# Patient Record
Sex: FEMALE | Race: WHITE | Employment: OTHER | URBAN - METROPOLITAN AREA
[De-identification: names, ages, dates, MRNs, and addresses within clinical notes are randomized per-mention and may not be internally consistent; named-entity substitution may affect disease eponyms.]

---

## 2017-06-20 ENCOUNTER — OFFICE VISIT (OUTPATIENT)
Dept: ORTHOPEDIC SURGERY | Age: 72
End: 2017-06-20

## 2017-06-20 VITALS
SYSTOLIC BLOOD PRESSURE: 129 MMHG | DIASTOLIC BLOOD PRESSURE: 63 MMHG | HEART RATE: 68 BPM | HEIGHT: 67 IN | BODY MASS INDEX: 18.83 KG/M2 | WEIGHT: 120 LBS

## 2017-06-20 DIAGNOSIS — M47.812 CERVICAL SPONDYLOSIS WITHOUT MYELOPATHY: ICD-10-CM

## 2017-06-20 DIAGNOSIS — M50.30 DEGENERATION OF CERVICAL INTERVERTEBRAL DISC: ICD-10-CM

## 2017-06-20 DIAGNOSIS — M50.20 DISPLACEMENT OF CERVICAL INTERVERTEBRAL DISC WITHOUT MYELOPATHY: Primary | ICD-10-CM

## 2017-06-20 DIAGNOSIS — M54.12 RADICULOPATHY, CERVICAL: ICD-10-CM

## 2017-06-20 RX ORDER — LEFLUNOMIDE 10 MG/1
TABLET ORAL
Refills: 0 | COMMUNITY
Start: 2017-05-31 | End: 2018-05-07

## 2017-06-20 RX ORDER — GABAPENTIN 100 MG/1
100 CAPSULE ORAL
Qty: 90 CAP | Refills: 5 | Status: SHIPPED | OUTPATIENT
Start: 2017-06-20 | End: 2018-04-30 | Stop reason: SDUPTHER

## 2017-06-20 NOTE — MR AVS SNAPSHOT
Visit Information Date & Time Provider Department Dept. Phone Encounter #  
 6/20/2017 10:50 AM Tom Arshad MD 4 [facility], [address] and [facility] [place] [phone] [id number] Follow-up Instructions Return in about 6 months (around 12/20/2017). Upcoming Health Maintenance Date Due Hepatitis C Screening 1945 DTaP/Tdap/Td series (1 - Tdap) 10/25/1967 BREAST CANCER SCRN MAMMOGRAM 10/25/1996 FOBT Q 1 YEAR AGE 50-75 10/25/1996 ZOSTER VACCINE AGE 60> 10/25/2006 GLAUCOMA SCREENING Q2Y 10/25/2011 OSTEOPOROSIS SCREENING (DEXA) 10/25/2011 Pneumococcal 65+ Low/Medium Risk (1 of 2 - PCV13) 10/25/2011 MEDICARE YEARLY EXAM 10/25/2011 INFLUENZA AGE 9 TO ADULT 8/1/2017 Allergies as of 6/20/2017  Review Complete On: 6/20/2017 By: Tom Arshad MD  
  
 Severity Noted Reaction Type Reactions Hydrocodone High 11/30/2016    Anaphylaxis Nsaids (Non-steroidal Anti-inflammatory Drug) Medium 11/30/2016    Other (comments) None due to kidneys Azithromycin  11/30/2016    Itching Other Medication    Other (comments) GI intolerance to nonsteroidal antiinflammatory medications Vicodin [Hydrocodone-acetaminophen]    Other (comments) Current Immunizations  Never Reviewed No immunizations on file. Not reviewed this visit You Were Diagnosed With   
  
 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 
ICD-9-CM: 723.4 Vitals BP Pulse Height(growth percentile) Weight(growth percentile) BMI OB Status 129/63 68 5' 7\" (1.702 m) 120 lb (54.4 kg) 18.79 kg/m2 Hysterectomy Smoking Status Never Smoker Vitals History BMI and BSA Data Body Mass Index Body Surface Area 18.79 kg/m 2 1.6 m 2 Preferred Pharmacy Pharmacy Name Phone [facility], [address] [phone] Your Updated Medication List  
  
   
This list is accurate as of: 6/20/17 11:43 AM.  Always use your most recent med list.  
  
  
  
  
 acetaminophen 500 mg tablet Commonly known as:  TYLENOL  
500 mg.  
  
 aspirin 81 mg chewable tablet Take 81 mg by mouth daily. Biotin (Bulk) 100 % Powd  
Use.  
  
 cholecalciferol 400 unit Tab tablet Commonly known as:  VITAMIN D3 Take 800 Units by mouth. CIMZIA SC  
by SubCUTAneous route. clotrimazole 10 mg bradly Commonly known as:  MYCELEX  
prn FISH -1,000 mg capsule Generic drug:  fish oil- omega-3 fatty acids Take  by Mouth. folic acid 1 mg tablet Commonly known as:  FOLVITE  
  
 gabapentin 100 mg capsule Commonly known as:  NEURONTIN Take 1 Cap by mouth three (3) times daily (with meals). Indications: NEUROPATHIC PAIN  
  
 leflunomide 10 mg tablet Commonly known as:  ARAVA  
  
 methotrexate 2.5 mg tablet Commonly known as:  RHEUMATREX  
take 6 tablets by mouth every week  
  
 metoprolol tartrate 50 mg tablet Commonly known as:  LOPRESSOR  
1 po bid  
  
 multivitamin tablet Commonly known as:  ONE A DAY Take 1 Tab by Mouth Once a Day. omeprazole 40 mg capsule Commonly known as:  PRILOSEC  
i po q d  
  
 predniSONE 5 mg tablet Commonly known as:  DELTASONE  
2.5 mg.  
  
 sertraline 25 mg tablet Commonly known as:  ZOLOFT Take  by mouth daily. Prescriptions Sent to Pharmacy Refills  
 gabapentin (NEURONTIN) 100 mg capsule 5 Sig: Take 1 Cap by mouth three (3) times daily (with meals). Indications: NEUROPATHIC PAIN  Class: Normal  
 Pharmacy: [facility] [address], [address]  #: [phone] Route: Oral  
  
Follow-up Instructions Return in about 6 months (around 12/20/2017). Introducing [facility]! Anthonymag Adams introduces True Pivot patient portal. Now you can access parts of your medical record, email your doctor's office, and request medication refills online. 1. In your internet browser, go to [email or web address] 2. Click on the First Time User? Click Here link in the Sign In box. You will see the New Member Sign Up page. 3. Enter your True Pivot Access Code exactly as it appears below. You will not need to use this code after youve completed the sign-up process. If you do not sign up before the expiration date, you must request a new code. · True Pivot Access Code: [email or web address] Expires: 9/18/2017 10:53 AM 
 
4. Enter the last four digits of your Social Security Number (xxxx) and Date of Birth (mm/dd/yyyy) as indicated and click Submit. You will be taken to the next sign-up page. 5. Create a True Pivot ID. This will be your True Pivot login ID and cannot be changed, so think of one that is secure and easy to remember. 6. Create a True Pivot password. You can change your password at any time. 7. Enter your Password Reset Question and Answer. This can be used at a later time if you forget your password. 8. Enter your e-mail address. You will receive e-mail notification when new information is available in [address] 9. Click Sign Up. You can now view and download portions of your medical record. 10. Click the Download Summary menu link to download a portable copy of your medical information. If you have questions, please visit the Frequently Asked Questions section of the True Pivot website. Remember, True Pivot is NOT to be used for urgent needs. For medical emergencies, dial 911. Now available from your iPhone and Android! Please provide this summary of care documentation to your next provider. Your primary care clinician is listed as Mily Parham. If you have any questions after today's visit, please call [phone].

## 2017-06-20 NOTE — PROGRESS NOTES
[facility]  [address], [place]  Phone: [phone]  Fax: [phone]        PROGRESS NOTE      HISTORY OF PRESENT ILLNESS:  The patient is a 80 y.o. female and was seen today for follow up of neck pain extending to left elbow with paraesthesia of the left hand. Pt reports of RUE pain with weakness of the hands with  but denies progression. Previously, patient stated her neck pain symptoms extended into the left upper extremity with LUE symptoms localized from the wrist distally involving all of the fingers. She had some lesser proximal symptoms. She reported trouble raising LUE some days. Pt sees Dr. Anne Ding, a neurosurgeon at [facility], and had MRI there. She also reports widespread pain which she attributes to RA, noting she is in the process of switching to a new biologic. Pt is on Methotrexate for her rheumatoid arthritis. She is followed by Dr. Justa Otoole for RA. Pt has hx of squamous skin carcinoma and reports her oncologist did not appreciate the biologic. Pt continues with Neurontin 100 mg TID. She continues with her HEP 3-4/week. She denies dropping objects or LOB. Cervical spine MRI dated 4/19/14 per report revealed a disc osteophyte complex with superimposed left paracentral disc protrusion at C5-C6 and flattening of the left cervical cord with severe narrowing of the left neuroforamen. There were degenerative changes at the remaining levels. At her last clinical appointment, she declined increased Neurontin dosage at that time. Patient wished to continue her current treatment. She was provided with refills of Neurontin 100 mg TID. I recommended she increase the frequency of HEP to daily. The patient returns today with pain location and distribution remain unchanged. She rates pain 1/10, an improvement since her last visit (2/10). She is compliant with Neurontin 100 mg TID. Pt admits completing HEP 5x/week.  reviewed. Past Medical History:   Diagnosis Date    Bowen's disease     Coronary artery disease     Fibromuscular dysplasia (HCC)     GERD (gastroesophageal reflux disease)     Hypertension     Rheumatoid arthritis ([facility] [address]     Skin cancer     Thyroid disease         Social History     Social History    Marital status:      Spouse name: N/A    Number of children: N/A    Years of education: N/A     Occupational History    Not on file. Social History Main Topics    Smoking status: Never Smoker    Smokeless tobacco: Never Used    Alcohol use Yes    Drug use: No    Sexual activity: Not on file     Other Topics Concern    Not on file     Social History Narrative       Current Outpatient Prescriptions   Medication Sig Dispense Refill    gabapentin (NEURONTIN) 100 mg capsule Take 1 Cap by mouth three (3) times daily (with meals). Indications: NEUROPATHIC PAIN 90 Cap 5    fish oil- omega-3 fatty acids (FISH OIL) 300-1,000 mg capsule Take  by Mouth.  multivitamin (ONE A DAY) tablet Take 1 Tab by Mouth Once a Day.  acetaminophen (TYLENOL) 500 mg tablet 500 mg.  cholecalciferol (VITAMIN D3) 400 unit tab tablet Take 800 Units by mouth.  methotrexate (RHEUMATREX) 2.5 mg tablet take 6 tablets by mouth every week  0    aspirin 81 mg chewable tablet Take 81 mg by mouth daily.  sertraline (ZOLOFT) 25 mg tablet Take  by mouth daily.  folic acid (FOLVITE) 1 mg tablet   0    metoprolol (LOPRESSOR) 50 mg tablet 1 po bid  0    omeprazole (PRILOSEC) 40 mg capsule i po q d      predniSONE (DELTASONE) 5 mg tablet 2.5 mg.  0    leflunomide (ARAVA) 10 mg tablet   0    Biotin, Bulk, 100 % powd Use.  clotrimazole (MYCELEX) 10 mg bradly prn  0    CERTOLIZUMAB PEGOL (CIMZIA SC) by SubCUTAneous route.          Allergies   Allergen Reactions    Hydrocodone Anaphylaxis    Nsaids (Non-Steroidal Anti-Inflammatory Drug) Other (comments)     None due to kidneys    Azithromycin Itching  Other Medication Other (comments)     GI intolerance to nonsteroidal antiinflammatory medications     Vicodin [Hydrocodone-Acetaminophen] Other (comments)          PHYSICAL EXAMINATION    Visit Vitals    /63    Pulse 68    Ht 5' 7\" (1.702 m)    Wt 120 lb (54.4 kg)    BMI 18.79 kg/m2       CONSTITUTIONAL: NAD, A&O x 3  SENSATION: Intact to light touch throughout  NEURO: Genesis's is negative bilaterally. RANGE OF MOTION: The patient has full passive range of motion in all four extremities. Shoulder AB/Flex Elbow Flex Wrist Ext Elbow Ext Wrist Flex Hand Intrin Tone   Right +4/5 +4/5 +4/5 +4/5 +4/5 +4/5 +4/5   Left +4/5 +4/5 +4/5 +4/5 +4/5 +4/5 +4/5                 ASSESSMENT   [place] was seen today for follow-up. 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 Indications: NEUROPATHIC PAIN    Degeneration of cervical intervertebral disc  -     gabapentin (NEURONTIN) 100 mg capsule; Take 1 Cap by mouth three (3) times daily (with meals). Indications: NEUROPATHIC PAIN    Cervical spondylosis without myelopathy  -     gabapentin (NEURONTIN) 100 mg capsule; Take 1 Cap by mouth three (3) times daily (with meals). Indications: NEUROPATHIC PAIN    Radiculopathy, cervical  -     gabapentin (NEURONTIN) 100 mg capsule; Take 1 Cap by mouth three (3) times daily (with meals). Indications: NEUROPATHIC PAIN          IMPRESSION AND PLAN:  Patient wished to continue her current treatment. She was provided with refills of her Neurontin 100 mg TID. I recommend she increase the frequency of HEP to daily. I will see the patient back in 6 month's time or earlier if needed. Written by Alaina Ramsey, as dictated by Junior William MD  I examined the patient, reviewed and agree with the note.

## 2018-03-19 ENCOUNTER — HOSPITAL ENCOUNTER (OUTPATIENT)
Dept: LAB | Age: 73
Discharge: HOME | End: 2018-03-19
Payer: MEDICARE

## 2018-03-19 LAB
ALBUMIN SERPL-MCNC: 3.9 G/DL (ref 3.4–5)
ALBUMIN/GLOB SERPL: 1.3 {RATIO} (ref 0.8–1.7)
ALP SERPL-CCNC: 71 U/L (ref 45–117)
ALT SERPL-CCNC: 26 U/L (ref 13–56)
ANION GAP SERPL CALC-SCNC: 9 MMOL/L (ref 3–18)
AST SERPL-CCNC: 24 U/L (ref 15–37)
BILIRUB SERPL-MCNC: 0.4 MG/DL (ref 0.2–1)
BUN SERPL-MCNC: 11 MG/DL (ref 7–18)
BUN/CREAT SERPL: 14 (ref 12–20)
CALCIUM SERPL-MCNC: 10.2 MG/DL (ref 8.5–10.1)
CHLORIDE SERPL-SCNC: 101 MMOL/L (ref 100–108)
CO2 SERPL-SCNC: 30 MMOL/L (ref 21–32)
CREAT SERPL-MCNC: 0.8 MG/DL (ref 0.6–1.3)
ERYTHROCYTE [DISTWIDTH] IN BLOOD BY AUTOMATED COUNT: 13.3 % (ref 11.6–14.5)
GLOBULIN SER CALC-MCNC: 3.1 G/DL (ref 2–4)
GLUCOSE SERPL-MCNC: 80 MG/DL (ref 74–99)
HCT VFR BLD AUTO: 37.5 % (ref 35–45)
HGB BLD-MCNC: 12.1 G/DL (ref 12–16)
MCH RBC QN AUTO: 29.9 PG (ref 24–34)
MCHC RBC AUTO-ENTMCNC: 32.3 G/DL (ref 31–37)
MCV RBC AUTO: 92.6 FL (ref 74–97)
PLATELET # BLD AUTO: 301 K/UL (ref 135–420)
PMV BLD AUTO: 10.8 FL (ref 9.2–11.8)
POTASSIUM SERPL-SCNC: 3.8 MMOL/L (ref 3.5–5.5)
PROT SERPL-MCNC: 7 G/DL (ref 6.4–8.2)
RBC # BLD AUTO: 4.05 M/UL (ref 4.2–5.3)
SODIUM SERPL-SCNC: 140 MMOL/L (ref 136–145)
WBC # BLD AUTO: 6.1 K/UL (ref 4.6–13.2)

## 2018-03-19 PROCEDURE — 36415 COLL VENOUS BLD VENIPUNCTURE: CPT

## 2018-03-19 PROCEDURE — 85027 COMPLETE CBC AUTOMATED: CPT

## 2018-03-19 PROCEDURE — 80053 COMPREHEN METABOLIC PANEL: CPT

## 2018-04-02 ENCOUNTER — HOSPITAL ENCOUNTER (OUTPATIENT)
Dept: LAB | Age: 73
Discharge: HOME | End: 2018-04-02
Payer: MEDICARE

## 2018-04-02 PROCEDURE — 88331 PATH CONSLTJ SURG 1 BLK 1SPC: CPT

## 2018-04-02 PROCEDURE — 88305 TISSUE EXAM BY PATHOLOGIST: CPT

## 2018-04-30 ENCOUNTER — OFFICE VISIT (OUTPATIENT)
Dept: ORTHOPEDIC SURGERY | Age: 73
End: 2018-04-30

## 2018-04-30 VITALS
HEIGHT: 67 IN | DIASTOLIC BLOOD PRESSURE: 52 MMHG | WEIGHT: 121 LBS | BODY MASS INDEX: 18.99 KG/M2 | SYSTOLIC BLOOD PRESSURE: 130 MMHG | HEART RATE: 64 BPM

## 2018-04-30 DIAGNOSIS — M50.30 DEGENERATION OF CERVICAL INTERVERTEBRAL DISC: ICD-10-CM

## 2018-04-30 DIAGNOSIS — M50.20 DISPLACEMENT OF CERVICAL INTERVERTEBRAL DISC WITHOUT MYELOPATHY: ICD-10-CM

## 2018-04-30 DIAGNOSIS — M50.20 OTHER CERVICAL DISC DISPLACEMENT, UNSPECIFIED CERVICAL REGION: ICD-10-CM

## 2018-04-30 DIAGNOSIS — M54.12 RADICULOPATHY, CERVICAL: ICD-10-CM

## 2018-04-30 DIAGNOSIS — M47.812 CERVICAL SPONDYLOSIS WITHOUT MYELOPATHY: Primary | ICD-10-CM

## 2018-04-30 RX ORDER — GABAPENTIN 100 MG/1
100 CAPSULE ORAL
Qty: 270 CAP | Refills: 0 | Status: SHIPPED | OUTPATIENT
Start: 2018-04-30 | End: 2018-10-18

## 2018-04-30 NOTE — MR AVS SNAPSHOT
[address] [facility] 
 
 
 [address] [facility] 
[phone] Patient: Tessy Andres MRN: [id number] :1945 Visit Information Date & Time Provider Department Dept. Phone Encounter #  
 2018 11:20 AM Trinity Benavides  [facility], [address] and [facility] [place] [phone] [id number] Follow-up Instructions Return in about 4 weeks (around 2018). Follow-up and Disposition History Your Appointments 2018 10:00 AM  
New Patient with Romain Cuello MD  
[facility]) Appt Note: New Patient - [facility] (referred by Dr. Juan R Marquez) [address] [facility], [facility] [address] [address] [place]  
  
   
 [address], [address] Upcoming Health Maintenance Date Due Hepatitis C Screening 1945 DTaP/Tdap/Td series (1 - Tdap) 10/25/1967 BREAST CANCER SCRN MAMMOGRAM 10/25/1996 FOBT Q 1 YEAR AGE 50-75 10/25/1996 ZOSTER VACCINE AGE 60> 2006 GLAUCOMA SCREENING Q2Y 10/25/2011 Bone Densitometry (Dexa) Screening 10/25/2011 Pneumococcal 65+ Low/Medium Risk (1 of 2 - PCV13) 10/25/2011 MEDICARE YEARLY EXAM 3/20/2018 Influenza Age 5 to Adult 2018 Allergies as of 2018  Review Complete On: 2018 By: Trinity Benavides MD  
  
 Severity Noted Reaction Type Reactions Hydrocodone High 2016    Anaphylaxis Nsaids (Non-steroidal Anti-inflammatory Drug) Medium 2016    Other (comments) None due to kidneys Azithromycin  2016    Itching Other Medication    Other (comments) GI intolerance to nonsteroidal antiinflammatory medications Vicodin [Hydrocodone-acetaminophen]    Other (comments) Current Immunizations  Never Reviewed No immunizations on file. Not reviewed this visit You Were Diagnosed With   
  
 Codes Comments Cervical spondylosis without myelopathy    -  Primary ICD-10-CM: G63.369 ICD-9-CM: 721.0 Other cervical disc displacement, unspecified cervical region     ICD-10-CM: M50.20 ICD-9-CM: 722.0 Radiculopathy, cervical     ICD-10-CM: M54.12 
ICD-9-CM: 723.4 Displacement of cervical intervertebral disc without myelopathy     ICD-10-CM: M50.20 ICD-9-CM: 722.0 Degeneration of cervical intervertebral disc     ICD-10-CM: M50.30 ICD-9-CM: 722.4 Vitals BP Pulse Height(growth percentile) Weight(growth percentile) BMI OB Status 130/52 64 5' 7\" (1.702 m) 121 lb (54.9 kg) 18.95 kg/m2 Hysterectomy Smoking Status Never Smoker Vitals History BMI and BSA Data Body Mass Index Body Surface Area 18.95 kg/m 2 1.61 m 2 Preferred Pharmacy Pharmacy Name Phone [facility], [facility] [address] [phone] Your Updated Medication List  
  
   
This list is accurate as of 4/30/18 12:31 PM.  Always use your most recent med list.  
  
  
  
  
 acetaminophen 500 mg tablet Commonly known as:  TYLENOL  
500 mg.  
  
 aspirin 81 mg chewable tablet Take 81 mg by mouth daily. Biotin (Bulk) 100 % Powd  
Use.  
  
 cholecalciferol 400 unit Tab tablet Commonly known as:  VITAMIN D3 Take 800 Units by mouth. CIMZIA SC  
by SubCUTAneous route. clotrimazole 10 mg bradly Commonly known as:  MYCELEX  
prn FISH -1,000 mg capsule Generic drug:  fish oil- omega-3 fatty acids Take  by Mouth. folic acid 1 mg tablet Commonly known as:  FOLVITE  
  
 gabapentin 100 mg capsule Commonly known as:  NEURONTIN Take 1 Cap by mouth three (3) times daily (with meals). Indications: NEUROPATHIC PAIN  
  
 leflunomide 10 mg tablet Commonly known as:  ARAVA  
  
 methotrexate 2.5 mg tablet Commonly known as:  RHEUMATREX  
take 6 tablets by mouth every week metoprolol tartrate 50 mg tablet Commonly known as:  LOPRESSOR  
1 po bid  
  
 multivitamin tablet Commonly known as:  ONE A DAY Take 1 Tab by Mouth Once a Day. omeprazole 40 mg capsule Commonly known as:  PRILOSEC  
i po q d  
  
 predniSONE 5 mg tablet Commonly known as:  DELTASONE  
2.5 mg.  
  
 sertraline 25 mg tablet Commonly known as:  ZOLOFT Take  by mouth daily. Prescriptions Sent to Pharmacy Refills  
 gabapentin (NEURONTIN) 100 mg capsule 0 Sig: Take 1 Cap by mouth three (3) times daily (with meals). Indications: NEUROPATHIC PAIN Class: Normal  
 Pharmacy: [id number] [address] [place] [address],[address], [address] Ph #: [phone] Route: Oral  
  
Follow-up Instructions Return in about 4 weeks (around 5/28/2018). Introducing [facility]! Dear [place]: 
Thank you for requesting a [employer] account. Our records indicate that you already have an active [employer] account. You can access your account anytime at [email or web address] [email or web address] Did you know that you can access your hospital and ER discharge instructions at any time in [employer]? You can also review all of your test results from your hospital stay or ER visit. Additional Information If you have questions, please visit the Frequently Asked Questions section of the 2[employer] website at [email or web address] [email or web address] Remember, [employer] is NOT to be used for urgent needs. For medical emergencies, dial 911. Now available from your iPhone and Android! Please provide this summary of care documentation to your next provider. Your primary care clinician is listed as Shayne Gilman. If you have any questions after today's visit, please call [phone].

## 2018-04-30 NOTE — PROGRESS NOTES
[facility] SPECIAL[facility]  [address], [place]   Phone: [phone]  Fax: [phone]        PROGRESS NOTE      HISTORY OF PRESENT ILLNESS:  The patient is a 71 y.o. female and was seen today for follow up of neck and left shoulder pain extending to LUE to the hand, involving all digits with paresthesia. Pt reports of RUE pain with weakness of the hands with  but denies progression. Previously, patient stated her neck pain symptoms extended into the left upper extremity with LUE symptoms localized from the wrist distally involving all of the fingers. She had some lesser proximal symptoms. She reported trouble raising LUE some days. Pt sees Dr. Surendra Lieja, a neurosurgeon at [facility], and had MRI there. She also reports widespread pain which she attributes to RA, noting she is in the process of switching to a new biologic. Pt is on Methotrexate for her rheumatoid arthritis. She is followed by Dr. Sandor Bay for RA. Pt has hx of squamous skin carcinoma and reports her oncologist did not appreciate the biologic. Pt continues with Neurontin 100 mg TID. She continues with her HEP 3-4/week. She denies dropping objects or LOB. Cervical spine MRI dated 4/19/14 per report revealed a disc osteophyte complex with superimposed left paracentral disc protrusion at C5-C6 and flattening of the left cervical cord with severe narrowing of the left neuroforamen. There were degenerative changes at the remaining levels. At her last clinical appointment, patient wished to continue her current treatment. She was provided with refills of her Neurontin 100 mg TID. I recommended she increase the frequency of HEP to daily. Pt was last seen on 6/20/17 but failed to f/u in 6 month's time. The patient returns today with pain location and distribution remain unchanged. She rates pain 5/10, elevated since her last visit (1/10).  Pt states she had to d/c her Methotrexate x 6 weeks d/t a facial surgery which she believes has caused her increase in neck/left shoulder pain. She reports she will be receiving Methotrexate treatment today and next Tuesday. Pt is compliant with Neurontin 100 mg TID. Pt performs her HEP daily.  reviewed. Body mass index is 18.95 kg/(m^2). PCP: Romain Cuello MD      Past Medical History:   Diagnosis Date    Bowen's disease     Coronary artery disease     Fibromuscular dysplasia ([facility] [address]     GERD (gastroesophageal reflux disease)     Hypertension     Rheumatoid arthritis ([facility] [address]     Skin cancer     Thyroid disease         Social History     Social History    Marital status:      Spouse name: N/A    Number of children: N/A    Years of education: N/A     Occupational History    Not on file. Social History Main Topics    Smoking status: Never Smoker    Smokeless tobacco: Never Used    Alcohol use Yes    Drug use: No    Sexual activity: Not on file     Other Topics Concern    Not on file     Social History Narrative       Current Outpatient Prescriptions   Medication Sig Dispense Refill    gabapentin (NEURONTIN) 100 mg capsule Take 1 Cap by mouth three (3) times daily (with meals). Indications: NEUROPATHIC PAIN 270 Cap 0    fish oil- omega-3 fatty acids (FISH OIL) 300-1,000 mg capsule Take  by Mouth.  multivitamin (ONE A DAY) tablet Take 1 Tab by Mouth Once a Day.  acetaminophen (TYLENOL) 500 mg tablet 500 mg.  cholecalciferol (VITAMIN D3) 400 unit tab tablet Take 800 Units by mouth.  methotrexate (RHEUMATREX) 2.5 mg tablet take 6 tablets by mouth every week  0    aspirin 81 mg chewable tablet Take 81 mg by mouth daily.  sertraline (ZOLOFT) 25 mg tablet Take  by mouth daily.       folic acid (FOLVITE) 1 mg tablet   0    metoprolol (LOPRESSOR) 50 mg tablet 1 po bid  0    omeprazole (PRILOSEC) 40 mg capsule i po q d      predniSONE (DELTASONE) 5 mg tablet 2.5 mg.  0    leflunomide (ARAVA) 10 mg tablet   0    Biotin, Bulk, 100 % powd Use.  clotrimazole (MYCELEX) 10 mg bradly prn  0    CERTOLIZUMAB PEGOL (CIMZIA SC) by SubCUTAneous route. Allergies   Allergen Reactions    Hydrocodone Anaphylaxis    Nsaids (Non-Steroidal Anti-Inflammatory Drug) Other (comments)     None due to kidneys    Azithromycin Itching    Other Medication Other (comments)     GI intolerance to nonsteroidal antiinflammatory medications     Vicodin [Hydrocodone-Acetaminophen] Other (comments)        PHYSICAL EXAMINATION    Visit Vitals    /52    Pulse 64    Ht 5' 7\" (1.702 m)    Wt 54.9 kg (121 lb)    BMI 18.95 kg/m2       CONSTITUTIONAL: NAD, A&O x 3  SENSATION: Intact to light touch throughout  NEURO: Genesis's is negative bilaterally. RANGE OF MOTION: The patient has full passive range of motion in all four extremities. Shoulder AB/Flex Elbow Flex Wrist Ext Elbow Ext Wrist Flex Hand Intrin Tone   Right +4/5 +4/5 +4/5 +4/5 +4/5 +4/5 +4/5   Left +4/5 +4/5 +4/5 +4/5 +4/5 +4/5 +4/5                 ASSESSMENT   Diagnoses and all orders for this visit:    1. Cervical spondylosis without myelopathy  -     gabapentin (NEURONTIN) 100 mg capsule; Take 1 Cap by mouth three (3) times daily (with meals). Indications: NEUROPATHIC PAIN    2. Other cervical disc displacement, unspecified cervical region    3. Radiculopathy, cervical  -     gabapentin (NEURONTIN) 100 mg capsule; Take 1 Cap by mouth three (3) times daily (with meals). Indications: NEUROPATHIC PAIN    4. 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 Indications: NEUROPATHIC PAIN    5. Degeneration of cervical intervertebral disc  -     gabapentin (NEURONTIN) 100 mg capsule; Take 1 Cap by mouth three (3) times daily (with meals).  Indications: NEUROPATHIC PAIN          IMPRESSION AND PLAN:  Patient wished to continue her current treatment at this time as she would like to see how she responds to her Methotrexate treatments. She was provided with refills of her Neurontin 100 mg TID. I encourage patient to perform her HEP daily. I will see the patient back in 1 month's time or earlier if needed. Written by Roselia Wei, as dictated by Leonel Cooney MD  I examined the patient, reviewed and agree with the note.

## 2018-05-02 ENCOUNTER — TELEPHONE (OUTPATIENT)
Dept: INTERNAL MEDICINE CLINIC | Age: 73
End: 2018-05-02

## 2018-05-02 ENCOUNTER — OFFICE VISIT (OUTPATIENT)
Dept: INTERNAL MEDICINE CLINIC | Age: 73
End: 2018-05-02

## 2018-05-02 DIAGNOSIS — D04.9 BOWEN'S DISEASE: ICD-10-CM

## 2018-05-02 DIAGNOSIS — K58.2 IRRITABLE BOWEL SYNDROME WITH BOTH CONSTIPATION AND DIARRHEA: ICD-10-CM

## 2018-05-02 DIAGNOSIS — K21.9 GASTROESOPHAGEAL REFLUX DISEASE, ESOPHAGITIS PRESENCE NOT SPECIFIED: ICD-10-CM

## 2018-05-02 DIAGNOSIS — E21.3 HYPERPARATHYROIDISM (HCC): ICD-10-CM

## 2018-05-02 DIAGNOSIS — Z00.00 MEDICARE ANNUAL WELLNESS VISIT, INITIAL: ICD-10-CM

## 2018-05-02 DIAGNOSIS — M54.2 NECK PAIN ON LEFT SIDE: ICD-10-CM

## 2018-05-02 DIAGNOSIS — Z71.89 ACP (ADVANCE CARE PLANNING): ICD-10-CM

## 2018-05-02 DIAGNOSIS — M05.79 RHEUMATOID ARTHRITIS INVOLVING MULTIPLE SITES WITH POSITIVE RHEUMATOID FACTOR (HCC): ICD-10-CM

## 2018-05-02 DIAGNOSIS — I44.7 LBBB (LEFT BUNDLE BRANCH BLOCK): ICD-10-CM

## 2018-05-02 DIAGNOSIS — I77.3 FIBROMUSCULAR DYSPLASIA (HCC): ICD-10-CM

## 2018-05-02 DIAGNOSIS — I10 ESSENTIAL HYPERTENSION: Primary | ICD-10-CM

## 2018-05-02 DIAGNOSIS — I70.1 RENAL ARTERY STENOSIS DUE TO FIBROMUSCULAR DYSPLASIA (HCC): ICD-10-CM

## 2018-05-02 DIAGNOSIS — I77.3 RENAL ARTERY STENOSIS DUE TO FIBROMUSCULAR DYSPLASIA (HCC): ICD-10-CM

## 2018-05-02 DIAGNOSIS — M54.12 RADICULOPATHY, CERVICAL: ICD-10-CM

## 2018-05-02 NOTE — MR AVS SNAPSHOT
[facility] 
 
 
 [address], [facility] [address] [facility] 
[phone] Patient: Tessy Andres MRN: [id number] :1945 Visit Information Date & Time Provider Department Dept. Phone Encounter #  
 2018 10:00 AM Romain Cuello MD [facility] [id number] Follow-up Instructions Return in about 3 months (around 2018), or if symptoms worsen or fail to improve. Your Appointments 2018 10:25 AM  
Follow Up with Trinity Benavides MD  
[facility] ([facility]) Appt Note: neck 4 wk fu  
  [address]  
  
    
 2018  9:25 AM  
LAB with [facility] NURSE VISIT [facility] ([facility]) Appt Note: labs [address], [address] [place]  
  
   
 [address], [address]  
  
    
 2018  8:30 AM  
Office Visit with Romain Cuello MD  
[facility] Appt Note: ov per sarris [address], [facility] [address] [address] [place]  
  
   
 [address], [address] Upcoming Health Maintenance Date Due Hepatitis C Screening 1945 DTaP/Tdap/Td series (1 - Tdap) 10/25/1967 BREAST CANCER SCRN MAMMOGRAM 10/25/1996 FOBT Q 1 YEAR AGE 50-75 10/25/1996 ZOSTER VACCINE AGE 60> 2006 GLAUCOMA SCREENING Q2Y 10/25/2011 Bone Densitometry (Dexa) Screening 10/25/2011 Pneumococcal 65+ Low/Medium Risk (1 of 2 - PCV13) 10/25/2011 MEDICARE YEARLY EXAM 3/20/2018 Influenza Age 5 to Adult 2018 Allergies as of 2018  Review Complete On: 2018 By: Jaxon Vazquez Severity Noted Reaction Type Reactions Hydrocodone High 2016    Anaphylaxis Nsaids (Non-steroidal Anti-inflammatory Drug) Medium 11/30/2016    Other (comments) None due to kidneys Azithromycin  11/30/2016    Itching Other Medication    Other (comments) GI intolerance to nonsteroidal antiinflammatory medications Vicodin [Hydrocodone-acetaminophen]    Other (comments) Current Immunizations  Never Reviewed No immunizations on file. Not reviewed this visit Vitals BP Pulse Temp Resp Height(growth percentile) Weight(growth percentile) 146/86 (BP 1 Location: Right arm, BP Patient Position: Sitting) 75 98.1 °F (36.7 °C) (Oral) 16 5' 7\" (1.702 m) 120 lb 6.4 oz (54.6 kg) SpO2 BMI OB Status Smoking Status 90% 18.86 kg/m2 Hysterectomy Never Smoker Vitals History BMI and BSA Data Body Mass Index Body Surface Area  
 18.86 kg/m 2 1.61 m 2 Preferred Pharmacy Pharmacy Name Phone [facility], [facility] [address]. [phone] Your Updated Medication List  
  
   
This list is accurate as of 5/2/18 11:17 AM.  Always use your most recent med list.  
  
  
  
  
 acetaminophen 500 mg tablet Commonly known as:  TYLENOL  
500 mg.  
  
 aspirin 81 mg chewable tablet Take 81 mg by mouth daily. Biotin (Bulk) 100 % Powd  
Use.  
  
 cholecalciferol 400 unit Tab tablet Commonly known as:  VITAMIN D3 Take 800 Units by mouth. CIMZIA SC  
by SubCUTAneous route. clotrimazole 10 mg bradly Commonly known as:  MYCELEX  
prn FISH -1,000 mg capsule Generic drug:  fish oil- omega-3 fatty acids Take  by Mouth. folic acid 1 mg tablet Commonly known as:  FOLVITE  
  
 gabapentin 100 mg capsule Commonly known as:  NEURONTIN Take 1 Cap by mouth three (3) times daily (with meals). Indications: NEUROPATHIC PAIN  
  
 leflunomide 10 mg tablet Commonly known as:  ARAVA  
  
 methotrexate 2.5 mg tablet Commonly known as:  Peri Burton take 6 tablets by mouth every week  
  
 metoprolol tartrate 50 mg tablet Commonly known as:  LOPRESSOR  
1 po bid  
  
 multivitamin tablet Commonly known as:  ONE A DAY Take 1 Tab by Mouth Once a Day. omeprazole 40 mg capsule Commonly known as:  PRILOSEC  
i po q d  
  
 predniSONE 5 mg tablet Commonly known as:  DELTASONE  
2.5 mg.  
  
 sertraline 25 mg tablet Commonly known as:  ZOLOFT Take  by mouth daily. Follow-up Instructions Return in about 3 months (around 8/2/2018), or if symptoms worsen or fail to improve. Patient Instructions Medicare Wellness Visit, Female The best way to improve and maintain good health is to have a healthy lifestyle by eating a well-balanced diet, exercising regularly, limiting alcohol and stopping smoking. Regular visits with your physician or non-physician health care provider also support your good health. Preventive screening tests can find health problems before they become diseases or illnesses. Preventive services such as immunizations prevent serious infections. All people over age 72 should have a Pneumovax and a Prevnar-13 shot to prevent potentially life threatening infections with the pneumococcus bacteria, a common cause of pneumonia. These are once in a lifetime unless you and your provider decide differently. All people over 65 should have a yearly influenza vaccine or \"flu\" shot. This does not prevent infection with cold viruses but has been proven to prevent hospitalization and death from influenza. Although Medicare part B \"regular Medicare\" currently only covers tetanus vaccination in the context of an injury, a tetanus vaccine (Tdap or Td) is recommended every 10 years. A shingles vaccine is recommended once in a lifetime after age 61. The Shingles vaccine is also not covered by Medicare part B.  
 
Note, however, that both the Shingles vaccine and Tdap/Td are generally covered by secondary carriers. Please check your coverage and out of pocket expenses. Consider contacting your local health department because it may stock these vaccines for a reasonable charge. We currently have documentation of the following immunization history for you: There is no immunization history on file for this patient. Screening for infection with Hepatitis C is recommended for anyone born between [birth year] through [address]. The table at the bottom of this document indicates the status of this and other preventive services. A bone mass density test (DEXA) to screen for osteoporosis or thinning of the bones should be done at least once after age 72 and may be done up to every 2 years as determined by you and your health care provider. The most recent DEXA we have on file for you is: DEXA Results (most recent): No results found for this or any previous visit. Screening for diabetes mellitus with a blood sugar test (glucose) should be done at least every 3 years until age 79. You and your health care provider may decide whether to continue screening after age 79. The most recent blood glucose we have on file for you is:  
Lab Results Component Value Date/Time Glucose 81 03/19/2018 01:48 PM  
 
 
 
Glaucoma is a disease of the eye due to increased ocular pressure that can lead to blindness. People with risk factors for glaucoma ( race, diabetes, family history) should be screened at least every 2 years by an eye professional.  
 
Cardiovascular screening tests that check for elevated lipids or cholesterol (fatty part of blood) which can lead to heart disease and strokes should be done every 4-6 years through age 79. You and your health care provider may decide whether to continue screening after age 79. The most recent lipid panel we have on file for you is: No results found for: CHOL, CHOLPOCT, CHOLX, CHLST, CHOLV, HDL, HDLPOC, LDL, LDLCPOC, LDLC, DLDLP, VLDLC, VLDL, TGLX, TRIGL, TRIGP, TGLPOCT, CHHD, CHHDX Colorectal cancer screening that evaluates for blood or polyps in your colon for people with average risk should be done yearly as a stool test, every five years as a flexible sigmoidoscope or every 10 years as a colonoscopy up to age 76. You and your health care provider may decide whether to continue screening after age 76. Breast cancer screening with a mammogram is recommended at least once every 2 years  for women age 54-69. You and your health care provider may decide whether to continue screening after age 76. The most recent mammogram we have on file for you is: [facility] Results (most recent): No results found for this or any previous visit. Screening for cervical cancer with a pap smear is recommended for all women with a cervix until age 72. The frequency of this test is based on the details of her prior pap smear testing. You and your health care provider may decide whether to continue screening after age 72. People who have smoked the equivalent of 1 pack per day for 30 years or more may benefit from screening for lung cancer with a yearly low dose CT scan until they have been non smokers for 15 years or competing health conditions render this unlikely to be beneficial. Our records show: N/A Your Medicare Wellness Exam is recommended annually. Here is a list of your current Health Maintenance items with a due date: 
Health Maintenance Topic Date Due  
 Hepatitis C Screening  1945  
 DTaP/Tdap/Td series (1 - Tdap) 10/25/1967  BREAST CANCER SCRN MAMMOGRAM  10/25/1996  
 FOBT Q 1 YEAR AGE 50-75  10/25/1996  ZOSTER VACCINE AGE 60>  08/25/2006  GLAUCOMA SCREENING Q2Y  10/25/2011  Bone Densitometry (Dexa) Screening  10/25/2011  Pneumococcal 65+ Low/Medium Risk (1 of 2 - PCV13) 10/25/2011  MEDICARE YEARLY EXAM  03/20/2018  Influenza Age 5 to Adult  08/01/2018 Will update health maintenance once records received and chart reviewed. Introducing [facility]! Dear [address] [place] [address]: 
Thank you for requesting a Heyo account. Our records indicate that you already have an active Heyo account. You can access your account anytime at [email or web address] Did you know that you can access your hospital and ER discharge instructions at any time in Heyo? You can also review all of your test results from your hospital stay or ER visit. Additional Information If you have questions, please visit the Frequently Asked Questions section of the Heyo website at [email or web address] Remember, Heyo is NOT to be used for urgent needs. For medical emergencies, dial 911. Now available from your iPhone and Android! Please provide this summary of care documentation to your next provider. Your primary care clinician is listed as Deonte Morel. If you have any questions after today's visit, please call [phone].

## 2018-05-02 NOTE — PROGRESS NOTES
This is an Initial Medicare Annual Wellness Exam (AWV) (Performed 12 months after IPPE or effective date of Medicare Part B enrollment, Once in a lifetime)    I have reviewed the patient's medical history in detail and updated the computerized patient record. 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    Rheumatoid arthritis ([facility] [address]     Skin cancer       Past Surgical History:   Procedure Laterality Date    HX ANGIOPLASTY      HX KNEE REPLACEMENT Bilateral     partial     HX OTHER SURGICAL      removal of fibroid benign tumors from breast     HX PARTIAL HYSTERECTOMY      HX TONSILLECTOMY       Current Outpatient Prescriptions   Medication Sig Dispense Refill    pantoprazole (PROTONIX) 40 mg tablet 40 mg.  predniSONE (DELTASONE) 5 mg tablet 5 mg.  gabapentin (NEURONTIN) 100 mg capsule Take 1 Cap by mouth three (3) times daily (with meals). Indications: NEUROPATHIC PAIN 270 Cap 0    fish oil- omega-3 fatty acids (FISH OIL) 300-1,000 mg capsule Take  by Mouth.  multivitamin (ONE A DAY) tablet Take 1 Tab by Mouth Once a Day.  acetaminophen (TYLENOL) 500 mg tablet 500 mg.  cholecalciferol (VITAMIN D3) 400 unit tab tablet Take 800 Units by mouth.  methotrexate (RHEUMATREX) 2.5 mg tablet take 6 tablets by mouth every week  0    aspirin 81 mg chewable tablet Take 81 mg by mouth daily.  sertraline (ZOLOFT) 25 mg tablet Take  by mouth daily.  folic acid (FOLVITE) 1 mg tablet   0    metoprolol (LOPRESSOR) 50 mg tablet 1 po bid  0    Biotin, Bulk, 100 % powd Use.       clotrimazole (MYCELEX) 10 mg bradly prn  0     Allergies   Allergen Reactions    Hydrocodone Anaphylaxis    Nsaids (Non-Steroidal Anti-Inflammatory Drug) Other (comments)     None due to kidneys    Azithromycin Itching    Other Medication Other (comments)     GI intolerance to nonsteroidal antiinflammatory medications     Vicodin [Hydrocodone-Acetaminophen] Other (comments)     Family History   Problem Relation Age of Onset    Arthritis-osteo Mother     Elevated Lipids Mother     Heart Disease Mother      CHF    Lung Disease Mother     Hypertension Sister     Lung Disease Sister     Hypertension Brother     MS Brother      Social History   Substance Use Topics    Smoking status: Never Smoker    Smokeless tobacco: Never Used    Alcohol use Yes     Patient Active Problem List   Diagnosis Code    Neck pain on left side M54.2    Intervertebral disc protrusion [id number]    Displacement of cervical intervertebral disc without myelopathy M50.20    Cervical spondylosis without myelopathy M47.812    Degeneration of cervical intervertebral disc M50.30    Brachial neuritis or radiculitis M54.12    Radiculopathy, cervical M54.12    Skin cancer C44.90    Rheumatoid arthritis ([facility] [address] M06.9    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 (HCC) I70.1    LBBB (left bundle branch block) I44.7    Hypertension I10    Hyperparathyroidism ([facility] [address] E21.3    Hiatal hernia K44.9    GERD (gastroesophageal reflux disease) K21.9    Fibromuscular dysplasia (HCC) I77.3    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    Irritable bowel syndrome with both constipation and diarrhea K58.2       Depression Risk Factor Screening:     PHQ over the last two weeks 5/2/2018   PHQ Not Done -   Little interest or pleasure in doing things Not at all   Feeling down, depressed or hopeless Not at all   Total Score PHQ 2 0     Alcohol Risk Factor Screening: You do not drink alcohol or very rarely. Functional Ability and Level of Safety:     Hearing Loss  Hearing is good.     Activities of Daily Living  The home contains: no safety equipment. Patient does total self care    Fall Risk  Fall Risk Assessment, last 12 mths 5/2/2018   Able to walk? Yes   Fall in past 12 months? Yes   Fall with injury? Yes   Number of falls in past 12 months 1   Fall Risk Score 2       Abuse Screen  Patient is not abused    Cognitive Screening   Evaluation of Cognitive Function:  Has your family/caregiver stated any concerns about your memory: no  Normal    Patient Care Team   Patient Care Team:  Kofi Staples MD as PCP - General (Internal Medicine)  Leesa Steve MD as Physician (Physical Medicine and Rehab)  Denice Welsh DO (Rheumatology)  Jane Sharpe MD (Gastroenterology)  Rebel Colon MD (Ophthalmology)  Jewell Bright MD (Cardiology)  Bob Busby MD (Endocrinology)    Assessment/Plan   Education and counseling provided:  Are appropriate based on today's review and evaluation  End-of-Life planning (with patient's consent)  Pneumococcal Vaccine  Screening Mammography  Screening Pap and pelvic (covered once every 2 years)  Colorectal cancer screening tests  Cardiovascular screening blood test  Bone mass measurement (DEXA)  Screening for glaucoma  Diabetes screening test    Diagnoses and all orders for this visit:    1. Essential hypertension  -     CBC WITH AUTOMATED DIFF; Future  -     LIPID PANEL; Future  -     METABOLIC PANEL, COMPREHENSIVE; Future  -     URINALYSIS W/MICROSCOPIC; Future  -     TSH 3RD GENERATION; Future    2. Fibromuscular dysplasia ([address]  -     LIPID PANEL; Future    3. LBBB (left bundle branch block)    4. 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 Rheumatoid arthritis involving multiple sites with positive rheumatoid factor (HCC)  -     CBC WITH AUTOMATED DIFF; Future  -     VITAMIN D, 25 HYDROXY; Future    6. Hyperparathyroidism ([address]  -     VITAMIN D, 25 HYDROXY; Future    7. Radiculopathy, cervical    8.  Gastroesophageal reflux disease, esophagitis presence not specified    9. Irritable bowel syndrome with both constipation and diarrhea    10. Bowen's disease    11. Neck pain on left side    12. Medicare annual wellness visit, initial    15.  ACP (advance care planning)         Health Maintenance Due   Topic Date Due    DTaP/Tdap/Td series (1 - Tdap) 10/25/1967    Pneumococcal 65+ Low/Medium Risk (1 of 2 - PCV13) 10/25/2011

## 2018-05-02 NOTE — PATIENT INSTRUCTIONS
Medicare Wellness Visit, Female    The best way to improve and maintain good health is to have a healthy lifestyle by eating a well-balanced diet, exercising regularly, limiting alcohol and stopping smoking. Regular visits with your physician or non-physician health care provider also support your good health. Preventive screening tests can find health problems before they become diseases or illnesses. Preventive services such as immunizations prevent serious infections. All people over age 72 should have a Pneumovax and a Prevnar-13 shot to prevent potentially life threatening infections with the pneumococcus bacteria, a common cause of pneumonia. These are once in a lifetime unless you and your provider decide differently. All people over 65 should have a yearly influenza vaccine or \"flu\" shot. This does not prevent infection with cold viruses but has been proven to prevent hospitalization and death from influenza. Although Medicare part B \"regular Medicare\" currently only covers tetanus vaccination in the context of an injury, a tetanus vaccine (Tdap or Td) is recommended every 10 years. A shingles vaccine is recommended once in a lifetime after age 61. The Shingles vaccine is also not covered by Medicare part B. Note, however, that both the Shingles vaccine and Tdap/Td are generally covered by secondary carriers. Please check your coverage and out of pocket expenses. Consider contacting your local health department because it may stock these vaccines for a reasonable charge. We currently have documentation of the following immunization history for you:  Immunization History   Administered Date(s) Administered    Pneumococcal Conjugate (PCV-13) 12/14/2015       Screening for infection with Hepatitis C is recommended for anyone born between [birth year] through [address]. The table at the bottom of this document indicates the status of this and other preventive services.     A bone mass density test (DEXA) to screen for osteoporosis or thinning of the bones should be done at least once after age 72 and may be done up to every 2 years as determined by you and your health care provider. The most recent DEXA we have on file for you is:  DEXA Results (most recent):  No results found for this or any previous visit. Screening for diabetes mellitus with a blood sugar test (glucose) should be done at least every 3 years until age 79. You and your health care provider may decide whether to continue screening after age 79. The most recent blood glucose we have on file for you is:   Lab Results   Component Value Date/Time    Glucose 81 03/19/2018 01:48 PM         Glaucoma is a disease of the eye due to increased ocular pressure that can lead to blindness. People with risk factors for glaucoma ( race, diabetes, family history) should be screened at least every 2 years by an eye professional.     Cardiovascular screening tests that check for elevated lipids or cholesterol (fatty part of blood) which can lead to heart disease and strokes should be done every 4-6 years through age 79. You and your health care provider may decide whether to continue screening after age 79. The most recent lipid panel we have on file for you is:   No results found for: CHOL, CHOLPOCT, CHOLX, CHLST, CHOLV, HDL, HDLPOC, LDL, LDLCPOC, LDLC, DLDLP, VLDLC, VLDL, TGLX, TRIGL, TRIGP, TGLPOCT, CHHD, CHHDX    Colorectal cancer screening that evaluates for blood or polyps in your colon for people with average risk should be done yearly as a stool test, every five years as a flexible sigmoidoscope or every 10 years as a colonoscopy up to age 76. You and your health care provider may decide whether to continue screening after age 76. Breast cancer screening with a mammogram is recommended at least once every 2 years  for women age 54-69. You and your health care provider may decide whether to continue screening after age 76.  The most recent mammogram we have on file for you is:   [facility] Results (most recent):  No results found for this or any previous visit. Screening for cervical cancer with a pap smear is recommended for all women with a cervix until age 72. The frequency of this test is based on the details of her prior pap smear testing. You and your health care provider may decide whether to continue screening after age 72. People who have smoked the equivalent of 1 pack per day for 30 years or more may benefit from screening for lung cancer with a yearly low dose CT scan until they have been non smokers for 15 years or competing health conditions render this unlikely to be beneficial. Our records show: N/A    Your Medicare Wellness Exam is recommended annually. Here is a list of your current Health Maintenance items with a due date:  Health Maintenance   Topic Date Due    Hepatitis C Screening  1945    DTaP/Tdap/Td series (1 - Tdap) 10/25/1967    ZOSTER VACCINE AGE 60>  08/25/2006    Bone Densitometry (Dexa) Screening  10/25/2011    Pneumococcal 65+ Low/Medium Risk (1 of 2 - PCV13) 10/25/2011    Influenza Age 9 to Adult  08/01/2018    MEDICARE YEARLY EXAM  05/03/2019    GLAUCOMA SCREENING Q2Y  07/10/2019    BREAST CANCER SCRN MAMMOGRAM  09/06/2019    COLONOSCOPY  07/14/2021     Will update health maintenance once records received and chart reviewed.

## 2018-05-02 NOTE — ACP (ADVANCE CARE PLANNING)
Advance Care Planning (ACP) Provider Note - Comprehensive     Date of ACP Conversation: 05/02/18  Persons included in Conversation:  patient  Length of ACP Conversation in minutes:  16 minutes    Authorized Decision Maker (if patient is incapable of making informed decisions):  and daughter  This person is:  Healthcare Agent/Medical Power of  under Advance Directive          General ACP for ALL Patients with Decision Making Capacity:   Importance of advance care planning, including choosing a healthcare agent to communicate patient's healthcare decisions if patient lost the ability to make decisions, such as after a sudden illness or accident  Understanding of the healthcare agent role was assessed and information provided  Exploration of values, goals, and preferences if recovery is not expected, even with continued medical treatment in the event of: Imminent death  Severe, permanent brain injury  \"In these circumstances, what matters most to you? \"  Care focused more on comfort or quality of life. Review of Existing Advance Directive:  Patient has not completed an advance directive previously. She states that she would designate her  and daughter as her healthcare agents. She expresses that she does not wish life prolonging procedures for end of life care. For Serious or Chronic Illness:  Understanding of medical condition      Interventions Provided:  Recommended completion of Advance Directive form after review of ACP materials and conversation with prospective healthcare agent   Recommended communicating the plan and making copies for the healthcare agent, personal physician, and others as appropriate (e.g., health system)  Recommended review of completed ACP document annually or upon change in health status   Recommended to complete advance directive and return completed form to office to be copied and scanned into chart. Paperwork provided and reviewed.

## 2018-05-02 NOTE — TELEPHONE ENCOUNTER
Please request records from:    Dr. Triston Katz (prior PCP)-release of information signed. Need particularly immunization history. Dr. Maureen Aviles for mammogram and pap smear results. She sees a nurse practitioner. Dr. Shagufta Cotter for recent eye exam.    Thanks.

## 2018-05-02 NOTE — PROGRESS NOTES
Chief Complaint   Patient presents with   [address] [facility] patient present to establish care with a new PCP, Dr. Ria Jorge. Health Maintenance Due   Topic Date Due    Hepatitis C Screening  1945    DTaP/Tdap/Td series (1 - Tdap) 10/25/1967    BREAST CANCER SCRN MAMMOGRAM  10/25/1996    FOBT Q 1 YEAR AGE 50-75  10/25/1996    ZOSTER VACCINE AGE 60>  08/25/2006    GLAUCOMA SCREENING Q2Y  10/25/2011    Bone Densitometry (Dexa) Screening  10/25/2011    Pneumococcal 65+ Low/Medium Risk (1 of 2 - PCV13) 10/25/2011    MEDICARE YEARLY EXAM  03/20/2018     Patient states her last mammogram was 6 months ago at [facility]. Eye exam was 3 months ago with [facility]. 1. Have you been to the ER, urgent care clinic or hospitalized within the last 12 months? NO.     2. Have you seen or consulted any other health care providers outside of the [address] [place] [address] within the last 12 months (Include any smears or colon screening)? YES, Dr. Satnam Pozo, rheumatologist, last seen yesterday. Dr. Asa Tapia, cardiologist, last seen months ago. Dr. Vanessa Perez, Gastroenterologist, last seen at office 2 weeks ago. Dr. Usha Dickinson, neuro surgeon, at [facility] in [place], last seen 3 months ago. Dr. Shari Mcknight, dermatologist surgeon, last seen 3 weeks ago. Do you have an Advanced Directive? NO    Would you like information on Advanced Directives?  YES

## 2018-05-07 VITALS
HEART RATE: 75 BPM | SYSTOLIC BLOOD PRESSURE: 132 MMHG | DIASTOLIC BLOOD PRESSURE: 78 MMHG | OXYGEN SATURATION: 90 % | BODY MASS INDEX: 18.9 KG/M2 | RESPIRATION RATE: 16 BRPM | WEIGHT: 120.4 LBS | HEIGHT: 67 IN | TEMPERATURE: 98.1 F

## 2018-05-07 PROBLEM — K58.2 IRRITABLE BOWEL SYNDROME WITH BOTH CONSTIPATION AND DIARRHEA: Status: ACTIVE | Noted: 2018-05-07

## 2018-05-07 RX ORDER — PANTOPRAZOLE SODIUM 40 MG/1
40 TABLET, DELAYED RELEASE ORAL DAILY
COMMUNITY
Start: 2017-11-09 | End: 2018-05-29 | Stop reason: ALTCHOICE

## 2018-05-07 RX ORDER — PREDNISONE 5 MG/1
2.5 TABLET ORAL DAILY
COMMUNITY
Start: 2017-09-30 | End: 2021-10-10 | Stop reason: ALTCHOICE

## 2018-05-07 NOTE — PROGRESS NOTES
HPI:   [place] is a 71y.o. year old female who presents today to establish care. Prior PCP Dr. Ava Liang. She was referred by Dr. Milvia Ramirez. She has a history of hypertension, rheumatoid arthritis, cervical degenerative arthritis/disc disease, fibromuscular dysplasia, renal artery stenosis, GERD, hyperparathyroidism, Bowen's disease, and palpitations. She reports that she is doing well and is currently without specific complaints. She has a history of hypertension and palpitations, treated currently with metoprolol. She has seen Dr. Simon Aleman in 2012 for evaluation of palpitations. Event monitor was placed, and showed a known LBBB and PVC's, but reportedly no other arrhythmias (report unavailable). In 9/2012, she also underwent a pharmacologic nuclear stress test which was a normal low risk study with EF 55%. She also had an echocardiogram, which showed low normal LV function (EF 50%), mild grade 1 diastolic dysfunction, trace MR, TR, and AI. She also has a history of fibromuscular dysplasia, and in 5/2009, underwent aortogram and renal arteriogram with balloon angioplasty. She has a known right carotid bruit, and her last carotid duplex (5/2104) showed 50-69% bilateral internal carotid stenoses secondary to fibromuscular dysplasia. There was no change from prior study in 2011. She remains quite active, and denies any chest pain, shortness of breath at rest or with exertion, palpitations, lightheadedness, or edema. She has a history of rheumatoid arthritis, diagnosed when she was 37years old, treated currently with subcutaneous methotrexate and prednisone. She is being followed closely by Dr. Milvia Ramirez. She is not currently being treated with a biologic agent due to concern regarding recent multiple squamous cell carcinomas of the skin, for which she is being followed by Dr. Tracy Yan.  She reports that she was well controlled on Humira for several years, but had to discontinue treatment when it was no longer on her insurance formulary. She also has a history of osteopenia, with bone density studies performed by Dr. Paul Pisano. She has mild primary hyperparathyroidism with normal calcium and Vitamin D levels. She also has mild hypercalciuria. She is being followed by Dr. Johnnie Solo. She has a history of cervical degenerative joint and disc disease and chronic neck pain. She was being followed by Dr. Mathew Arthur, who recommended surgery. She was seen by Dr. Rafael Hogan at [facility] for a second opinion, and cervical MRI (9/2015) showed multilevel degenerative disc and facet disease; worst at C5-C6. Left central/subarticular disc protrusion with superimposed annular fissure noted at C5-C6 causing mass effect upon the left hemicord but no signal changes. There is also severe left neuroforaminal narrowing at this level. Recommendation was for her to proceed with physical therapy, which resulted in some improvement. She states that she is also being treated with gabapentin. She states that she was recently referred to Dr. Rimma Muse for evaluation, but has not yet had an appointment with him. She has a history of GERD. She was recently evaluated by Dr. Teja Huynh and her treatment was changed from omeprazole to Protonix. She underwent upper endoscopy by Dr. Maria Guadalupe Cruz in 11/2012 which showed a 2 cm hiatal hernia and pathology from a distal esophageal biopsy showing chronic inflammation. She had a screening colonoscopy in 7/14/2016 by Dr. Maria Guadalupe Cruz, which reportedly showed a polyp (report unavailable). Recommendation was for follow-up in 5 years. She denies any abdominal pain, nausea, vomiting, melena, hematochezia, or change in bowel movements.      Past Medical History:   Diagnosis Date    Bowen's disease     Fibromuscular dysplasia (HCC)     GERD (gastroesophageal reflux disease)     Hiatal hernia     Hyperparathyroidism ([address]     Hypertension     LBBB (left bundle branch block)     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 [facility])     s/p balloon angioplasty 5/2009 [facility]    Rheumatoid arthritis ([facility]     Skin cancer      Past Surgical History:   Procedure Laterality Date    HX ANGIOPLASTY      HX KNEE REPLACEMENT Bilateral     partial     HX OTHER SURGICAL      removal of fibroid benign tumors from breast     HX PARTIAL HYSTERECTOMY      HX TONSILLECTOMY       Current Outpatient Prescriptions   Medication Sig    pantoprazole (PROTONIX) 40 mg tablet 40 mg.  predniSONE (DELTASONE) 5 mg tablet 5 mg.  gabapentin (NEURONTIN) 100 mg capsule Take 1 Cap by mouth three (3) times daily (with meals). Indications: NEUROPATHIC PAIN    fish oil- omega-3 fatty acids (FISH OIL) 300-1,000 mg capsule Take  by Mouth.  multivitamin (ONE A DAY) tablet Take 1 Tab by Mouth Once a Day.  acetaminophen (TYLENOL) 500 mg tablet 500 mg.  cholecalciferol (VITAMIN D3) 400 unit tab tablet Take 800 Units by mouth.  methotrexate (RHEUMATREX) 2.5 mg tablet take 6 tablets by mouth every week    aspirin 81 mg chewable tablet Take 81 mg by mouth daily.  sertraline (ZOLOFT) 25 mg tablet Take  by mouth daily.  folic acid (FOLVITE) 1 mg tablet     metoprolol (LOPRESSOR) 50 mg tablet 1 po bid    Biotin, Bulk, 100 % powd Use.  clotrimazole (MYCELEX) 10 mg bradly prn     No current facility-administered medications for this visit. Allergies and Intolerances: Allergies   Allergen Reactions    Hydrocodone Anaphylaxis    Nsaids (Non-Steroidal Anti-Inflammatory Drug) Other (comments)     None due to kidneys    Azithromycin Itching    Other Medication Other (comments)     GI intolerance to nonsteroidal antiinflammatory medications     Vicodin [Hydrocodone-Acetaminophen] Other (comments)     Family History: Her father passed away from a ruptured AAA. Her mother has osteoarthritis and COPD, and a maternal aunt had RA. Her brother recently passed away from multiple sclerosis.  She has no family history of colon or breast cancer. Family History   Problem Relation Age of Onset    Arthritis-osteo Mother     Elevated Lipids Mother     Heart Disease Mother      CHF    Lung Disease Mother     Hypertension Sister     Lung Disease Sister     Hypertension Brother     MS Brother      Social History:   She  reports that she has never smoked. She has never used smokeless tobacco. She is  with two children and one stepchild. 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 She drinks wine only occasionally. History   Alcohol Use    Yes     Immunization History:  Immunization History   Administered Date(s) Administered    Pneumococcal Conjugate (PCV-13) 12/14/2015       Review of Systems:   As above included in HPI. Otherwise 11 point review of systems negative including constitutional, skin, HENT, eyes, respiratory, cardiovascular, gastrointestinal, genitourinary, musculoskeletal, endocrine, hematologic, allergy, and neurologic. Physical:   Vitals:   BP: 132/78  HR: 75  WT: 120 lb 6.4 oz (54.6 kg)  BMI:  18.86 kg/m2    Exam:   Patient appears in no apparent distress. Affect is appropriate. HEENT --Anicteric sclerae, tympanic membranes normal,  ear canals normal.  PERRL, EOMI, conjunctiva and lids normal.   Sinuses were nontender, turbinates normal, hearing normal.  Oropharynx without  erythema, normal tongue, oral mucosa and tonsils. No cervical lymphadenopathy. No thyromegaly, JVD, or bruits. Carotid pulses 2+ with normal upstroke. Lungs --Clear to auscultation. No wheezing or rales. Heart --Regular rate and rhythm, no murmurs, rubs, gallops, or clicks. Chest wall --Nontender to palpation. PMI normal.  Abdomen -- Soft and nontender, no hepatosplenomegaly or masses. Extremities -- Without cyanosis, clubbing, edema. 2+ pulses equally and bilaterally.   Normal looking digits, ROM intact  Neuro -- CN 2-12 intact, strength 5/5 with intact soft touch in all extremities  Derm  no obvious abnormalities noted, no rash      Review of Data:  Labs:  No visits with results within 1 Month(s) from this visit. Latest known visit with results is:    Hospital Outpatient Visit on 03/19/2018   Component Date Value Ref Range Status    WBC 03/19/2018 6.1  4.6 - 13.2 K/uL Final    RBC 03/19/2018 4.05* 4.20 - 5.30 M/uL Final    HGB 03/19/2018 12.1  12.0 - 16.0 g/dL Final    HCT 03/19/2018 37.5  35.0 - 45.0 % Final    MCV 03/19/2018 92.6  74.0 - 97.0 FL Final    MCH 03/19/2018 29.9  24.0 - 34.0 PG Final    MCHC 03/19/2018 32.3  31.0 - 37.0 g/dL Final    RDW 03/19/2018 13.3  11.6 - 14.5 % Final    PLATELET 71/38/0701 552  135 - 420 K/uL Final    MPV 03/19/2018 10.8  9.2 - 11.8 FL Final    Sodium 03/19/2018 140  136 - 145 mmol/L Final    Potassium 03/19/2018 3.8  3.5 - 5.5 mmol/L Final    Chloride 03/19/2018 101  100 - 108 mmol/L Final    CO2 03/19/2018 30  21 - 32 mmol/L Final    Anion gap 03/19/2018 9  3.0 - 18 mmol/L Final    Glucose 03/19/2018 80  74 - 99 mg/dL Final    BUN 03/19/2018 11  7.0 - 18 MG/DL Final    Creatinine 03/19/2018 0.80  0.6 - 1.3 MG/DL Final    BUN/Creatinine ratio 03/19/2018 14  12 - 20   Final    GFR est AA 03/19/2018 >60  >60 ml/min/1.73m2 Final    GFR est non-AA 03/19/2018 >60  >60 ml/min/1.73m2 Final    Calcium 03/19/2018 10.2* 8.5 - 10.1 MG/DL Final    Bilirubin, total 03/19/2018 0.4  0.2 - 1.0 MG/DL Final    ALT (SGPT) 03/19/2018 26  13 - 56 U/L Final    AST (SGOT) 03/19/2018 24  15 - 37 U/L Final    Alk.  phosphatase 03/19/2018 71  45 - 117 U/L Final    Protein, total 03/19/2018 7.0  6.4 - 8.2 g/dL Final    Albumin 03/19/2018 3.9  3.4 - 5.0 g/dL Final    Globulin 03/19/2018 3.1  2.0 - 4.0 g/dL Final    A-G Ratio 03/19/2018 1.3  0.8 - 1.7   Final     Calculated 10 year ASCVD risk score: unknown    Health Maintenance:  Screening:    Mammogram: negative (2/2018) [facility]   PAP smear: well woman exams performed at [facility] (last 2/2018)   Colorectal: colonoscopy (7/2016) benign polyp (report unavailable) Dr. Roint Joyce. Due 2021. Now followed by Dr. Milena Luciano. Depression: on Zoloft   DM (HbA1c/FPG): FPG 80 (3/2018)   Hepatitis C: negative (1/2/2018) Dr. Tao Thomas. Falls: none   DEXA: osteopenia. Performed by Dr. Tao Thomas. Glaucoma: regular eye exams with Dr. Monique Gomes (last 2/2018 )   Smoking: none   Vitamin D: 44.3 (4/2017)   Medicare Wellness: today    Impression:  Patient Active Problem List   Diagnosis Code    Neck pain on left side M54.2    Intervertebral disc protrusion [id number]    Displacement of cervical intervertebral disc without myelopathy M50.20    Cervical spondylosis without myelopathy M47.812    Degeneration of cervical intervertebral disc M50.30    Brachial neuritis or radiculitis M54.12    Radiculopathy, cervical M54.12    Skin cancer C44.90    Rheumatoid arthritis ([facility] [address] M06.9    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 (HCC) I70.1    LBBB (left bundle branch block) I44.7    Hypertension I10    Hyperparathyroidism ([address] E21.3    Hiatal hernia K44.9    GERD (gastroesophageal reflux disease) K21.9    Fibromuscular dysplasia (HCC) I77.3    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    Irritable bowel syndrome with both constipation and diarrhea K58.2       Plan:  1. Hypertension. Blood pressure initially elevated on presentation, but improved on repeat measurement. On metoprolol, which also helps with control of palpitations. Renal function normal with creatinine 0.8/ eGFR >60. Will continue to follow. 2. Fibromuscular dysplasia. Previous angioplasty of renal arteries. Known moderate stenosis of bilateral carotid arteries. Previously followed by Dr. Abdulaziz Forrest of [facility], but given stability over many years, decision made to only pursue further evaluation if becomes symptomatic. Follow. 3. Rheumatoid arthritis. On [place] methotrexate and prednisone with fairly stable symptoms. Considering another biologic but hesitant given Bowen's disease.  Being followed closely by Dr. Feliberto Boggs. 4. Cervical degenerative disease. Experiencing chronic neck pain, treated with gabapentin with some improvement. Notable improvement with exercising as well. Not wishing to pursue surgery. Being followed by Dr. Mellie Bernheim currently, but states also considering referral to Dr. Carolina Stern for evaluation. 5. Osteopenia. Bone density study performed by Dr. Feliberto Boggs. Will request copy. On calcium and Vitamin D supplementation. 6. Primary hyperparathyroidism. Mild increase in PTH with normal calcium and Vitamin D levels. Also, mild hypercalciuria. Being followed by Dr. Rigoberto Zambrano. 7. GERD. Recently changed to Protonix by Dr. Geovanny Andrew. Symptoms of diarrhea alternating with constipation chronically thought to be secondary to irritable bowel syndrome. On probiotic. 8. Bowen's disease. Being followed closely by Dr. Stephanie Parikh for multiple squamous cell carcinomas in situ. 9. Health maintenance. Reviewed record and received Prevnar 13 in  12/2015. Will give pneumovax at next visit. Unclear if received Tdap. Shingrix to be considered once recommendation regarding immunosuppressed patients becomes available. Mammogram and well women exams performed at [facility]. Will request report. Continue regular eye exams with Dr. Melanie Dugan. Will request report. Vitamin D level normal. Continue maintenance dose supplement. Will obtain fasting labs at next visit in 8/2018 as physical due at that time. In addition, an annual Medicare wellness visit was done today. Patient understands recommendations and agrees with plan. Follow-up in 3 months.

## 2018-05-09 ENCOUNTER — TELEPHONE (OUTPATIENT)
Dept: INTERNAL MEDICINE CLINIC | Age: 73
End: 2018-05-09

## 2018-05-15 ENCOUNTER — DOCUMENTATION ONLY (OUTPATIENT)
Dept: INTERNAL MEDICINE CLINIC | Age: 73
End: 2018-05-15

## 2018-05-15 NOTE — PROGRESS NOTES
Reviewed bone density study, performed by Dr. Author Hodges on 10/25/2016. Showed T-scores:  femoral neck left -1.51 and lumbar -1. 96. Calculated FRAX score estimates her 10 year risk of a major osteoporetic fracture at 17 % and hip fracture at 3.4 %. No significant change when compared to prior study in 3/19/2014.

## 2018-05-16 NOTE — TELEPHONE ENCOUNTER
[facility] calling again. Says they never got fax. Their machine down. Can you fax it to her back up machine at [phone]?

## 2018-05-29 ENCOUNTER — OFFICE VISIT (OUTPATIENT)
Dept: ORTHOPEDIC SURGERY | Age: 73
End: 2018-05-29

## 2018-05-29 VITALS
BODY MASS INDEX: 18.83 KG/M2 | TEMPERATURE: 98.4 F | SYSTOLIC BLOOD PRESSURE: 134 MMHG | RESPIRATION RATE: 14 BRPM | OXYGEN SATURATION: 99 % | DIASTOLIC BLOOD PRESSURE: 61 MMHG | HEIGHT: 67 IN | WEIGHT: 120 LBS | HEART RATE: 60 BPM

## 2018-05-29 DIAGNOSIS — M47.812 CERVICAL SPONDYLOSIS WITHOUT MYELOPATHY: Primary | ICD-10-CM

## 2018-05-29 DIAGNOSIS — M05.79 RHEUMATOID ARTHRITIS INVOLVING MULTIPLE SITES WITH POSITIVE RHEUMATOID FACTOR (HCC): ICD-10-CM

## 2018-05-29 DIAGNOSIS — M54.12 RADICULOPATHY, CERVICAL: ICD-10-CM

## 2018-05-29 DIAGNOSIS — M50.30 DEGENERATION OF CERVICAL INTERVERTEBRAL DISC: ICD-10-CM

## 2018-05-29 RX ORDER — OMEPRAZOLE 40 MG/1
40 CAPSULE, DELAYED RELEASE ORAL EVERY OTHER DAY
COMMUNITY

## 2018-05-29 NOTE — PROGRESS NOTES
[facility]  [address], [place]   Phone: [phone]  Fax: [phone]        PROGRESS NOTE      HISTORY OF PRESENT ILLNESS:  The patient is a 71 y.o. female and was seen today for follow up of neck and left shoulder pain extending to LUE to the hand, involving all digits with paresthesia. Pt reports of RUE pain with weakness of the hands with  but denies progression. Previously, patient stated her neck pain symptoms extended into the left upper extremity with LUE symptoms localized from the wrist distally involving all of the fingers. She had some lesser proximal symptoms. She reported trouble raising LUE some days. Pt sees Dr. Dyana Jang, a neurosurgeon at [facility], and had MRI there. She also reports widespread pain which she attributes to RA, noting she is in the process of switching to a new biologic. Pt is on Methotrexate for her rheumatoid arthritis. She is followed by Dr. Lin Hall for RA. Pt has hx of squamous skin carcinoma and reports her oncologist did not appreciate the biologic. Pt continues with Neurontin 100 mg TID. She continues with her HEP 3-4/week. She denies dropping objects or LOB. Cervical spine MRI dated 4/19/14 per report revealed a disc osteophyte complex with superimposed left paracentral disc protrusion at C5-C6 and flattening of the left cervical cord with severe narrowing of the left neuroforamen. There were degenerative changes at the remaining levels. At her last clinical appointment, patient wished to continue her current treatment at that time as she wished to see how she responded to her Methotrexate treatments. She was provided with refills of her Neurontin 100 mg TID. I encouraged patient to perform her HEP daily. The patient returns today with pain location and distribution remain unchanged. She rates pain 1-5/10, an  improvement since her last visit (5/10).  Pt restarted her Methotrexate after her facial surgery which has improved her pain overall. She is noting more good days than bad. Pt is compliant with Neurontin 100 mg TID. She performs her HEP daily.  reviewed. Body mass index is 18.79 kg/(m^2). PCP: Luis Villanueva MD      Past Medical History:   Diagnosis Date    Bowen's disease     [facility]romuscular [facility] [address]     GERD (gastroesophageal reflux disease)     Hiatal hernia     Hyperparathyroidism ([facility]     Hypertension     LBBB (left bundle branch block)     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 [facility])     s/p [facility] 5/2009 [facility]    Rheumatoid arthritis ([facility] [address]     Skin cancer         Social History     Social History    Marital status:      Spouse name: N/A    Number of children: N/A    Years of education: N/A     Occupational History    Not on file. Social History Main Topics    Smoking status: Never Smoker    Smokeless tobacco: Never Used    Alcohol use Yes    Drug use: No    Sexual activity: Not Currently     Other Topics Concern    Not on file     Social History Narrative       Current Outpatient Prescriptions   Medication Sig Dispense Refill    METHOTREXATE SODIUM INJECTION 15 mg every seven (7) days.  omeprazole (PRILOSEC) 40 mg capsule Take 40 mg by mouth daily.  predniSONE (DELTASONE) 5 mg tablet Take 5 mg by mouth daily.  gabapentin (NEURONTIN) 100 mg capsule Take 1 Cap by mouth three (3) times daily (with meals). Indications: NEUROPATHIC PAIN 270 Cap 0    multivitamin (ONE A DAY) tablet Take 1 Tab by Mouth Once a Day.  acetaminophen (TYLENOL) 500 mg tablet Take 500 mg by mouth every six (6) hours as needed.  cholecalciferol (VITAMIN D3) 400 unit tab tablet Take 800 Units by mouth.  aspirin 81 mg chewable tablet Take 81 mg by mouth daily.  sertraline (ZOLOFT) 25 mg tablet Take  by mouth daily.  folic acid (FOLVITE) 1 mg tablet Take 1 mg by mouth daily.   0    metoprolol (LOPRESSOR) 50 mg tablet 1 po bid  0       Allergies   Allergen Reactions    Hydrocodone Anaphylaxis    Nsaids (Non-Steroidal Anti-Inflammatory Drug) Other (comments)     None due to kidneys    Azithromycin Itching    Other Medication Other (comments)     GI intolerance to nonsteroidal antiinflammatory medications     Vicodin [Hydrocodone-Acetaminophen] Other (comments)          PHYSICAL EXAMINATION    Visit Vitals    /61    Pulse 60    Temp 98.4 °F (36.9 °C) (Oral)    Resp 14    Ht 5' 7\" (1.702 m)    Wt 54.4 kg (120 lb)    SpO2 99%    BMI 18.79 kg/m2       CONSTITUTIONAL: NAD, A&O x 3  SENSATION: Intact to light touch throughout  NEURO: Genesis's is negative bilaterally. RANGE OF MOTION: The patient has full passive range of motion in all four extremities. EXTREMITIES: Arthritic changes, bilateral hands      Shoulder AB/Flex Elbow Flex Wrist Ext Elbow Ext Wrist Flex Hand Intrin Tone   Right +4/5 +4/5 +4/5 +4/5 +4/5 +4/5 +4/5   Left +4/5 +4/5 +4/5 +4/5 +4/5 +4/5 +4/5                 ASSESSMENT   Diagnoses and all orders for this visit:    1. Cervical spondylosis without myelopathy    2. Radiculopathy, cervical    3. Degeneration of cervical intervertebral disc    4. Rheumatoid arthritis involving multiple sites with positive rheumatoid factor ([address]          IMPRESSION AND PLAN:  Patient wished to continue her current treatment. She does not need refills of her Neurontin 100 mg TID. I encourage patient to perform her HEP daily. I will see the patient back in 3 month's time or earlier if needed. Written by Keri Humphrey, as dictated by Tavares Felix MD  I examined the patient, reviewed and agree with the note.

## 2018-08-02 ENCOUNTER — HOSPITAL ENCOUNTER (OUTPATIENT)
Dept: LAB | Age: 73
Discharge: HOME | End: 2018-08-02
Payer: MEDICARE

## 2018-08-02 LAB
APPEARANCE UR: CLEAR
BACTERIA URNS QL MICRO: NEGATIVE /HPF
BASOPHILS # BLD: 0 K/UL (ref 0–0.1)
BASOPHILS NFR BLD: 1 % (ref 0–2)
BILIRUB UR QL: NEGATIVE
CAOX CRY URNS QL MICRO: ABNORMAL
CHOLEST SERPL-MCNC: 223 MG/DL
COLOR UR: YELLOW
DIFFERENTIAL METHOD BLD: ABNORMAL
EOSINOPHIL # BLD: 0.1 K/UL (ref 0–0.4)
EOSINOPHIL NFR BLD: 3 % (ref 0–5)
EPITH CASTS URNS QL MICRO: ABNORMAL /LPF (ref 0–5)
ERYTHROCYTE [DISTWIDTH] IN BLOOD BY AUTOMATED COUNT: 14.8 % (ref 11.6–14.5)
GLUCOSE UR STRIP.AUTO-MCNC: NEGATIVE MG/DL
HCT VFR BLD AUTO: 41.3 % (ref 35–45)
HDLC SERPL-MCNC: 81 MG/DL (ref 40–60)
HDLC SERPL: 2.8 {RATIO} (ref 0–5)
HGB BLD-MCNC: 13 G/DL (ref 12–16)
HGB UR QL STRIP: NEGATIVE
KETONES UR QL STRIP.AUTO: NEGATIVE MG/DL
LDLC SERPL CALC-MCNC: 126.2 MG/DL (ref 0–100)
LEUKOCYTE ESTERASE UR QL STRIP.AUTO: ABNORMAL
LIPID PROFILE,FLP: ABNORMAL
LYMPHOCYTES # BLD: 1.6 K/UL (ref 0.9–3.6)
LYMPHOCYTES NFR BLD: 30 % (ref 21–52)
MCH RBC QN AUTO: 29.9 PG (ref 24–34)
MCHC RBC AUTO-ENTMCNC: 31.5 G/DL (ref 31–37)
MCV RBC AUTO: 94.9 FL (ref 74–97)
MONOCYTES # BLD: 0.5 K/UL (ref 0.05–1.2)
MONOCYTES NFR BLD: 10 % (ref 3–10)
NEUTS SEG # BLD: 2.9 K/UL (ref 1.8–8)
NEUTS SEG NFR BLD: 56 % (ref 40–73)
NITRITE UR QL STRIP.AUTO: NEGATIVE
PH UR STRIP: 6 [PH] (ref 5–8)
PLATELET # BLD AUTO: 277 K/UL (ref 135–420)
PMV BLD AUTO: 10.9 FL (ref 9.2–11.8)
PROT UR STRIP-MCNC: NEGATIVE MG/DL
RBC # BLD AUTO: 4.35 M/UL (ref 4.2–5.3)
RBC #/AREA URNS HPF: 0 /HPF (ref 0–5)
SP GR UR REFRACTOMETRY: 1.02 (ref 1–1.03)
TRIGL SERPL-MCNC: 79 MG/DL (ref ?–150)
TSH SERPL DL<=0.05 MIU/L-ACNC: 2.8 UIU/ML (ref 0.36–3.74)
UROBILINOGEN UR QL STRIP.AUTO: 1 EU/DL (ref 0.2–1)
VLDLC SERPL CALC-MCNC: 15.8 MG/DL
WBC # BLD AUTO: 5.2 K/UL (ref 4.6–13.2)
WBC URNS QL MICRO: ABNORMAL /HPF (ref 0–4)

## 2018-08-02 PROCEDURE — 36415 COLL VENOUS BLD VENIPUNCTURE: CPT | Performed by: INTERNAL MEDICINE

## 2018-08-02 PROCEDURE — 82306 VITAMIN D 25 HYDROXY: CPT | Performed by: INTERNAL MEDICINE

## 2018-08-02 PROCEDURE — 80061 LIPID PANEL: CPT | Performed by: INTERNAL MEDICINE

## 2018-08-02 PROCEDURE — 81001 URINALYSIS AUTO W/SCOPE: CPT | Performed by: INTERNAL MEDICINE

## 2018-08-02 PROCEDURE — 84443 ASSAY THYROID STIM HORMONE: CPT | Performed by: INTERNAL MEDICINE

## 2018-08-02 PROCEDURE — 85025 COMPLETE CBC W/AUTO DIFF WBC: CPT | Performed by: INTERNAL MEDICINE

## 2018-08-03 LAB — 25(OH)D3 SERPL-MCNC: 19.9 NG/ML (ref 30–100)

## 2018-08-09 ENCOUNTER — OFFICE VISIT (OUTPATIENT)
Dept: INTERNAL MEDICINE CLINIC | Age: 73
End: 2018-08-09

## 2018-08-09 VITALS
DIASTOLIC BLOOD PRESSURE: 62 MMHG | WEIGHT: 120 LBS | HEART RATE: 64 BPM | OXYGEN SATURATION: 97 % | RESPIRATION RATE: 14 BRPM | BODY MASS INDEX: 18.83 KG/M2 | TEMPERATURE: 97.9 F | SYSTOLIC BLOOD PRESSURE: 132 MMHG | HEIGHT: 67 IN

## 2018-08-09 DIAGNOSIS — K21.9 GASTROESOPHAGEAL REFLUX DISEASE, ESOPHAGITIS PRESENCE NOT SPECIFIED: ICD-10-CM

## 2018-08-09 DIAGNOSIS — K58.2 IRRITABLE BOWEL SYNDROME WITH BOTH CONSTIPATION AND DIARRHEA: ICD-10-CM

## 2018-08-09 DIAGNOSIS — M54.12 RADICULOPATHY, CERVICAL: ICD-10-CM

## 2018-08-09 DIAGNOSIS — E21.3 HYPERPARATHYROIDISM (HCC): ICD-10-CM

## 2018-08-09 DIAGNOSIS — I44.7 LBBB (LEFT BUNDLE BRANCH BLOCK): ICD-10-CM

## 2018-08-09 DIAGNOSIS — I77.3 RENAL ARTERY STENOSIS DUE TO FIBROMUSCULAR DYSPLASIA (HCC): ICD-10-CM

## 2018-08-09 DIAGNOSIS — I70.1 RENAL ARTERY STENOSIS DUE TO FIBROMUSCULAR DYSPLASIA (HCC): ICD-10-CM

## 2018-08-09 DIAGNOSIS — I77.3 FIBROMUSCULAR DYSPLASIA (HCC): ICD-10-CM

## 2018-08-09 DIAGNOSIS — R09.89 BILATERAL CAROTID BRUITS: ICD-10-CM

## 2018-08-09 DIAGNOSIS — D04.9 BOWEN'S DISEASE: ICD-10-CM

## 2018-08-09 DIAGNOSIS — C44.90 SKIN CANCER: ICD-10-CM

## 2018-08-09 DIAGNOSIS — M47.812 CERVICAL SPONDYLOSIS WITHOUT MYELOPATHY: ICD-10-CM

## 2018-08-09 DIAGNOSIS — E78.5 HYPERLIPIDEMIA, UNSPECIFIED HYPERLIPIDEMIA TYPE: ICD-10-CM

## 2018-08-09 DIAGNOSIS — E55.9 VITAMIN D DEFICIENCY: ICD-10-CM

## 2018-08-09 DIAGNOSIS — M85.89 OSTEOPENIA OF MULTIPLE SITES: ICD-10-CM

## 2018-08-09 DIAGNOSIS — E83.52 FHH (FAMILIAL HYPOCALCIURIC HYPERCALCEMIA): ICD-10-CM

## 2018-08-09 DIAGNOSIS — I10 ESSENTIAL HYPERTENSION: Primary | ICD-10-CM

## 2018-08-09 DIAGNOSIS — M05.79 RHEUMATOID ARTHRITIS INVOLVING MULTIPLE SITES WITH POSITIVE RHEUMATOID FACTOR (HCC): ICD-10-CM

## 2018-08-09 RX ORDER — METOPROLOL TARTRATE 50 MG/1
50 TABLET ORAL 2 TIMES DAILY
Qty: 180 TAB | Refills: 2 | Status: SHIPPED | OUTPATIENT
Start: 2018-08-09 | End: 2019-02-28 | Stop reason: SDUPTHER

## 2018-08-09 RX ORDER — SERTRALINE HYDROCHLORIDE 25 MG/1
25 TABLET, FILM COATED ORAL DAILY
Qty: 90 TAB | Refills: 2 | Status: SHIPPED | OUTPATIENT
Start: 2018-08-09 | End: 2019-04-17 | Stop reason: SDUPTHER

## 2018-08-09 NOTE — PATIENT INSTRUCTIONS
Hyperlipidemia: After Your Visit  Your Care Instructions  Hyperlipidemia is too much fat in your blood. The body has several kinds of fat, including cholesterol and triglycerides. Your body needs fat for many things, such as making new cells. But too much fat in your blood increases your chances of having a heart attack or stroke. You may be able to lower your cholesterol and triglycerides with a heart-healthy diet, exercise, and if needed, medicine. Your doctor may want you to try lifestyle changes first to see whether they lower the fat in your blood. You may need to take medicine if lifestyle changes do not lower the fat in your blood enough. Follow-up care is a key part of your treatment and safety. Be sure to make and go to all appointments, and call your doctor if you are having problems. Its also a good idea to know your test results and keep a list of the medicines you take. How can you care for yourself at home? Take your medicines  · Take your medicines exactly as prescribed. Call your doctor if you think you are having a problem with your medicine. · If you take medicine to lower your cholesterol, go to follow-up visits. You will need to have blood tests. · Do not take large doses of niacin, which is a B vitamin, while taking medicine called statins. It may increase the chance of muscle pain and liver problems. · Talk to your doctor about avoiding grapefruit juice if you are taking statins. Grapefruit juice can raise the level of this medicine in your blood. This could increase side effects. Eat more fruits, vegetables, and fiber  · Fruits and vegetables have lots of nutrients that help protect against heart disease, and they have littleif anyfat. Try to eat at least five servings a day. Dark green, deep orange, or yellow fruits and vegetables are healthy choices. · Keep carrots, celery, and other veggies handy for snacks.  Buy fruit that is in season and store it where you can see it so that you will be tempted to eat it. Cook dishes that have a lot of veggies in them, such as stir-fries and soups. · Foods high in fiber may reduce your cholesterol and provide important vitamins and minerals. High-fiber foods include whole-grain cereals and breads, oatmeal, beans, brown rice, citrus fruits, and apples. · Buy whole-grain breads and cereals instead of white bread and pastries. Limit saturated fat  · Read food labels and try to avoid saturated fat and trans fat. They increase your risk of heart disease. · Use olive or canola oil when you cook. Try cholesterol-lowering spreads, such as Benecol or Take Control. · Bake, broil, grill, or steam foods instead of frying them. · Limit the amount of high-fat meats you eat, including hot dogs and sausages. Cut out all visible fat when you prepare meat. · Eat fish, skinless poultry, and soy products such as tofu instead of high-fat meats. Soybeans may be especially good for your heart. Eat at least two servings of fish a week. Certain fish, such as salmon, contain omega-3 fatty acids, which may help reduce your risk of heart attack. · Choose low-fat or fat-free milk and dairy products. Get exercise, limit alcohol, and quit smoking  · Get more exercise. Work with your doctor to set up an exercise program. Even if you can do only a small amount, exercise will help you get stronger, have more energy, and manage your weight and your stress. Walking is an easy way to get exercise. Gradually increase the amount you walk every day. Aim for at least 30 minutes on most days of the week. You also may want to swim, bike, or do other activities. · Limit alcohol to no more than 2 drinks a day for men and 1 drink a day for women. · Do not smoke. If you need help quitting, talk to your doctor about stop-smoking programs and medicines. These can increase your chances of quitting for good. When should you call for help?   Call 911 anytime you think you may need emergency care. For example, call if:  · You have symptoms of a heart attack. These may include:  ¨ Chest pain or pressure, or a strange feeling in the chest.  ¨ Sweating. ¨ Shortness of breath. ¨ Nausea or vomiting. ¨ Pain, pressure, or a strange feeling in the back, neck, jaw, or upper belly or in one or both shoulders or arms. ¨ Lightheadedness or sudden weakness. ¨ A fast or irregular heartbeat. After you call 911, the  may tell you to chew 1 adult-strength or 2 to 4 low-dose aspirin. Wait for an ambulance. Do not try to drive yourself. · You have signs of a stroke. These may include:  ¨ Sudden numbness, paralysis, or weakness in your face, arm, or leg, especially on only one side of your body. ¨ New problems with walking or balance. ¨ Sudden vision changes. ¨ Drooling or slurred speech. ¨ New problems speaking or understanding simple statements, or feeling confused. ¨ A sudden, severe headache that is different from past headaches. · You passed out (lost consciousness). Call your doctor now or seek immediate medical care if:  · You have muscle pain or weakness. Watch closely for changes in your health, and be sure to contact your doctor if:  · You are very tired. · You have an upset stomach, gas, constipation, or belly pain or cramps. Where can you learn more? Go to [employer].be  Enter C406 in the search box to learn more about \"Hyperlipidemia: After Your Visit. \"   © 7444-6067 Healthwise, Incorporated. Care instructions adapted under license by [employer] (which disclaims liability or warranty for this information). This care instruction is for use with your licensed healthcare professional. If you have questions about a medical condition or this instruction, always ask your healthcare professional. [place] [address] any warranty or liability for your use of this information.   Content Version: 6.1.063438; Last Revised: October 13, 2011 Learning About High Cholesterol  What is high cholesterol? Cholesterol is a type of fat in your blood. It is needed for many body functions, such as making new cells. Cholesterol is made by your body. It also comes from food you eat. If you have too much cholesterol, it starts to build up in your arteries. This is called hardening of the arteries, or atherosclerosis. High cholesterol raises your risk of a heart attack and stroke. There are different types of cholesterol. LDL is the \"bad\" cholesterol. High LDL can raise your risk for heart disease, heart attack, and stroke. HDL is the \"good\" cholesterol. High HDL is linked with a lower risk for heart disease, heart attack, and stroke. Your cholesterol levels help your doctor find out your risk for having a heart attack or stroke. How can you prevent high cholesterol? A heart-healthy lifestyle can help you prevent high cholesterol. This lifestyle helps lower your risk for a heart attack and stroke. · Eat heart-healthy foods. ¨ Eat fruits, vegetables, whole grains (like oatmeal), dried beans and peas, nuts and seeds, soy products (like tofu), and fat-free or low-fat dairy products. ¨ Replace butter, margarine, and hydrogenated or partially hydrogenated oils with olive and canola oils. (Canola oil margarine without trans fat is fine.)  ¨ Replace red meat with fish, poultry, and soy protein (like tofu). ¨ Limit processed and packaged foods like chips, crackers, and cookies. · Be active. Exercise can improve your cholesterol level. Get at least 30 minutes of exercise on most days of the week. Walking is a good choice. You also may want to do other activities, such as running, swimming, cycling, or playing tennis or team sports. · Stay at a healthy weight. Lose weight if you need to. · Don't smoke. If you need help quitting, talk to your doctor about stop-smoking programs and medicines. These can increase your chances of quitting for good.   How is high cholesterol treated? The goal of treatment is to reduce your chances of having a heart attack or stroke. The goal is not to lower your cholesterol numbers only. · You may make lifestyle changes, such as eating healthy foods, not smoking, losing weight, and being more active. · You may have to take medicine. Follow-up care is a key part of your treatment and safety. Be sure to make and go to all appointments, and call your doctor if you are having problems. It's also a good idea to know your test results and keep a list of the medicines you take. Where can you learn more? Go to http://kylee-geno.info/. Enter H151 in the search box to learn more about \"Learning About High Cholesterol. \"  Current as of: May 10, 2017  Content Version: 11.7  © 1461-0256 [employer]. Care instructions adapted under license by [employer] (which disclaims liability or warranty for this information). If you have questions about a medical condition or this instruction, always ask your healthcare professional. [address] any warranty or liability for your use of this information.

## 2018-08-09 NOTE — MR AVS SNAPSHOT
[facility] 
 
 
 [address] [facility] 
[phone] Patient: Kenji Seen MRN: [id number] :1945 Visit Information Date & Time Provider Department Dept. Phone Encounter #  
 2018  8:30 AM Hilario Collazo MD [facility] [phone] [id number] Follow-up Instructions Return in about 3 months (around 2018), or if symptoms worsen or fail to improve. Your Appointments 2018  9:15 AM  
Follow Up with Emely [facility] MD  
[facility] and [facility] [place] ([facility]) Appt Note: 3 mo follow up neck and shoulder [address] [place] [facility] [address]  
  
   
 [address] [facility] [address]  
  
    
 2018  9:00 AM  
New Patient with Erick Moralez MD  
[facility] ([address] [place] [address]) Appt Note: Acupuncture appt; -  
 [address] [facility] [address] [facility] [address] [facility]  
  
    
 2018  9:30 AM  
LAB with [facility] [place] NURSE VISIT [facility] ([address] [place] [address]) Appt Note: lab  
 [address], [address] [facility] [address] [place]  
  
   
 [address], [address]  
  
    
 11/15/2018  9:30 AM  
Office Visit with Hilario Collazo MD  
[facility] [address] Appt Note: 3 month follow up  
 [address], [address] [address] [address] [place]  
  
   
 [address], [address] Upcoming Health Maintenance Date Due DTaP/Tdap/Td series (1 - Tdap) 10/25/1967 Pneumococcal 65+ High/Highest Risk (2 of 2 - PPSV23) 2016 Influenza Age 5 to Adult 2018 GLAUCOMA SCREENING Q2Y 7/10/2019 BREAST CANCER SCRN MAMMOGRAM 2019 COLONOSCOPY 2021 Allergies as of 8/9/2018  Review Complete On: 8/9/2018 By: Santos Dick Severity Noted Reaction Type Reactions Hydrocodone High 11/30/2016    Anaphylaxis Nsaids (Non-steroidal Anti-inflammatory Drug) Medium 11/30/2016    Other (comments) None due to kidneys Azithromycin  11/30/2016    Itching Other Medication    Other (comments) GI intolerance to nonsteroidal antiinflammatory medications Vicodin [Hydrocodone-acetaminophen]    Other (comments) Current Immunizations  Never Reviewed Name Date Pneumococcal Conjugate (PCV-13) 12/14/2015 Not reviewed this visit Vitals BP Pulse Temp Resp Height(growth percentile) Weight(growth percentile) 132/62 (BP 1 Location: Right arm, BP Patient Position: Sitting) 64 97.9 °F (36.6 °C) (Oral) 14 5' 7\" (1.702 m) 120 lb (54.4 kg) SpO2 BMI OB Status Smoking Status 97% 18.79 kg/m2 Hysterectomy Never Smoker Vitals History BMI and BSA Data Body Mass Index Body Surface Area 18.79 kg/m 2 1.6 m 2 Preferred Pharmacy Pharmacy Name Phone [facility], [facility]. [address] [phone] Your Updated Medication List  
  
   
This list is accurate as of 8/9/18  9:23 AM.  Always use your most recent med list.  
  
  
  
  
 acetaminophen 500 mg tablet Commonly known as:  TYLENOL Take 500 mg by mouth every six (6) hours as needed. aspirin 81 mg chewable tablet Take 81 mg by mouth daily. cholecalciferol 400 unit Tab tablet Commonly known as:  VITAMIN D3 Take 800 Units by mouth. folic acid 1 mg tablet Commonly known as:  Google Take 1 mg by mouth daily. gabapentin 100 mg capsule Commonly known as:  NEURONTIN Take 1 Cap by mouth three (3) times daily (with meals). Indications: NEUROPATHIC PAIN  
  
 METHOTREXATE SODIUM INJECTION 15 mg every seven (7) days. metoprolol tartrate 50 mg tablet Commonly known as:  LOPRESSOR  
 Take 1 Tab by mouth two (2) times a day. multivitamin tablet Commonly known as:  ONE A DAY Take 1 Tab by Mouth Once a Day. omeprazole 40 mg capsule Commonly known as:  PRILOSEC Take 40 mg by mouth daily. predniSONE 5 mg tablet Commonly known as:  Dena Chill Take 5 mg by mouth daily. sertraline 25 mg tablet Commonly known as:  ZOLOFT Take 1 Tab by mouth daily. Prescriptions Sent to Pharmacy Refills  
 sertraline (ZOLOFT) 25 mg tablet 2 Sig: Take 1 Tab by mouth daily. Class: Normal  
 Pharmacy: [id number] [address] [place] [address],[address], [address] Ph #: [phone] Route: Oral  
 metoprolol tartrate (LOPRESSOR) 50 mg tablet 2 Sig: Take 1 Tab by mouth two (2) times a day. Class: Normal  
 Pharmacy: [id number] [address] [place] [address],[address] Floor, [address] Ph #: [phone] Route: Oral  
  
Follow-up Instructions Return in about 3 months (around 11/9/2018), or if symptoms worsen or fail to improve. Patient Instructions Hyperlipidemia: After Your Visit Your Care Instructions Hyperlipidemia is too much fat in your blood. The body has several kinds of fat, including cholesterol and triglycerides. Your body needs fat for many things, such as making new cells. But too much fat in your blood increases your chances of having a heart attack or stroke. You may be able to lower your cholesterol and triglycerides with a heart-healthy diet, exercise, and if needed, medicine. Your doctor may want you to try lifestyle changes first to see whether they lower the fat in your blood. You may need to take medicine if lifestyle changes do not lower the fat in your blood enough. Follow-up care is a key part of your treatment and safety. Be sure to make and go to all appointments, and call your doctor if you are having problems. Its also a good idea to know your test results and keep a list of the medicines you take. How can you care for yourself at home? Take your medicines · Take your medicines exactly as prescribed. Call your doctor if you think you are having a problem with your medicine. · If you take medicine to lower your cholesterol, go to follow-up visits. You will need to have blood tests. · Do not take large doses of niacin, which is a B vitamin, while taking medicine called statins. It may increase the chance of muscle pain and liver problems. · Talk to your doctor about avoiding grapefruit juice if you are taking statins. Grapefruit juice can raise the level of this medicine in your blood. This could increase side effects. Eat more fruits, vegetables, and fiber · Fruits and vegetables have lots of nutrients that help protect against heart disease, and they have littleif anyfat. Try to eat at least five servings a day. Dark green, deep orange, or yellow fruits and vegetables are healthy choices. · Keep carrots, celery, and other veggies handy for snacks. Buy fruit that is in season and store it where you can see it so that you will be tempted to eat it. Cook dishes that have a lot of veggies in them, such as stir-fries and soups. · Foods high in fiber may reduce your cholesterol and provide important vitamins and minerals. High-fiber foods include whole-grain cereals and breads, oatmeal, beans, brown rice, citrus fruits, and apples. · Buy whole-grain breads and cereals instead of white bread and pastries. Limit saturated fat · Read food labels and try to avoid saturated fat and trans fat. They increase your risk of heart disease. · Use olive or canola oil when you cook. Try cholesterol-lowering spreads, such as Benecol or Take Control. · Bake, broil, grill, or steam foods instead of frying them. · Limit the amount of high-fat meats you eat, including hot dogs and sausages. Cut out all visible fat when you prepare meat.  
· Eat fish, skinless poultry, and soy products such as tofu instead of high-fat meats. Soybeans may be especially good for your heart. Eat at least two servings of fish a week. Certain fish, such as salmon, contain omega-3 fatty acids, which may help reduce your risk of heart attack. · Choose low-fat or fat-free milk and dairy products. Get exercise, limit alcohol, and quit smoking · Get more exercise. Work with your doctor to set up an exercise program. Even if you can do only a small amount, exercise will help you get stronger, have more energy, and manage your weight and your stress. Walking is an easy way to get exercise. Gradually increase the amount you walk every day. Aim for at least 30 minutes on most days of the week. You also may want to swim, bike, or do other activities. · Limit alcohol to no more than 2 drinks a day for men and 1 drink a day for women. · Do not smoke. If you need help quitting, talk to your doctor about stop-smoking programs and medicines. These can increase your chances of quitting for good. When should you call for help? Call 911 anytime you think you may need emergency care. For example, call if: 
· You have symptoms of a heart attack. These may include: ¨ Chest pain or pressure, or a strange feeling in the chest. 
¨ Sweating. ¨ Shortness of breath. ¨ Nausea or vomiting. ¨ Pain, pressure, or a strange feeling in the back, neck, jaw, or upper belly or in one or both shoulders or arms. ¨ Lightheadedness or sudden weakness. ¨ A fast or irregular heartbeat. After you call 911, the  may tell you to chew 1 adult-strength or 2 to 4 low-dose aspirin. Wait for an ambulance. Do not try to drive yourself. · You have signs of a stroke. These may include: 
¨ Sudden numbness, paralysis, or weakness in your face, arm, or leg, especially on only one side of your body. ¨ New problems with walking or balance. ¨ Sudden vision changes. ¨ Drooling or slurred speech. ¨ New problems speaking or understanding simple statements, or feeling confused. ¨ A sudden, severe headache that is different from past headaches. · You passed out (lost consciousness). Call your doctor now or seek immediate medical care if: 
· You have muscle pain or weakness. Watch closely for changes in your health, and be sure to contact your doctor if: 
· You are very tired. · You have an upset stomach, gas, constipation, or belly pain or cramps. Where can you learn more? Go to HowStuffWorks.be Enter C406 in the search box to learn more about \"Hyperlipidemia: After Your Visit. \"  
© 5363-9694 Healthwise, Incorporated. Care instructions adapted under license by Kip Bar[employer] (which disclaims liability or warranty for this information). This care instruction is for use with your licensed healthcare professional. If you have questions about a medical condition or this instruction, always ask your healthcare professional. [address] any warranty or liability for your use of this information. Content Version: 2.4.548272; Last Revised: October 13, 2011 Learning About High Cholesterol What is high cholesterol? Cholesterol is a type of fat in your blood. It is needed for many body functions, such as making new cells. Cholesterol is made by your body. It also comes from food you eat. If you have too much cholesterol, it starts to build up in your arteries. This is called hardening of the arteries, or atherosclerosis. High cholesterol raises your risk of a heart attack and stroke. There are different types of cholesterol. LDL is the \"bad\" cholesterol. High LDL can raise your risk for heart disease, heart attack, and stroke. HDL is the \"good\" cholesterol. High HDL is linked with a lower risk for heart disease, heart attack, and stroke. Your cholesterol levels help your doctor find out your risk for having a heart attack or stroke. How can you prevent high cholesterol? A heart-healthy lifestyle can help you prevent high cholesterol. This lifestyle helps lower your risk for a heart attack and stroke. · Eat heart-healthy foods. ¨ Eat fruits, vegetables, whole grains (like oatmeal), dried beans and peas, nuts and seeds, soy products (like tofu), and fat-free or low-fat dairy products. ¨ Replace butter, margarine, and hydrogenated or partially hydrogenated oils with olive and canola oils. (Canola oil margarine without trans fat is fine.) ¨ Replace red meat with fish, poultry, and soy protein (like tofu). ¨ Limit processed and packaged foods like chips, crackers, and cookies. · Be active. Exercise can improve your cholesterol level. Get at least 30 minutes of exercise on most days of the week. Walking is a good choice. You also may want to do other activities, such as running, swimming, cycling, or playing tennis or team sports. · Stay at a healthy weight. Lose weight if you need to. · Don't smoke. If you need help quitting, talk to your doctor about stop-smoking programs and medicines. These can increase your chances of quitting for good. How is high cholesterol treated? The goal of treatment is to reduce your chances of having a heart attack or stroke. The goal is not to lower your cholesterol numbers only. · You may make lifestyle changes, such as eating healthy foods, not smoking, losing weight, and being more active. · You may have to take medicine. Follow-up care is a key part of your treatment and safety. Be sure to make and go to all appointments, and call your doctor if you are having problems. It's also a good idea to know your test results and keep a list of the medicines you take. Where can you learn more? Go to http://kylee-geno.info/. Enter I231 in the search box to learn more about \"Learning About High Cholesterol. \" Current as of: May 10, 2017 Content Version: 11.7 © 8343-2899 Healthwise, Incorporated. Care instructions adapted under license by [employer] (which disclaims liability or warranty for this information). If you have questions about a medical condition or this instruction, always ask your healthcare professional. [address] any warranty or liability for your use of this information. Introducing [facility]! Dear [place]: 
Thank you for requesting a [employer] account. Our records indicate that you already have an active [employer] account. You can access your account anytime at [email or web address] Did you know that you can access your hospital and ER discharge instructions at any time in [employer]? You can also review all of your test results from your hospital stay or ER visit. Additional Information If you have questions, please visit the Frequently Asked Questions section of the [employer] website at [email or web address] Remember, [employer] is NOT to be used for urgent needs. For medical emergencies, dial 911. Now available from your iPhone and Android! Please provide this summary of care documentation to your next provider. Your primary care clinician is listed as Umair Vazquez. If you have any questions after today's visit, please call [phone].

## 2018-08-09 NOTE — PROGRESS NOTES
Chief Complaint   Patient presents with    Hypertension     3 month follow up with lab results. Health Maintenance Due   Topic Date Due    DTaP/Tdap/Td series (1 - Tdap) 10/25/1966    Pneumococcal 65+ High/Highest Risk (2 of 2 - PPSV23) 02/08/2016    Influenza Age 9 to Adult  08/01/2018     1. Have you been to the ER, urgent care clinic or hospitalized since your last visit? NO.     2. Have you seen or consulted any other health care providers outside of the [address] [place] [address] since your last visit (Include any pap smears or colon screening)? NO      Do you have an Advanced Directive? NO    Would you like information on Advanced Directives? NO, patient states she has the paperwork.

## 2018-08-12 PROBLEM — E55.9 VITAMIN D DEFICIENCY: Status: ACTIVE | Noted: 2018-08-12

## 2018-08-12 PROBLEM — E78.5 HYPERLIPIDEMIA: Status: ACTIVE | Noted: 2018-08-12

## 2018-08-12 PROBLEM — M85.89 OSTEOPENIA OF MULTIPLE SITES: Status: ACTIVE | Noted: 2018-08-12

## 2018-08-12 PROBLEM — E83.52 FHH (FAMILIAL HYPOCALCIURIC HYPERCALCEMIA): Status: ACTIVE | Noted: 2018-08-12

## 2018-08-12 NOTE — PROGRESS NOTES
HPI:   [place] is a 71y.o. 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 She reports that she is doing relatively well. She has been having increasing difficulty with constipation alternating with diarrhea as well as increasing reflux symptoms. She states that she was evaluated by Dr. Elena Bartholomew and it was her opinion that she suffered from irritable bowel syndrome. She has made changes in her diet and increased her consumption of fiber with some improvement. She states that she has resumed taking omeprazole with reasonable control of her reflux symptoms. She states that she continues to have some difficulty with pain related to her rheumatoid arthritis, but remains hesitant about starting additional treatment due to her multiple skin cancers. She is otherwise without specific complaints. She has a history of hypertension and palpitations, treated currently with metoprolol. She has seen Dr. Jennifer Freeman in 2012 for evaluation of palpitations. Event monitor was placed, and showed a known LBBB and PVC's, but reportedly no other arrhythmias (report unavailable). In 9/2012, she also underwent a pharmacologic nuclear stress test which was a normal low risk study with EF 55%. She also had an echocardiogram, which showed low normal LV function (EF 50%), mild grade 1 diastolic dysfunction, trace MR, TR, and AI. She also has a history of fibromuscular dysplasia, and in 5/2009, underwent aortogram and renal arteriogram with balloon angioplasty.  She has a known right carotid bruit, and her last carotid duplex (5/2104) showed mild atherosclerotic intraluminal plaquing with elevated velocities in the bilateral mid internal carotid arteries with a \"string of pearls\" appearance consistent with known fibromuscular dysplasia, causing a 50-69% stenosis of the bilateral internal carotid arteries. There was no change from prior study in 2011. She had been followed by Dr. Jessica Young, but states that she has been released from his care. She remains quite active, and denies any chest pain, shortness of breath at rest or with exertion, palpitations, lightheadedness, or edema. She has a history of rheumatoid arthritis, diagnosed when she was 37years old, treated currently with subcutaneous methotrexate and prednisone. She is being followed closely by Dr. Deonte Gonzalez. She is not currently being treated with a biologic agent due to concern regarding recent multiple squamous cell carcinomas of the skin, for which she is being followed by Dr. Nolan Osler. She reports that she was well controlled on Humira for several years, but had to discontinue treatment when it was no longer on her insurance formulary. She also has a history of osteopenia, with bone density studies performed by Dr. Deonte Gonzalez. Her last bone density study was in 10/2016 showing T-scores:  femoral neck left -1.5  /right -1.6 and lumbar -1.9. She continues to take calcium and Vitamin D supplements. She has no history of pathologic fractures. She was recently diagnosed with familial hypocalciuric hypercalcemia with mildly elevated calcium 10.6, mildly elevated PTH 80, Vitamin D level 36.5, and 24 hour urine calcium of 105 (FeCa 1%) in 7/30/2018, which would be consistent with this diagnosis. She is being followed by Dr. Diogo Shah. She has a history of cervical degenerative joint and disc disease and chronic neck pain. She was being followed by Dr. Enrique Gamez, who recommended surgery.  She was seen by Dr. Natalia Castillo at [address] for a second opinion, and cervical MRI (9/2015) showed multilevel degenerative disc and facet disease, worst at C5-C6; left central/subarticular disc protrusion with superimposed annular fissure noted at C5-C6 causing mass effect upon the left hemicord but no signal changes with severe left neuroforaminal narrowing at this level. Recommendation was for her to proceed with physical therapy, which did result in some improvement. She states that she is currently being treated with gabapentin for pain control. She also states that she was referred to Dr. Randee Mcmillan for evaluation, but has not yet scheduled an appointment with him. She has a history of GERD. She was recently evaluated by Dr. Jenna Villarreal and her treatment was changed from omeprazole to Protonix. However, she states that she restarted omeprazole since she found it to be more effective. She underwent upper endoscopy by Dr. Cruz Poon in 11/2012 which showed a 2 cm hiatal hernia and pathology from a distal esophageal biopsy showing chronic inflammation. She had a screening colonoscopy in 7/14/2016 by Dr. Cruz Poon, which showed an adenomatous polyp (report unavailable). Recommendation was for follow-up in 5 years. She denies any abdominal pain, nausea, vomiting, melena, hematochezia, or change in bowel movements. Past Medical History:   Diagnosis Date    Bowen's disease     Fibromuscular dysplasia (HCC)     GERD (gastroesophageal reflux disease)     Hiatal hernia     [facility]par[facility]roidism ([facility] [address]     Hypertension     LBBB (left bundle branch block)     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 [facility])     s/p balloon angioplasty 5/2009 [facility]    Rheumatoid arthritis ([facility] [address]     Skin cancer      Past Surgical History:   Procedure Laterality Date    HX ANGIOPLASTY      HX KNEE REPLACEMENT Bilateral     partial     HX OTHER SURGICAL      removal of fibroid benign tumors from breast     HX PARTIAL HYSTERECTOMY      HX TONSILLECTOMY       Current Outpatient Prescriptions   Medication Sig    sertraline (ZOLOFT) 25 mg tablet Take 1 Tab by mouth daily.  metoprolol tartrate (LOPRESSOR) 50 mg tablet Take 1 Tab by mouth two (2) times a day.     METHOTREXATE SODIUM INJECTION 15 mg every seven (7) days.  omeprazole (PRILOSEC) 40 mg capsule Take 40 mg by mouth daily.  predniSONE (DELTASONE) 5 mg tablet Take 5 mg by mouth daily.  gabapentin (NEURONTIN) 100 mg capsule Take 1 Cap by mouth three (3) times daily (with meals). Indications: NEUROPATHIC PAIN    multivitamin (ONE A DAY) tablet Take 1 Tab by Mouth Once a Day.  acetaminophen (TYLENOL) 500 mg tablet Take 500 mg by mouth every six (6) hours as needed.  aspirin 81 mg chewable tablet Take 81 mg by mouth daily.  folic acid (FOLVITE) 1 mg tablet Take 1 mg by mouth daily.  cholecalciferol (VITAMIN D3) 400 unit tab tablet Take 800 Units by mouth. No current facility-administered medications for this visit. Allergies and Intolerances: Allergies   Allergen Reactions    Hydrocodone Anaphylaxis    Nsaids (Non-Steroidal Anti-Inflammatory Drug) Other (comments)     None due to kidneys    Azithromycin Itching    Other Medication Other (comments)     GI intolerance to nonsteroidal antiinflammatory medications     Vicodin [Hydrocodone-Acetaminophen] Other (comments)     Family History: Her father passed away from a ruptured AAA. Her mother has osteoarthritis and COPD, and a maternal aunt had RA. Her brother recently passed away from multiple sclerosis. She has no family history of colon or breast cancer. Family History   Problem Relation Age of Onset    Arthritis-osteo Mother     Elevated Lipids Mother     Heart Disease Mother      CHF    Lung Disease Mother     Hypertension Sister     Lung Disease Sister     Hypertension Brother     MS Brother      Social History:   She  reports that she has never smoked. She has never used smokeless tobacco. She is  with two children and one stepchild. 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 She drinks wine only occasionally.    History   Alcohol Use    Yes     Immunization History:  Immunization History   Administered Date(s) Administered   [facility] Pneumococcal Conjugate (PCV-13) 12/14/2015       Review of Systems:   As above included in HPI. Otherwise 11 point review of systems negative including constitutional, skin, HENT, eyes, respiratory, cardiovascular, gastrointestinal, genitourinary, musculoskeletal, endocrine, hematologic, allergy, and neurologic. Physical:   Vitals:   BP: 132/62; repeat 128/68 left arm  HR: 64  WT: 120 lb (54.4 kg)  BMI:  18.79 kg/m2    Exam:   Patient appears in no apparent distress. Affect is appropriate. HEENT: PERRLA, anicteric, oropharynx clear, no JVD, adenopathy or thyromegaly. Bilateral carotid bruits clearly evident. Lungs: clear to auscultation, no wheezes, rhonchi, or rales. Heart: regular rate and rhythm. No murmur, rubs, gallops  Abdomen: soft, nontender, nondistended, normal bowel sounds, no hepatosplenomegaly or masses. Extremities: without edema. Pulses 1-2+ bilaterally. Review of Data:  Labs:  Hospital Outpatient Visit on 08/02/2018   Component Date Value Ref Range Status    WBC 08/02/2018 5.2  4.6 - 13.2 K/uL Final    RBC 08/02/2018 4.35  4.20 - 5.30 M/uL Final    HGB 08/02/2018 13.0  12.0 - 16.0 g/dL Final    HCT 08/02/2018 41.3  35.0 - 45.0 % Final    MCV 08/02/2018 94.9  74.0 - 97.0 FL Final    MCH 08/02/2018 29.9  24.0 - 34.0 PG Final    MCHC 08/02/2018 31.5  31.0 - 37.0 g/dL Final    RDW 08/02/2018 14.8* 11.6 - 14.5 % Final    PLATELET 42/87/6243 946  135 - 420 K/uL Final    MPV 08/02/2018 10.9  9.2 - 11.8 FL Final    NEUTROPHILS 08/02/2018 56  40 - 73 % Final    LYMPHOCYTES 08/02/2018 30  21 - 52 % Final    MONOCYTES 08/02/2018 10  3 - 10 % Final    EOSINOPHILS 08/02/2018 3  0 - 5 % Final    BASOPHILS 08/02/2018 1  0 - 2 % Final    ABS. NEUTROPHILS 08/02/2018 2.9  1.8 - 8.0 K/UL Final    ABS. LYMPHOCYTES 08/02/2018 1.6  0.9 - 3.6 K/UL Final    ABS. MONOCYTES 08/02/2018 0.5  0.05 - 1.2 K/UL Final    ABS. EOSINOPHILS 08/02/2018 0.1  0.0 - 0.4 K/UL Final    ABS.  BASOPHILS 08/02/2018 0.0  0.0 - 0.1 K/UL Final    DF 08/02/2018 AUTOMATED    Final    LIPID PROFILE 08/02/2018        Final    Cholesterol, total 08/02/2018 223* <200 MG/DL Final    Triglyceride 08/02/2018 79  <150 MG/DL Final    HDL Cholesterol 08/02/2018 81* 40 - 60 MG/DL Final    LDL, calculated 08/02/2018 126.2* 0 - 100 MG/DL Final    VLDL, calculated 08/02/2018 15.8  MG/DL Final    CHOL/HDL Ratio 08/02/2018 2.8  0 - 5.0   Final    TSH 08/02/2018 2.80  0.36 - 3.74 uIU/mL Final    Vitamin D 25-Hydroxy 08/02/2018 19.9* 30 - 100 ng/mL Final    Color 08/02/2018 YELLOW    Final    Appearance 08/02/2018 CLEAR    Final    Specific gravity 08/02/2018 1.020  1.005 - 1.030   Final    pH (UA) 08/02/2018 6.0  5.0 - 8.0   Final    Protein 08/02/2018 NEGATIVE   NEG mg/dL Final    Glucose 08/02/2018 NEGATIVE   NEG mg/dL Final    Ketone 08/02/2018 NEGATIVE   NEG mg/dL Final    Bilirubin 08/02/2018 NEGATIVE   NEG   Final    Blood 08/02/2018 NEGATIVE   NEG   Final    Urobilinogen 08/02/2018 1.0  0.2 - 1.0 EU/dL Final    Nitrites 08/02/2018 NEGATIVE   NEG   Final    Leukocyte Esterase 08/02/2018 TRACE* NEG   Final    WBC 08/02/2018 2 to 3  0 - 4 /hpf Final    RBC 08/02/2018 0  0 - 5 /hpf Final    Epithelial cells 08/02/2018 FEW  0 - 5 /lpf Final    Bacteria 08/02/2018 NEGATIVE   NEG /hpf Final    CA Oxalate crystals 08/02/2018 FEW* NEG   Final     Calculated 10 year ASCVD risk score: 12.4%    Health Maintenance:  Screening:    Mammogram: negative (2/2018) [facility]   PAP smear: well woman exams performed at [facility] (last 2/2018)   Colorectal: colonoscopy (7/2016) tubular adenoma. Dr. Adalgisa Campos. Due 2021. Now followed by Dr. Andry Aldridge. Depression: on Zoloft   DM (HbA1c/FPG): FPG 80 (3/2018)   Hepatitis C: negative (1/2/2018) Dr. Ambar Plummer. Falls: none   DEXA: osteopenia. Performed by Dr. Ambar lPummer.    Glaucoma: regular eye exams with Dr. Jerry Lane (last 2/2018 )   Smoking: none   Vitamin D: 19.9 (8/2018)    Medicare Wellness: 5/2/2018    Impression:  Patient Active Problem List   Diagnosis Code    Intervertebral disc protrusion [id number]    Displacement of cervical intervertebral disc without myelopathy M50.20    Cervical spondylosis without myelopathy M47.812    Degeneration of cervical intervertebral disc M50.30    Brachial neuritis or radiculitis M54.12    Radiculopathy, cervical M54.12    Skin cancer C44.90    Rheumatoid arthritis ([facility] [address] M06.9    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 (HCC) I70.1    LBBB (left bundle branch block) I44.7    Hypertension I10    Hyperparathyroidism ([facility] [address] E21.3    Hiatal hernia K44.9    GERD (gastroesophageal reflux disease) K21.9    Fibromuscular dysplasia (HCC) I77.3    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    Irritable bowel syndrome with both constipation and diarrhea K58.2    Bilateral carotid bruits R09.89    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    Hyperlipidemia E78.5    Osteopenia of multiple sites M85.89    Vitamin D deficiency E55.9       Plan:  1. Hypertension. Blood pressure initially elevated on presentation, but improved on repeat measurement. On metoprolol 50 mh bid, which also helps with control of palpitations. Renal function normal with creatinine 0.7/ eGFR >60. Will continue to follow. 2. Fibromuscular dysplasia. Previous angioplasty of renal arteries. Known moderate stenosis of bilateral carotid arteries, although last carotid duplex in 5/2014. Noted prominent bilateral carotid bruits today. Review of report also showed mild atherosclerosis at that time. Given more prominent bruits today and that last duplex ultrasound was in 2014, will obtain repeat carotid duplex to evaluate. Order placed. Previously followed by [facility]. Juan Manuel [facility]idy [facility], but given stability over many years, released from his care with plan to pursue further evaluation only if becomes symptomatic. 3. Hyperlipidemia.  Calculated 10 year ASCVD risk is 12.4 %, which places her in one of the four statin benefit groups as per new AHA/ACC guidelines (primary prevention: 10 year ASCVD risk >7.5%). In addition, mild atherosclerosis in the setting of fibromuscular dysplasia described on prior carotid duplex. Also at increased due to chronic inflammatory state related to rheumatoid arthritis. Given these factors would recommend considering treatment with statin at this time. Reviewed prior lipid panels in Care Everywhere, and last one found was in 9/2012 with Total chol 183/ TG 59/HDL 72/ LDL 99. Discussed benefits and risk of statin therapy at length with patient. Unable to promote lifestyle modifications other than addition of regular exercise as already eating a healthy diet and BMI 18.79. Decision made to wait 3 months to reassess, and if remains elevated, will initiate rosuvastatin. Will recheck lipid panel at next visit. Continue to follow. 4. Rheumatoid arthritis. On [place] methotrexate and prednisone with fairly stable symptoms. Considering another biologic but hesitant given Bowen's disease. Being followed closely by Dr. Kaylee Rossi. 5. Cervical degenerative disease. Experiencing chronic neck pain, treated with gabapentin with some improvement. Notable improvement with exercising as well. Not wishing to pursue surgery. Being followed by Dr. Nayana Rm currently, but also considering referral to Dr. Pavel Turner for evaluation. 6. Osteopenia. Last bone density scan 10/2016 . Using femoral neck T-scores, calculated FRAX score estimates her 10 year risk of a major osteoporetic fracture at 8.7 % and hip fracture at 1.6 %, which are not an indication for biphosphonate treatment (>20% and >3%, respectively). Continue calcium and Vitamin D. Encouraged exercise, particularly weight bearing activities. Performed by Dr. Kaylee Rossi. 7. Hyperparathyroidism.  Mild increase in PTH, mildly elevated calcium, normal Vitamin D level,and 24 hour urine calcium only 105 (FeCa 1%) is consistent with a diagnosis of familial hypocalciuric hypercalcemia. Being followed by Dr. Hermes Davis and told at most recent visit that would not need to follow-up for 2 years given benign condition. Advised to drink plenty of fluids. 8. GERD. Has resumed taking omeprazole. Symptoms of diarrhea alternating with constipation chronically thought to be secondary to irritable bowel syndrome. On probiotic. Reportedly changed diet and increased fiber with some improvement. 8. Bowen's disease. Being followed closely by Dr. Sandra Hansen for multiple squamous cell carcinomas in situ. 9. Health maintenance. Reviewed record and received Prevnar 13 in 12/2015. Will give Pneumovax at next visit. Also unclear if received Tdap. Shingrix to be considered once recommendation regarding immunosuppressed patients becomes available. Mammogram and well women exams performed at [facility]. Continue regular eye exams with Dr. Wendy Martinez. Will request report. Vitamin D level low although measurement at [facility] on 7/20/2018 was normal. Will continue current maintenance dose supplement for now and recheck at next visit. Medicare wellness visit up to date. Total time: 40 minutes spent with the patient in face-to-face consultation of which greater than 50% was spent on counseling, answering questions and/or coordination of care. Complex medical review and management performed. Patient understands recommendations and agrees with plan. Follow-up in 3 months.

## 2018-08-15 ENCOUNTER — HOSPITAL ENCOUNTER (OUTPATIENT)
Dept: VASCULAR SURGERY | Age: 73
Discharge: HOME | End: 2018-08-15
Attending: INTERNAL MEDICINE
Payer: MEDICARE

## 2018-08-15 DIAGNOSIS — I77.3 FIBROMUSCULAR DYSPLASIA (HCC): ICD-10-CM

## 2018-08-15 DIAGNOSIS — R09.89 BILATERAL CAROTID BRUITS: ICD-10-CM

## 2018-08-15 LAB
LEFT CCA DIST DIAS: 20.35 CM/S
LEFT CCA DIST SYS: 93.04 CM/S
LEFT CCA MID DIAS: 25.2 CM/S
LEFT CCA MID SYS: 112.42 CM/S
LEFT CCA PROX DIAS: 28.43 CM/S
LEFT CCA PROX SYS: 126.96 CM/S
LEFT ECA DIAS: 15.55 CM/S
LEFT ECA SYS: 76.57 CM/S
LEFT ICA DIST DIAS: 43.2 CM/S
LEFT ICA DIST SYS: 118.34 CM/S
LEFT ICA MID DIAS: 34.54 CM/S
LEFT ICA MID SYS: 124.43 CM/S
LEFT ICA PROX DIAS: 20.35 CM/S
LEFT ICA PROX SYS: 65.58 CM/S
LEFT ICA/CCA SYS: 1.34
LEFT VERTEBRAL DIAS: 22.7 CM/S
LEFT VERTEBRAL SYS: 82.54 CM/S
RIGHT CCA DIST DIAS: 15.55 CM/S
RIGHT CCA DIST SYS: 88.38 CM/S
RIGHT CCA MID DIAS: 25.39 CM/S
RIGHT CCA MID SYS: 125.78 CM/S
RIGHT CCA PROX DIAS: 23.42 CM/S
RIGHT CCA PROX SYS: 108.06 CM/S
RIGHT ECA DIAS: 11.71 CM/S
RIGHT ECA SYS: 86.97 CM/S
RIGHT ICA DIST DIAS: 28.48 CM/S
RIGHT ICA DIST SYS: 136.13 CM/S
RIGHT ICA MID DIAS: 59.57 CM/S
RIGHT ICA MID SYS: 235.36 CM/S
RIGHT ICA PROX DIAS: 15.55 CM/S
RIGHT ICA PROX SYS: 58.85 CM/S
RIGHT ICA/CCA SYS: 2.66
RIGHT VERTEBRAL DIAS: 17.28 CM/S
RIGHT VERTEBRAL SYS: 81.39 CM/S

## 2018-08-15 PROCEDURE — 93880 EXTRACRANIAL BILAT STUDY: CPT

## 2018-08-30 ENCOUNTER — OFFICE VISIT (OUTPATIENT)
Dept: FAMILY MEDICINE CLINIC | Age: 73
End: 2018-08-30

## 2018-08-30 VITALS
BODY MASS INDEX: 18.36 KG/M2 | DIASTOLIC BLOOD PRESSURE: 68 MMHG | OXYGEN SATURATION: 98 % | SYSTOLIC BLOOD PRESSURE: 126 MMHG | WEIGHT: 117 LBS | HEIGHT: 67 IN | RESPIRATION RATE: 16 BRPM | HEART RATE: 72 BPM | TEMPERATURE: 98.7 F

## 2018-08-30 DIAGNOSIS — M05.79 RHEUMATOID ARTHRITIS INVOLVING MULTIPLE SITES WITH POSITIVE RHEUMATOID FACTOR (HCC): ICD-10-CM

## 2018-08-30 DIAGNOSIS — M79.18 MYOFASCIAL PAIN: Primary | ICD-10-CM

## 2018-08-30 DIAGNOSIS — M54.12 RADICULOPATHY, CERVICAL: ICD-10-CM

## 2018-08-30 NOTE — PROGRESS NOTES
SUBJECTIVE Chief Complaint Patient presents with  New Patient  Arthritis History of Rheumatoid  Other  
  wants to discuss acupuncture Patient presents for neck pain localized to her trapezius. She has had RA for greater than 30 years. She has had chronic neck pain for the past 3-4 years. She has seen a few different spine specialists and was given a mixed picture on surgical versus nonsurgical options. She saw a physiatrist and was sent to PT about 1.5 years ago. She still does the exercises which she derives some benefit from. She was taken off of neurontin by her orthopedist.  She is currently seeing Dr. Evangelina Arambula (ortho). She wonders about the role of acupuncture in her pain. She sees Dr. Trista Richardson for rheumatology. Her prior MRI does show left sided compression and that is the arm where she gets radicular pains. She takes weekly Mtx and is on prednisone. Past Medical History:  
Diagnosis Date  Bowen's disease  Fibromuscular dysplasia ([facility] [address]  GERD (gastroesophageal reflux disease)  Hiatal hernia  Hyperparathyroidism ([facility] [address]  Hypertension  LBBB (left bundle branch block)  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 [facility])   
 s/p balloon angioplasty 5/2009 [facility]  Rheumatoid arthritis ([facility] [address]  Skin cancer Current Outpatient Prescriptions:  
  sertraline (ZOLOFT) 25 mg tablet, Take 1 Tab by mouth daily. , Disp: 90 Tab, Rfl: 2 
  metoprolol tartrate (LOPRESSOR) 50 mg tablet, Take 1 Tab by mouth two (2) times a day., Disp: 180 Tab, Rfl: 2 
  METHOTREXATE SODIUM INJECTION, 15 mg every seven (7) days. , Disp: , Rfl:  
  omeprazole (PRILOSEC) 40 mg capsule, Take 40 mg by mouth daily. , Disp: , Rfl:  
  multivitamin (ONE A DAY) tablet, Take 1 Tab by Mouth Once a Day. , Disp: , Rfl:  
  aspirin 81 mg chewable tablet, Take 81 mg by mouth daily. , Disp: , Rfl:  
  folic acid (FOLVITE) 1 mg tablet, Take 1 mg by mouth daily. , Disp: , Rfl: 0 
   predniSONE (DELTASONE) 5 mg tablet, Take  by mouth daily. 2.5 mg daily, Disp: , Rfl:  
  gabapentin (NEURONTIN) 100 mg capsule, Take 1 Cap by mouth three (3) times daily (with meals). Indications: NEUROPATHIC PAIN, Disp: 270 Cap, Rfl: 0 
  acetaminophen (TYLENOL) 500 mg tablet, Take 500 mg by mouth every six (6) hours as needed. , Disp: , Rfl:  
  cholecalciferol (VITAMIN D3) 400 unit tab tablet, Take 800 Units by mouth., Disp: , Rfl:  
 
Allergies Allergen Reactions  Hydrocodone Anaphylaxis  Nsaids (Non-Steroidal Anti-Inflammatory Drug) Other (comments) None due to kidneys  Azithromycin Itching  Other Medication Other (comments) GI intolerance to nonsteroidal antiinflammatory medications  Vicodin [Hydrocodone-Acetaminophen] Other (comments) Past Surgical History:  
Procedure Laterality Date  HX ANGIOPLASTY  HX KNEE REPLACEMENT Bilateral 2009  
 partial   
 HX OTHER SURGICAL    
 removal of fibroid benign tumors from breast   
 HX PARTIAL HYSTERECTOMY [facility] Social History Social History  Marital status:  Spouse name: N/A  
 Number of children: N/A  
 Years of education: N/A Occupational History  Retired Social History Main Topics  Smoking status: Never Smoker  Smokeless tobacco: Never Used  Alcohol use 0.6 - 1.2 oz/week 1 - 2 Glasses of wine per week  Drug use: No  
 Sexual activity: Not Currently Other Topics Concern  Not on file Social History Narrative Family History Problem Relation Age of Onset AngelinaChenteDaniel Arthritis-osteo Mother  Elevated Lipids Mother  Heart Disease Mother CHF  Lung Disease Mother  Hypertension Sister  Lung Disease Sister  Hypertension Brother  MS Brother OBJECTIVE Blood pressure 126/68, pulse 72, temperature 98.7 °F (37.1 °C), temperature source Oral, resp. rate 16, height 5' 7\" (1.702 m), weight 117 lb (53.1 kg), SpO2 98 %. General:  alert, cooperative, well appearing, in no apparent distress. Musculoskeletal:  The patients gait is normal. 
Neck:  There is normal range of motion with the exception of restricted side bending both ways. There is a negative Spurlings maneuver. There is no bony tenderness. There is tenderness to palpation in the paraspinal regions, mainly the trapezius. Neuro:  Strength is 5/5 in the upper extremity bilaterally. Skin:  The skin is without jaundice. It is intact without pathologic lesions, erythema, vesicles, discharge, or rash. Palpation of the skin is normal without induration, subcutaneous nodules, or tightening. ASSESSMENT / PLAN 
  ICD-10-CM ICD-9-CM 1. Myofascial pain M79.1 729.1 2. Radiculopathy, cervical M54.12 723.4 3. Rheumatoid arthritis involving multiple sites with positive rheumatoid factor (HCC) M05.79 714.0 I have suggested that acupuncture can be used as a supportive modality but will not be curative of RA or any structural issues that she has in her cervical spine. I have advised that it may help mainly with myofascial pain which is likely secondary to RA and cervical arthritis. 25 minutes of face to face time spent with the patient with at least 50% on counseling on above medical issues. All chart history elements were reviewed by me at the time of the visit even though marked at time of note closure. Patient understands our medical plan. Patient has provided input and agrees with goals. Alternatives have been explained and offered. All questions answered. The patient is to call if condition worsens or fails to improve. Follow-up Disposition: 
Return for acupuncture if desired.

## 2018-08-30 NOTE — MR AVS SNAPSHOT
[address] [facility] [address] [facility] 
[phone] Patient: Gideon Box MRN: [id number] :1945 Visit Information Date & Time Provider Department Dept. Phone Encounter #  
 2018  9:00 AM Nadine Ferris, [address] [id number] Follow-up Instructions Return for acupuncture if desired. Your Appointments 2018  9:30 AM  
LAB with [facility] NURSE VISIT [facility] ([address] [facility]) Appt Note: lab  
 [address], [address] [facility] [address]  
  
   
 [address], [address]  
  
    
 11/15/2018  9:30 AM  
Office Visit with Brittany Monae MD  
[facility] [address] [place] [address] Appt Note: 3 month follow up  
 [address], [address] [address] [address]  
  
   
 [address], [address] Upcoming Health Maintenance Date Due DTaP/Tdap/Td series (1 - Tdap) 10/25/1967 Pneumococcal 65+ High/Highest Risk (2 of 2 - PPSV23) 2016 Influenza Age 5 to Adult 2018 GLAUCOMA SCREENING Q2Y 7/10/2019 BREAST CANCER SCRN MAMMOGRAM 2019 COLONOSCOPY 2021 Allergies as of 2018  Review Complete On: 2018 By: Nadine Ferris MD  
  
 Severity Noted Reaction Type Reactions Hydrocodone High 2016    Anaphylaxis Nsaids (Non-steroidal Anti-inflammatory Drug) Medium 2016    Other (comments) None due to kidneys Azithromycin  2016    Itching Other Medication    Other (comments) GI intolerance to nonsteroidal antiinflammatory medications Vicodin [Hydrocodone-acetaminophen]    Other (comments) Current Immunizations  Never Reviewed Name Date Pneumococcal Conjugate (PCV-13) 2015 Not reviewed this visit You Were Diagnosed With   
  
 Codes Comments Rheumatoid arthritis involving multiple sites with positive rheumatoid factor (HCC)    -  Primary ICD-10-CM: M05.79 ICD-9-CM: 714.0 Cervical spondylosis without myelopathy     ICD-10-CM: T93.355 ICD-9-CM: 721.0 Vitals BP Pulse Temp Resp Height(growth percentile) Weight(growth percentile) 126/68 (BP 1 Location: Left arm, BP Patient Position: Sitting) 72 98.7 °F (37.1 °C) (Oral) 16 5' 7\" (1.702 m) 117 lb (53.1 kg) SpO2 BMI OB Status Smoking Status 98% 18.32 kg/m2 Hysterectomy Never Smoker Vitals History BMI and BSA Data Body Mass Index Body Surface Area  
 18.32 kg/m 2 1.58 m 2 Preferred Pharmacy Pharmacy Name Phone [facility] [address] [facility] [address] [phone] Your Updated Medication List  
  
   
This list is accurate as of 8/30/18 10:08 AM.  Always use your most recent med list.  
  
  
  
  
 acetaminophen 500 mg tablet Commonly known as:  TYLENOL Take 500 mg by mouth every six (6) hours as needed. aspirin 81 mg chewable tablet Take 81 mg by mouth daily. cholecalciferol 400 unit Tab tablet Commonly known as:  VITAMIN D3 Take 800 Units by mouth. folic acid 1 mg tablet Commonly known as:  Google Take 1 mg by mouth daily. gabapentin 100 mg capsule Commonly known as:  NEURONTIN Take 1 Cap by mouth three (3) times daily (with meals). Indications: NEUROPATHIC PAIN  
  
 METHOTREXATE SODIUM INJECTION 15 mg every seven (7) days. metoprolol tartrate 50 mg tablet Commonly known as:  LOPRESSOR Take 1 Tab by mouth two (2) times a day. multivitamin tablet Commonly known as:  ONE A DAY Take 1 Tab by Mouth Once a Day. omeprazole 40 mg capsule Commonly known as:  PRILOSEC Take 40 mg by mouth daily. predniSONE 5 mg tablet Commonly known as:  Clayton Fossa Take  by mouth daily. 2.5 mg daily  
  
 sertraline 25 mg tablet Commonly known as:  ZOLOFT  
 Take 1 Tab by mouth daily. Follow-up Instructions Return for acupuncture if desired. Patient Instructions Rheumatoid Arthritis: Care Instructions Your Care Instructions Arthritis is a common health problem in which the joints are inflamed. There are many types of arthritis. In rheumatoid arthritis, the body's own immune system attacks the joints. This causes pain, stiffness, and swelling in the joints, especially in the hands and feet. It can become hard to open jars, write, and do other daily tasks. Sometimes rheumatoid arthritis can also cause bumps to form under the skin. Over time, rheumatoid arthritis can damage and deform joints. Early treatment with medicines may reduce your chances of having a lasting disability. Follow-up care is a key part of your treatment and safety. Be sure to make and go to all appointments, and call your doctor if you are having problems. It's also a good idea to know your test results and keep a list of the medicines you take. How can you care for yourself at home? · If your doctor recommends it, get more exercise. Walking is a good choice. If your knees or ankles hurt, try riding a stationary bike or swimming. · Move each joint gently through its full range of motion once or twice a day. · Rest joints when they are sore or overworked. Short rest breaks may help more than staying in bed. · Reach and stay at a healthy weight. Regular exercise and a healthy diet will help you do this. Extra weight can strain the joints, especially the knees and hips, and make the pain worse. Losing even a few pounds may help. · Get enough calcium and vitamin D to help prevent osteoporosis, which causes thin bones. Talk to your doctor about how much you should take. · Protect your joints from injury. Do not overuse them. Try to limit or avoid activities that cause joint pain or swelling.  Use special kitchen tools and other self-help devices as well as walkers, splints, or canes if needed. · Use heat to ease pain. Take warm showers or baths. Use hot packs or a heating pad set on low. Sleep under a warm electric blanket. · Put ice or a cold pack on the area for 10 to 20 minutes at a time. Put a thin cloth between the ice and your skin. · Take pain medicines exactly as directed. ¨ If the doctor gave you a prescription medicine for pain, take it as prescribed. ¨ If you are not taking a prescription pain medicine, ask your doctor if you can take an over-the-counter medicine. · Take an active role in managing your condition. Set up a treatment plan with your doctor, and learn as much as you can about rheumatoid arthritis. This will help you control pain and stay active. When should you call for help? Call your doctor now or seek immediate medical care if: 
  · You have a fever or a rash along with joint pain.  
  · You have joint pain that is so severe that you cannot use the joint at all.  
  · You have sudden swelling, redness, or pain in one or more joints, and you do not know why.  
  · You have back or neck pain along with weakness in your arms or legs.  
  · You have a loss of bowel or bladder control.  
 Watch closely for changes in your health, and be sure to contact your doctor if: 
  · You have joint pain that lasts for more than 6 weeks.  
  · You have side effects from your arthritis medicines, such as stomach pain, nausea, heartburn, or dark and tarlike stools. Where can you learn more? Go to http://kylee-geno.info/. Enter K205 in the search box to learn more about \"Rheumatoid Arthritis: Care Instructions. \" Current as of: October 10, 2017 Content Version: 11.7 © 1968-6980 [employer]. Care instructions adapted under license by [employer] (which disclaims liability or warranty for this information).  If you have questions about a medical condition or this instruction, always ask your healthcare professional. [address] any warranty or liability for your use of this information. Introducing [facility] & HEALTH SERVICES! Dear [place]: 
Thank you for requesting a Toppr account. Our records indicate that you already have an active Toppr account. You can access your account anytime at [email or web address] Did you know that you can access your hospital and ER discharge instructions at any time in Toppr? You can also review all of your test results from your hospital stay or ER visit. Additional Information If you have questions, please visit the Frequently Asked Questions section of the Toppr website at [email or web address] Remember, Toppr is NOT to be used for urgent needs. For medical emergencies, dial 911. Now available from your iPhone and Android! Please provide this summary of care documentation to your next provider. Your primary care clinician is listed as Troy Zhou. If you have any questions after today's visit, please call [phone].

## 2018-08-30 NOTE — PATIENT INSTRUCTIONS
Rheumatoid Arthritis: Care Instructions Your Care Instructions Arthritis is a common health problem in which the joints are inflamed. There are many types of arthritis. In rheumatoid arthritis, the body's own immune system attacks the joints. This causes pain, stiffness, and swelling in the joints, especially in the hands and feet. It can become hard to open jars, write, and do other daily tasks. Sometimes rheumatoid arthritis can also cause bumps to form under the skin. Over time, rheumatoid arthritis can damage and deform joints. Early treatment with medicines may reduce your chances of having a lasting disability. Follow-up care is a key part of your treatment and safety. Be sure to make and go to all appointments, and call your doctor if you are having problems. It's also a good idea to know your test results and keep a list of the medicines you take. How can you care for yourself at home? · If your doctor recommends it, get more exercise. Walking is a good choice. If your knees or ankles hurt, try riding a stationary bike or swimming. · Move each joint gently through its full range of motion once or twice a day. · Rest joints when they are sore or overworked. Short rest breaks may help more than staying in bed. · Reach and stay at a healthy weight. Regular exercise and a healthy diet will help you do this. Extra weight can strain the joints, especially the knees and hips, and make the pain worse. Losing even a few pounds may help. · Get enough calcium and vitamin D to help prevent osteoporosis, which causes thin bones. Talk to your doctor about how much you should take. · Protect your joints from injury. Do not overuse them. Try to limit or avoid activities that cause joint pain or swelling. Use special kitchen tools and other self-help devices as well as walkers, splints, or canes if needed. · Use heat to ease pain. Take warm showers or baths.  Use hot packs or a heating pad set on low. Sleep under a warm electric blanket. · Put ice or a cold pack on the area for 10 to 20 minutes at a time. Put a thin cloth between the ice and your skin. · Take pain medicines exactly as directed. ¨ If the doctor gave you a prescription medicine for pain, take it as prescribed. ¨ If you are not taking a prescription pain medicine, ask your doctor if you can take an over-the-counter medicine. · Take an active role in managing your condition. Set up a treatment plan with your doctor, and learn as much as you can about rheumatoid arthritis. This will help you control pain and stay active. When should you call for help? Call your doctor now or seek immediate medical care if: 
  · You have a fever or a rash along with joint pain.  
  · You have joint pain that is so severe that you cannot use the joint at all.  
  · You have sudden swelling, redness, or pain in one or more joints, and you do not know why.  
  · You have back or neck pain along with weakness in your arms or legs.  
  · You have a loss of bowel or bladder control.  
 Watch closely for changes in your health, and be sure to contact your doctor if: 
  · You have joint pain that lasts for more than 6 weeks.  
  · You have side effects from your arthritis medicines, such as stomach pain, nausea, heartburn, or dark and tarlike stools. Where can you learn more? Go to http://kylee-geno.info/. Enter K205 in the search box to learn more about \"Rheumatoid Arthritis: Care Instructions. \" Current as of: October 10, 2017 Content Version: 11.7 © 1389-5501 [employer]or. Care instructions adapted under license by [employer] (which disclaims liability or warranty for this information). If you have questions about a medical condition or this instruction, always ask your healthcare professional. [place] [address] any warranty or liability for your use of this information.

## 2018-08-30 NOTE — PROGRESS NOTES
1. Have you been to the ER, urgent care clinic since your last visit? Hospitalized since your last visit? No 
 
2. Have you seen or consulted any other health care providers outside of the [address] [place] [address] since your last visit? Include any pap smears or colon screening.  No

## 2018-09-11 ENCOUNTER — OFFICE VISIT (OUTPATIENT)
Dept: FAMILY MEDICINE CLINIC | Age: 73
End: 2018-09-11

## 2018-09-11 VITALS
WEIGHT: 117 LBS | HEART RATE: 61 BPM | HEIGHT: 67 IN | DIASTOLIC BLOOD PRESSURE: 76 MMHG | BODY MASS INDEX: 18.36 KG/M2 | SYSTOLIC BLOOD PRESSURE: 122 MMHG | RESPIRATION RATE: 16 BRPM | TEMPERATURE: 98.7 F | OXYGEN SATURATION: 98 %

## 2018-09-11 DIAGNOSIS — M79.18 MYOFASCIAL PAIN: Primary | ICD-10-CM

## 2018-09-11 DIAGNOSIS — M50.30 DEGENERATION OF CERVICAL INTERVERTEBRAL DISC: ICD-10-CM

## 2018-09-11 DIAGNOSIS — M47.812 CERVICAL SPONDYLOSIS WITHOUT MYELOPATHY: ICD-10-CM

## 2018-09-11 NOTE — MR AVS SNAPSHOT
[address] [place] [address] 250 200 [facility] 
[phone] Patient: La Nena Guevara MRN: [id number] :1945 Visit Information Date & Time Provider Department Dept. Phone Encounter #  
 2018 10:30 AM Vera Duran, [address] [facility] [address] [id number] Follow-up Instructions Return in about 1 week (around 2018) for acupuncture . Your Appointments 2018  9:30 AM  
LAB with [facility] NURSE VISIT [facility] ([facility]) Appt Note: lab  
 [address], [address] [facility] [address]  
  
   
 [address], [address]  
  
    
 11/15/2018  9:30 AM  
Office Visit with Britton Casillas MD  
[facility] Appt Note: 3 month follow up  
 [address], [address] [place] [address] [address]  
  
   
 [address], [address] Upcoming Health Maintenance Date Due DTaP/Tdap/Td series (1 - Tdap) 10/25/1967 Pneumococcal 65+ High/Highest Risk (2 of 2 - PPSV23) 2016 Influenza Age 5 to Adult 2018 MEDICARE YEARLY EXAM 5/3/2019 GLAUCOMA SCREENING Q2Y 7/10/2019 BREAST CANCER SCRN MAMMOGRAM 2019 COLONOSCOPY 2021 Allergies as of 2018  Review Complete On: 2018 By: Evan Calvillo Severity Noted Reaction Type Reactions Hydrocodone High 2016    Anaphylaxis Nsaids (Non-steroidal Anti-inflammatory Drug) Medium 2016    Other (comments) None due to kidneys Azithromycin  2016    Itching Other Medication    Other (comments) GI intolerance to nonsteroidal antiinflammatory medications Vicodin [Hydrocodone-acetaminophen]    Other (comments) Current Immunizations  Reviewed on 2018 Name Date  Influenza Vaccine 10/29/2013 12:00 AM  
 Pneumococcal Conjugate (PCV-13) 12/14/2015 12:00 AM  
  
 Reviewed by Unknown Boeck on 9/11/2018 at 10:40 AM  
You Were Diagnosed With   
  
 Codes Comments Myofascial pain    -  Primary ICD-10-CM: M79.1 ICD-9-CM: 729.1 Cervical spondylosis without myelopathy     ICD-10-CM: D47.510 ICD-9-CM: 721.0 Degeneration of cervical intervertebral disc     ICD-10-CM: M50.30 ICD-9-CM: 722.4 Vitals BP Pulse Temp Resp Height(growth percentile) Weight(growth percentile) 122/76 (BP 1 Location: Left arm, BP Patient Position: Sitting) 61 98.7 °F (37.1 °C) (Oral) 16 5' 7\" (1.702 m) 117 lb (53.1 kg) SpO2 BMI OB Status Smoking Status 98% 18.32 kg/m2 Hysterectomy Never Smoker Vitals History BMI and BSA Data Body Mass Index Body Surface Area  
 18.32 kg/m 2 1.58 m 2 Preferred Pharmacy Pharmacy Name Phone [facility] [address] [facility] [address] [phone] Your Updated Medication List  
  
   
This list is accurate as of 9/11/18 11:22 AM.  Always use your most recent med list.  
  
  
  
  
 acetaminophen 500 mg tablet Commonly known as:  TYLENOL Take 500 mg by mouth every six (6) hours as needed. aspirin 81 mg chewable tablet Take 81 mg by mouth daily. cholecalciferol 400 unit Tab tablet Commonly known as:  VITAMIN D3 Take 800 Units by mouth. folic acid 1 mg tablet Commonly known as:  Google Take 1 mg by mouth daily. gabapentin 100 mg capsule Commonly known as:  NEURONTIN Take 1 Cap by mouth three (3) times daily (with meals). Indications: NEUROPATHIC PAIN  
  
 METHOTREXATE SODIUM INJECTION 15 mg every seven (7) days. metoprolol tartrate 50 mg tablet Commonly known as:  LOPRESSOR Take 1 Tab by mouth two (2) times a day. multivitamin tablet Commonly known as:  ONE A DAY Take 1 Tab by Mouth Once a Day. omeprazole 40 mg capsule Commonly known as:  PRILOSEC  
 Take 40 mg by mouth daily. predniSONE 5 mg tablet Commonly known as:  Marcelene Im Take  by mouth daily. 2.5 mg every other day  
  
 sertraline 25 mg tablet Commonly known as:  ZOLOFT Take 1 Tab by mouth daily. We Performed the Following ACUPUNCT W/ ELEC STIMUL 15 MIN [id number] CPT(R)] Follow-up Instructions Return in about 1 week (around 9/18/2018) for acupuncture . Introducing [facility] & [facility] SERVICES! Dear [place]: 
Thank you for requesting a [employer] account. Our records indicate that you already have an active [employer] account. You can access your account anytime at [email or web address] Did you know that you can access your hospital and ER discharge instructions at any time in [employer]? You can also review all of your test results from your hospital stay or ER visit. Additional Information If you have questions, please visit the Frequently Asked Questions section of the [employer] website at [email or web address] Remember, [employer] is NOT to be used for urgent needs. For medical emergencies, dial 911. Now available from your iPhone and Android! Please provide this summary of care documentation to your next provider. Your primary care clinician is listed as Karely Alaniz. If you have any questions after today's visit, please call [phone].

## 2018-09-11 NOTE — PROGRESS NOTES
1. Have you been to the ER, urgent care clinic since your last visit? Hospitalized since your last visit? No    2. Have you seen or consulted any other health care providers outside of the [address] [place] [address] since your last visit? Include any pap smears or colon screening.  No

## 2018-09-11 NOTE — PROGRESS NOTES
ICD-10-CM ICD-9-CM    1. Myofascial pain M79.1 729.1 ACUPUNCT W/ ELEC STIMUL 15 MIN   2. Cervical spondylosis without myelopathy M47.812 721.0 ACUPUNCT W/ ELEC STIMUL 15 MIN   3. Degeneration of cervical intervertebral disc M50.30 722.4 ACUPUNCT W/ ELEC STIMUL 15 MIN     Here for first treatment for above diagnoses. No new complaints. [facility]  OFFICE PROCEDURE PROGRESS NOTE    Chart reviewed for the following:   Deborah Dietrich MD, have reviewed the History, Physical and updated the Allergic reactions for [address] performed immediately prior to start of procedure:   Deborah Dietrich MD, have performed the following reviews on [address] [place] [address] prior to the start of the procedure:            * Patient was identified by name and date of birth   * Agreement on procedure being performed was verified  * Procedure site verified and marked as necessary  * Patient was positioned for comfort  * Consent was signed and verified    Patient consents to acupuncture after having been made aware of the following possible complications: infection (rare), bruising and bleeding into the tissues, pain and discomfort, weakness, pneumothorax, tiredness, fainting, nausea, aggravation of existing symptoms for a short time, etc.      Time: 10:49am      Date of procedure: 9/11/2018    Procedure performed by: Layo Gallo MD    Provider assisted by:  MD    Patient assisted by: self     Electroacupuncture Treatment:    Cervical myofascial treatment as follows:    Acupuncture needles placed along the bilateral bladder line using the following points: BL-10, BL-18, BL-22. Also needles placed along bilateral SI-12 or near functionally tender point of the trapezius on each side. Electric stimulation applied at 30Hz connecting BL-10 (negative) to BL-18 (positive) for 20 minutes. On the contralateral side, the current running the opposite direction.   Also bilateral SI-12 functional point (negative) to BL-22 (positive) at [facility] for 20 minutes. Patient is advised to refrain from use of cigarettes, alcohol, or illicit drug use, especially on day of treatment. Patient is advised to avoid strensuous activity and extremes of temperature.       How tolerated by patient: tolerated the procedure well with no complications

## 2018-09-18 ENCOUNTER — OFFICE VISIT (OUTPATIENT)
Dept: FAMILY MEDICINE CLINIC | Age: 73
End: 2018-09-18

## 2018-09-18 VITALS
HEIGHT: 67 IN | DIASTOLIC BLOOD PRESSURE: 74 MMHG | SYSTOLIC BLOOD PRESSURE: 118 MMHG | TEMPERATURE: 98 F | RESPIRATION RATE: 16 BRPM | WEIGHT: 117 LBS | HEART RATE: 59 BPM | BODY MASS INDEX: 18.36 KG/M2 | OXYGEN SATURATION: 99 %

## 2018-09-18 DIAGNOSIS — M47.812 CERVICAL SPONDYLOSIS WITHOUT MYELOPATHY: ICD-10-CM

## 2018-09-18 DIAGNOSIS — M50.30 DEGENERATION OF CERVICAL INTERVERTEBRAL DISC: ICD-10-CM

## 2018-09-18 DIAGNOSIS — M79.18 MYOFASCIAL PAIN: Primary | ICD-10-CM

## 2018-09-18 NOTE — PROGRESS NOTES
1. Have you been to the ER, urgent care clinic since your last visit? Hospitalized since your last visit? No    2. Have you seen or consulted any other health care providers outside of the [address] [place] [address] since your last visit? Include any pap smears or colon screening.  No

## 2018-09-18 NOTE — MR AVS SNAPSHOT
[address] [place] [address] 250 200 [facility] 
[phone] Patient: Joanna Aguero MRN: [id number] :1945 Visit Information Date & Time Provider Department Dept. Phone Encounter #  
 2018 10:15 AM Suresh Riley, [address] [place] [address] Road [id number] Follow-up Instructions Return in about 1 week (around 2018) for acupuncture. Your Appointments 2018  9:30 AM  
LAB with [facility] NURSE VISIT Internists of [address] [place] [address] ([facility]) Appt Note: lab  
 [address], [address] [facility] [address]  
  
   
 [address]  
  
    
 11/15/2018  9:30 AM  
Office Visit with Bradley Garcia MD  
Internists of [facility] Appt Note: 3 month follow up  
 [address] [facility], [address] [place] [address] [address] [place]  
  
   
 [address], [facility] Upcoming Health Maintenance Date Due DTaP/Tdap/Td series (1 - Tdap) 10/25/1967 Pneumococcal 65+ High/Highest Risk (2 of 2 - PPSV23) 2016 Influenza Age 5 to Adult 2018 MEDICARE YEARLY EXAM 5/3/2019 GLAUCOMA SCREENING Q2Y 7/10/2019 BREAST CANCER SCRN MAMMOGRAM 2019 COLONOSCOPY 2021 Allergies as of 2018  Review Complete On: 2018 By: Jose A Beebe Severity Noted Reaction Type Reactions Hydrocodone High 2016    Anaphylaxis Nsaids (Non-steroidal Anti-inflammatory Drug) Medium 2016    Other (comments) None due to kidneys Azithromycin  2016    Itching Other Medication    Other (comments) GI intolerance to nonsteroidal antiinflammatory medications Vicodin [Hydrocodone-acetaminophen]    Other (comments) Current Immunizations  Reviewed on 2018 Name Date  Influenza Vaccine 10/29/2013 12:00 AM  
 Pneumococcal Conjugate (PCV-13) 12/14/2015 12:00 AM  
  
 Reviewed by Nancy Anthony on 9/18/2018 at 10:29 AM  
Vitals BP Pulse Temp Resp Height(growth percentile) Weight(growth percentile) 118/74 (BP 1 Location: Left arm, BP Patient Position: Sitting) (!) 59 98 °F (36.7 °C) (Oral) 16 5' 7\" (1.702 m) 117 lb (53.1 kg) SpO2 BMI OB Status Smoking Status 99% 18.32 kg/m2 Hysterectomy Never Smoker Vitals History BMI and BSA Data Body Mass Index Body Surface Area  
 18.32 kg/m 2 1.58 m 2 Preferred Pharmacy Pharmacy Name Phone [facility], [address] [phone] Your Updated Medication List  
  
   
This list is accurate as of 9/18/18 11:22 AM.  Always use your most recent med list.  
  
  
  
  
 acetaminophen 500 mg tablet Commonly known as:  TYLENOL Take 500 mg by mouth every six (6) hours as needed. aspirin 81 mg chewable tablet Take 81 mg by mouth daily. cholecalciferol 400 unit Tab tablet Commonly known as:  VITAMIN D3 Take 800 Units by mouth. folic acid 1 mg tablet Commonly known as:  Google Take 1 mg by mouth daily. gabapentin 100 mg capsule Commonly known as:  NEURONTIN Take 1 Cap by mouth three (3) times daily (with meals). Indications: NEUROPATHIC PAIN  
  
 METHOTREXATE SODIUM INJECTION 15 mg every seven (7) days. metoprolol tartrate 50 mg tablet Commonly known as:  LOPRESSOR Take 1 Tab by mouth two (2) times a day. multivitamin tablet Commonly known as:  ONE A DAY Take 1 Tab by Mouth Once a Day. omeprazole 40 mg capsule Commonly known as:  PRILOSEC Take 40 mg by mouth daily. predniSONE 5 mg tablet Commonly known as:  Marcelene Im Take  by mouth daily. 2.5 mg every other day  
  
 sertraline 25 mg tablet Commonly known as:  ZOLOFT Take 1 Tab by mouth daily. Follow-up Instructions Return in about 1 week (around 9/25/2018) for acupuncture. Introducing [facility] & HEALTH SERVICES! Dear [facility]: 
Thank you for requesting a TestObject account. Our records indicate that you already have an active TestObject account. You can access your account anytime at [email or web address] Did you know that you can access your hospital and ER discharge instructions at any time in TestObject? You can also review all of your test results from your hospital stay or ER visit. Additional Information If you have questions, please visit the Frequently Asked Questions section of the TestObject website at [email or web address] Remember, TestObject is NOT to be used for urgent needs. For medical emergencies, dial 911. Now available from your iPhone and Android! Please provide this summary of care documentation to your next provider. Your primary care clinician is listed as Carmita Hu. If you have any questions after today's visit, please call [phone].

## 2018-09-25 ENCOUNTER — OFFICE VISIT (OUTPATIENT)
Dept: FAMILY MEDICINE CLINIC | Age: 73
End: 2018-09-25

## 2018-09-25 VITALS — WEIGHT: 117 LBS | HEIGHT: 67 IN | BODY MASS INDEX: 18.36 KG/M2 | RESPIRATION RATE: 16 BRPM

## 2018-09-25 DIAGNOSIS — M50.30 DEGENERATION OF CERVICAL INTERVERTEBRAL DISC: ICD-10-CM

## 2018-09-25 DIAGNOSIS — M79.18 MYOFASCIAL PAIN: Primary | ICD-10-CM

## 2018-09-25 DIAGNOSIS — M47.812 CERVICAL SPONDYLOSIS WITHOUT MYELOPATHY: ICD-10-CM

## 2018-09-25 NOTE — MR AVS SNAPSHOT
[address] [place] [address] [address] 200 [facility] 
[phone] Patient: Laurent Joel MRN: [id number] :1945 Visit Information Date & Time Provider Department Dept. Phone Encounter #  
 2018 10:15 AM Wade Soliman, [address] [facility] Road [id number] Follow-up Instructions Return in about 1 week (around 10/2/2018) for acupuncture. Your Appointments 2018  9:30 AM  
LAB with [facility] NURSE VISIT Internists of [address] [place] [address] ([address] [place] [address]) Appt Note: lab  
 [address], [address] [facility] [address]  
  
   
 [address], [address]  
  
    
 11/15/2018  9:30 AM  
Office Visit with Bryanna Tejeda MD  
Internists of [address] Appt Note: 3 month follow up  
 [address], [address] [place] [address] [address]  
  
   
 [address], [address] Upcoming Health Maintenance Date Due DTaP/Tdap/Td series (1 - Tdap) 10/25/1967 Shingrix Vaccine Age 50> (1 of 2) 10/25/1996 Pneumococcal 65+ High/Highest Risk (2 of 2 - PPSV23) 2016 Influenza Age 5 to Adult 2018 MEDICARE YEARLY EXAM 5/3/2019 GLAUCOMA SCREENING Q2Y 7/10/2019 BREAST CANCER SCRN MAMMOGRAM 2019 COLONOSCOPY 2021 Allergies as of 2018  Review Complete On: 2018 By: Pina He Severity Noted Reaction Type Reactions Hydrocodone High 2016    Anaphylaxis Nsaids (Non-steroidal Anti-inflammatory Drug) Medium 2016    Other (comments) None due to kidneys Azithromycin  2016    Itching Other Medication    Other (comments) GI intolerance to nonsteroidal antiinflammatory medications Vicodin [Hydrocodone-acetaminophen]    Other (comments) Current Immunizations  Reviewed on 2018 Name Date Influenza Vaccine 10/29/2013 12:00 AM  
 Pneumococcal Conjugate (PCV-13) 12/14/2015 12:00 AM  
  
 Reviewed by Veda Solis on 9/25/2018 at 10:15 AM  
You Were Diagnosed With   
  
 Codes Comments Myofascial pain    -  Primary ICD-10-CM: M79.1 ICD-9-CM: 729.1 Cervical spondylosis without myelopathy     ICD-10-CM: Q23.098 ICD-9-CM: 721.0 Degeneration of cervical intervertebral disc     ICD-10-CM: M50.30 ICD-9-CM: 722.4 Vitals Height(growth percentile) Weight(growth percentile) BMI OB Status Smoking Status 5' 7\" (1.702 m) 117 lb (53.1 kg) 18.32 kg/m2 Hysterectomy Never Smoker BMI and BSA Data Body Mass Index Body Surface Area  
 18.32 kg/m 2 1.58 m 2 Preferred Pharmacy Pharmacy Name Phone [facility], [facility] [address] [phone] Your Updated Medication List  
  
   
This list is accurate as of 9/25/18 10:58 AM.  Always use your most recent med list.  
  
  
  
  
 acetaminophen 500 mg tablet Commonly known as:  TYLENOL Take 500 mg by mouth every six (6) hours as needed. aspirin 81 mg chewable tablet Take 81 mg by mouth daily. cholecalciferol 400 unit Tab tablet Commonly known as:  VITAMIN D3 Take 800 Units by mouth. folic acid 1 mg tablet Commonly known as:  Google Take 1 mg by mouth daily. gabapentin 100 mg capsule Commonly known as:  NEURONTIN Take 1 Cap by mouth three (3) times daily (with meals). Indications: NEUROPATHIC PAIN  
  
 METHOTREXATE SODIUM INJECTION 15 mg every seven (7) days. metoprolol tartrate 50 mg tablet Commonly known as:  LOPRESSOR Take 1 Tab by mouth two (2) times a day. multivitamin tablet Commonly known as:  ONE A DAY Take 1 Tab by Mouth Once a Day. omeprazole 40 mg capsule Commonly known as:  PRILOSEC Take 40 mg by mouth daily. predniSONE 5 mg tablet Commonly known as:  [place] Xavier  
 Take  by mouth daily. 2.5 mg every other day  
  
 sertraline 25 mg tablet Commonly known as:  ZOLOFT Take 1 Tab by mouth daily. Follow-up Instructions Return in about 1 week (around 10/2/2018) for acupuncture. Introducing [facility] & [facility] SERVICES! Dear [place]: 
Thank you for requesting a ZIIBRA account. Our records indicate that you already have an active ZIIBRA account. You can access your account anytime at [email or web address] Did you know that you can access your hospital and ER discharge instructions at any time in ZIIBRA? You can also review all of your test results from your hospital stay or ER visit. Additional Information If you have questions, please visit the Frequently Asked Questions section of the ZIIBRA website at [email or web address] Remember, ZIIBRA is NOT to be used for urgent needs. For medical emergencies, dial 911. Now available from your iPhone and Android! Please provide this summary of care documentation to your next provider. Your primary care clinician is listed as Alyse Jenkins. If you have any questions after today's visit, please call [phone].

## 2018-09-25 NOTE — PROGRESS NOTES
ICD-10-CM ICD-9-CM    1. Myofascial pain M79.1 729.1 ACUPUNCT W/ ELEC STIMUL 15 MIN   2. Cervical spondylosis without myelopathy M47.812 721.0 ACUPUNCT W/ ELEC STIMUL 15 MIN   3. Degeneration of cervical intervertebral disc M50.30 722.4 ACUPUNCT W/ ELEC STIMUL 15 MIN     Here for third treatment for above diagnoses. No new complaints other than some soreness that was a bit worse after treatment. Currently feels better from soreness and perhaps better than baseline. [facility] FAMILY PRACTICE  OFFICE PROCEDURE PROGRESS NOTE    Chart reviewed for the following:   Radha Nava MD, have reviewed the History, Physical and updated the Allergic reactions for [address] performed immediately prior to start of procedure:   Radha Nava MD, have performed the following reviews on [address] [place] [address] prior to the start of the procedure:            * Patient was identified by name and date of birth   * Agreement on procedure being performed was verified  * Procedure site verified and marked as necessary  * Patient was positioned for comfort  * Consent was signed and verified    Patient consents to acupuncture after having been made aware of the following possible complications: infection (rare), bruising and bleeding into the tissues, pain and discomfort, weakness, pneumothorax, tiredness, fainting, nausea, aggravation of existing symptoms for a short time, etc.      Time: 10:35am    Date of procedure: 9/25/2018    Procedure performed by: Yandy Acuña MD    Provider assisted by:  MD    Patient assisted by: self     Electroacupuncture Treatment:    Cervical myofascial treatment as follows:    Acupuncture needles placed along the bilateral bladder line using the following points: BL-10, BL-18, BL-22. Also needles placed along bilateral SI-12 or near functionally tender point of the trapezius on each side.   Electric stimulation applied at 30Hz connecting BL-10 (negative) to BL-18 (positive) for 20 minutes. On the contralateral side, the current running the opposite direction. Also bilateral SI-12 functional point (negative) to BL-22 (positive) at [facility] for 20 minutes. Patient is advised to refrain from use of cigarettes, alcohol, or illicit drug use, especially on day of treatment. Patient is advised to avoid strensuous activity and extremes of temperature.       How tolerated by patient: tolerated the procedure well with no complications

## 2018-10-11 ENCOUNTER — OFFICE VISIT (OUTPATIENT)
Dept: FAMILY MEDICINE CLINIC | Age: 73
End: 2018-10-11

## 2018-10-11 VITALS
BODY MASS INDEX: 18.52 KG/M2 | HEIGHT: 67 IN | RESPIRATION RATE: 16 BRPM | SYSTOLIC BLOOD PRESSURE: 120 MMHG | HEART RATE: 68 BPM | TEMPERATURE: 98.4 F | OXYGEN SATURATION: 98 % | DIASTOLIC BLOOD PRESSURE: 74 MMHG | WEIGHT: 118 LBS

## 2018-10-11 DIAGNOSIS — M05.79 RHEUMATOID ARTHRITIS INVOLVING MULTIPLE SITES WITH POSITIVE RHEUMATOID FACTOR (HCC): ICD-10-CM

## 2018-10-11 DIAGNOSIS — M47.812 CERVICAL SPONDYLOSIS WITHOUT MYELOPATHY: Primary | ICD-10-CM

## 2018-10-11 NOTE — PROGRESS NOTES
1. Have you been to the ER, urgent care clinic since your last visit? Hospitalized since your last visit? No    2. Have you seen or consulted any other health care providers outside of the [address] [place] [address] since your last visit? Include any pap smears or colon screening.  No

## 2018-10-11 NOTE — PATIENT INSTRUCTIONS

## 2018-10-11 NOTE — MR AVS SNAPSHOT
[address] [facility] 250 200 [facility] 
[phone] Patient: Shelia Dixon MRN: [id number] :1945 Visit Information Date & Time Provider Department Dept. Phone Encounter #  
 10/11/2018 10:15 AM David Castillo, [address] [facility] [phone] [id number] Follow-up Instructions Return in about 1 week (around 10/18/2018) for acupuncture . Your Appointments 2018  9:30 AM  
LAB with [facility] NURSE VISIT [facility]) Appt Note: lab  
 [address], [address] [facility] [address]  
  
   
 [address], [address]  
  
    
 11/15/2018  9:30 AM  
Office Visit with Jl Chapa MD  
Internists of [facility] Appt Note: 3 month follow up  
 [address], [address] [place] [address] [address]  
  
   
 [address], [address] Upcoming Health Maintenance Date Due DTaP/Tdap/Td series (1 - Tdap) 10/25/1967 Shingrix Vaccine Age 50> (1 of 2) 10/25/1996 Pneumococcal 65+ High/Highest Risk (2 of 2 - PPSV23) 2016 Influenza Age 5 to Adult 2018 MEDICARE YEARLY EXAM 5/3/2019 GLAUCOMA SCREENING Q2Y 7/10/2019 BREAST CANCER SCRN MAMMOGRAM 2019 COLONOSCOPY 2021 Allergies as of 10/11/2018  Review Complete On: 10/11/2018 By: David Castillo MD  
  
 Severity Noted Reaction Type Reactions Hydrocodone High 2016    Anaphylaxis Nsaids (Non-steroidal Anti-inflammatory Drug) Medium 2016    Other (comments) None due to kidneys Azithromycin  2016    Itching Other Medication    Other (comments) GI intolerance to nonsteroidal antiinflammatory medications Vicodin [Hydrocodone-acetaminophen]    Other (comments) Current Immunizations  Reviewed on 10/11/2018 Name Date Influenza Vaccine 10/29/2013 12:00 AM  
 Pneumococcal Conjugate (PCV-13) 12/14/2015 12:00 AM  
  
 Reviewed by Angel Reaves on 10/11/2018 at 10:29 AM  
You Were Diagnosed With   
  
 Codes Comments Cervical spondylosis without myelopathy    -  Primary ICD-10-CM: D24.666 ICD-9-CM: 721.0 Rheumatoid arthritis involving multiple sites with positive rheumatoid factor (HCC)     ICD-10-CM: M05.79 ICD-9-CM: 714.0 Vitals BP Pulse Temp Resp Height(growth percentile) Weight(growth percentile) 120/74 (BP 1 Location: Left arm, BP Patient Position: Sitting) 68 98.4 °F (36.9 °C) (Oral) 16 5' 7\" (1.702 m) 118 lb (53.5 kg) SpO2 BMI OB Status Smoking Status 98% 18.48 kg/m2 Hysterectomy Never Smoker Vitals History BMI and BSA Data Body Mass Index Body Surface Area  
 18.48 kg/m 2 1.59 m 2 Preferred Pharmacy Pharmacy Name Phone [facility], [facility] [address]. [phone] Your Updated Medication List  
  
   
This list is accurate as of 10/11/18 11:24 AM.  Always use your most recent med list.  
  
  
  
  
 acetaminophen 500 mg tablet Commonly known as:  TYLENOL Take 500 mg by mouth every six (6) hours as needed. aspirin 81 mg chewable tablet Take 81 mg by mouth daily. cholecalciferol 400 unit Tab tablet Commonly known as:  VITAMIN D3 Take 800 Units by mouth. folic acid 1 mg tablet Commonly known as:  Google Take 1 mg by mouth daily. gabapentin 100 mg capsule Commonly known as:  NEURONTIN Take 1 Cap by mouth three (3) times daily (with meals). Indications: NEUROPATHIC PAIN  
  
 METHOTREXATE SODIUM INJECTION 15 mg every seven (7) days. metoprolol tartrate 50 mg tablet Commonly known as:  LOPRESSOR Take 1 Tab by mouth two (2) times a day. multivitamin tablet Commonly known as:  ONE A DAY Take 1 Tab by Mouth Once a Day. omeprazole 40 mg capsule Commonly known as:  PRILOSEC Take 40 mg by mouth daily. predniSONE 5 mg tablet Commonly known as:  [place] Eunice Take  by mouth daily. 2.5 mg every other day  
  
 sertraline 25 mg tablet Commonly known as:  ZOLOFT Take 1 Tab by mouth daily. We Performed the Following ACUPUNCT W/O ELEC STIMUL 15 MIN [id number] CPT(R)] Follow-up Instructions Return in about 1 week (around 10/18/2018) for acupuncture . Patient Instructions A Healthy Lifestyle: Care Instructions Your Care Instructions A healthy lifestyle can help you feel good, stay at a healthy weight, and have plenty of energy for both work and play. A healthy lifestyle is something you can share with your whole family. A healthy lifestyle also can lower your risk for serious health problems, such as high blood pressure, heart disease, and diabetes. You can follow a few steps listed below to improve your health and the health of your family. Follow-up care is a key part of your treatment and safety. Be sure to make and go to all appointments, and call your doctor if you are having problems. It's also a good idea to know your test results and keep a list of the medicines you take. How can you care for yourself at home? · Do not eat too much sugar, fat, or fast foods. You can still have dessert and treats now and then. The goal is moderation. · Start small to improve your eating habits. Pay attention to portion sizes, drink less juice and soda pop, and eat more fruits and vegetables. ¨ Eat a healthy amount of food. A 3-ounce serving of meat, for example, is about the size of a deck of cards. Fill the rest of your plate with vegetables and whole grains. ¨ Limit the amount of soda and sports drinks you have every day. Drink more water when you are thirsty. ¨ Eat at least 5 servings of fruits and vegetables every day.  It may seem like a lot, but it is not hard to reach this goal. A serving or helping is 1 piece of fruit, 1 cup of vegetables, or 2 cups of leafy, raw vegetables. Have an apple or some carrot sticks as an afternoon snack instead of a candy bar. Try to have fruits and/or vegetables at every meal. 
· Make exercise part of your daily routine. You may want to start with simple activities, such as walking, bicycling, or slow swimming. Try to be active 30 to 60 minutes every day. You do not need to do all 30 to 60 minutes all at once. For example, you can exercise 3 times a day for 10 or 20 minutes. Moderate exercise is safe for most people, but it is always a good idea to talk to your doctor before starting an exercise program. 
· Keep moving. Jay Seamen the lawn, work in the garden, or LOOKCAST. Take the stairs instead of the elevator at work. · If you smoke, quit. People who smoke have an increased risk for heart attack, stroke, cancer, and other lung illnesses. Quitting is hard, but there are ways to boost your chance of quitting tobacco for good. ¨ Use nicotine gum, patches, or lozenges. ¨ Ask your doctor about stop-smoking programs and medicines. ¨ Keep trying. In addition to reducing your risk of diseases in the future, you will notice some benefits soon after you stop using tobacco. If you have shortness of breath or asthma symptoms, they will likely get better within a few weeks after you quit. · Limit how much alcohol you drink. Moderate amounts of alcohol (up to 2 drinks a day for men, 1 drink a day for women) are okay. But drinking too much can lead to liver problems, high blood pressure, and other health problems. Family health If you have a family, there are many things you can do together to improve your health. · Eat meals together as a family as often as possible. · Eat healthy foods. This includes fruits, vegetables, lean meats and dairy, and whole grains. · Include your family in your fitness plan.  Most people think of activities such as jogging or tennis as the way to fitness, but there are many ways you and your family can be more active. Anything that makes you breathe hard and gets your heart pumping is exercise. Here are some tips: 
¨ Walk to do errands or to take your child to school or the bus. ¨ Go for a family bike ride after dinner instead of watching TV. Where can you learn more? Go to http://kylee-geno.info/. Enter Y834 in the search box to learn more about \"A Healthy Lifestyle: Care Instructions. \" Current as of: December 7, 2017 Content Version: 11.8 © 1169-8946 Respectance. Care instructions adapted under license by TrekCafe (which disclaims liability or warranty for this information). If you have questions about a medical condition or this instruction, always ask your healthcare professional. [address] any warranty or liability for your use of this information. Introducing [facility] & HEALTH SERVICES! Dear [place]: 
Thank you for requesting a Morpho Technologies account. Our records indicate that you already have an active Morpho Technologies account. You can access your account anytime at [email or web address] Did you know that you can access your hospital and ER discharge instructions at any time in Morpho Technologies? You can also review all of your test results from your hospital stay or ER visit. Additional Information If you have questions, please visit the Frequently Asked Questions section of the Morpho Technologies website at [email or web address] Remember, Morpho Technologies is NOT to be used for urgent needs. For medical emergencies, dial 911. Now available from your iPhone and Android! Please provide this summary of care documentation to your next provider. Your primary care clinician is listed as Bernard Covington. If you have any questions after today's visit, please call [phone].

## 2018-10-11 NOTE — PROGRESS NOTES
ICD-10-CM ICD-9-CM    1. Cervical spondylosis without myelopathy M47.812 721.0 ACUPUNCT W/O ELEC STIMUL 15 MIN   2. Rheumatoid arthritis involving multiple sites with positive rheumatoid factor (HCC) M05.79 714.0 ACUPUNCT W/O ELEC STIMUL 15 MIN     Here for a discussion about acupuncture. She has had soreness after her cervical treatments which seems to be longer with each treatment. So we will shift to more traditional meridian treatments along with auricular points. [facility] FAMILY PRACTICE  OFFICE PROCEDURE PROGRESS NOTE    Chart reviewed for the following:   Alejandro Clay MD, have reviewed the History, Physical and updated the Allergic reactions for [address] performed immediately prior to start of procedure:   Alejandro Clay MD, have performed the following reviews on [address] [place] [address] prior to the start of the procedure:            * Patient was identified by name and date of birth   * Agreement on procedure being performed was verified  * Procedure site verified and marked as necessary  * Patient was positioned for comfort  * Consent was signed and verified    Patient consents to acupuncture after having been made aware of the following possible complications: infection (rare), bruising and bleeding into the tissues, pain and discomfort, weakness, pneumothorax, tiredness, fainting, nausea, aggravation of existing symptoms for a short time, etc.      Time: 10:51am    Date of procedure: 10/11/2018    Procedure performed by: Jen Mercer MD    Provider assisted by:  MD    Patient assisted by: self       Acupuncture Treatment as follows:    Ki-Bl meridian treatment using points Ki-3, Ht-3, Bl-60 bilaterally. Kept in dispersion for 25 minutes. Auricular point for cuevas men and neck on homunculus bilaterally. Patient tolerated treatment well. Needles removed with no complications.       Patient is advised to refrain from use of cigarettes, alcohol, or illicit drug use, especially on day of treatment. Patient is advised to avoid strensuous activity and extremes of temperature.

## 2018-10-18 ENCOUNTER — OFFICE VISIT (OUTPATIENT)
Dept: FAMILY MEDICINE CLINIC | Age: 73
End: 2018-10-18

## 2018-10-18 VITALS
RESPIRATION RATE: 16 BRPM | TEMPERATURE: 98.3 F | WEIGHT: 118 LBS | HEIGHT: 67 IN | BODY MASS INDEX: 18.52 KG/M2 | OXYGEN SATURATION: 98 % | DIASTOLIC BLOOD PRESSURE: 72 MMHG | SYSTOLIC BLOOD PRESSURE: 104 MMHG | HEART RATE: 68 BPM

## 2018-10-18 DIAGNOSIS — M05.79 RHEUMATOID ARTHRITIS INVOLVING MULTIPLE SITES WITH POSITIVE RHEUMATOID FACTOR (HCC): ICD-10-CM

## 2018-10-18 DIAGNOSIS — M47.812 CERVICAL SPONDYLOSIS WITHOUT MYELOPATHY: Primary | ICD-10-CM

## 2018-10-18 NOTE — PROGRESS NOTES
1. Have you been to the ER, urgent care clinic since your last visit? Hospitalized since your last visit? No 
 
2. Have you seen or consulted any other health care providers outside of the [address] [place] [address] since your last visit? Include any pap smears or colon screening. No  
 
3. [place] is no longer take gabapentin 100 mg (please remove from medication list)

## 2018-10-18 NOTE — PROGRESS NOTES
ICD-10-CM ICD-9-CM 1. Cervical spondylosis without myelopathy M47.812 721.0 [place] ACUPUNCT W/O ELEC STIMUL 15 MIN 2. Rheumatoid arthritis involving multiple sites with positive rheumatoid factor (HCC) M05.79 714.0 NV ACUPUNCT W/O ELEC STIMUL 15 MIN Here for follow-up of acupuncture. She has had soreness after her cervical treatments which seems to be longer with each treatment so we changed to a meridian treatment with auricular points. She says that the first treatment went well and she feels a positive change but is unsure if she is only being hopeful. [facility] PROCEDURE PROGRESS NOTE Chart reviewed for the following: 
 Zach Alicea MD, have reviewed the History, Physical and updated the Allergic reactions for [address] [place] [address] TIME OUT performed immediately prior to start of procedure: 
 I, Hayley Jackson MD, have performed the following reviews on [address] [place] [address] prior to the start of the procedure: 
         
* Patient was identified by name and date of birth * Agreement on procedure being performed was verified * Procedure site verified and marked as necessary * Patient was positioned for comfort * Consent was signed and verified Patient consents to acupuncture after having been made aware of the following possible complications: infection (rare), bruising and bleeding into the tissues, pain and discomfort, weakness, pneumothorax, tiredness, fainting, nausea, aggravation of existing symptoms for a short time, etc.  
  
Time: 10:20am 
 
Date of procedure: 10/18/2018 Procedure performed by: Hayley Jackson MD 
 
Provider assisted by:  MD 
 
Patient assisted by: self Acupuncture Treatment as follows: 
 
Ki-Bl meridian treatment using points Ki-3, Ht-3, Bl-60 bilaterally. Kept in dispersion for 25 minutes. Auricular point for cuevas men and neck on homunculus bilaterally. Patient tolerated treatment well. Needles removed with no complications. Patient is advised to refrain from use of cigarettes, alcohol, or illicit drug use, especially on day of treatment. Patient is advised to avoid strensuous activity and extremes of temperature.

## 2018-11-01 ENCOUNTER — OFFICE VISIT (OUTPATIENT)
Dept: FAMILY MEDICINE CLINIC | Age: 73
End: 2018-11-01

## 2018-11-01 VITALS — WEIGHT: 118 LBS | BODY MASS INDEX: 18.52 KG/M2 | HEIGHT: 67 IN

## 2018-11-01 DIAGNOSIS — M05.79 RHEUMATOID ARTHRITIS INVOLVING MULTIPLE SITES WITH POSITIVE RHEUMATOID FACTOR (HCC): Primary | ICD-10-CM

## 2018-11-01 DIAGNOSIS — M47.812 CERVICAL SPONDYLOSIS WITHOUT MYELOPATHY: ICD-10-CM

## 2018-11-01 NOTE — PATIENT INSTRUCTIONS
A Healthy Lifestyle: Care Instructions  Your Care Instructions    A healthy lifestyle can help you feel good, stay at a healthy weight, and have plenty of energy for both work and play. A healthy lifestyle is something you can share with your whole family. A healthy lifestyle also can lower your risk for serious health problems, such as high blood pressure, heart disease, and diabetes. You can follow a few steps listed below to improve your health and the health of your family. Follow-up care is a key part of your treatment and safety. Be sure to make and go to all appointments, and call your doctor if you are having problems. It's also a good idea to know your test results and keep a list of the medicines you take. How can you care for yourself at home? · Do not eat too much sugar, fat, or fast foods. You can still have dessert and treats now and then. The goal is moderation. · Start small to improve your eating habits. Pay attention to portion sizes, drink less juice and soda pop, and eat more fruits and vegetables. ? Eat a healthy amount of food. A 3-ounce serving of meat, for example, is about the size of a deck of cards. Fill the rest of your plate with vegetables and whole grains. ? Limit the amount of soda and sports drinks you have every day. Drink more water when you are thirsty. ? Eat at least 5 servings of fruits and vegetables every day. It may seem like a lot, but it is not hard to reach this goal. A serving or helping is 1 piece of fruit, 1 cup of vegetables, or 2 cups of leafy, raw vegetables. Have an apple or some carrot sticks as an afternoon snack instead of a candy bar. Try to have fruits and/or vegetables at every meal.  · Make exercise part of your daily routine. You may want to start with simple activities, such as walking, bicycling, or slow swimming. Try to be active 30 to 60 minutes every day. You do not need to do all 30 to 60 minutes all at once.  For example, you can exercise 3 times a day for 10 or 20 minutes. Moderate exercise is safe for most people, but it is always a good idea to talk to your doctor before starting an exercise program.  · Keep moving. [employer] the lawn, work in the garden, or [employer]. Take the stairs instead of the elevator at work. · If you smoke, quit. People who smoke have an increased risk for heart attack, stroke, cancer, and other lung illnesses. Quitting is hard, but there are ways to boost your chance of quitting tobacco for good. ? Use nicotine gum, patches, or lozenges. ? Ask your doctor about stop-smoking programs and medicines. ? Keep trying. In addition to reducing your risk of diseases in the future, you will notice some benefits soon after you stop using tobacco. If you have shortness of breath or asthma symptoms, they will likely get better within a few weeks after you quit. · Limit how much alcohol you drink. Moderate amounts of alcohol (up to 2 drinks a day for men, 1 drink a day for women) are okay. But drinking too much can lead to liver problems, high blood pressure, and other health problems. Family health  If you have a family, there are many things you can do together to improve your health. · Eat meals together as a family as often as possible. · Eat healthy foods. This includes fruits, vegetables, lean meats and dairy, and whole grains. · Include your family in your fitness plan. Most people think of activities such as jogging or tennis as the way to fitness, but there are many ways you and your family can be more active. Anything that makes you breathe hard and gets your heart pumping is exercise. Here are some tips:  ? Walk to do errands or to take your child to school or the bus.  ? Go for a family bike ride after dinner instead of watching TV. Where can you learn more? Go to http://kylee-geno.info/. Enter F528 in the search box to learn more about \"A Healthy Lifestyle: Care Instructions. \"  Current as of: December 7, 2017  Content Version: 11.8  © 2757-0333 [employer]. Care instructions adapted under license by [employer] (which disclaims liability or warranty for this information). If you have questions about a medical condition or this instruction, always ask your healthcare professional. [address] any warranty or liability for your use of this information.

## 2018-11-01 NOTE — PROGRESS NOTES
ICD-10-CM ICD-9-CM    1. Rheumatoid arthritis involving multiple sites with positive rheumatoid factor (HCC) M05.79 714.0 [place] ACUPUNCT W/O ELEC STIMUL 15 MIN   2. Cervical spondylosis without myelopathy M47.812 721.0 MO ACUPUNCT W/O ELEC STIMUL 15 MIN     Here for follow-up of acupuncture. She has had soreness after her cervical treatments which seems to be longer with each treatment so we changed to a meridian treatment with auricular points. She says her rheumatologist and her thought that her RA nodules in her hands were not as swollen as usual.      [facility]  OFFICE PROCEDURE PROGRESS NOTE    Chart reviewed for the following:   Patience Webster MD, have reviewed the History, Physical and updated the Allergic reactions for [address] performed immediately prior to start of procedure:   Patience Webster MD, have performed the following reviews on [address] [place] [address] prior to the start of the procedure:            * Patient was identified by name and date of birth   * Agreement on procedure being performed was verified  * Procedure site verified and marked as necessary  * Patient was positioned for comfort  * Consent was signed and verified    Patient consents to acupuncture after having been made aware of the following possible complications: infection (rare), bruising and bleeding into the tissues, pain and discomfort, weakness, pneumothorax, tiredness, fainting, nausea, aggravation of existing symptoms for a short time, etc.      Time: 10:40am    Date of procedure: 11/1/2018    Procedure performed by: Efren Mccabe MD    Provider assisted by:  MD    Patient assisted by: self       Acupuncture Treatment as follows:    Ki-Bl meridian treatment using points Ki-3, Ht-3, Bl-60 bilaterally. Kept in dispersion for 25 minutes. Auricular point for cuevas men and neck on homunculus bilaterally. Patient tolerated treatment well. Needles removed with no complications. Patient is advised to refrain from use of cigarettes, alcohol, or illicit drug use, especially on day of treatment. Patient is advised to avoid strensuous activity and extremes of temperature.

## 2018-11-01 NOTE — PROGRESS NOTES
1. Have you been to the ER, urgent care clinic since your last visit? Hospitalized since your last visit? No    2. Have you seen or consulted any other health care providers outside of the [address] [place] [address] since your last visit? Include any pap smears or colon screening.  No

## 2018-11-08 ENCOUNTER — HOSPITAL ENCOUNTER (OUTPATIENT)
Dept: LAB | Age: 73
Discharge: HOME | End: 2018-11-08
Payer: MEDICARE

## 2018-11-08 ENCOUNTER — APPOINTMENT (OUTPATIENT)
Dept: INTERNAL MEDICINE CLINIC | Age: 73
End: 2018-11-08

## 2018-11-08 DIAGNOSIS — E78.5 HYPERLIPIDEMIA, UNSPECIFIED HYPERLIPIDEMIA TYPE: ICD-10-CM

## 2018-11-08 DIAGNOSIS — E55.9 VITAMIN D DEFICIENCY: ICD-10-CM

## 2018-11-08 DIAGNOSIS — I10 ESSENTIAL HYPERTENSION: ICD-10-CM

## 2018-11-08 DIAGNOSIS — R09.89 BILATERAL CAROTID BRUITS: ICD-10-CM

## 2018-11-08 LAB
ALBUMIN SERPL-MCNC: 3.8 G/DL (ref 3.4–5)
ALBUMIN/GLOB SERPL: 1.3 {RATIO} (ref 0.8–1.7)
ALP SERPL-CCNC: 70 U/L (ref 45–117)
ALT SERPL-CCNC: 35 U/L (ref 13–56)
ANION GAP SERPL CALC-SCNC: 6 MMOL/L (ref 3–18)
AST SERPL-CCNC: 27 U/L (ref 15–37)
BILIRUB SERPL-MCNC: 0.5 MG/DL (ref 0.2–1)
BUN SERPL-MCNC: 9 MG/DL (ref 7–18)
BUN/CREAT SERPL: 11 (ref 12–20)
CALCIUM SERPL-MCNC: 9.9 MG/DL (ref 8.5–10.1)
CHLORIDE SERPL-SCNC: 104 MMOL/L (ref 100–108)
CHOLEST SERPL-MCNC: 180 MG/DL
CO2 SERPL-SCNC: 30 MMOL/L (ref 21–32)
CREAT SERPL-MCNC: 0.8 MG/DL (ref 0.6–1.3)
GLOBULIN SER CALC-MCNC: 3 G/DL (ref 2–4)
GLUCOSE SERPL-MCNC: 75 MG/DL (ref 74–99)
HDLC SERPL-MCNC: 77 MG/DL (ref 40–60)
HDLC SERPL: 2.3 {RATIO} (ref 0–5)
LDLC SERPL CALC-MCNC: 89.6 MG/DL (ref 0–100)
LIPID PROFILE,FLP: ABNORMAL
POTASSIUM SERPL-SCNC: 4.1 MMOL/L (ref 3.5–5.5)
PROT SERPL-MCNC: 6.8 G/DL (ref 6.4–8.2)
SODIUM SERPL-SCNC: 140 MMOL/L (ref 136–145)
TRIGL SERPL-MCNC: 67 MG/DL (ref ?–150)
VLDLC SERPL CALC-MCNC: 13.4 MG/DL

## 2018-11-08 PROCEDURE — 36415 COLL VENOUS BLD VENIPUNCTURE: CPT

## 2018-11-08 PROCEDURE — 82306 VITAMIN D 25 HYDROXY: CPT

## 2018-11-08 PROCEDURE — 80053 COMPREHEN METABOLIC PANEL: CPT

## 2018-11-08 PROCEDURE — 80061 LIPID PANEL: CPT

## 2018-11-09 LAB — 25(OH)D3 SERPL-MCNC: 27.1 NG/ML (ref 30–100)

## 2018-11-13 ENCOUNTER — OFFICE VISIT (OUTPATIENT)
Dept: FAMILY MEDICINE CLINIC | Age: 73
End: 2018-11-13

## 2018-11-13 VITALS
HEART RATE: 72 BPM | WEIGHT: 118 LBS | SYSTOLIC BLOOD PRESSURE: 110 MMHG | DIASTOLIC BLOOD PRESSURE: 70 MMHG | BODY MASS INDEX: 18.52 KG/M2 | OXYGEN SATURATION: 99 % | HEIGHT: 67 IN | RESPIRATION RATE: 16 BRPM | TEMPERATURE: 98.1 F

## 2018-11-13 DIAGNOSIS — M47.812 CERVICAL SPONDYLOSIS WITHOUT MYELOPATHY: Primary | ICD-10-CM

## 2018-11-13 DIAGNOSIS — M05.79 RHEUMATOID ARTHRITIS INVOLVING MULTIPLE SITES WITH POSITIVE RHEUMATOID FACTOR (HCC): ICD-10-CM

## 2018-11-13 RX ORDER — LIDOCAINE 50 MG/G
PATCH TOPICAL
Qty: 30 EACH | Refills: 11 | Status: SHIPPED | OUTPATIENT
Start: 2018-11-13 | End: 2019-10-21

## 2018-11-13 NOTE — PATIENT INSTRUCTIONS
Neck Arthritis: Exercises  Your Care Instructions  Here are some examples of typical rehabilitation exercises for your condition. Start each exercise slowly. Ease off the exercise if you start to have pain. Your doctor or physical therapist will tell you when you can start these exercises and which ones will work best for you. How to do the exercises  Neck stretches to the side    1. This stretch works best if you keep your shoulder down as you lean away from it. To help you remember to do this, start by relaxing your shoulders and lightly holding on to your thighs or your chair. 2. Tilt your head toward your shoulder and hold for 15 to 30 seconds. Let the weight of your head stretch your muscles. 3. Repeat 2 to 4 times toward each shoulder. Chin tuck    1. Lie on the floor with a rolled-up towel under your neck. Your head should be touching the floor. 2. Slowly bring your chin toward your chest.  3. Hold for a count of 6, and then relax for up to 10 seconds. 4. Repeat 8 to 12 times. Active cervical rotation    1. Sit in a firm chair, or stand up straight. 2. Keeping your chin level, turn your head to the right, and hold for 15 to 30 seconds. 3. Turn your head to the left and hold for 15 to 30 seconds. 4. Repeat 2 to 4 times to each side. Shoulder blade squeeze    1. While standing, squeeze your shoulder blades together. 2. Do not raise your shoulders up as you are squeezing. 3. Hold for 6 seconds. 4. Repeat 8 to 12 times. Shoulder rolls    1. Sit comfortably with your feet shoulder-width apart. You can also do this exercise standing up. 2. Roll your shoulders up, then back, and then down in a smooth, circular motion. 3. Repeat 2 to 4 times. Follow-up care is a key part of your treatment and safety. Be sure to make and go to all appointments, and call your doctor if you are having problems.  It's also a good idea to know your test results and keep a list of the medicines you take.  Where can you learn more? Go to http://kylee-geno.info/. Enter X068 in the search box to learn more about \"Neck Arthritis: Exercises. \"  Current as of: November 29, 2017  Content Version: 11.8  © 1216-4391 [employer]wise, Incorporated. Care instructions adapted under license by [employer] (which disclaims liability or warranty for this information). If you have questions about a medical condition or this instruction, always ask your healthcare professional. [address] any warranty or liability for your use of this information.

## 2018-11-13 NOTE — PROGRESS NOTES
1. Have you been to the ER, urgent care clinic since your last visit? Hospitalized since your last visit? No    2. Have you seen or consulted any other health care providers outside of the [address] [place] [address] since your last visit? Include any pap smears or colon screening.  No

## 2018-11-14 ENCOUNTER — TELEPHONE (OUTPATIENT)
Dept: FAMILY MEDICINE CLINIC | Age: 73
End: 2018-11-14

## 2018-11-14 NOTE — TELEPHONE ENCOUNTER
Pt states that [facility] said that the office need to contact the insurance about th RX Lidoderm patches. Pt said that the insurance is requesting more information. We have not received anything from the pharmacy about this. Please advise.

## 2018-11-15 ENCOUNTER — OFFICE VISIT (OUTPATIENT)
Dept: INTERNAL MEDICINE CLINIC | Age: 73
End: 2018-11-15

## 2018-11-15 VITALS
TEMPERATURE: 98.2 F | SYSTOLIC BLOOD PRESSURE: 126 MMHG | BODY MASS INDEX: 18.68 KG/M2 | WEIGHT: 119 LBS | HEIGHT: 67 IN | HEART RATE: 63 BPM | DIASTOLIC BLOOD PRESSURE: 60 MMHG | RESPIRATION RATE: 16 BRPM | OXYGEN SATURATION: 100 %

## 2018-11-15 DIAGNOSIS — I65.23 CAROTID STENOSIS, BILATERAL: ICD-10-CM

## 2018-11-15 DIAGNOSIS — I44.7 LBBB (LEFT BUNDLE BRANCH BLOCK): ICD-10-CM

## 2018-11-15 DIAGNOSIS — M50.20 DISPLACEMENT OF CERVICAL INTERVERTEBRAL DISC WITHOUT MYELOPATHY: ICD-10-CM

## 2018-11-15 DIAGNOSIS — K58.2 IRRITABLE BOWEL SYNDROME WITH BOTH CONSTIPATION AND DIARRHEA: ICD-10-CM

## 2018-11-15 DIAGNOSIS — I70.1 RENAL ARTERY STENOSIS DUE TO FIBROMUSCULAR DYSPLASIA (HCC): ICD-10-CM

## 2018-11-15 DIAGNOSIS — M05.79 RHEUMATOID ARTHRITIS INVOLVING MULTIPLE SITES WITH POSITIVE RHEUMATOID FACTOR (HCC): ICD-10-CM

## 2018-11-15 DIAGNOSIS — M54.12 RADICULOPATHY, CERVICAL: ICD-10-CM

## 2018-11-15 DIAGNOSIS — I77.3 RENAL ARTERY STENOSIS DUE TO FIBROMUSCULAR DYSPLASIA (HCC): ICD-10-CM

## 2018-11-15 DIAGNOSIS — M50.30 DEGENERATION OF CERVICAL INTERVERTEBRAL DISC: ICD-10-CM

## 2018-11-15 DIAGNOSIS — E55.9 VITAMIN D DEFICIENCY: ICD-10-CM

## 2018-11-15 DIAGNOSIS — Z23 ENCOUNTER FOR IMMUNIZATION: ICD-10-CM

## 2018-11-15 DIAGNOSIS — I77.3 FIBROMUSCULAR DYSPLASIA (HCC): ICD-10-CM

## 2018-11-15 DIAGNOSIS — E21.3 HYPERPARATHYROIDISM (HCC): ICD-10-CM

## 2018-11-15 DIAGNOSIS — D04.9 BOWEN'S DISEASE: ICD-10-CM

## 2018-11-15 DIAGNOSIS — K21.9 GASTROESOPHAGEAL REFLUX DISEASE, ESOPHAGITIS PRESENCE NOT SPECIFIED: ICD-10-CM

## 2018-11-15 DIAGNOSIS — M47.812 CERVICAL SPONDYLOSIS WITHOUT MYELOPATHY: ICD-10-CM

## 2018-11-15 DIAGNOSIS — E83.52 FHH (FAMILIAL HYPOCALCIURIC HYPERCALCEMIA): ICD-10-CM

## 2018-11-15 DIAGNOSIS — E78.5 HYPERLIPIDEMIA, UNSPECIFIED HYPERLIPIDEMIA TYPE: ICD-10-CM

## 2018-11-15 DIAGNOSIS — M85.89 OSTEOPENIA OF MULTIPLE SITES: ICD-10-CM

## 2018-11-15 DIAGNOSIS — I10 ESSENTIAL HYPERTENSION: Primary | ICD-10-CM

## 2018-11-15 NOTE — PATIENT INSTRUCTIONS

## 2018-11-15 NOTE — PROGRESS NOTES
Chief Complaint Patient presents with  Hypertension 3 month follow up with lab results. Health Maintenance Due Topic Date Due  
 DTaP/Tdap/Td series (1 - Tdap) 10/25/1967  Shingrix Vaccine Age 50> (1 of 2) 10/25/1996  Pneumococcal 65+ High/Highest Risk (2 of 2 - PPSV23) 02/08/2016  Influenza Age 5 to Adult  08/01/2018 Patient states she got her flu vaccine from Dr. Stephanie Bolden office 3 weeks ago. Patient given pneumococcal vaccine, Pneumovax 23, in right deltoid , per verbal order from Dr. Kiah Gonzalez. Instructed patient to sit and wait 10-20 minutes before leaving the premises so that we can watch for any complications or adverse reactions. Patient given vaccine information statement handout before vaccine was given. Patient tolerated well without adverse reactions or complications. 1. Have you been to the ER, urgent care clinic or hospitalized since your last visit? NO.  
 
2. Have you seen or consulted any other health care providers outside of the [address] [place] [address] since your last visit (Include any pap smears or colon screening)? YES, Last seen by Dr. Tk Diaz, rheumatologist, 3 weeks ago. Do you have an Advanced Directive? NO Would you like information on Advanced Directives?  NO

## 2018-11-16 NOTE — TELEPHONE ENCOUNTER
Prior authorization denied. Lidocaine patches are only covered for the diagnoses of postherpetic neuralgia and diabetic neuropathy under patient's plan.

## 2018-11-19 PROBLEM — E83.52: Status: ACTIVE | Noted: 2018-11-19

## 2018-11-19 PROBLEM — I65.23 CAROTID STENOSIS, BILATERAL: Status: ACTIVE | Noted: 2018-11-19

## 2018-11-19 NOTE — PROGRESS NOTES
HPI:  
[place] is a 68y.o. 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 She reports that she is doing relatively well. She states that she has been having increasing difficulty with right sided neck pain and had been referred to Dr. Chin Amaral for acupuncture therapy. She had been receiving it biweekly since 8/2018, but at her last visit on 11/13/2018, it was recommended that she discontinue treatment since she was not deriving any benefit from it. She was prescribed a lidoderm patch, but has not yet received it. She states that she was referred to Dr. Davey Kuhn for further evaluation. She states that she is continuing to have some difficulty with pain related to her rheumatoid arthritis, but remains hesitant about starting additional treatment due to her multiple skin cancers. She has changed her diet since her last visit, eliminating butter and red meat, and she has increased her activity by walking her dogs. She underwent a carotid duplex study (8/15/2018) showing mild (<50%) stenosis of the left internal carotid artery and moderate (50-69%) stenosis of the right internal carotid artery. She is otherwise without specific complaints. She has a history of hypertension and palpitations, treated currently with metoprolol. She has a history of dyslipidemia, not currently being treated with medication. She was evaluated by Dr. Minh Sanchez in 2012 for palpitations. Event monitor was placed, and showed a known LBBB and PVC's, but reportedly no other arrhythmias (report unavailable). In 9/2012, she also underwent a pharmacologic nuclear stress test which was a normal low risk study with EF 55%.  She also had an echocardiogram, which showed low normal LV function (EF 50%), mild grade 1 diastolic dysfunction, trace MR, TR, and AI. She also has a history of fibromuscular dysplasia, and in 5/2009, underwent aortogram and renal arteriogram with balloon angioplasty. She has a known right carotid bruit, and her last carotid duplex (5/2014) showed mild atherosclerotic intraluminal plaquing with elevated velocities in the bilateral mid internal carotid arteries with a \"string of pearls\" appearance consistent with known fibromuscular dysplasia, causing a 50-69% stenosis of the bilateral internal carotid arteries. There was no change from prior study in 2011. She had been followed by Dr. Cheng Berger, but states that she has been released from his care. She remains quite active, and denies any chest pain, shortness of breath at rest or with exertion, palpitations, lightheadedness, or edema. She has a history of rheumatoid arthritis, diagnosed when she was 37years old, treated currently with subcutaneous methotrexate and prednisone. She is being followed closely by Dr. Keny Victoria. She is not currently being treated with a biologic agent due to concern regarding recent multiple squamous cell carcinomas of the skin, for which she is being followed by Dr. Maryan Anthony. She reports that she was well controlled on Humira for several years, but had to discontinue treatment when it was no longer on her insurance formulary. She also has a history of osteopenia, with bone density studies performed by Dr. Keny Victoria. Her last bone density study was in 10/2016 showing T-scores:  femoral neck left -1.5  /right -1.6 and lumbar -1.9. She continues to take calcium and Vitamin D supplements. She has no history of pathologic fractures. She was recently diagnosed with familial hypocalciuric hypercalcemia with mildly elevated calcium 10.6, mildly elevated PTH 80, Vitamin D level 36.5, and 24 hour urine calcium of 105 (FeCa 1%) in 7/30/2018, which would be consistent with this diagnosis. She is being followed by Dr. Taina Bro. She has a history of cervical degenerative joint and disc disease and chronic neck pain. She was being followed by Dr. Geraldine Yao, who recommended surgery. She was seen by Dr. Irene Corbin at [facility] for a second opinion, and cervical MRI (9/2015) showed multilevel degenerative disc and facet disease, worst at C5-C6; left central/subarticular disc protrusion with superimposed annular fissure noted at C5-C6 causing mass effect upon the left hemicord but no signal changes with severe left neuroforaminal narrowing at this level. Recommendation was for her to proceed with physical therapy, which did result in some improvement. She previously had been treated with gabapentin for pain control, but is currently only using Tylenol. She also states that she was referred to Dr. Eileen eCrvantes for evaluation, but has not yet scheduled an appointment with him. She has a history of GERD. In 11/2017, she was having increasing difficulty with constipation alternating with diarrhea as well as increasing reflux symptoms. She states that she was evaluated by Dr. Sara Ramsay and it was her opinion that she suffered from irritable bowel syndrome. She has made changes in her diet and increased her consumption of fiber with some improvement. She states that she has resumed taking omeprazole with reasonable control of her reflux symptoms. She underwent upper endoscopy by Dr. Narendra Pulido in 11/2012 which showed a 2 cm hiatal hernia and pathology from a distal esophageal biopsy showing chronic inflammation. She had a screening colonoscopy in 7/14/2016 by Dr. Narendra Pulido, which showed an adenomatous polyp (report unavailable). Recommendation was for follow-up in 5 years. She denies any abdominal pain, nausea, vomiting, melena, hematochezia, or change in bowel movements. Past Medical History:  
Diagnosis Date  Bowen's disease  Fibrom[facility] dysplasia [facility] [address]  GERD (gastroesophageal reflux disease)  Hiatal hernia  Hyperparathyroidism ([facility] [address]  Hypertension  LBBB (left bundle branch block)  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 [facility])   
 s/p balloon angioplasty 5/2009 [facility]  Rheumatoid arthritis ([facility] [address]  Skin cancer Past Surgical History:  
Procedure Laterality Date  HX ANGIOPLASTY  HX KNEE REPLACEMENT Bilateral 2009  
 partial   
 HX OTHER SURGICAL    
 removal of fibroid benign tumors from breast   
 HX PARTIAL HYSTERECTOMY [address] Current Outpatient Medications Medication Sig  sertraline (ZOLOFT) 25 mg tablet Take 1 Tab by mouth daily.  metoprolol tartrate (LOPRESSOR) 50 mg tablet Take 1 Tab by mouth two (2) times a day.  METHOTREXATE SODIUM INJECTION 15 mg every seven (7) days.  omeprazole (PRILOSEC) 40 mg capsule Take 40 mg by mouth daily.  predniSONE (DELTASONE) 5 mg tablet Take  by mouth daily. 2.5 mg every other day  multivitamin (ONE A DAY) tablet Take 1 Tab by Mouth Once a Day.  acetaminophen (TYLENOL) 500 mg tablet Take 500 mg by mouth every six (6) hours as needed.  cholecalciferol (VITAMIN D3) 400 unit tab tablet Take 800 Units by mouth.  aspirin 81 mg chewable tablet Take 81 mg by mouth daily.  folic acid (FOLVITE) 1 mg tablet Take 1 mg by mouth daily.  lidocaine (LIDODERM) 5 % Apply patch to the affected area for 12 hours a day and remove for 12 hours a day. No current facility-administered medications for this visit. Allergies and Intolerances: Allergies Allergen Reactions  Hydrocodone Anaphylaxis  Nsaids (Non-Steroidal Anti-Inflammatory Drug) Other (comments) None due to kidneys  Azithromycin Itching  Other Medication Other (comments) GI intolerance to nonsteroidal antiinflammatory medications  Vicodin [Hydrocodone-Acetaminophen] Other (comments) Family History: Her father passed away from a ruptured AAA.  Her mother has osteoarthritis and COPD, and a maternal aunt had RA. Her brother recently passed away from multiple sclerosis. She has no family history of colon or breast cancer. Family History Problem Relation Age of Onset [facility] Arthritis-osteo Mother  Elevated Lipids Mother  Heart Disease Mother CHF  Lung Disease Mother  Hypertension Sister  Lung Disease Sister  Hypertension Brother  MS Brother Social History: She  reports that  has never smoked. she has never used smokeless tobacco. She is  with two children and one stepchild. 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 She drinks wine only occasionally. Social History Substance and Sexual Activity Alcohol Use Yes  Alcohol/week: 0.6 - 1.2 oz  Types: 1 - 2 Glasses of wine per week Immunization History: 
Immunization History Administered Date(s) Administered  Influenza Vaccine 10/29/2013  Pneumococcal Conjugate (PCV-13) 12/14/2015  Pneumococcal Polysaccharide (PPSV-23) 11/15/2018 Review of Systems: As above included in HPI. Otherwise 11 point review of systems negative including constitutional, skin, HENT, eyes, respiratory, cardiovascular, gastrointestinal, genitourinary, musculoskeletal, endocrine, hematologic, allergy, and neurologic. Physical:  
Vitals:  
BP: 126/60 HR: 63 
WT: 119 lb (54 kg) BMI:  18.64 kg/m2 Exam:  
Patient appears in no apparent distress. Affect is appropriate. HEENT: PERRLA, anicteric, oropharynx clear, no JVD, adenopathy or thyromegaly. Bilateral carotid bruits clearly evident. Lungs: clear to auscultation, no wheezes, rhonchi, or rales. Heart: regular rate and rhythm. No murmur, rubs, gallops Abdomen: soft, nontender, nondistended, normal bowel sounds, no hepatosplenomegaly or masses. Extremities: without edema. Pulses 1-2+ bilaterally. Review of Data: 
Labs: Hospital Outpatient Visit on 11/08/2018 Component Date Value Ref Range Status  Sodium 11/08/2018 140  136 - 145 mmol/L Final  
 Potassium 11/08/2018 4.1  3.5 - 5.5 mmol/L Final  
 Chloride 11/08/2018 104  100 - 108 mmol/L Final  
 CO2 11/08/2018 30  21 - 32 mmol/L Final  
 Anion gap 11/08/2018 6  3.0 - 18 mmol/L Final  
 Glucose 11/08/2018 75  74 - 99 mg/dL Final  
 BUN 11/08/2018 9  7.0 - 18 MG/DL Final  
 Creatinine 11/08/2018 0.80  0.6 - 1.3 MG/DL Final  
 BUN/Creatinine ratio 11/08/2018 11* 12 - 20   Final  
 GFR est AA 11/08/2018 >60  >60 ml/min/1.73m2 Final  
 GFR est non-AA 11/08/2018 >60  >60 ml/min/1.73m2 Final  
 Calcium 11/08/2018 9.9  8.5 - 10.1 MG/DL Final  
 Bilirubin, total 11/08/2018 0.5  0.2 - 1.0 MG/DL Final  
 ALT (SGPT) 11/08/2018 35  13 - 56 U/L Final  
 AST (SGOT) 11/08/2018 27  15 - 37 U/L Final  
 Alk. phosphatase 11/08/2018 70  45 - 117 U/L Final  
 Protein, total 11/08/2018 6.8  6.4 - 8.2 g/dL Final  
 Albumin 11/08/2018 3.8  3.4 - 5.0 g/dL Final  
 Globulin 11/08/2018 3.0  2.0 - 4.0 g/dL Final  
 A-G Ratio 11/08/2018 1.3  0.8 - 1.7   Final  
 LIPID PROFILE 11/08/2018        Final  
 Cholesterol, total 11/08/2018 180  <200 MG/DL Final  
 Triglyceride 11/08/2018 67  <150 MG/DL Final  
 HDL Cholesterol 11/08/2018 77* 40 - 60 MG/DL Final  
 LDL, calculated 11/08/2018 89.6  0 - 100 MG/DL Final  
 VLDL, calculated 11/08/2018 13.4  MG/DL Final  
 CHOL/HDL Ratio 11/08/2018 2.3  0 - 5.0   Final  
 Vitamin D 25-Hydroxy 11/08/2018 27.1* 30 - 100 ng/mL Final  
 
Calculated 10 year ASCVD risk score: 13.4% Health Maintenance: 
Screening:  
 Mammogram: negative (2/2018) [facility] 
 PAP smear: well woman exams performed at [facility] (last 2/2018) Colorectal: colonoscopy (7/2016) tubular adenoma. Dr. Chhaya Walsh. Due 2021. Now followed by Dr. Krishna Haas. Depression: on Zoloft DM (HbA1c/FPG): FPG 75 (11/2018) Hepatitis C: negative (1/2/2018) Dr. Lucy Rasmussen. Falls: none DEXA: osteopenia. Performed by Dr. Lesly Llanos. Glaucoma: regular eye exams with Dr. David Munoz (last 2/2018 ) Smoking: none Vitamin D: 27.1 (11/2018) Medicare Wellness: 5/2/2018 Impression: 
Patient Active Problem List  
Diagnosis Code  Intervertebral disc protrusion [id number]  Displacement of cervical intervertebral disc without myelopathy M50.20  Cervical spondylosis without myelopathy M47.812  Degeneration of cervical intervertebral disc M50.30  Brachial neuritis or radiculitis M54.12  Radiculopathy, cervical M54.12  
 Skin cancer C44.90  Rheumatoid arthritis ([facility] [address] M06.9  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 (HCC) I70.1  LBBB (left bundle branch block) I44.7  Hypertension I10  
 Hyperparathyroidism ([facility] [address] E21.3  Hiatal hernia K44.9  GERD (gastroesophageal reflux disease) K21.9  Fibromuscular dysplasia (HCC) I77.3  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  Irritable bowel syndrome with both constipation and diarrhea K58.2  Bilateral carotid bruits R09.89  
 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  
 Hyperlipidemia E78.5  Osteopenia of multiple sites M85.89  Vitamin D deficiency E55.9 Plan: 1. Hypertension. Blood pressure well controlled on current regimen of metoprolol 50 mg bid, which also helps with control of palpitations. Renal function normal with creatinine 0.80/ eGFR >60. Will continue to follow. 2. Fibromuscular dysplasia. Previous angioplasty of renal arteries. Known moderate stenosis of bilateral carotid arteries, although last carotid duplex in 5/2014. Noted prominent carotid bruits and repeat carotid duplex obtained in 8/2018 showing moderate (50-69%) stenosis of the right ICA and mild (<50%) of the left ICA. Review of report also showed mild atherosclerosis at that time.  Previously followed by Dr. Cornelio Lugo of [facility], but given stability over many years, released from his care with plan to pursue further evaluation only if becomes symptomatic. Will continue to monitor. 3. Hyperlipidemia. Calculated 10 year ASCVD risk is 13.4 %, which places her in one of the four statin benefit groups as per new AHA/ACC guidelines (primary prevention: 10 year ASCVD risk >7.5%). In addition, mild atherosclerosis in the setting of fibromuscular dysplasia described on prior carotid duplex. Also at increased due to chronic inflammatory state related to rheumatoid arthritis. Given these factors would recommend considering treatment with statin at this time. Reviewed prior lipid panels in Care Everywhere, and last one found was in 9/2012 with total chol 183/ TG 59/HDL 72/ LDL 99. Patient wishing to attempt lifestyle changes prior to considering statin therapy, and she eliminated saturated fats in the form of butter and red meat from her diet. Repeat lipid panel today significantly improved with LDL 89/ HDL 77. Prior levels in 8/2018 with / HDL 81. Discussed that will recheck lipid panel at next visit in 3 months. Given multiple risk factors and known atherosclerosis, would favor initiating rosuvastatin of no further improvements made. Goal LDL <70. Would want to confirm no longer Vitamin D deficient prior to initiating to avoid potentiating muscular symptoms with use. Will readdress at next visit. 4. Rheumatoid arthritis. On [place] methotrexate and prednisone with fairly stable symptoms. Considering another biologic but hesitant given Bowen's disease. Being followed closely by Dr. Gomez Alvarado. 5. Cervical degenerative disease. Experiencing chronic neck pain, treated previously with gabapentin. Some improvement with exercising as well, although continuing to experience pain. Attempted acupuncture therapy by Dr. Dashawn Braun, but after 3 months, no significant benefit seen. Previously followed by Dr. Andrew Benitez, but referred to Dr. Casper Gaona by Dr. Gomez Alvarado.  Has not yet scheduled evaluation. Discussed use of Lidoderm patch. Has not yet received from pharmacy since attempting to obtain PA. However, discussed that usually only approved for post herpetic neuralgia. Discussed over the counter 4% patches as viable option. 6. Osteopenia. Last bone density scan 10/2016 . Using femoral neck T-scores, calculated FRAX score estimates her 10 year risk of a major osteoporetic fracture at 8.7 % and hip fracture at 1.6 %, which are not an indication for biphosphonate treatment (>20% and >3%, respectively). Continue calcium and Vitamin D. Encouraged exercise, particularly weight bearing activities. Performed by Dr. Paty Arrieta. 7. Hyperparathyroidism. Mild increase in PTH, mildly elevated calcium, normal Vitamin D level,and 24 hour urine calcium only 105 (FeCa 1%) is consistent with a diagnosis of familial hypocalciuric hypercalcemia. Being followed by Dr. Kings Price and told at most recent visit that would not need to follow-up for 2 years given benign condition. Advised to drink plenty of fluids. 8. GERD. Has resumed taking omeprazole. Symptoms of diarrhea alternating with constipation chronically thought to be secondary to irritable bowel syndrome. On probiotic. Reportedly changed diet and increased fiber with some improvement. 8. Bowen's disease. Being followed closely by Dr. Dora Gerard for multiple squamous cell carcinomas in situ. 9. Health maintenance. Already received influenza vaccine at Dr. Veronica Oreilly office on 10/29/2018. Reviewed record and received Prevnar 13 in 12/2015. Will give Pneumovax today. Will discuss Tdap at next visit . Shingrix deferred given concerns for triggering arthritis flare. Mammogram and well women exams performed at [facility]. Continue regular eye exams with Dr. Karina Schaffer. Will request report. Vitamin D level remains mildly low and patient reports only taking 400 U tablet.  Instructed to increase to 2000 U daily for now and will recheck next visit. Medicare wellness visit up to date. Total time: 40 minutes spent with the patient in face-to-face consultation of which greater than 50% was spent on counseling, answering questions and/or coordination of care. Complex medical review and management performed. Patient understands recommendations and agrees with plan. Follow-up in 3 months.

## 2018-11-21 NOTE — TELEPHONE ENCOUNTER
[phone] advising patient that prior authorization was denied for Lidoderm patches.

## 2019-01-30 ENCOUNTER — TELEPHONE (OUTPATIENT)
Dept: INTERNAL MEDICINE CLINIC | Age: 74
End: 2019-01-30

## 2019-01-30 RX ORDER — OSELTAMIVIR PHOSPHATE 75 MG/1
75 CAPSULE ORAL DAILY
Qty: 10 CAP | Refills: 0 | Status: SHIPPED | OUTPATIENT
Start: 2019-01-30 | End: 2019-02-09

## 2019-01-30 NOTE — TELEPHONE ENCOUNTER
Dosing for influenza exposure prophylaxis: Tamiflu 75 mg daily for 10 days. Script sent [facility]. Please let her know.

## 2019-01-30 NOTE — TELEPHONE ENCOUNTER
Patient called asking if Dr. Vanessa Atwood can order a Tamaflu medication her daughter was diagnose with Type A Flu this is for precautionary because she takes care of her grand children.

## 2019-02-15 ENCOUNTER — TELEPHONE (OUTPATIENT)
Dept: INTERNAL MEDICINE CLINIC | Age: 74
End: 2019-02-15

## 2019-02-15 DIAGNOSIS — M05.79 RHEUMATOID ARTHRITIS INVOLVING MULTIPLE SITES WITH POSITIVE RHEUMATOID FACTOR (HCC): ICD-10-CM

## 2019-02-15 DIAGNOSIS — E55.9 VITAMIN D DEFICIENCY: ICD-10-CM

## 2019-02-15 DIAGNOSIS — E78.5 HYPERLIPIDEMIA, UNSPECIFIED HYPERLIPIDEMIA TYPE: ICD-10-CM

## 2019-02-15 DIAGNOSIS — I10 ESSENTIAL HYPERTENSION: Primary | ICD-10-CM

## 2019-02-15 NOTE — TELEPHONE ENCOUNTER
Pt Rhuemotoligist  Dr Tao Thomas wants pt to haver a cbc with differential  platlet ran the patient is having labs done 02/21/ here she said Dr Khalida Bryan told her if she needed additional labs to let her know so it can be added to the order Dr Khalida Bryan put in .  Please advise

## 2019-02-21 ENCOUNTER — APPOINTMENT (OUTPATIENT)
Dept: INTERNAL MEDICINE CLINIC | Age: 74
End: 2019-02-21

## 2019-02-21 ENCOUNTER — HOSPITAL ENCOUNTER (OUTPATIENT)
Dept: LAB | Age: 74
Discharge: HOME | End: 2019-02-21
Payer: MEDICARE

## 2019-02-21 DIAGNOSIS — E78.5 HYPERLIPIDEMIA, UNSPECIFIED HYPERLIPIDEMIA TYPE: ICD-10-CM

## 2019-02-21 DIAGNOSIS — M05.79 RHEUMATOID ARTHRITIS INVOLVING MULTIPLE SITES WITH POSITIVE RHEUMATOID FACTOR (HCC): ICD-10-CM

## 2019-02-21 DIAGNOSIS — E55.9 VITAMIN D DEFICIENCY: ICD-10-CM

## 2019-02-21 DIAGNOSIS — I10 ESSENTIAL HYPERTENSION: ICD-10-CM

## 2019-02-21 LAB
ALBUMIN SERPL-MCNC: 4.3 G/DL (ref 3.4–5)
ALBUMIN/GLOB SERPL: 1.5 {RATIO} (ref 0.8–1.7)
ALP SERPL-CCNC: 78 U/L (ref 45–117)
ALT SERPL-CCNC: 45 U/L (ref 13–56)
ANION GAP SERPL CALC-SCNC: 6 MMOL/L (ref 3–18)
AST SERPL-CCNC: 38 U/L (ref 15–37)
BASOPHILS # BLD: 0.1 K/UL (ref 0–0.1)
BASOPHILS NFR BLD: 1 % (ref 0–2)
BILIRUB SERPL-MCNC: 0.4 MG/DL (ref 0.2–1)
BUN SERPL-MCNC: 9 MG/DL (ref 7–18)
BUN/CREAT SERPL: 11 (ref 12–20)
CALCIUM SERPL-MCNC: 10.4 MG/DL (ref 8.5–10.1)
CHLORIDE SERPL-SCNC: 103 MMOL/L (ref 100–108)
CHOLEST SERPL-MCNC: 222 MG/DL
CO2 SERPL-SCNC: 32 MMOL/L (ref 21–32)
CREAT SERPL-MCNC: 0.79 MG/DL (ref 0.6–1.3)
DIFFERENTIAL METHOD BLD: ABNORMAL
EOSINOPHIL # BLD: 0.2 K/UL (ref 0–0.4)
EOSINOPHIL NFR BLD: 3 % (ref 0–5)
ERYTHROCYTE [DISTWIDTH] IN BLOOD BY AUTOMATED COUNT: 14.5 % (ref 11.6–14.5)
GLOBULIN SER CALC-MCNC: 2.9 G/DL (ref 2–4)
GLUCOSE SERPL-MCNC: 77 MG/DL (ref 74–99)
HCT VFR BLD AUTO: 40.9 % (ref 35–45)
HDLC SERPL-MCNC: 97 MG/DL (ref 40–60)
HDLC SERPL: 2.3 {RATIO} (ref 0–5)
HGB BLD-MCNC: 12.6 G/DL (ref 12–16)
LDLC SERPL CALC-MCNC: 110.4 MG/DL (ref 0–100)
LIPID PROFILE,FLP: ABNORMAL
LYMPHOCYTES # BLD: 1.2 K/UL (ref 0.9–3.6)
LYMPHOCYTES NFR BLD: 22 % (ref 21–52)
MCH RBC QN AUTO: 29.8 PG (ref 24–34)
MCHC RBC AUTO-ENTMCNC: 30.8 G/DL (ref 31–37)
MCV RBC AUTO: 96.7 FL (ref 74–97)
MONOCYTES # BLD: 0.6 K/UL (ref 0.05–1.2)
MONOCYTES NFR BLD: 11 % (ref 3–10)
NEUTS SEG # BLD: 3.6 K/UL (ref 1.8–8)
NEUTS SEG NFR BLD: 63 % (ref 40–73)
PLATELET # BLD AUTO: 247 K/UL (ref 135–420)
PMV BLD AUTO: 11.1 FL (ref 9.2–11.8)
POTASSIUM SERPL-SCNC: 4 MMOL/L (ref 3.5–5.5)
PROT SERPL-MCNC: 7.2 G/DL (ref 6.4–8.2)
RBC # BLD AUTO: 4.23 M/UL (ref 4.2–5.3)
SODIUM SERPL-SCNC: 141 MMOL/L (ref 136–145)
TRIGL SERPL-MCNC: 73 MG/DL (ref ?–150)
VLDLC SERPL CALC-MCNC: 14.6 MG/DL
WBC # BLD AUTO: 5.7 K/UL (ref 4.6–13.2)

## 2019-02-21 PROCEDURE — 80061 LIPID PANEL: CPT

## 2019-02-21 PROCEDURE — 82306 VITAMIN D 25 HYDROXY: CPT

## 2019-02-21 PROCEDURE — 80053 COMPREHEN METABOLIC PANEL: CPT

## 2019-02-21 PROCEDURE — 36415 COLL VENOUS BLD VENIPUNCTURE: CPT

## 2019-02-21 PROCEDURE — 85025 COMPLETE CBC W/AUTO DIFF WBC: CPT

## 2019-02-22 LAB — 25(OH)D3 SERPL-MCNC: 29.5 NG/ML (ref 30–100)

## 2019-02-28 ENCOUNTER — OFFICE VISIT (OUTPATIENT)
Dept: INTERNAL MEDICINE CLINIC | Age: 74
End: 2019-02-28

## 2019-02-28 VITALS
SYSTOLIC BLOOD PRESSURE: 124 MMHG | HEART RATE: 72 BPM | DIASTOLIC BLOOD PRESSURE: 80 MMHG | RESPIRATION RATE: 14 BRPM | TEMPERATURE: 98.6 F | BODY MASS INDEX: 18.36 KG/M2 | HEIGHT: 67 IN | WEIGHT: 117 LBS | OXYGEN SATURATION: 98 %

## 2019-02-28 DIAGNOSIS — I77.3 FIBROMUSCULAR DYSPLASIA (HCC): ICD-10-CM

## 2019-02-28 DIAGNOSIS — M47.812 CERVICAL SPONDYLOSIS WITHOUT MYELOPATHY: ICD-10-CM

## 2019-02-28 DIAGNOSIS — M05.79 RHEUMATOID ARTHRITIS INVOLVING MULTIPLE SITES WITH POSITIVE RHEUMATOID FACTOR (HCC): ICD-10-CM

## 2019-02-28 DIAGNOSIS — I10 ESSENTIAL HYPERTENSION: Primary | ICD-10-CM

## 2019-02-28 DIAGNOSIS — I70.1 RENAL ARTERY STENOSIS DUE TO FIBROMUSCULAR DYSPLASIA (HCC): ICD-10-CM

## 2019-02-28 DIAGNOSIS — M54.12 RADICULOPATHY, CERVICAL: ICD-10-CM

## 2019-02-28 DIAGNOSIS — M85.89 OSTEOPENIA OF MULTIPLE SITES: ICD-10-CM

## 2019-02-28 DIAGNOSIS — D04.9 BOWEN'S DISEASE: ICD-10-CM

## 2019-02-28 DIAGNOSIS — E83.52 FHH (FAMILIAL HYPOCALCIURIC HYPERCALCEMIA): ICD-10-CM

## 2019-02-28 DIAGNOSIS — E78.5 HYPERLIPIDEMIA, UNSPECIFIED HYPERLIPIDEMIA TYPE: ICD-10-CM

## 2019-02-28 DIAGNOSIS — I77.3 RENAL ARTERY STENOSIS DUE TO FIBROMUSCULAR DYSPLASIA (HCC): ICD-10-CM

## 2019-02-28 DIAGNOSIS — E55.9 VITAMIN D DEFICIENCY: ICD-10-CM

## 2019-02-28 DIAGNOSIS — M50.30 DEGENERATION OF CERVICAL INTERVERTEBRAL DISC: ICD-10-CM

## 2019-02-28 DIAGNOSIS — I65.23 CAROTID STENOSIS, BILATERAL: ICD-10-CM

## 2019-02-28 DIAGNOSIS — E21.3 HYPERPARATHYROIDISM (HCC): ICD-10-CM

## 2019-02-28 RX ORDER — METOPROLOL TARTRATE 50 MG/1
50 TABLET ORAL 2 TIMES DAILY
Qty: 180 TAB | Refills: 2 | Status: SHIPPED | OUTPATIENT
Start: 2019-02-28

## 2019-02-28 NOTE — PATIENT INSTRUCTIONS

## 2019-02-28 NOTE — ACP (ADVANCE CARE PLANNING)
Do you have an Advanced Directive? NO    Would you like information on Advanced Directives? NO, patient states she has to redo the one she has and already has the paperwork.

## 2019-02-28 NOTE — PROGRESS NOTES
Chief Complaint Patient presents with  Hypertension 3 month follow up with lab results. Health Maintenance Due Topic Date Due  
 DTaP/Tdap/Td series (1 - Tdap) 10/25/1966 1. Have you been to the ER, urgent care clinic or hospitalized since your last visit? NO.  
 
2. Have you seen or consulted any other health care providers outside of the [address] [place] [address] since your last visit (Include any pap smears or colon screening)?  YES, Last seen on Feb. 2019 with Dr. Erum Moralez, rheumatologist.

## 2019-03-05 ENCOUNTER — TELEPHONE (OUTPATIENT)
Dept: INTERNAL MEDICINE CLINIC | Age: 74
End: 2019-03-05

## 2019-03-06 NOTE — PROGRESS NOTES
HPI:   [place] is a 68y.o. 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 She reports that she is doing relatively well. She currently has nasal congestion, producing clear sputum and mild sore throat. She denies any fever, chills, or cough. She reports that her dose of methotrexate was held this week due to her cold. She did take preventive Tamiflu earlier this month for 10 days when her daughter was diagnosed with Influenza A. Fortunately, she did not develop symptoms of the flu. She reports that she began experiencing worsening reflux symptoms in 12/2018 after attempting to wean omeprazole from 40 mg to 20 mg daily. She also was taking an increased dose of prednisone at the time for a flare of her RA. She was evaluated by Dr. Cecilia Burch and attempted a trial of Protonix which she believed may be working better. She plans to continue taking it. She states that at the time of her worsening reflux, she also noted increasing palpitations and underwent evaluation by THOM[facility] Damian for Dr. Anselm Primrose. She states that she was offered an event recorder to evaluate, but she noticed that her symptoms improved once her GERD was under control. She therefore declined further evaluation and continues on metoprolol tartrate 50 mg bid. She also states that she is continuing to consider starting a new biologic therapy in addition to methotrexate in order to achieve better control of her RA. However, she remains hesitant due to her current difficulty with skin cancers. She is otherwise without specific complaints and feeling generally well. She has a history of hypertension and palpitations, treated currently with metoprolol.  She has a history of dyslipidemia, not currently being treated with medication. She was evaluated by Dr. Jose Luis Rosas in 2012 for palpitations. Event monitor was placed, and showed a known LBBB and PVC's, but reportedly no other arrhythmias (report unavailable). In 9/2012, she also underwent a pharmacologic nuclear stress test which was a normal low risk study with EF 55%. She also had an echocardiogram, which showed low normal LV function (EF 50%), mild grade 1 diastolic dysfunction, trace MR, TR, and AI. She also has a history of fibromuscular dysplasia, and in 5/2009, underwent aortogram and renal arteriogram with balloon angioplasty. She has a known right carotid bruit, and her last carotid duplex (5/2014) showed mild atherosclerotic intraluminal plaquing with elevated velocities in the bilateral mid internal carotid arteries with a \"string of pearls\" appearance consistent with known fibromuscular dysplasia, causing a 50-69% stenosis of the bilateral internal carotid arteries. There was no change from prior study in 2011. She had been followed by Dr. Sue No, but states that she has been released from his care. She remains quite active, and denies any chest pain, shortness of breath at rest or with exertion, palpitations, lightheadedness, or edema. She underwent a repeat carotid duplex study (8/15/2018) showing mild (<50%) stenosis of the left internal carotid artery and moderate (50-69%) stenosis of the right internal carotid artery. She has a history of rheumatoid arthritis, diagnosed when she was 37years old, treated currently with subcutaneous methotrexate and prednisone. She is being followed closely by Dr. Alanna Mullins. She is not currently being treated with a biologic agent due to concern regarding recent multiple squamous cell carcinomas of the skin, for which she is being followed by Dr. Kevin Barboza.  She reports that she was well controlled on Humira for several years, but had to discontinue treatment when it was no longer on her insurance formulary. She also has a history of osteopenia, with bone density studies performed by Dr. Jamey Vital. Her last bone density study was in 2/2019 showing T-scores:  femoral neck left -1.7  /right -1.9 and lumbar -2.1. She continues to take calcium and Vitamin D supplements. She has no history of pathologic fractures. She was recently diagnosed with familial hypocalciuric hypercalcemia with mildly elevated calcium 10.6, mildly elevated PTH 80, Vitamin D level 36.5, and 24 hour urine calcium of 105 (FeCa 1%) in 7/30/2018, which would be consistent with this diagnosis. She is being followed by Dr. Calvin Tamayo. She has a history of cervical degenerative joint and disc disease and chronic neck pain. She was being followed by Dr. Svetlana Miramontes, who recommended surgery. She was seen by Dr. Hyman Meckel at [facility] for a second opinion, and cervical MRI (9/2015) showed multilevel degenerative disc and facet disease, worst at C5-C6; left central/subarticular disc protrusion with superimposed annular fissure noted at C5-C6 causing mass effect upon the left hemicord but no signal changes with severe left neuroforaminal narrowing at this level. Recommendation was for her to proceed with physical therapy, which did result in some improvement. She previously had been treated with gabapentin for pain control, but is currently only using Tylenol. She underwent acupuncture therapy with Dr. Yon Solis with only minimal improvement. She also states that she was referred to Dr. Piedad Blizzard for evaluation, but has not yet scheduled an appointment with him. She has a history of GERD. In 11/2017, she was having increasing difficulty with constipation alternating with diarrhea as well as increasing reflux symptoms. She states that she was evaluated by Dr. Mehreen Davison and it was her opinion that she suffered from irritable bowel syndrome. She has made changes in her diet and increased her consumption of fiber with some improvement.  She states that she has resumed taking omeprazole with reasonable control of her reflux symptoms. She underwent upper endoscopy by Dr. Gardenia Rose in 11/2012 which showed a 2 cm hiatal hernia and pathology from a distal esophageal biopsy showing chronic inflammation. She had a screening colonoscopy in 7/14/2016 by Dr. Gardenia Rose, which showed an adenomatous polyp (report unavailable). Recommendation was for follow-up in 5 years. She denies any abdominal pain, nausea, vomiting, melena, hematochezia, or change in bowel movements. Past Medical History:   Diagnosis Date    Bowen's disease     Fibromuscular dysplasia (HCC)     GERD (gastroesophageal reflux disease)     Hiatal hernia     [facility])     Hypertension     LBBB (left bundle branch block)     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 (HCC)     s/p balloon angioplasty 5/2009 [facility]    Rheumatoid arthritis ([facility])     Skin cancer      Past Surgical History:   Procedure Laterality Date    HX ANGIOPLASTY      HX KNEE REPLACEMENT Bilateral 2009    partial     HX OTHER SURGICAL      removal of fibroid benign tumors from breast     HX PARTIAL HYSTERECTOMY      HX TONSILLECTOMY  1964     Current Outpatient Medications   Medication Sig    metoprolol tartrate (LOPRESSOR) 50 mg tablet Take 1 Tab by mouth two (2) times a day.  sertraline (ZOLOFT) 25 mg tablet Take 1 Tab by mouth daily.  METHOTREXATE SODIUM INJECTION 15 mg every seven (7) days.  omeprazole (PRILOSEC) 40 mg capsule Take 40 mg by mouth daily.  predniSONE (DELTASONE) 5 mg tablet Take  by mouth daily. 2.5 mg every other day    multivitamin (ONE A DAY) tablet Take 1 Tab by Mouth Once a Day.  acetaminophen (TYLENOL) 500 mg tablet Take 500 mg by mouth every six (6) hours as needed.  cholecalciferol (VITAMIN D3) 400 unit tab tablet Take 2,000 Units by mouth.  aspirin 81 mg chewable tablet Take 81 mg by mouth daily.     folic acid (FOLVITE) 1 mg tablet Take 1 mg by mouth daily.    lidocaine (LIDODERM) 5 % Apply patch to the affected area for 12 hours a day and remove for 12 hours a day. No current facility-administered medications for this visit. Allergies and Intolerances: Allergies   Allergen Reactions    Hydrocodone Anaphylaxis    Nsaids (Non-Steroidal Anti-Inflammatory Drug) Other (comments)     None due to kidneys    Azithromycin Itching    Other Medication Other (comments)     GI intolerance to nonsteroidal antiinflammatory medications     Vicodin [Hydrocodone-Acetaminophen] Other (comments)     Family History: Her father passed away from a ruptured AAA. Her mother has osteoarthritis and COPD, and a maternal aunt had RA. Her brother recently passed away from multiple sclerosis. She has no family history of colon or breast cancer. Family History   Problem Relation Age of Onset    Arthritis-osteo Mother     Elevated Lipids Mother     Heart Disease Mother         CHF    Lung Disease Mother     Hypertension Sister     Lung Disease Sister     Hypertension Brother     MS Brother      Social History:   She  reports that  has never smoked. she has never used smokeless tobacco. She is  with two children and one stepchild. 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 She drinks wine only occasionally. Social History     Substance and Sexual Activity   Alcohol Use Yes    Alcohol/week: 0.6 - 1.2 oz    Types: 1 - 2 Glasses of wine per week     Immunization History:  Immunization History   Administered Date(s) Administered    Influenza High Dose Vaccine PF 10/29/2018    Influenza Vaccine 10/29/2013    Pneumococcal Conjugate (PCV-13) 12/14/2015    Pneumococcal Polysaccharide (PPSV-23) 11/15/2018       Review of Systems:   As above included in HPI.   Otherwise 11 point review of systems negative including constitutional, skin, HENT, eyes, respiratory, cardiovascular, gastrointestinal, genitourinary, musculoskeletal, endocrine, hematologic, allergy, and neurologic. Physical:   Vitals:   BP: 124/80  HR: 72  WT: 117 lb (53.1 kg)  BMI:  18.32 kg/m2    Exam:   Patient appears in no apparent distress. Affect is appropriate. HEENT: PERRLA, anicteric, oropharynx clear, no JVD, adenopathy or thyromegaly. Bilateral carotid bruits clearly evident. Lungs: clear to auscultation, no wheezes, rhonchi, or rales. Heart: regular rate and rhythm. No murmur, rubs, gallops  Abdomen: soft, nontender, nondistended, normal bowel sounds, no hepatosplenomegaly or masses. Extremities: without edema. Pulses 1-2+ bilaterally. Review of Data:  Labs:  Hospital Outpatient Visit on 02/21/2019   Component Date Value Ref Range Status    WBC 02/21/2019 5.7  4.6 - 13.2 K/uL Final    RBC 02/21/2019 4.23  4.20 - 5.30 M/uL Final    HGB 02/21/2019 12.6  12.0 - 16.0 g/dL Final    HCT 02/21/2019 40.9  35.0 - 45.0 % Final    MCV 02/21/2019 96.7  74.0 - 97.0 FL Final    MCH 02/21/2019 29.8  24.0 - 34.0 PG Final    MCHC 02/21/2019 30.8* 31.0 - 37.0 g/dL Final    RDW 02/21/2019 14.5  11.6 - 14.5 % Final    PLATELET 84/40/6318 064  135 - 420 K/uL Final    MPV 02/21/2019 11.1  9.2 - 11.8 FL Final    NEUTROPHILS 02/21/2019 63  40 - 73 % Final    LYMPHOCYTES 02/21/2019 22  21 - 52 % Final    MONOCYTES 02/21/2019 11* 3 - 10 % Final    EOSINOPHILS 02/21/2019 3  0 - 5 % Final    BASOPHILS 02/21/2019 1  0 - 2 % Final    ABS. NEUTROPHILS 02/21/2019 3.6  1.8 - 8.0 K/UL Final    ABS. LYMPHOCYTES 02/21/2019 1.2  0.9 - 3.6 K/UL Final    ABS. MONOCYTES 02/21/2019 0.6  0.05 - 1.2 K/UL Final    ABS. EOSINOPHILS 02/21/2019 0.2  0.0 - 0.4 K/UL Final    ABS.  BASOPHILS 02/21/2019 0.1  0.0 - 0.1 K/UL Final    DF 02/21/2019 AUTOMATED    Final    LIPID PROFILE 02/21/2019        Final    Cholesterol, total 02/21/2019 222* <200 MG/DL Final    Triglyceride 02/21/2019 73  <150 MG/DL Final    HDL Cholesterol 02/21/2019 97* 40 - 60 MG/DL Final    LDL, calculated 02/21/2019 110.4* 0 - 100 MG/DL Final    VLDL, calculated 02/21/2019 14.6  MG/DL Final    CHOL/HDL Ratio 02/21/2019 2.3  0 - 5.0   Final    Sodium 02/21/2019 141  136 - 145 mmol/L Final    Potassium 02/21/2019 4.0  3.5 - 5.5 mmol/L Final    Chloride 02/21/2019 103  100 - 108 mmol/L Final    CO2 02/21/2019 32  21 - 32 mmol/L Final    Anion gap 02/21/2019 6  3.0 - 18 mmol/L Final    Glucose 02/21/2019 77  74 - 99 mg/dL Final    BUN 02/21/2019 9  7.0 - 18 MG/DL Final    Creatinine 02/21/2019 0.79  0.6 - 1.3 MG/DL Final    BUN/Creatinine ratio 02/21/2019 11* 12 - 20   Final    GFR est AA 02/21/2019 >60  >60 ml/min/1.73m2 Final    GFR est non-AA 02/21/2019 >60  >60 ml/min/1.73m2 Final    Calcium 02/21/2019 10.4* 8.5 - 10.1 MG/DL Final    Bilirubin, total 02/21/2019 0.4  0.2 - 1.0 MG/DL Final    ALT (SGPT) 02/21/2019 45  13 - 56 U/L Final    AST (SGOT) 02/21/2019 38* 15 - 37 U/L Final    Alk. phosphatase 02/21/2019 78  45 - 117 U/L Final    Protein, total 02/21/2019 7.2  6.4 - 8.2 g/dL Final    Albumin 02/21/2019 4.3  3.4 - 5.0 g/dL Final    Globulin 02/21/2019 2.9  2.0 - 4.0 g/dL Final    A-G Ratio 02/21/2019 1.5  0.8 - 1.7   Final    Vitamin D 25-Hydroxy 02/21/2019 29.5* 30 - 100 ng/mL Final     Calculated 10 year ASCVD risk score: 14.1%    Health Maintenance:  Screening:    Mammogram: negative (2/2018) [facility]   [place] smear: [place] [facility] [place] [facility]   Colorectal: colonoscopy (7/2016) tubular adenoma. Dr. Raeann Jordan. Due 2021. Now followed by Dr. Josh Gtz. Depression: on Zoloft   DM (HbA1c/FPG): FPG 77 (2/2019)   Hepatitis C: negative (1/2/2018) Dr. Winter Causey. Falls: none   DEXA: osteopenia (last 2/2019). Performed by Dr. Winter Causey.    Glaucoma: regular eye exams with Dr. Eliza Iqbal (last 2/2018 )   Smoking: none   Vitamin D: 29.5 (2/2019)    Medicare Wellness: 5/2/2018    Impression:  Patient Active Problem List   Diagnosis Code    Intervertebral disc protrusion [id number]    Displacement of cervical intervertebral disc without myelopathy M50.20    Cervical spondylosis without myelopathy M47.812    Degeneration of cervical intervertebral disc M50.30    Brachial neuritis or radiculitis M54.12    Radiculopathy, cervical M54.12    Skin cancer C44.90    Rheumatoid arthritis (HCC) M06.9    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 (HCC) I70.1    LBBB (left bundle branch block) I44.7    Hypertension I10    Hyperparathyroidism ([address] E21.3    Hiatal hernia K44.9    GERD (gastroesophageal reflux disease) K21.9    Fibromuscular dysplasia (HCC) I77.3    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    Irritable bowel syndrome with both constipation and diarrhea K58.2    Bilateral carotid bruits R09.89    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    Hyperlipidemia E78.5    Osteopenia of multiple sites M85.89    Vitamin D deficiency E55.9    Carotid stenosis, bilateral I65.23       Plan:  1. Hypertension. Blood pressure well controlled on current regimen of metoprolol 50 mg bid, which also helps with control of palpitations. Recent increase in palpitations resolved, and pateint declined further evaluation by cardiology. Renal function normal with creatinine 0.79/ eGFR >60. Will continue to follow. 2. Fibromuscular dysplasia. Previous angioplasty of renal arteries. Known moderate stenosis of bilateral carotid arteries, although last carotid duplex in 5/2014. Noted prominent carotid bruits and repeat carotid duplex obtained in 8/2018 showing moderate (50-69%) stenosis of the right ICA and mild (<50%) of the left ICA. Review of report also showed mild atherosclerosis at that time. Previously followed by Dr. Tamara Og of [facility], but given stability over many years, released from his care with plan to pursue further evaluation only if becomes symptomatic. Will continue to monitor. 3. Hyperlipidemia.  Calculated 10 year ASCVD risk is 14.1 %, which places her in one of the four statin benefit groups as per new AHA/ACC guidelines (primary prevention: 10 year ASCVD risk >7.5%). In addition, mild atherosclerosis in the setting of fibromuscular dysplasia described on prior carotid duplex. Also at increased due to chronic inflammatory state related to rheumatoid arthritis. Given these factors would consider treatment with statin at this time. Reviewed prior lipid panels in Care Everywhere, and last one found in 9/2012 with total chol 183/ TG 59/HDL 72/ LDL 99. Patient wished to attempt lifestyle changes prior to considering statin therapy, and she eliminated saturated fats in the form of butter and red meat from her diet. Repeat lipid panel today significantly improved with LDL 89/ HDL 77 at last visit. Slightly increased LDL today at 110 but HDL also elevated at 97. She states that she had been increasing her intake of carbohydrates due to worsening GERD symptoms, and she believed that this was contributing to worsening lipid levels. Discussed that will recheck lipid panel at next visit in 3 months. Given multiple risk factors and known atherosclerosis, would favor initiating rosuvastatin if no further improvements made. Goal LDL <70. Would want to confirm no longer Vitamin D deficient prior to initiating to avoid potentiating muscular symptoms with use. Will readdress at next visit. edson  4. Rheumatoid arthritis. On [place] methotrexate and prednisone with fairly stable symptoms. Considering another biologic but hesitant given Bowen's disease. Being followed closely by Dr. Winter Causey. 5. Cervical degenerative disease. Experiencing chronic neck pain, treated previously with gabapentin. Some improvement with exercising as well, although continuing to experience pain. Attempted acupuncture therapy by Dr. Ulises Draper, but after 3 months, no significant benefit seen. Previously followed by Dr. Dahlia Corea, but referred to Dr. Gemma Mclaughlin by Dr. Winter Causey. Has not yet scheduled evaluation. 6. Osteopenia.  Last bone density scan 2/2019 . Using femoral neck T-scores, calculated FRAX score estimates her 10 year risk of a major osteoporetic fracture at 13 % and hip fracture at 3.1 %, which are an indication for biphosphonate treatment with hip fracture risk >3%. Being performed and followed by Dr. Iram Malave. Continue calcium and Vitamin D. Encouraged exercise, particularly weight bearing activities. 7. Hyperparathyroidism. Mild increase in PTH, mildly elevated calcium, normal Vitamin D level,and 24 hour urine calcium only 105 (FeCa 1%) is consistent with a diagnosis of familial hypocalciuric hypercalcemia. Being followed by Dr. Mary Daley and told at most recent visit that would not need to follow-up for 2 years given benign condition. Advised to drink plenty of fluids. 8. GERD. Finding Protonix more helpful than omeprazole. Symptoms of diarrhea alternating with constipation chronically thought to be secondary to irritable bowel syndrome. On probiotic. Reportedly changed diet and increased fiber with some improvement. 9. Mild elevation of AST. AST at 38 with upper limits of normal 37. Will repeat next visit. 10. Bowen's disease. Being followed closely by Dr. Jenise Francois for multiple squamous cell carcinomas in situ. 11. Health maintenance. Already received influenza vaccine at Dr. Gato Young office. Completed pneumococcal series. Will discuss Tdap at next visit. Shingrix deferred given concerns for triggering arthritis flare. Mammogram and well women exams performed at [facility]. Continue regular eye exams with Dr. Eusebio De Dios. Will request report. Vitamin D level remains mildly low and patient reports only taking 400 U tablet. Instructed to increase to 2000 U daily for now and will recheck next visit. Medicare wellness visit up to date. Patient understands recommendations and agrees with plan. Follow-up in 3 months.

## 2019-04-17 RX ORDER — SERTRALINE HYDROCHLORIDE 25 MG/1
TABLET, FILM COATED ORAL
Qty: 90 TAB | Refills: 2 | Status: SHIPPED | OUTPATIENT
Start: 2019-04-17 | End: 2019-10-30

## 2019-05-29 ENCOUNTER — TELEPHONE (OUTPATIENT)
Dept: INTERNAL MEDICINE CLINIC | Age: 74
End: 2019-05-29

## 2019-05-29 DIAGNOSIS — I10 ESSENTIAL HYPERTENSION: Primary | ICD-10-CM

## 2019-05-29 NOTE — TELEPHONE ENCOUNTER
Patient is coming in tomorrow to have labs drawn. She also see's Dr. Kay Chávez who wants labs done as well. Patient is asking if you can add those labs to our orders so she can get them done here at the same time.     Albumin Serum  Creatinin Serum  CBC w/ diff  AST  ALT

## 2019-05-30 ENCOUNTER — APPOINTMENT (OUTPATIENT)
Dept: INTERNAL MEDICINE CLINIC | Age: 74
End: 2019-05-30

## 2019-05-30 ENCOUNTER — HOSPITAL ENCOUNTER (OUTPATIENT)
Dept: LAB | Age: 74
Discharge: HOME | End: 2019-05-30
Payer: MEDICARE

## 2019-05-30 DIAGNOSIS — M47.812 CERVICAL SPONDYLOSIS WITHOUT MYELOPATHY: ICD-10-CM

## 2019-05-30 DIAGNOSIS — E78.5 HYPERLIPIDEMIA, UNSPECIFIED HYPERLIPIDEMIA TYPE: ICD-10-CM

## 2019-05-30 DIAGNOSIS — M85.89 OSTEOPENIA OF MULTIPLE SITES: ICD-10-CM

## 2019-05-30 DIAGNOSIS — I65.23 CAROTID STENOSIS, BILATERAL: ICD-10-CM

## 2019-05-30 DIAGNOSIS — M50.30 DEGENERATION OF CERVICAL INTERVERTEBRAL DISC: ICD-10-CM

## 2019-05-30 DIAGNOSIS — E83.52 FHH (FAMILIAL HYPOCALCIURIC HYPERCALCEMIA): ICD-10-CM

## 2019-05-30 DIAGNOSIS — I77.3 FIBROMUSCULAR DYSPLASIA (HCC): ICD-10-CM

## 2019-05-30 DIAGNOSIS — D04.9 BOWEN'S DISEASE: ICD-10-CM

## 2019-05-30 DIAGNOSIS — E21.3 HYPERPARATHYROIDISM (HCC): ICD-10-CM

## 2019-05-30 DIAGNOSIS — I10 ESSENTIAL HYPERTENSION: ICD-10-CM

## 2019-05-30 DIAGNOSIS — E55.9 VITAMIN D DEFICIENCY: ICD-10-CM

## 2019-05-30 DIAGNOSIS — I70.1 RENAL ARTERY STENOSIS DUE TO FIBROMUSCULAR DYSPLASIA (HCC): ICD-10-CM

## 2019-05-30 DIAGNOSIS — M54.12 RADICULOPATHY, CERVICAL: ICD-10-CM

## 2019-05-30 DIAGNOSIS — I77.3 RENAL ARTERY STENOSIS DUE TO FIBROMUSCULAR DYSPLASIA (HCC): ICD-10-CM

## 2019-05-30 DIAGNOSIS — M05.79 RHEUMATOID ARTHRITIS INVOLVING MULTIPLE SITES WITH POSITIVE RHEUMATOID FACTOR (HCC): ICD-10-CM

## 2019-05-30 LAB
ALBUMIN SERPL-MCNC: 4 G/DL (ref 3.4–5)
ALBUMIN/GLOB SERPL: 1.4 {RATIO} (ref 0.8–1.7)
ALP SERPL-CCNC: 69 U/L (ref 45–117)
ALT SERPL-CCNC: 31 U/L (ref 13–56)
ANION GAP SERPL CALC-SCNC: 7 MMOL/L (ref 3–18)
AST SERPL-CCNC: 25 U/L (ref 15–37)
BASOPHILS # BLD: 0 K/UL (ref 0–0.1)
BASOPHILS NFR BLD: 1 % (ref 0–2)
BILIRUB SERPL-MCNC: 0.4 MG/DL (ref 0.2–1)
BUN SERPL-MCNC: 12 MG/DL (ref 7–18)
BUN/CREAT SERPL: 15 (ref 12–20)
CALCIUM SERPL-MCNC: 10.1 MG/DL (ref 8.5–10.1)
CHLORIDE SERPL-SCNC: 105 MMOL/L (ref 100–108)
CHOLEST SERPL-MCNC: 190 MG/DL
CO2 SERPL-SCNC: 30 MMOL/L (ref 21–32)
CREAT SERPL-MCNC: 0.78 MG/DL (ref 0.6–1.3)
DIFFERENTIAL METHOD BLD: ABNORMAL
EOSINOPHIL # BLD: 0.1 K/UL (ref 0–0.4)
EOSINOPHIL NFR BLD: 2 % (ref 0–5)
ERYTHROCYTE [DISTWIDTH] IN BLOOD BY AUTOMATED COUNT: 14.5 % (ref 11.6–14.5)
GLOBULIN SER CALC-MCNC: 2.9 G/DL (ref 2–4)
GLUCOSE SERPL-MCNC: 72 MG/DL (ref 74–99)
HCT VFR BLD AUTO: 39.7 % (ref 35–45)
HDLC SERPL-MCNC: 83 MG/DL (ref 40–60)
HDLC SERPL: 2.3 {RATIO} (ref 0–5)
HGB BLD-MCNC: 11.9 G/DL (ref 12–16)
LDLC SERPL CALC-MCNC: 93.6 MG/DL (ref 0–100)
LIPID PROFILE,FLP: ABNORMAL
LYMPHOCYTES # BLD: 1.1 K/UL (ref 0.9–3.6)
LYMPHOCYTES NFR BLD: 21 % (ref 21–52)
MCH RBC QN AUTO: 29.6 PG (ref 24–34)
MCHC RBC AUTO-ENTMCNC: 30 G/DL (ref 31–37)
MCV RBC AUTO: 98.8 FL (ref 74–97)
MONOCYTES # BLD: 0.6 K/UL (ref 0.05–1.2)
MONOCYTES NFR BLD: 12 % (ref 3–10)
NEUTS SEG # BLD: 3.2 K/UL (ref 1.8–8)
NEUTS SEG NFR BLD: 64 % (ref 40–73)
PLATELET # BLD AUTO: 279 K/UL (ref 135–420)
PMV BLD AUTO: 10.8 FL (ref 9.2–11.8)
POTASSIUM SERPL-SCNC: 4.1 MMOL/L (ref 3.5–5.5)
PROT SERPL-MCNC: 6.9 G/DL (ref 6.4–8.2)
RBC # BLD AUTO: 4.02 M/UL (ref 4.2–5.3)
SODIUM SERPL-SCNC: 142 MMOL/L (ref 136–145)
TRIGL SERPL-MCNC: 67 MG/DL (ref ?–150)
VLDLC SERPL CALC-MCNC: 13.4 MG/DL
WBC # BLD AUTO: 5 K/UL (ref 4.6–13.2)

## 2019-05-30 PROCEDURE — 80061 LIPID PANEL: CPT

## 2019-05-30 PROCEDURE — 85025 COMPLETE CBC W/AUTO DIFF WBC: CPT

## 2019-05-30 PROCEDURE — 80053 COMPREHEN METABOLIC PANEL: CPT

## 2019-05-30 PROCEDURE — 36415 COLL VENOUS BLD VENIPUNCTURE: CPT

## 2019-05-30 PROCEDURE — 82306 VITAMIN D 25 HYDROXY: CPT

## 2019-05-31 LAB — 25(OH)D3 SERPL-MCNC: 45.6 NG/ML (ref 30–100)

## 2019-06-06 ENCOUNTER — OFFICE VISIT (OUTPATIENT)
Dept: INTERNAL MEDICINE CLINIC | Age: 74
End: 2019-06-06

## 2019-06-06 ENCOUNTER — TELEPHONE (OUTPATIENT)
Dept: INTERNAL MEDICINE CLINIC | Age: 74
End: 2019-06-06

## 2019-06-06 VITALS
HEART RATE: 72 BPM | BODY MASS INDEX: 17.45 KG/M2 | DIASTOLIC BLOOD PRESSURE: 76 MMHG | HEIGHT: 67 IN | RESPIRATION RATE: 14 BRPM | OXYGEN SATURATION: 98 % | TEMPERATURE: 98.6 F | WEIGHT: 111.2 LBS | SYSTOLIC BLOOD PRESSURE: 126 MMHG

## 2019-06-06 DIAGNOSIS — K58.2 IRRITABLE BOWEL SYNDROME WITH BOTH CONSTIPATION AND DIARRHEA: ICD-10-CM

## 2019-06-06 DIAGNOSIS — I77.3 FIBROMUSCULAR DYSPLASIA (HCC): ICD-10-CM

## 2019-06-06 DIAGNOSIS — I70.1 RENAL ARTERY STENOSIS DUE TO FIBROMUSCULAR DYSPLASIA (HCC): ICD-10-CM

## 2019-06-06 DIAGNOSIS — E55.9 VITAMIN D DEFICIENCY: ICD-10-CM

## 2019-06-06 DIAGNOSIS — M05.79 RHEUMATOID ARTHRITIS INVOLVING MULTIPLE SITES WITH POSITIVE RHEUMATOID FACTOR (HCC): ICD-10-CM

## 2019-06-06 DIAGNOSIS — K21.9 GASTROESOPHAGEAL REFLUX DISEASE, ESOPHAGITIS PRESENCE NOT SPECIFIED: ICD-10-CM

## 2019-06-06 DIAGNOSIS — D04.9 BOWEN'S DISEASE: ICD-10-CM

## 2019-06-06 DIAGNOSIS — Z71.89 ACP (ADVANCE CARE PLANNING): ICD-10-CM

## 2019-06-06 DIAGNOSIS — M85.89 OSTEOPENIA OF MULTIPLE SITES: ICD-10-CM

## 2019-06-06 DIAGNOSIS — I65.23 CAROTID STENOSIS, BILATERAL: ICD-10-CM

## 2019-06-06 DIAGNOSIS — K13.79 MOUTH PAIN: ICD-10-CM

## 2019-06-06 DIAGNOSIS — E21.3 HYPERPARATHYROIDISM (HCC): ICD-10-CM

## 2019-06-06 DIAGNOSIS — Z00.00 MEDICARE ANNUAL WELLNESS VISIT, SUBSEQUENT: ICD-10-CM

## 2019-06-06 DIAGNOSIS — I10 ESSENTIAL HYPERTENSION: Primary | ICD-10-CM

## 2019-06-06 DIAGNOSIS — M54.12 RADICULOPATHY, CERVICAL: ICD-10-CM

## 2019-06-06 DIAGNOSIS — E78.5 HYPERLIPIDEMIA, UNSPECIFIED HYPERLIPIDEMIA TYPE: ICD-10-CM

## 2019-06-06 DIAGNOSIS — M47.812 CERVICAL SPONDYLOSIS WITHOUT MYELOPATHY: ICD-10-CM

## 2019-06-06 DIAGNOSIS — E83.52 FHH (FAMILIAL HYPOCALCIURIC HYPERCALCEMIA): ICD-10-CM

## 2019-06-06 DIAGNOSIS — I77.3 RENAL ARTERY STENOSIS DUE TO FIBROMUSCULAR DYSPLASIA (HCC): ICD-10-CM

## 2019-06-06 RX ORDER — CLOTRIMAZOLE 10 MG/1
10 LOZENGE ORAL; TOPICAL
Qty: 50 TAB | Refills: 0 | Status: SHIPPED | OUTPATIENT
Start: 2019-06-06 | End: 2019-10-21

## 2019-06-06 NOTE — PATIENT INSTRUCTIONS
Discontinue aspirin. Watch weight carefully. Medicare Wellness Visit, Female The best way to improve and maintain good health is to have a healthy lifestyle by eating a well-balanced diet, exercising regularly, limiting alcohol and stopping smoking. Regular visits with your physician or non-physician health care provider also support your good health. Preventive screening tests can find health problems before they become diseases or illnesses. Preventive services such as immunizations prevent serious infections. All people over age 72 should have a Pneumovax and a Prevnar-13 shot to prevent potentially life threatening infections with the pneumococcus bacteria, a common cause of pneumonia. These are once in a lifetime unless you and your provider decide differently. All people over 65 should have a yearly influenza vaccine or \"flu\" shot. This does not prevent infection with cold viruses but has been proven to prevent hospitalization and death from influenza. Although Medicare part B \"regular Medicare\" currently only covers tetanus vaccination in the context of an injury, a tetanus vaccine (Tdap or Td) is recommended every 10 years. A shingles vaccine is recommended once in a lifetime after age 61. The Shingles vaccine is also not covered by Medicare part B. Note, however, that both the Shingles vaccine and Tdap/Td are generally covered by secondary carriers. Please check your coverage and out of pocket expenses. Consider contacting your local health department because it may stock these vaccines for a reasonable charge. We currently have documentation of the following immunization history for you: 
Immunization History Administered Date(s) Administered  Influenza High Dose Vaccine PF 10/29/2018  Influenza Vaccine 10/29/2013  Pneumococcal Conjugate (PCV-13) 12/14/2015  Pneumococcal Polysaccharide (PPSV-23) 11/15/2018 Screening for infection with Hepatitis C is recommended for anyone born between 80 through [address]. The table at the bottom of this document indicates the status of this and other preventive services. A bone mass density test (DEXA) to screen for osteoporosis or thinning of the bones should be done at least once after age 72 and may be done up to every 2 years as determined by you and your health care provider. The most recent DEXA we have on file for you is: DEXA Results (most recent): No results found for this or any previous visit. Screening for diabetes mellitus with a blood sugar test (glucose) should be done at least every 3 years until age 79. You and your health care provider may decide whether to continue screening after age 79. The most recent blood glucose we have on file for you is:  
Lab Results Component Value Date/Time Glucose 72 (L) 05/30/2019 09:34 AM  
 
 
 
Glaucoma is a disease of the eye due to increased ocular pressure that can lead to blindness. People with risk factors for glaucoma ( race, diabetes, family history) should be screened at least every 2 years by an eye professional.  
 
Cardiovascular screening tests that check for elevated lipids or cholesterol (fatty part of blood) which can lead to heart disease and strokes should be done every 4-6 years through age 79. You and your health care provider may decide whether to continue screening after age 79. The most recent lipid panel we have on file for you is:  
Lab Results Component Value Date/Time  Cholesterol, total 190 05/30/2019 09:34 AM  
 HDL Cholesterol 83 (H) 05/30/2019 09:34 AM  
 LDL, calculated 93.6 05/30/2019 09:34 AM  
 VLDL, calculated 13.4 05/30/2019 09:34 AM  
 Triglyceride 67 05/30/2019 09:34 AM  
 CHOL/HDL Ratio 2.3 05/30/2019 09:34 AM  
 
 
Colorectal cancer screening that evaluates for blood or polyps in your colon for people with average risk should be done yearly as a stool test, every five years as a flexible sigmoidoscope or every 10 years as a colonoscopy up to age 76. You and your health care provider may decide whether to continue screening after age 76. Breast cancer screening with a mammogram is recommended at least once every 2 years  for women age 54-69. You and your health care provider may decide whether to continue screening after age 76. The most recent mammogram we have on file for you is: EFRA[facility]N Results (most recent): No results found for this or any previous visit. Screening for cervical cancer with a pap smear is recommended for all women with a cervix until age 72. The frequency of this test is based on the details of her prior pap smear testing. You and your health care provider may decide whether to continue screening after age 72. People who have smoked the equivalent of 1 pack per day for 30 years or more may benefit from screening for lung cancer with a yearly low dose CT scan until they have been non smokers for 15 years or competing health conditions render this unlikely to be beneficial. Our records show: n/a Your Medicare Wellness Exam is recommended annually. Here is a list of your current Health Maintenance items with a due date: 
Health Maintenance Topic Date Due  
 DTaP/Tdap/Td series (1 - Tdap) 10/25/1967  GLAUCOMA SCREENING Q2Y  07/10/2019  BREAST CANCER SCRN MAMMOGRAM  09/06/2019  Shingrix Vaccine Age 50> (1 of 2) 11/02/2020 (Originally 10/25/1996)  Influenza Age 5 to Adult  08/01/2019  MEDICARE YEARLY EXAM  06/06/2020  COLONOSCOPY  07/14/2021  Hepatitis C Screening  Completed  Bone Densitometry (Dexa) Screening  Completed  Pneumococcal 65+ years  Completed Learning About Getting More Protein How does your body use protein? Protein is an essential part of our diets. It is made up of chemicals called amino acids.  Your body needs protein to help build and repair muscle, skin, and other body tissues. Protein also helps fight infection, balance body fluids, and carry oxygen through the body. How much protein do you need? How much protein you need each day depends on your age, sex, and how active you are. It's recommended that most adults eat 5 to 7 ounces of protein foods a day. Sometimes you may need to eat more protein. Your doctor may advise you on the right amount of protein you need. What foods contain protein? Protein is found in a variety of foods. High-protein foods include lean meat, poultry, and fish. A serving of these foods is 2 to 3 ounces. (3 ounces is about the size and thickness of a deck of cards.) Protein isn't just found in meat. If you are looking for other types of protein, the following foods are equal to about 1 ounce of meat: · ¼ cup of cooked beans, peas, or lentils · ¼ cup of tofu (about 2 ounces) · 2 Tbsp of hummus · ½ ounce of nuts or seeds (for example, 12 almonds or 7 walnut halves) · 1 egg · 1 Tbsp of peanut butter or other nut or seed butter Other sources of protein include cheese, milk, and other milk products. You can also buy protein bars, drinks, and powders. Check the nutrition label for the amount of protein in each serving. What are some tips for getting more protein? You can get more protein in your food by adding high-protein ingredients. For example, you can: · Add powdered milk to other foods, such as pudding or soups. · Add powdered protein to fruit smoothies and cooked cereal. 
· Add beans to soup and chili. · Add nuts, seeds, or wheat germ to yogurt. You can also: 
· Spread peanut butter onto a banana. · Mix cottage cheese into noodle dishes or casseroles. · Sprinkle hard-boiled eggs on a salad. · Grate cheese over vegetables and soups. Where can you learn more? Go to http://kylee-geno.info/. Leonel Snyder in the search box to learn more about \"Learning About Getting More Protein. \" 
 Current as of: March 28, 2018 Content Version: 11.9 © 8027-2408 [employer]. Care instructions adapted under license by [employer] (which disclaims liability or warranty for this information). If you have questions about a medical condition or this instruction, always ask your healthcare professional. [address] any warranty or liability for your use of this information.

## 2019-06-06 NOTE — ACP (ADVANCE CARE PLANNING)
Advance Care Planning (ACP) Provider Note - Comprehensive     Date of ACP Conversation: 06/06/19  Persons included in Conversation:  patient  Length of ACP Conversation in minutes:  16 minutes    Authorized Decision Maker (if patient is incapable of making informed decisions):  and daughter  This person is:  Healthcare Agent/Medical Power of  under Advance Directive          General ACP for ALL Patients with Decision Making Capacity:   Importance of advance care planning, including choosing a healthcare agent to communicate patient's healthcare decisions if patient lost the ability to make decisions, such as after a sudden illness or accident  Understanding of the healthcare agent role was assessed and information provided  Exploration of values, goals, and preferences if recovery is not expected, even with continued medical treatment in the event of: Imminent death  Severe, permanent brain injury  \"In these circumstances, what matters most to you? \"  Care focused more on comfort or quality of life. Review of Existing Advance Directive:  Patient has not completed an advance directive previously. She states that she would designate her  and daughter as her healthcare agents. She expresses that she does not wish life prolonging procedures for end of life care. For Serious or Chronic Illness:  Understanding of medical condition      Interventions Provided:  Recommended completion of Advance Directive form after review of ACP materials and conversation with prospective healthcare agent   Recommended communicating the plan and making copies for the healthcare agent, personal physician, and others as appropriate (e.g., health system)  Recommended review of completed ACP document annually or upon change in health status   Recommended to complete advance directive and return completed form to office to be copied and scanned into chart. Paperwork provided and reviewed.

## 2019-06-06 NOTE — PROGRESS NOTES
Chief Complaint   Patient presents with    Hypertension     3 month follow up with lab results. [facility] Annual Wellness Visit     Yearly Medicare wellness exam.     Health Maintenance Due   Topic Date Due    DTaP/Tdap/Td series (1 - Tdap) 10/25/1967    MEDICARE YEARLY EXAM  05/03/2019    GLAUCOMA SCREENING Q2Y  07/10/2019    BREAST CANCER SCRN MAMMOGRAM  09/06/2019     1. Have you been to the ER, urgent care clinic or hospitalized since your last visit? NO.     2. Have you seen or consulted any other health care providers outside of the [address] [place] [address] since your last visit (Include any pap smears or colon screening)? YES, Patient states she went to the dermatologist 1 months ago. Plastic surgeon last seen 3 weeks ago. rheumatologist 1 month ago. Do you have an Advanced Directive? NO    Would you like information on Advanced Directives?  NO    Learning Assessment 6/6/2019   PRIMARY LEARNER Patient   HIGHEST LEVEL OF EDUCATION - PRIMARY LEARNER  SOME COLLEGE   BARRIERS PRIMARY LEARNER NONE   CO-LEARNER CAREGIVER No   PRIMARY LANGUAGE ENGLISH    NEED No   LEARNER PREFERENCE PRIMARY DEMONSTRATION     LISTENING     READING   ANSWERED BY patientr   RELATIONSHIP SELF

## 2019-06-06 NOTE — PROGRESS NOTES
This is the Subsequent Medicare Annual Wellness Exam, performed 12 months or more after the Initial AWV or the last Subsequent AWV    I have reviewed the patient's medical history in detail and updated the computerized patient record. History     Past Medical History:   Diagnosis Date    Bowen's disease     Fibromuscular dysplasia (HCC)     GERD (gastroesophageal reflux disease)     Hiatal hernia     [facility]par[facility] ([facility] [address]     Hypertension     LBBB (left bundle branch block)     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 (HCC)     s/p balloon angioplasty 5/2009 [facility]    Rheumatoid arthritis ([facility] [address]     Skin cancer       Past Surgical History:   Procedure Laterality Date    HX ANGIOPLASTY      HX KNEE REPLACEMENT Bilateral 2009    partial     HX OTHER SURGICAL      removal of fibroid benign tumors from breast     HX PARTIAL HYSTERECTOMY      HX TONSILLECTOMY  1964     Current Outpatient Medications   Medication Sig Dispense Refill    clotrimazole (MYCELEX) 10 mg bradly Take 1 Tab by mouth five (5) times daily. 50 Tab 0    sertraline (ZOLOFT) 25 mg tablet take 1 tablet by mouth once daily 90 Tab 2    metoprolol tartrate (LOPRESSOR) 50 mg tablet Take 1 Tab by mouth two (2) times a day. 180 Tab 2    omeprazole (PRILOSEC) 40 mg capsule Take 40 mg by mouth daily.  predniSONE (DELTASONE) 5 mg tablet Take  by mouth daily. 2.5 mg every other day      multivitamin (ONE A DAY) tablet Take 1 Tab by Mouth Once a Day.  acetaminophen (TYLENOL) 500 mg tablet Take 500 mg by mouth every six (6) hours as needed.  cholecalciferol (VITAMIN D3) 400 unit tab tablet Take 2,000 Units by mouth.  aspirin 81 mg chewable tablet Take 81 mg by mouth daily.  folic acid (FOLVITE) 1 mg tablet Take 1 mg by mouth daily. 0    lidocaine (LIDODERM) 5 % Apply patch to the affected area for 12 hours a day and remove for 12 hours a day.  30 Each 11    METHOTREXATE SODIUM INJECTION 15 mg every seven (7) days. Allergies   Allergen Reactions    Hydrocodone Anaphylaxis    Nsaids (Non-Steroidal Anti-Inflammatory Drug) Other (comments)     None due to kidneys    Azithromycin Itching    Other Medication Other (comments)     GI intolerance to nonsteroidal antiinflammatory medications     Vicodin [Hydrocodone-Acetaminophen] Other (comments)     Family History   Problem Relation Age of Onset    Arthritis-osteo Mother     Elevated Lipids Mother     Heart Disease Mother         CHF    Lung Disease Mother     Hypertension Sister     Lung Disease Sister     Hypertension Brother     MS Brother      Social History     Tobacco Use    Smoking status: Never Smoker    Smokeless tobacco: Never Used   Substance Use Topics    Alcohol use:  Yes     Alcohol/week: 0.6 - 1.2 oz     Types: 1 - 2 Glasses of wine per week     Patient Active Problem List   Diagnosis Code    Intervertebral disc protrusion [id number]    Displacement of cervical intervertebral disc without myelopathy M50.20    Cervical spondylosis without myelopathy M47.812    Degeneration of cervical intervertebral disc M50.30    Brachial neuritis or radiculitis M54.12    Radiculopathy, cervical M54.12    Skin cancer C44.90    Rheumatoid arthritis ([facility] [address] M06.9    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 (HCC) I70.1    LBBB (left bundle branch block) I44.7    Hypertension I10    Hyperparathyroidism ([address] E21.3    Hiatal hernia K44.9    GERD (gastroesophageal reflux disease) K21.9    Fibromuscular dysplasia (HCC) I77.3    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    Irritable bowel syndrome with both constipation and diarrhea K58.2    Bilateral carotid bruits R09.89    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 Functional Ability and Level of Safety:   Hearing Loss  Hearing is good. Activities of Daily Living  The home contains: no safety equipment. Patient does total self care    Fall Risk  Fall Risk Assessment, last 12 mths 6/6/2019   Able to walk? Yes   Fall in past 12 months? No   Fall with injury? -   Number of falls in past 12 months -   Fall Risk Score -       Abuse Screen  Patient is not abused    Cognitive Screening   Evaluation of Cognitive Function:  Has your family/caregiver stated any concerns about your memory: no  Normal    Patient Care Team   Patient Care Team:  Janette Sim MD as PCP - General (Internal Medicine)  Josh Lugo MD as Physician (Physical Medicine and Rehab)  Javier Luciano DO (Rheumatology)  Yunier Suresh MD (Gastroenterology)  Levi Reyes MD (Ophthalmology)  Quan Chan MD (Cardiology)  Sushila Corrales MD (Endocrinology)  Josse Gandhi (Orthopedic Surgery)  Luana Meza NP (Nurse Practitioner)    Assessment/Plan   Education and counseling provided:  Are appropriate based on today's review and evaluation  End-of-Life planning (with patient's consent)  Influenza Vaccine  Screening Mammography  Colorectal cancer screening tests  Cardiovascular screening blood test  Bone mass measurement (DEXA)  Screening for glaucoma  Diabetes screening test  Shingrix vaccine and Tdap    Diagnoses and all orders for this visit:    1. Essential hypertension  -     CBC WITH AUTOMATED DIFF; Future  -     LIPID PANEL; Future  -     MAGNESIUM; Future  -     METABOLIC PANEL, COMPREHENSIVE; Future  -     TSH 3RD GENERATION; Future  -     URINALYSIS W/MICROSCOPIC;  Future  -     VITAMIN D, 25 HYDROXY; Future  - VITAMIN B12 & FOLATE; Future    2. Fibromuscular dysplasia (HCC)  -     CBC WITH AUTOMATED DIFF; Future  -     LIPID PANEL; Future  -     MAGNESIUM; Future  -     METABOLIC PANEL, COMPREHENSIVE; Future  -     TSH 3RD GENERATION; Future  -     URINALYSIS W/MICROSCOPIC; Future  -     VITAMIN D, 25 HYDROXY; Future  -     VITAMIN B12 & FOLATE; Future    3. Carotid stenosis, bilateral  -     CBC WITH AUTOMATED DIFF; Future  -     LIPID PANEL; Future  -     MAGNESIUM; Future  -     METABOLIC PANEL, COMPREHENSIVE; Future  -     TSH 3RD GENERATION; Future  -     URINALYSIS W/MICROSCOPIC; Future  -     VITAMIN D, 25 HYDROXY; Future  -     VITAMIN B12 & FOLATE; Future    4. 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 (HCC)  -     CBC WITH AUTOMATED DIFF; Future  -     LIPID PANEL; Future  -     MAGNESIUM; Future  -     METABOLIC PANEL, COMPREHENSIVE; Future  -     TSH 3RD GENERATION; Future  -     URINALYSIS W/MICROSCOPIC; Future  -     VITAMIN D, 25 HYDROXY; Future  -     VITAMIN B12 & FOLATE; Future    5. Mouth pain  -     CBC WITH AUTOMATED DIFF; Future  -     LIPID PANEL; Future  -     MAGNESIUM; Future  -     METABOLIC PANEL, COMPREHENSIVE; Future  -     TSH 3RD GENERATION; Future  -     URINALYSIS W/MICROSCOPIC; Future  -     VITAMIN D, 25 HYDROXY; Future  -     VITAMIN B12 & FOLATE; Future    6. Hyperlipidemia, unspecified hyperlipidemia type  -     CBC WITH AUTOMATED DIFF; Future  -     LIPID PANEL; Future  -     MAGNESIUM; Future  -     METABOLIC PANEL, COMPREHENSIVE; Future  -     TSH 3RD GENERATION; Future  -     URINALYSIS W/MICROSCOPIC; Future  -     VITAMIN D, 25 HYDROXY; Future  -     VITAMIN B12 & FOLATE; Future    7. Rheumatoid arthritis involving multiple sites with positive rheumatoid factor (HCC)  -     CBC WITH AUTOMATED DIFF; Future  -     LIPID PANEL; Future  -     MAGNESIUM; Future  -     METABOLIC PANEL, COMPREHENSIVE; Future  -     TSH 3RD GENERATION;  Future  -     URINALYSIS W/MICROSCOPIC; Future  -     VITAMIN D, 25 HYDROXY; Future  -     VITAMIN B12 & FOLATE; Future    8. Osteopenia of multiple sites  -     CBC WITH AUTOMATED DIFF; Future  -     LIPID PANEL; Future  -     MAGNESIUM; Future  -     METABOLIC PANEL, COMPREHENSIVE; Future  -     TSH 3RD GENERATION; Future  -     URINALYSIS W/MICROSCOPIC; Future  -     VITAMIN D, 25 HYDROXY; Future  -     VITAMIN B12 & FOLATE; Future    9. Bowen's disease  -     CBC WITH AUTOMATED DIFF; Future  -     LIPID PANEL; Future  -     MAGNESIUM; Future  -     METABOLIC PANEL, COMPREHENSIVE; Future  -     TSH 3RD GENERATION; Future  -     URINALYSIS W/MICROSCOPIC; Future  -     VITAMIN D, 25 HYDROXY; Future  -     VITAMIN B12 & FOLATE; Future    10. Hyperparathyroidism ([address]  -     CBC WITH AUTOMATED DIFF; Future  -     LIPID PANEL; Future  -     MAGNESIUM; Future  -     METABOLIC PANEL, COMPREHENSIVE; Future  -     TSH 3RD GENERATION; Future  -     URINALYSIS W/MICROSCOPIC; Future  -     VITAMIN D, 25 HYDROXY; Future  -     VITAMIN B12 & FOLATE; Future    11. Gastroesophageal reflux disease, esophagitis presence not specified  -     CBC WITH AUTOMATED DIFF; Future  -     LIPID PANEL; Future  -     MAGNESIUM; Future  -     METABOLIC PANEL, COMPREHENSIVE; Future  -     TSH 3RD GENERATION; Future  -     URINALYSIS W/MICROSCOPIC; Future  -     VITAMIN D, 25 HYDROXY; Future  -     VITAMIN B12 & FOLATE; Future    12. Irritable bowel syndrome with both constipation and diarrhea  -     CBC WITH AUTOMATED DIFF; Future  -     LIPID PANEL; Future  -     MAGNESIUM; Future  -     METABOLIC PANEL, COMPREHENSIVE; Future  -     TSH 3RD GENERATION; Future  -     URINALYSIS W/MICROSCOPIC; Future  -     VITAMIN D, 25 HYDROXY; Future  -     VITAMIN B12 & FOLATE; Future    13. Cervical spondylosis without myelopathy  -     CBC WITH AUTOMATED DIFF; Future  -     LIPID PANEL; Future  -     MAGNESIUM; Future  -     METABOLIC PANEL, COMPREHENSIVE;  Future  -     TSH 3RD GENERATION; Future  -     URINALYSIS W/MICROSCOPIC; Future  -     VITAMIN D, 25 HYDROXY; Future  -     VITAMIN B12 & FOLATE; Future    14. Radiculopathy, cervical  -     CBC WITH AUTOMATED DIFF; Future  -     LIPID PANEL; Future  -     MAGNESIUM; Future  -     METABOLIC PANEL, COMPREHENSIVE; Future  -     TSH 3RD GENERATION; Future  -     URINALYSIS W/MICROSCOPIC; Future  -     VITAMIN D, 25 HYDROXY; Future  -     VITAMIN B12 & FOLATE; Future    15. 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)  -     CBC WITH AUTOMATED DIFF; Future  -     LIPID PANEL; Future  -     MAGNESIUM; Future  -     METABOLIC PANEL, COMPREHENSIVE; Future  -     TSH 3RD GENERATION; Future  -     URINALYSIS W/MICROSCOPIC; Future  -     VITAMIN D, 25 HYDROXY; Future  -     VITAMIN B12 & FOLATE; Future    16. Vitamin D deficiency  -     CBC WITH AUTOMATED DIFF; Future  -     LIPID PANEL; Future  -     MAGNESIUM; Future  -     METABOLIC PANEL, COMPREHENSIVE; Future  -     TSH 3RD GENERATION; Future  -     URINALYSIS W/MICROSCOPIC; Future  -     VITAMIN D, 25 HYDROXY; Future  -     VITAMIN B12 & FOLATE; Future    17. Medicare annual wellness visit, subsequent    18. ACP (advance care planning)    Other orders  -     diph,Pertuss,Acell,,Tet Vac-PF (ADACEL) 2 Lf-(2.5-5-3-5 mcg)-5Lf/0.5 mL susp; 0.5 mL by IntraMUSCular route once for 1 dose. -     clotrimazole (MYCELEX) 10 mg bradly; Take 1 Tab by mouth five (5) times daily.         Health Maintenance Due   Topic Date Due    BREAST CANCER SCRN MAMMOGRAM  09/06/2019

## 2019-06-06 NOTE — TELEPHONE ENCOUNTER
Please request recent eye exam from Dr. Sylvia Kline . Patient reports being seen in the last 6 months. Please request copy of mammogram from [facility] KIM Chatman. Thank you.

## 2019-06-11 PROBLEM — K13.79 MOUTH PAIN: Status: ACTIVE | Noted: 2019-06-11

## 2019-06-11 NOTE — PROGRESS NOTES
HPI:   [place] is a 68y.o. 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 She reports that she is doing relatively well. She reports that she has been having difficulty with pain involving her gums and mouth for the last 4 weeks. She states that she has had difficulty with this in the past intermittently, and underwent evaluation by Dr. Angel Alejo and was prescribed Mycelex bradly. She reports that her dose of methotrexate has been held for the last 3 weeks because of her discomfort. She acknowledges that her weight has decreased eight pounds since she is having some difficulty eating due to the discomfort. She is otherwise without specific complaints and feeling generally well. She has a history of hypertension and palpitations, treated currently with metoprolol. She has a history of dyslipidemia, not currently being treated with medication. She was evaluated by Dr. Shadi Britt in 2012 for palpitations. Event monitor was placed, and showed a known LBBB and PVC's, but reportedly no other arrhythmias (report unavailable). In 9/2012, she also underwent a pharmacologic nuclear stress test which was a normal low risk study with EF 55%. She also had an echocardiogram, which showed low normal LV function (EF 50%), mild grade 1 diastolic dysfunction, trace MR, TR, and AI. She also has a history of fibromuscular dysplasia, and in 5/2009, underwent aortogram and renal arteriogram with balloon angioplasty.  She has a known right carotid bruit, and her last carotid duplex (5/2014) showed mild atherosclerotic intraluminal plaquing with elevated velocities in the bilateral mid internal carotid arteries with a \"string of pearls\" appearance consistent with known fibromuscular dysplasia, causing a 50-69% stenosis of the bilateral internal carotid arteries. There was no change from prior study in 2011. She had been followed by Dr. Leonardo Louise, but states that she has been released from his care. She remains quite active, and denies any chest pain, shortness of breath at rest or with exertion, palpitations, lightheadedness, or edema. She underwent a repeat carotid duplex study (8/15/2018) showing mild (<50%) stenosis of the left internal carotid artery and moderate (50-69%) stenosis of the right internal carotid artery. She has a history of rheumatoid arthritis, diagnosed when she was 37years old, treated currently with subcutaneous methotrexate and prednisone. She is being followed closely by Dr. Shavon Auguste. She is not currently being treated with a biologic agent due to concern regarding recent multiple squamous cell carcinomas of the skin, for which she is being followed by Dr. Ling Kenney. She reports that she was well controlled on Humira for several years, but had to discontinue treatment when it was no longer on her insurance formulary. She also has a history of osteopenia, with bone density studies performed by Dr. Shavon Auguste. Her last bone density study was in 2/2019 showing T-scores:  femoral neck left -1.7  /right -1.9 and lumbar -2.1. She continues to take calcium and Vitamin D supplements. She has no history of pathologic fractures. She was recently diagnosed with familial hypocalciuric hypercalcemia with mildly elevated calcium 10.6, mildly elevated PTH 80, Vitamin D level 36.5, and 24 hour urine calcium of 105 (FeCa 1%) in 7/30/2018, which would be consistent with this diagnosis. She is being followed by Dr. Veronique Berrios. She has a history of cervical degenerative joint and disc disease and chronic neck pain. She was being followed by Dr. Luis Borrego, who recommended surgery.  She was seen by Dr. Jodi Reyes at [facility] for a second opinion, and cervical MRI (9/2015) showed multilevel degenerative disc and facet disease, worst at C5-C6; left central/subarticular disc protrusion with superimposed annular fissure noted at C5-C6 causing mass effect upon the left hemicord but no signal changes with severe left neuroforaminal narrowing at this level. Recommendation was for her to proceed with physical therapy, which did result in some improvement. She previously had been treated with gabapentin for pain control, but is currently only using Tylenol. She underwent acupuncture therapy with Dr. Tavares Cano with only minimal improvement. She also states that she was referred to Dr. JOHNSON [facility] for evaluation, but has not yet scheduled an appointment with him. She has a history of GERD. In 11/2017, she was having increasing difficulty with constipation alternating with diarrhea as well as increasing reflux symptoms. She states that she was evaluated by Dr. Mahsa Epperson and it was her opinion that she suffered from irritable bowel syndrome. She has made changes in her diet and increased her consumption of fiber with some improvement. She began experiencing worsening reflux symptoms in 12/2018 after attempting to wean omeprazole from 40 mg to 20 mg daily. She also was taking an increased dose of prednisone at the time for a flare of her RA. She was evaluated by Dr. Mahsa Epperson and attempted a trial of Protonix which she believed may be working better. She plans to continue taking it. She states that at the time of her worsening reflux, she also noted increasing palpitations and underwent evaluation by THOMAS Hernandez for Dr. Anju Rich. She states that she was offered an event recorder to evaluate, but she noticed that her symptoms improved once her GERD was under control. She therefore declined further evaluation. She states that she has resumed taking omeprazole with reasonable control of her reflux symptoms.  She underwent upper endoscopy by Dr. Kirti Gilbert in 11/2012 which showed a 2 cm hiatal hernia and pathology from a distal esophageal biopsy showing chronic inflammation. She had a screening colonoscopy in 7/14/2016 by Dr. Nanda Gill, which showed an adenomatous polyp (report unavailable). Recommendation was for follow-up in 5 years. She denies any abdominal pain, nausea, vomiting, melena, hematochezia, or change in bowel movements. Past Medical History:   Diagnosis Date    Bowen's disease     Fibromuscular dysplasia (HCC)     GERD (gastroesophageal reflux disease)     Hiatal hernia     [facility]par[facility] ([facility] [address]     Hypertension     LBBB (left bundle branch block)     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 [facility])     s/p balloon angioplasty 5/2009 [facility]    Rheumatoid arthritis ([facility] [address]     Skin cancer      Past Surgical History:   Procedure Laterality Date    HX ANGIOPLASTY      HX KNEE REPLACEMENT Bilateral 2009    partial     HX OTHER SURGICAL      removal of fibroid benign tumors from breast     HX PARTIAL HYSTERECTOMY      HX TONSILLECTOMY  1964     Current Outpatient Medications   Medication Sig    clotrimazole (MYCELEX) 10 mg bradly Take 1 Tab by mouth five (5) times daily.  sertraline (ZOLOFT) 25 mg tablet take 1 tablet by mouth once daily    metoprolol tartrate (LOPRESSOR) 50 mg tablet Take 1 Tab by mouth two (2) times a day.  omeprazole (PRILOSEC) 40 mg capsule Take 40 mg by mouth daily.  predniSONE (DELTASONE) 5 mg tablet Take  by mouth daily. 2.5 mg every other day    multivitamin (ONE A DAY) tablet Take 1 Tab by Mouth Once a Day.  acetaminophen (TYLENOL) 500 mg tablet Take 500 mg by mouth every six (6) hours as needed.  cholecalciferol (VITAMIN D3) 400 unit tab tablet Take 2,000 Units by mouth.  aspirin 81 mg chewable tablet Take 81 mg by mouth daily.  folic acid (FOLVITE) 1 mg tablet Take 1 mg by mouth daily.  lidocaine (LIDODERM) 5 % Apply patch to the affected area for 12 hours a day and remove for 12 hours a day.     METHOTREXATE SODIUM INJECTION 15 mg every seven (7) days. No current facility-administered medications for this visit. Allergies and Intolerances: Allergies   Allergen Reactions    Hydrocodone Anaphylaxis    Nsaids (Non-Steroidal Anti-Inflammatory Drug) Other (comments)     None due to kidneys    Azithromycin Itching    Other Medication Other (comments)     GI intolerance to nonsteroidal antiinflammatory medications     Vicodin [Hydrocodone-Acetaminophen] Other (comments)     Family History: Her father passed away from a ruptured AAA. Her mother has osteoarthritis and COPD, and a maternal aunt had RA. Her brother recently passed away from multiple sclerosis. She has no family history of colon or breast cancer. Family History   Problem Relation Age of Onset    Arthritis-osteo Mother     Elevated Lipids Mother     Heart Disease Mother         CHF    Lung Disease Mother     Hypertension Sister     Lung Disease Sister     Hypertension Brother     MS Brother      Social History:   She  reports that she has never smoked. She has never used smokeless tobacco. She is  with two children and one stepchild. 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 She drinks wine only occasionally. Social History     Substance and Sexual Activity   Alcohol Use Yes    Alcohol/week: 0.6 - 1.2 oz    Types: 1 - 2 Glasses of wine per week     Immunization History:  Immunization History   Administered Date(s) Administered    Influenza High Dose Vaccine PF 10/29/2018    Influenza Vaccine 10/29/2013    Pneumococcal Conjugate (PCV-13) 12/14/2015    Pneumococcal Polysaccharide (PPSV-23) 11/15/2018       Review of Systems:   As above included in HPI. Otherwise 11 point review of systems negative including constitutional, skin, HENT, eyes, respiratory, cardiovascular, gastrointestinal, genitourinary, musculoskeletal, endocrine, hematologic, allergy, and neurologic.     Physical:   Vitals: BP: 126/76  HR: 72  WT: 111 lb 3.2 oz (50.4 kg)  BMI:  17.42 kg/m2    Exam:   Patient appears in no apparent distress. Affect is appropriate. HEENT: PERRLA, anicteric; oropharynx clear without erythema, ulcers, or obvious exudate; no JVD, adenopathy or thyromegaly. Bilateral carotid bruits clearly evident. Lungs: clear to auscultation, no wheezes, rhonchi, or rales. Heart: regular rate and rhythm. No murmur, rubs, gallops  Abdomen: soft, nontender, nondistended, normal bowel sounds, no hepatosplenomegaly or masses. Extremities: without edema. Pulses 1-2+ bilaterally. Review of Data:  Labs:  Hospital Outpatient Visit on 05/30/2019   Component Date Value Ref Range Status    Vitamin D 25-Hydroxy 05/30/2019 45.6  30 - 100 ng/mL Final    LIPID PROFILE 05/30/2019        Final    Cholesterol, total 05/30/2019 190  <200 MG/DL Final    Triglyceride 05/30/2019 67  <150 MG/DL Final    HDL Cholesterol 05/30/2019 83* 40 - 60 MG/DL Final    LDL, calculated 05/30/2019 93.6  0 - 100 MG/DL Final    VLDL, calculated 05/30/2019 13.4  MG/DL Final    CHOL/HDL Ratio 05/30/2019 2.3  0 - 5.0   Final    Sodium 05/30/2019 142  136 - 145 mmol/L Final    Potassium 05/30/2019 4.1  3.5 - 5.5 mmol/L Final    Chloride 05/30/2019 105  100 - 108 mmol/L Final    CO2 05/30/2019 30  21 - 32 mmol/L Final    Anion gap 05/30/2019 7  3.0 - 18 mmol/L Final    Glucose 05/30/2019 72* 74 - 99 mg/dL Final    BUN 05/30/2019 12  7.0 - 18 MG/DL Final    Creatinine 05/30/2019 0.78  0.6 - 1.3 MG/DL Final    BUN/Creatinine ratio 05/30/2019 15  12 - 20   Final    GFR est AA 05/30/2019 >60  >60 ml/min/1.73m2 Final    GFR est non-AA 05/30/2019 >60  >60 ml/min/1.73m2 Final    Calcium 05/30/2019 10.1  8.5 - 10.1 MG/DL Final    Bilirubin, total 05/30/2019 0.4  0.2 - 1.0 MG/DL Final    ALT (SGPT) 05/30/2019 31  13 - 56 U/L Final    AST (SGOT) 05/30/2019 25  15 - 37 U/L Final    Alk.  phosphatase 05/30/2019 69  45 - 117 U/L Final    Protein, total 05/30/2019 6.9  6.4 - 8.2 g/dL Final    Albumin 05/30/2019 4.0  3.4 - 5.0 g/dL Final    Globulin 05/30/2019 2.9  2.0 - 4.0 g/dL Final    A-G Ratio 05/30/2019 1.4  0.8 - 1.7   Final    WBC 05/30/2019 5.0  4.6 - 13.2 K/uL Final    RBC 05/30/2019 4.02* 4.20 - 5.30 M/uL Final    HGB 05/30/2019 11.9* 12.0 - 16.0 g/dL Final    HCT 05/30/2019 39.7  35.0 - 45.0 % Final    MCV 05/30/2019 98.8* 74.0 - 97.0 FL Final    MCH 05/30/2019 29.6  24.0 - 34.0 PG Final    MCHC 05/30/2019 30.0* 31.0 - 37.0 g/dL Final    RDW 05/30/2019 14.5  11.6 - 14.5 % Final    PLATELET 72/91/6646 221  135 - 420 K/uL Final    MPV 05/30/2019 10.8  9.2 - 11.8 FL Final    NEUTROPHILS 05/30/2019 64  40 - 73 % Final    LYMPHOCYTES 05/30/2019 21  21 - 52 % Final    MONOCYTES 05/30/2019 12* 3 - 10 % Final    EOSINOPHILS 05/30/2019 2  0 - 5 % Final    BASOPHILS 05/30/2019 1  0 - 2 % Final    ABS. NEUTROPHILS 05/30/2019 3.2  1.8 - 8.0 K/UL Final    ABS. LYMPHOCYTES 05/30/2019 1.1  0.9 - 3.6 K/UL Final    ABS. MONOCYTES 05/30/2019 0.6  0.05 - 1.2 K/UL Final    ABS. EOSINOPHILS 05/30/2019 0.1  0.0 - 0.4 K/UL Final    ABS. BASOPHILS 05/30/2019 0.0  0.0 - 0.1 K/UL Final    DF 05/30/2019 AUTOMATED    Final     Calculated 10 year ASCVD risk score: 11.1%    Health Maintenance:  Screening:    Mammogram: negative (2/2019) [facility]   PAP smear: well woman exams performed at [facility] by KIM Nino (last 2/2019)   Colorectal: colonoscopy (7/2016) tubular adenoma. Dr. Moriah Peterson. Due 2021. Now followed by Dr. Judith Juan. Depression: on Zoloft   DM (HbA1c/FPG): FPG 72 (5/2019)   Hepatitis C: negative (1/2/2018) Dr. Thais Holland. Falls: none   DEXA: osteopenia (last 2/2019). Performed by Dr. Thais Holland.    Glaucoma: regular eye exams with Dr. Joy Valdes (last 2/2019 ) every 6 months   Smoking: none   Vitamin D: 45.5 (5/2019)    Medicare Wellness: today    Impression:  Patient Active Problem List   Diagnosis Code    Intervertebral disc protrusion [id number]    Displacement of cervical intervertebral disc without myelopathy M50.20    Cervical spondylosis without myelopathy M47.812    Degeneration of cervical intervertebral disc M50.30    Brachial neuritis or radiculitis M54.12    Radiculopathy, cervical M54.12    Skin cancer C44.90    Rheumatoid arthritis (HCC) M06.9    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 (HCC) I70.1    LBBB (left bundle branch block) I44.7    Hypertension I10    Hyperparathyroidism ([address] E21.3    Hiatal hernia K44.9    GERD (gastroesophageal reflux disease) K21.9    Fibromuscular dysplasia (HCC) I77.3    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    Irritable bowel syndrome with both constipation and diarrhea K58.2    Bilateral carotid bruits R09.89    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    Hyperlipidemia E78.5    Osteopenia of multiple sites M85.89    Vitamin D deficiency E55.9    Carotid stenosis, bilateral I65.23       Plan:  1. Hypertension. Blood pressure well controlled on current regimen of metoprolol 50 mg bid, which also helps with control of palpitations. Recent increase in palpitations resolved after reflux symptoms improved, and pateint declined further evaluation by cardiology. Renal function normal with creatinine 0.78/ eGFR >60. Will continue to follow. 2. Fibromuscular dysplasia. Previous angioplasty of renal arteries. Known moderate stenosis of bilateral carotid arteries, although last carotid duplex in 5/2014. Noted prominent carotid bruits and repeat carotid duplex obtained in 8/2018 showing moderate (50-69%) stenosis of the right ICA and mild (<50%) of the left ICA. Review of report also showed mild atherosclerosis at that time. Previously followed by Dr. Wilmer Vigil of [facility], but given stability over many years, released from his care with plan to pursue further evaluation only if becomes symptomatic. Will continue to monitor. 3. Hyperlipidemia. Calculated 10 year ASCVD risk improved to 11.1 %, which places her in one of the four statin benefit groups as per new AHA/ACC guidelines (primary prevention: 10 year ASCVD risk >7.5%). In addition, mild atherosclerosis in the setting of fibromuscular dysplasia described on prior carotid duplex. Also at increased due to chronic inflammatory state related to rheumatoid arthritis. Given these factors would consider treatment with statin. Reviewed prior lipid panels in Care Everywhere, and last one found in 9/2012 with total chol 183/ TG 59/HDL 72/ LDL 99. Patient wished to attempt lifestyle changes prior to considering statin therapy, and she eliminated saturated fats in the form of butter and red meat from her diet. Repeat lipid panel today significantly improved with LDL 93 and HDL 83 which is reasonable control in this patient. Given multiple risk factors and known atherosclerosis, would consider initiating rosuvastatin if unable to maintain LDL <100. Will continue to follow. 4. Rheumatoid arthritis. On [place] methotrexate and prednisone with fairly stable symptoms. Considering another biologic but hesitant given Bowen's disease. Being followed closely by Dr. Jovita Becker. Methotrexate on hold due to mucosal irritation. Unclear etiology, but will prescribe Mycelex troches as has been previously helpful. Follow. 5. Mouth pain. No obvious lesions on exam, and no evidence of oral thrush. She has had gums evaluated by dentist and no abnormalities seen. Also evaluated by Dr. Patrick Lerner of ENT in the past with no etiology found. Differential diagnosis would include herpes simplex, aphthous stomatitis, xerostomia, or nutritional deficiencies (B12, B6, folate, Zinc, iron). Other eitiologies could include candidiasis, allergic contact dermatitis, diabetes, menopause, or thyroid disease. If no cause is found, may be classified as burning mouth syndrome. Tricyclics, pregabalin, gabapentin, and Mirapex have shown some benefit.  She reports that she has had similar episodes in the past, but will resolve after several weeks without persistent symptoms. Has found some benefit with Mycelex troches and will order. Advised to take a multivitamin. Continue to follow. 6. Cervical degenerative disease. Experiencing chronic neck pain, treated previously with gabapentin. Some improvement with exercising as well, although continuing to experience pain. Attempted acupuncture therapy by Dr. Stacia Lima, but after 3 months, no significant benefit seen. Previously followed by Dr. Adriel Luna, but referred to Dr. Sharyn Pepper by Dr. Britni Laboy. Has not yet scheduled evaluation, but not a significant complaint today. 7. Osteopenia. Last bone density scan 2/2019 . Using femoral neck T-scores, calculated FRAX score estimates her 10 year risk of a major osteoporetic fracture at 13 % and hip fracture at 3.1 %, which are an indication for biphosphonate treatment with hip fracture risk >3%. Being performed and followed by Dr. Britni Laboy. Continue calcium and Vitamin D. Encouraged exercise, particularly weight bearing activities. 8. Hyperparathyroidism. Mild increase in PTH, mildly elevated calcium, normal Vitamin D level,and 24 hour urine calcium only 105 (FeCa 1%) is consistent with a diagnosis of familial hypocalciuric hypercalcemia. Being followed by Dr. Kayla Carreno and told at most recent visit that would not need to follow-up for 2 years given benign condition. Advised to drink plenty of fluids. 9. GERD. Resumed daily omeprazole with good response. Symptoms of diarrhea alternating with constipation chronically thought to be secondary to irritable bowel syndrome. On probiotic. Reportedly changed diet and increased fiber with some improvement. 10. Bowen's disease. Being followed closely by Dr. Kiya Hurt for multiple squamous cell carcinomas in situ. 11. Health maintenance. Completed pneumococcal series. Given scripts for Tdap and Shingrix vaccines.  Mammogram and well women exams performed at [facility]. Continue regular eye exams with Dr. Kirill Corrales. Will request report. Vitamin D level now normal after increasing to 2000 U daily. Discussed recommendations regarding aspirin use and primary prevention, and patient wishing to discontinue. In addition, an annual Medicare wellness visit was done today. Patient understands recommendations and agrees with plan. Follow-up in 6 months.

## 2019-10-21 ENCOUNTER — APPOINTMENT (OUTPATIENT)
Dept: GENERAL RADIOLOGY | Age: 74
End: 2019-10-21
Attending: EMERGENCY MEDICINE
Payer: MEDICARE

## 2019-10-21 ENCOUNTER — HOSPITAL ENCOUNTER (EMERGENCY)
Age: 74
Discharge: HOME | End: 2019-10-22
Attending: EMERGENCY MEDICINE
Payer: MEDICARE

## 2019-10-21 VITALS
OXYGEN SATURATION: 97 % | TEMPERATURE: 98.7 F | HEART RATE: 73 BPM | WEIGHT: 115 LBS | SYSTOLIC BLOOD PRESSURE: 144 MMHG | DIASTOLIC BLOOD PRESSURE: 73 MMHG | RESPIRATION RATE: 12 BRPM | BODY MASS INDEX: 18.01 KG/M2

## 2019-10-21 DIAGNOSIS — R07.89 ATYPICAL CHEST PAIN: ICD-10-CM

## 2019-10-21 DIAGNOSIS — I44.7 LBBB (LEFT BUNDLE BRANCH BLOCK): ICD-10-CM

## 2019-10-21 DIAGNOSIS — R51.9 ACUTE NONINTRACTABLE HEADACHE, UNSPECIFIED HEADACHE TYPE: ICD-10-CM

## 2019-10-21 DIAGNOSIS — R03.0 ELEVATED BLOOD PRESSURE READING: Primary | ICD-10-CM

## 2019-10-21 LAB
ANION GAP SERPL CALC-SCNC: 6 MMOL/L (ref 3–18)
BASOPHILS # BLD: 0 K/UL (ref 0–0.1)
BASOPHILS NFR BLD: 1 % (ref 0–2)
BUN SERPL-MCNC: 13 MG/DL (ref 7–18)
BUN/CREAT SERPL: 16 (ref 12–20)
CALCIUM SERPL-MCNC: 9.9 MG/DL (ref 8.5–10.1)
CHLORIDE SERPL-SCNC: 107 MMOL/L (ref 100–111)
CK MB CFR SERPL CALC: 3.3 % (ref 0–4)
CK MB SERPL-MCNC: 3.2 NG/ML (ref 5–25)
CK SERPL-CCNC: 97 U/L (ref 26–192)
CO2 SERPL-SCNC: 28 MMOL/L (ref 21–32)
CREAT SERPL-MCNC: 0.79 MG/DL (ref 0.6–1.3)
DIFFERENTIAL METHOD BLD: ABNORMAL
EOSINOPHIL # BLD: 0.1 K/UL (ref 0–0.4)
EOSINOPHIL NFR BLD: 1 % (ref 0–5)
ERYTHROCYTE [DISTWIDTH] IN BLOOD BY AUTOMATED COUNT: 13.6 % (ref 11.6–14.5)
GLUCOSE SERPL-MCNC: 118 MG/DL (ref 74–99)
HCT VFR BLD AUTO: 36.9 % (ref 35–45)
HGB BLD-MCNC: 11.8 G/DL (ref 12–16)
LYMPHOCYTES # BLD: 1.3 K/UL (ref 0.9–3.6)
LYMPHOCYTES NFR BLD: 21 % (ref 21–52)
MCH RBC QN AUTO: 30.3 PG (ref 24–34)
MCHC RBC AUTO-ENTMCNC: 32 G/DL (ref 31–37)
MCV RBC AUTO: 94.6 FL (ref 74–97)
MONOCYTES # BLD: 0.5 K/UL (ref 0.05–1.2)
MONOCYTES NFR BLD: 9 % (ref 3–10)
NEUTS SEG # BLD: 4.3 K/UL (ref 1.8–8)
NEUTS SEG NFR BLD: 68 % (ref 40–73)
PLATELET # BLD AUTO: 249 K/UL (ref 135–420)
PMV BLD AUTO: 9.8 FL (ref 9.2–11.8)
POTASSIUM SERPL-SCNC: 3.7 MMOL/L (ref 3.5–5.5)
RBC # BLD AUTO: 3.9 M/UL (ref 4.2–5.3)
SODIUM SERPL-SCNC: 141 MMOL/L (ref 136–145)
TROPONIN I SERPL-MCNC: <0.02 NG/ML (ref 0–0.04)
WBC # BLD AUTO: 6.2 K/UL (ref 4.6–13.2)

## 2019-10-21 PROCEDURE — 82550 ASSAY OF CK (CPK): CPT

## 2019-10-21 PROCEDURE — 93005 ELECTROCARDIOGRAM TRACING: CPT

## 2019-10-21 PROCEDURE — 71045 X-RAY EXAM CHEST 1 VIEW: CPT

## 2019-10-21 PROCEDURE — 80048 BASIC METABOLIC PNL TOTAL CA: CPT

## 2019-10-21 PROCEDURE — 99283 EMERGENCY DEPT VISIT LOW MDM: CPT

## 2019-10-21 PROCEDURE — 74011250637 HC RX REV CODE- 250/637: Performed by: EMERGENCY MEDICINE

## 2019-10-21 PROCEDURE — 85025 COMPLETE CBC W/AUTO DIFF WBC: CPT

## 2019-10-21 RX ORDER — ACETAMINOPHEN 500 MG
1000 TABLET ORAL
Status: COMPLETED | OUTPATIENT
Start: 2019-10-21 | End: 2019-10-21

## 2019-10-21 RX ADMIN — ACETAMINOPHEN 1000 MG: 500 TABLET ORAL at 22:52

## 2019-10-22 ENCOUNTER — TELEPHONE (OUTPATIENT)
Dept: INTERNAL MEDICINE CLINIC | Age: 74
End: 2019-10-22

## 2019-10-22 LAB
ATRIAL RATE: 64 BPM
CALCULATED P AXIS, ECG09: 50 DEGREES
CALCULATED R AXIS, ECG10: -42 DEGREES
CALCULATED T AXIS, ECG11: 75 DEGREES
DIAGNOSIS, 93000: NORMAL
P-R INTERVAL, ECG05: 174 MS
Q-T INTERVAL, ECG07: 474 MS
QRS DURATION, ECG06: 150 MS
QTC CALCULATION (BEZET), ECG08: 489 MS
VENTRICULAR RATE, ECG03: 64 BPM

## 2019-10-22 NOTE — ED NOTES
12:14 AM  10/22/19     Discharge instructions given to [place] (name) with verbalization of understanding. Patient accompanied by spouse. Patient discharged with the following prescriptions none. Patient discharged to home (destination).       Mitch Williamson

## 2019-10-22 NOTE — ED TRIAGE NOTES
Pt reports episode of elevated bp at her pcp office last week without s/sx.   Last 2 days it has report throbbing of hands and feet with head fullness which appears more prominently late afternoon/early evening

## 2019-10-22 NOTE — TELEPHONE ENCOUNTER
Patient went to the ER yesterday and is trying to schedule a follow up. Please advise when you can see her.

## 2019-10-22 NOTE — ED PROVIDER NOTES
Kin Leal is a 68 y.o. Female with past medical history of hypertension, fibromuscular dysplasia, and rheumatoid arthritis coming in with elevated blood pressure. Patient states that a couple days ago she was at her GI doctor and her blood pressure was elevated in the office. She states that throughout the day today she has had an achy, dull, global, nonradiating headache. She states is been very mild in severity. She states that tonight she was lying in bed and felt some throbbing in her hands and feet and was concerned about her blood pressure. She states that she checked at home a few times in the systolic range from the 745R to the 170s. She states that her normal systolic blood pressure is in the 120s. She states that she takes metoprolol twice daily and has been compliant including her evening dose tonight. Patient denies any shortness of breath, leg swelling, orthopnea, visual changes, nausea/vomiting, lightheadedness, dizziness, confusion, speech difficulty, numbness, or weakness. When asked about chest pain she does state that she frequently gets chest \"tightness. \"  She states that this is due to her right hiatal hernia and reflux and is not out of the ordinary for her. She denies any chest pain or tightness currently, but does state that she had some tightness 2 to 3 hours ago while lying in bed. She denies any diaphoresis or exertional symptoms. She states the symptoms were exactly the same as her previous reflux symptoms. Patient has not taken anything for her headache. She has no other complaints at this time.            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)     s/p balloon angioplasty 5/2009 [facility]    Rheumatoid arthritis ([address]     Skin cancer        Past Surgical History:   Procedure Laterality Date    HX ANGIOPLASTY      HX KNEE REPLACEMENT Bilateral 2009    partial     HX OTHER SURGICAL      removal of fibroid benign tumors from breast     HX PARTIAL HYSTERECTOMY      HX TONSILLECTOMY  1964         Family History:   Problem Relation Age of Onset    Arthritis-osteo Mother     Elevated Lipids Mother     Heart Disease Mother         CHF    Lung Disease Mother     Hypertension Sister     Lung Disease Sister     Hypertension Brother     MS Brother        Social History     Socioeconomic History    Marital status:      Spouse name: Not on file    Number of children: Not on file    Years of education: Not on file    Highest education level: Not on file   Occupational History    Occupation: Retired   Social Needs    Financial resource strain: Not on file    Food insecurity:     Worry: Not on file     Inability: Not on file   Active Scaler needs:     Medical: Not on file     Non-medical: Not on file   Tobacco Use    Smoking status: Never Smoker    Smokeless tobacco: Never Used   Substance and Sexual Activity    Alcohol use:  Yes     Alcohol/week: 1.0 - 2.0 standard drinks     Types: 1 - 2 Glasses of wine per week    Drug use: No    Sexual activity: Not Currently   Lifestyle    Physical activity:     Days per week: Not on file     Minutes per session: Not on file    Stress: Not on file   Relationships    Social connections:     Talks on phone: Not on file     Gets together: Not on file     Attends [employer] service: Not on file     Active member of club or organization: Not on file     Attends meetings of clubs or organizations: Not on file     Relationship status: Not on file    Intimate partner violence:     Fear of current or ex partner: Not on file     Emotionally abused: Not on file     Physically abused: Not on file     Forced sexual activity: Not on file   Other Topics Concern    Not on file   Social History Narrative    Not on file         ALLERGIES: Hydrocodone; Nsaids (non-steroidal anti-inflammatory drug); Azithromycin; Other medication; and Vicodin [hydrocodone-acetaminophen]    Review of Systems   Constitutional: Negative. Negative for chills and fever. HENT: Negative. Eyes: Negative. Negative for visual disturbance. Respiratory: Negative. Negative for shortness of breath. Cardiovascular: Positive for chest pain. Negative for leg swelling. Gastrointestinal: Negative. Negative for abdominal pain, nausea and vomiting. Genitourinary: Negative. Negative for dysuria. Musculoskeletal: Negative. Negative for back pain, myalgias and neck pain. Skin: Negative. Negative for rash. Neurological: Positive for headaches. Negative for dizziness, syncope, speech difficulty, weakness, light-headedness and numbness. Psychiatric/Behavioral: Negative. All other systems reviewed and are negative. Vitals:    10/21/19 2219   BP: 144/73   Pulse: 73   Resp: 12   Temp: 98.7 °F (37.1 °C)   SpO2: 97%   Weight: 52.2 kg (115 lb)            Physical Exam   Constitutional: She is oriented to person, place, and time. She appears well-developed and well-nourished. Thin   HENT:   Head: Normocephalic and atraumatic. Mouth/Throat: Oropharynx is clear and moist.   Eyes: Pupils are equal, round, and reactive to light. Conjunctivae and EOM are normal.   Neck: Trachea normal and normal range of motion. Neck supple. No JVD present. Cardiovascular: Normal rate, regular rhythm, S1 normal and S2 normal. Exam reveals no gallop and no friction rub. No murmur heard. Pulmonary/Chest: Effort normal and breath sounds normal. No accessory muscle usage. No respiratory distress. Abdominal: Soft. Normal appearance. She exhibits no distension. There is no tenderness. There is no rigidity and no guarding.    Musculoskeletal: Normal range of motion. She exhibits no edema or tenderness. Neurological: She is alert and oriented to person, place, and time. She has normal strength. No cranial nerve deficit or sensory deficit. Coordination normal.   Skin: Skin is warm and intact. No rash noted. Psychiatric: She has a normal mood and affect. Her speech is normal and behavior is normal.   Vitals reviewed. MDM  Number of Diagnoses or Management Options  Acute nonintractable headache, unspecified headache type:   Atypical chest pain:   Elevated blood pressure reading:   LBBB (left bundle branch block):   Diagnosis management comments: Mat Ornelas is a 68 y.o. Female coming in with concerns about her blood pressure. On further questioning she does admit to having some chest tightness earlier, however she states that this is not out of the ordinary for her and she is not concerned that this is cardiac ischemia. Blood pressure here is 144/73. Patient is in no distress and has no evidence of hypertensive urgency or emergency. No evidence of heart failure, pulmonary edema, or neurologic changes. Do not feel the patient's blood pressure needs emergent treatment. Given the fact that she has had some chest tightness we will get cardiac enzymes, chest x-ray, EKG, basic labs to rule out cardiac ischemia. I reevaluated the patient and she states her headache is gone completely. She is resting comfortably in bed and was actually sleeping upon my entrance back in the room. She states she has not had any recurrent chest pain or tightness while here in the emergency department. She is currently 100% asymptomatic. Patient was updated on all her results as well as her EKG showing a left bundle branch block pattern. She states that she has been told this previously, although we have no comparison EKGs. I have very low suspicion of acute ischemic process.   I have advised her to follow-up with her regular doctor and I gave her return precautions for recurrent or worsening symptoms. Patient verbalizes understanding and agrees with this plan. Procedures    Vitals:  Patient Vitals for the past 12 hrs:   Temp Pulse Resp BP SpO2   10/21/19 2219 98.7 °F (37.1 °C) 73 12 144/73 97 %       Medications ordered:   Medications   acetaminophen (TYLENOL) tablet 1,000 mg (1,000 mg Oral Given 10/21/19 5532)         Lab findings:  Recent Results (from the past 12 hour(s))   CBC WITH AUTOMATED DIFF    Collection Time: 10/21/19 10:55 PM   Result Value Ref Range    WBC 6.2 4.6 - 13.2 K/uL    RBC 3.90 (L) 4.20 - 5.30 M/uL    HGB 11.8 (L) 12.0 - 16.0 g/dL    HCT 36.9 35.0 - 45.0 %    MCV 94.6 74.0 - 97.0 FL    MCH 30.3 24.0 - 34.0 PG    MCHC 32.0 31.0 - 37.0 g/dL    RDW 13.6 11.6 - 14.5 %    PLATELET 109 969 - 727 K/uL    MPV 9.8 9.2 - 11.8 FL    NEUTROPHILS 68 40 - 73 %    LYMPHOCYTES 21 21 - 52 %    MONOCYTES 9 3 - 10 %    EOSINOPHILS 1 0 - 5 %    BASOPHILS 1 0 - 2 %    ABS. NEUTROPHILS 4.3 1.8 - 8.0 K/UL    ABS. LYMPHOCYTES 1.3 0.9 - 3.6 K/UL    ABS. MONOCYTES 0.5 0.05 - 1.2 K/UL    ABS. EOSINOPHILS 0.1 0.0 - 0.4 K/UL    ABS.  BASOPHILS 0.0 0.0 - 0.1 K/UL    DF AUTOMATED     METABOLIC PANEL, BASIC    Collection Time: 10/21/19 10:55 PM   Result Value Ref Range    Sodium 141 136 - 145 mmol/L    Potassium 3.7 3.5 - 5.5 mmol/L    Chloride 107 100 - 111 mmol/L    CO2 28 21 - 32 mmol/L    Anion gap 6 3.0 - 18 mmol/L    Glucose 118 (H) 74 - 99 mg/dL    BUN 13 7.0 - 18 MG/DL    Creatinine 0.79 0.6 - 1.3 MG/DL    BUN/Creatinine ratio 16 12 - 20      GFR est AA >60 >60 ml/min/1.73m2    GFR est non-AA >60 >60 ml/min/1.73m2    Calcium 9.9 8.5 - 10.1 MG/DL   CARDIAC PANEL,(CK, CKMB & TROPONIN)    Collection Time: 10/21/19 10:55 PM   Result Value Ref Range    CK 97 26 - 192 U/L    CK - MB 3.2 <3.6 ng/ml    CK-MB Index 3.3 0.0 - 4.0 %    Troponin-I, QT <0.02 0.0 - 0.045 NG/ML   EKG, 12 LEAD, INITIAL    Collection Time: 10/21/19 11:15 PM   Result Value Ref Range    Ventricular Rate 64 BPM    Atrial Rate 64 BPM    P-R Interval 174 ms    QRS Duration 150 ms    Q-T Interval 474 ms    QTC Calculation (Bezet) 489 ms    Calculated P Axis 50 degrees    Calculated R Axis -42 degrees    Calculated T Axis 75 degrees    Diagnosis       Normal sinus rhythm  Left axis deviation  Left bundle branch block  Abnormal ECG  When compared with ECG of 27-JAN-2011 16:53,  Left bundle branch block is now present         EKG interpretation by ED Physician: Sinus rhythm rate of 64 bpm.  Left bundle branch block pattern. No previous EKG for comparison. Negative Scarbosa criteria. X-Ray, CT or other radiology findings or impressions:  XR CHEST PORT    (Results Pending)       Disposition:  Diagnosis:   1. Elevated blood pressure reading    2. Atypical chest pain    3. LBBB (left bundle branch block)    4. Acute nonintractable headache, unspecified headache type        Disposition: Discharge    Follow-up Information     Follow up With Specialties Details Why Contact Laron Pathak MD Internal Medicine Call in 2 days for office follow up and blood pressure recheck [phone] [facility] [id number] [phone] [id number] [facility] EMERGENCY DEPT Emergency Medicine  As needed, If symptoms worsen [id number] [facility]  [phone]           Patient's Medications   Start Taking    No medications on file   Continue Taking    ACETAMINOPHEN (TYLENOL) 500 MG TABLET    Take 500 mg by mouth every six (6) hours as needed. CHOLECALCIFEROL (VITAMIN D3) 400 UNIT TAB TABLET    Take 2,000 Units by mouth. FOLIC ACID (FOLVITE) 1 MG TABLET    Take 1 mg by mouth daily. METHOTREXATE SODIUM INJECTION    15 mg every seven (7) days. METOPROLOL TARTRATE (LOPRESSOR) 50 MG TABLET    Take 1 Tab by mouth two (2) times a day. MULTIVITAMIN (ONE A DAY) TABLET    Take 1 Tab by Mouth Once a Day. OMEPRAZOLE (PRILOSEC) 40 MG CAPSULE    Take 40 mg by mouth daily.     PREDNISONE (DELTASONE) 5 MG TABLET    Take  by mouth daily. Per pt now takes daily    SERTRALINE (ZOLOFT) 25 MG TABLET    take 1 tablet by mouth once daily   These Medications have changed    No medications on file   Stop Taking    CLOTRIMAZOLE (MYCELEX) 10 MG WARREN    Take 1 Tab by mouth five (5) times daily. LIDOCAINE (LIDODERM) 5 %    Apply patch to the affected area for 12 hours a day and remove for 12 hours a day.

## 2019-10-22 NOTE — DISCHARGE INSTRUCTIONS
Chest Pain: Care Instructions  Your Care Instructions    There are many things that can cause chest pain. Some are not serious and will get better on their own in a few days. But some kinds of chest pain need more testing and treatment. Your doctor may have recommended a follow-up visit in the next 8 to 12 hours. If you are not getting better, you may need more tests or treatment. Even though your doctor has released you, you still need to watch for any problems. The doctor carefully checked you, but sometimes problems can develop later. If you have new symptoms or if your symptoms do not get better, get medical care right away. If you have worse or different chest pain or pressure that lasts more than 5 minutes or you passed out (lost consciousness), call 911 or seek other emergency help right away. A medical visit is only one step in your treatment. Even if you feel better, you still need to do what your doctor recommends, such as going to all suggested follow-up appointments and taking medicines exactly as directed. This will help you recover and help prevent future problems. How can you care for yourself at home? · Rest until you feel better. · Take your medicine exactly as prescribed. Call your doctor if you think you are having a problem with your medicine. · Do not drive after taking a prescription pain medicine. When should you call for help? Call 911 if:    · You passed out (lost consciousness).     · You have severe difficulty breathing.     · You have symptoms of a heart attack. These may include:  ? Chest pain or pressure, or a strange feeling in your chest.  ? Sweating. ? Shortness of breath. ? Nausea or vomiting. ? Pain, pressure, or a strange feeling in your back, neck, jaw, or upper belly or in one or both shoulders or arms. ? Lightheadedness or sudden weakness. ? A fast or irregular heartbeat.   After you call 911, the  may tell you to chew 1 adult-strength or 2 to 4 low-dose aspirin. Wait for an ambulance. Do not try to drive yourself.    Call your doctor today if:    · You have any trouble breathing.     · Your chest pain gets worse.     · You are dizzy or lightheaded, or you feel like you may faint.     · You are not getting better as expected.     · You are having new or different chest pain. Where can you learn more? Go to http://kylee-geno.info/. Enter A120 in the search box to learn more about \"Chest Pain: Care Instructions. \"  Current as of: June 26, 2019  Content Version: 12.2  © 1587-4627 [employer]. Care instructions adapted under license by [employer] (which disclaims liability or warranty for this information). If you have questions about a medical condition or this instruction, always ask your healthcare professional. [address] any warranty or liability for your use of this information. Heart Blocks: Care Instructions  Your Care Instructions    A heart block is a problem with your heart's electrical system. Normally, a small area of the heart (sinus node) creates the electrical signals that cause the heart to beat in a timed and regular way. A heart block occurs when the signal is blocked. This disrupts the heartbeat. A heart block does not mean that blood flow to the heart is blocked. Heart block can be caused by many things that affect the electrical system of the heart. These things include the effects of aging, certain medicines, and another health condition. There are three types of heart blocks. In a first-degree heart block, the signal is slower than normal. But the heart rate is normal, and the heart usually is not damaged. In a second-degree heart block, some signals do not reach the lower chambers of the heart. This can cause the heart to skip a beat or have an abnormal rhythm.   In a third-degree heart block, the signal is completely blocked from reaching the lower chambers. This can cause the heart to slow down a lot or even stop beating. It is a very serious condition. How heart block is treated can depend on the type and what is causing it. Treatment can also depend on your symptoms. If heart block doesn't cause symptoms, it may not be treated. Treatment may be a pacemaker. You and your doctor can decide what treatment is right for you. Follow-up care is a key part of your treatment and safety. Be sure to make and go to all appointments, and call your doctor if you are having problems. It's also a good idea to know your test results and keep a list of the medicines you take. How can you care for yourself at home? · If you feel lightheaded, sit or lie down to avoid injury that might occur if you faint and fall. · Make lifestyle changes to improve your heart health. ? Eat a heart-healthy diet that includes vegetables, fruits, nuts, beans, lean meat, fish, and whole grains. Limit alcohol, sodium, and sugar. ? Get regular exercise. Try for 30 minutes on most days of the week. If you do not have other heart problems, you likely do not have limits on the type or level of activity that you can do. You may want to walk, swim, bike, or do other activities. Ask your doctor what level of exercise is safe for you.  ? Stay at a healthy weight. Lose weight if you need to.  ? Do not smoke. If you need help quitting, talk to your doctor about stop-smoking programs and medicines. These can increase your chances of quitting for good. ? Manage other health problems such as high blood pressure, high cholesterol, and diabetes. · If you received a pacemaker or an implantable cardioverter-defibrillator (ICD), you will get more information about it. · Wear medical alert jewelry that describes your condition and says you have a pacemaker or ICD. You can buy this at most drugstores. When should you call for help? Call 911 anytime you think you may need emergency care.  For example, call if:    · You passed out (lost consciousness).     · You have symptoms of a heart attack. These may include:  ? Chest pain or pressure, or a strange feeling in the chest.  ? Sweating. ? Shortness of breath. ? Nausea or vomiting. ? Pain, pressure, or a strange feeling in the back, neck, jaw, or upper belly or in one or both shoulders or arms. ? Lightheadedness or sudden weakness. ? A fast or irregular heartbeat. After you call 911, the  may tell you to chew 1 adult-strength or 2 to 4 low-dose aspirin. Wait for an ambulance. Do not try to drive yourself.    Call your doctor now or seek immediate medical care if:    · You are dizzy or lightheaded, or you feel like you may faint.     · You have new or increased shortness of breath.    Watch closely for changes in your health, and be sure to contact your doctor if you have any problems. Where can you learn more? Go to http://kylee-geno.info/. Enter V648 in the search box to learn more about \"Heart Blocks: Care Instructions. \"  Current as of: April 9, 2019  Content Version: 12.2  © 2070-0540 [employer]. Care instructions adapted under license by [employer] (which disclaims liability or warranty for this information). If you have questions about a medical condition or this instruction, always ask your healthcare professional. [place] [address] any warranty or liability for your use of this information. Headache: Care Instructions  Your Care Instructions    Headaches have many possible causes. Most headaches aren't a sign of a more serious problem, and they will get better on their own. Home treatment may help you feel better faster. The doctor has checked you carefully, but problems can develop later. If you notice any problems or new symptoms, get medical treatment right away. Follow-up care is a key part of your treatment and safety.  Be sure to make and go to all appointments, and call your doctor if you are having problems. It's also a good idea to know your test results and keep a list of the medicines you take. How can you care for yourself at home? · Do not drive if you have taken a prescription pain medicine. · Rest in a quiet, dark room until your headache is gone. Close your eyes and try to relax or go to sleep. Don't watch TV or read. · Put a cold, moist cloth or cold pack on the painful area for 10 to 20 minutes at a time. Put a thin cloth between the cold pack and your skin. · Use a warm, moist towel or a heating pad set on low to relax tight shoulder and neck muscles. · Have someone gently massage your neck and shoulders. · Take pain medicines exactly as directed. ? If the doctor gave you a prescription medicine for pain, take it as prescribed. ? If you are not taking a prescription pain medicine, ask your doctor if you can take an over-the-counter medicine. · Be careful not to take pain medicine more often than the instructions allow, because you may get worse or more frequent headaches when the medicine wears off. · Do not ignore new symptoms that occur with a headache, such as a fever, weakness or numbness, vision changes, or confusion. These may be signs of a more serious problem. To prevent headaches  · Keep a headache diary so you can figure out what triggers your headaches. Avoiding triggers may help you prevent headaches. Record when each headache began, how long it lasted, and what the pain was like (throbbing, aching, stabbing, or dull). Write down any other symptoms you had with the headache, such as nausea, flashing lights or dark spots, or sensitivity to bright light or loud noise. Note if the headache occurred near your period. List anything that might have triggered the headache, such as certain foods (chocolate, cheese, wine) or odors, smoke, bright light, stress, or lack of sleep. · Find healthy ways to deal with stress.  Headaches are most common during or right after stressful times. Take time to relax before and after you do something that has caused a headache in the past.  · Try to keep your muscles relaxed by keeping good posture. Check your jaw, face, neck, and shoulder muscles for tension, and try relaxing them. When sitting at a desk, change positions often, and stretch for 30 seconds each hour. · Get plenty of sleep and exercise. · Eat regularly and well. Long periods without food can trigger a headache. · Treat yourself to a massage. Some people find that regular massages are very helpful in relieving tension. · Limit caffeine by not drinking too much coffee, tea, or soda. But don't quit caffeine suddenly, because that can also give you headaches. · Reduce eyestrain from computers by blinking frequently and looking away from the computer screen every so often. Make sure you have proper eyewear and that your monitor is set up properly, about an arm's length away. · Seek help if you have depression or anxiety. Your headaches may be linked to these conditions. Treatment can both prevent headaches and help with symptoms of anxiety or depression. When should you call for help? Call 911 anytime you think you may need emergency care. For example, call if:    · You have signs of a stroke. These may include:  ? Sudden numbness, paralysis, or weakness in your face, arm, or leg, especially on only one side of your body. ? Sudden vision changes. ? Sudden trouble speaking. ? Sudden confusion or trouble understanding simple statements. ? Sudden problems with walking or balance. ? A sudden, severe headache that is different from past headaches.    Call your doctor now or seek immediate medical care if:    · You have a new or worse headache.     · Your headache gets much worse. Where can you learn more? Go to http://kylee-geno.info/. Enter M271 in the search box to learn more about \"Headache: Care Instructions. \"  Current as of: March 28, 2019  Content Version: 12.2  © 9868-7744 "[employer]. Care instructions adapted under license by [employer] (which disclaims liability or warranty for this information). If you have questions about a medical condition or this instruction, always ask your healthcare professional. [address] any warranty or liability for your use of this information. Patient Education        Elevated Blood Pressure: Care Instructions  Your Care Instructions    Blood pressure is a measure of how hard the blood pushes against the walls of your arteries. It's normal for blood pressure to go up and down throughout the day. But if it stays up over time, you have high blood pressure. Two numbers tell you your blood pressure. The first number is the systolic pressure. It shows how hard the blood pushes when your heart is pumping. The second number is the diastolic pressure. It shows how hard the blood pushes between heartbeats, when your heart is relaxed and filling with blood. An ideal blood pressure in adults is less than 120/80 (say \"120 over 80\"). High blood pressure is 140/90 or higher. You have high blood pressure if your top number is 140 or higher or your bottom number is 90 or higher, or both. The main test for high blood pressure is simple, fast, and painless. To diagnose high blood pressure, your doctor will test your blood pressure at different times. After testing your blood pressure, your doctor may ask you to test it again when you are home. If you are diagnosed with high blood pressure, you can work with your doctor to make a long-term plan to manage it. Follow-up care is a key part of your treatment and safety. Be sure to make and go to all appointments, and call your doctor if you are having problems. It's also a good idea to know your test results and keep a list of the medicines you take. How can you care for yourself at home? · Do not smoke.  Smoking increases your risk for heart attack and stroke. If you need help quitting, talk to your doctor about stop-smoking programs and medicines. These can increase your chances of quitting for good. · Stay at a healthy weight. · Try to limit how much sodium you eat to less than 2,300 milligrams (mg) a day. Your doctor may ask you to try to eat less than 1,500 mg a day. · Be physically active. Get at least 30 minutes of exercise on most days of the week. Walking is a good choice. You also may want to do other activities, such as running, swimming, cycling, or playing tennis or team sports. · Avoid or limit alcohol. Talk to your doctor about whether you can drink any alcohol. · Eat plenty of fruits, vegetables, and low-fat dairy products. Eat less saturated and total fats. · Learn how to check your blood pressure at home. When should you call for help? Call your doctor now or seek immediate medical care if:  ? · Your blood pressure is much higher than normal (such as 180/110 or higher). ? · You think high blood pressure is causing symptoms such as:  ¨ Severe headache. ¨ Blurry vision. ? Watch closely for changes in your health, and be sure to contact your doctor if:  ? · You do not get better as expected. Where can you learn more? Go to http://kylee-geno.info/. Enter L935 in the search box to learn more about \"Elevated Blood Pressure: Care Instructions. \"  Current as of: September 21, 2016  Content Version: 11.4  © 3775-6656 [employer]. Care instructions adapted under license by [employer] (which disclaims liability or warranty for this information). If you have questions about a medical condition or this instruction, always ask your healthcare professional. [place] [address] any warranty or liability for your use of this information.

## 2019-10-30 ENCOUNTER — OFFICE VISIT (OUTPATIENT)
Dept: INTERNAL MEDICINE CLINIC | Age: 74
End: 2019-10-30

## 2019-10-30 DIAGNOSIS — M47.812 CERVICAL SPONDYLOSIS WITHOUT MYELOPATHY: ICD-10-CM

## 2019-10-30 DIAGNOSIS — M85.89 OSTEOPENIA OF MULTIPLE SITES: ICD-10-CM

## 2019-10-30 DIAGNOSIS — I44.7 LBBB (LEFT BUNDLE BRANCH BLOCK): ICD-10-CM

## 2019-10-30 DIAGNOSIS — K21.9 GASTROESOPHAGEAL REFLUX DISEASE, ESOPHAGITIS PRESENCE NOT SPECIFIED: ICD-10-CM

## 2019-10-30 DIAGNOSIS — E78.5 HYPERLIPIDEMIA, UNSPECIFIED HYPERLIPIDEMIA TYPE: ICD-10-CM

## 2019-10-30 DIAGNOSIS — I10 ESSENTIAL HYPERTENSION: Primary | ICD-10-CM

## 2019-10-30 DIAGNOSIS — M05.79 RHEUMATOID ARTHRITIS INVOLVING MULTIPLE SITES WITH POSITIVE RHEUMATOID FACTOR (HCC): ICD-10-CM

## 2019-10-30 DIAGNOSIS — E55.9 VITAMIN D DEFICIENCY: ICD-10-CM

## 2019-10-30 DIAGNOSIS — I70.1 RENAL ARTERY STENOSIS DUE TO FIBROMUSCULAR DYSPLASIA (HCC): ICD-10-CM

## 2019-10-30 DIAGNOSIS — K44.9 HIATAL HERNIA: ICD-10-CM

## 2019-10-30 DIAGNOSIS — I77.3 FIBROMUSCULAR DYSPLASIA (HCC): ICD-10-CM

## 2019-10-30 DIAGNOSIS — I77.3 RENAL ARTERY STENOSIS DUE TO FIBROMUSCULAR DYSPLASIA (HCC): ICD-10-CM

## 2019-10-30 DIAGNOSIS — I65.23 CAROTID STENOSIS, BILATERAL: ICD-10-CM

## 2019-10-30 RX ORDER — SERTRALINE HYDROCHLORIDE 50 MG/1
50 TABLET, FILM COATED ORAL DAILY
Qty: 90 TAB | Refills: 2 | Status: SHIPPED | OUTPATIENT
Start: 2019-10-30 | End: 2020-08-27

## 2019-10-30 NOTE — PROGRESS NOTES
Chief Complaint   Patient presents with   [facility] ED Follow-up     Follow up from 10/21/2019 for elevated blood pressure and headache at [facility]. Patient states she still has dull headaches. 1. Have you been to the ER, urgent care clinic or hospitalized since your last visit? YES.     2. Have you seen or consulted any other health care providers outside of the [address] [place] [address] since your last visit (Include any pap smears or colon screening)? NO      Learning Assessment 6/6/2019   PRIMARY LEARNER Patient   HIGHEST LEVEL OF EDUCATION - PRIMARY LEARNER  SOME COLLEGE   BARRIERS PRIMARY LEARNER NONE   CO-LEARNER CAREGIVER No   PRIMARY LANGUAGE ENGLISH    NEED No   LEARNER PREFERENCE PRIMARY DEMONSTRATION     LISTENING     READING   ANSWERED BY patientr   RELATIONSHIP SELF     Abuse Screening Questionnaire 10/30/2019   Do you ever feel afraid of your partner? N   Are you in a relationship with someone who physically or mentally threatens you? N   Is it safe for you to go home? Y     3 most recent PHQ Screens 10/30/2019   PHQ Not Done -   Little interest or pleasure in doing things Not at all   Feeling down, depressed, irritable, or hopeless Not at all   Total Score PHQ 2 0     Fall Risk Assessment, last 12 mths 10/30/2019   Able to walk? Yes   Fall in past 12 months?  No   Fall with injury? -   Number of falls in past 12 months -   Fall Risk Score -

## 2019-10-30 NOTE — PATIENT INSTRUCTIONS
DASH Diet: Care Instructions Your Care Instructions The DASH diet is an eating plan that can help lower your blood pressure. DASH stands for Dietary Approaches to Stop Hypertension. Hypertension is high blood pressure. The DASH diet focuses on eating foods that are high in calcium, potassium, and magnesium. These nutrients can lower blood pressure. The foods that are highest in these nutrients are fruits, vegetables, low-fat dairy products, nuts, seeds, and legumes. But taking calcium, potassium, and magnesium supplements instead of eating foods that are high in those nutrients does not have the same effect. The DASH diet also includes whole grains, fish, and poultry. The DASH diet is one of several lifestyle changes your doctor may recommend to lower your high blood pressure. Your doctor may also want you to decrease the amount of sodium in your diet. Lowering sodium while following the DASH diet can lower blood pressure even further than just the DASH diet alone. Follow-up care is a key part of your treatment and safety. Be sure to make and go to all appointments, and call your doctor if you are having problems. It's also a good idea to know your test results and keep a list of the medicines you take. How can you care for yourself at home? Following the DASH diet · Eat 4 to 5 servings of fruit each day. A serving is 1 medium-sized piece of fruit, ½ cup chopped or canned fruit, 1/4 cup dried fruit, or 4 ounces (½ cup) of fruit juice. Choose fruit more often than fruit juice. · Eat 4 to 5 servings of vegetables each day. A serving is 1 cup of lettuce or raw leafy vegetables, ½ cup of chopped or cooked vegetables, or 4 ounces (½ cup) of vegetable juice. Choose vegetables more often than vegetable juice. · Get 2 to 3 servings of low-fat and fat-free dairy each day. A serving is 8 ounces of milk, 1 cup of yogurt, or 1 ½ ounces of cheese. · Eat 6 to 8 servings of grains each day. A serving is 1 slice of bread, 1 ounce of dry cereal, or ½ cup of cooked rice, pasta, or cooked cereal. Try to choose whole-grain products as much as possible. · Limit lean meat, poultry, and fish to 2 servings each day. A serving is 3 ounces, about the size of a deck of cards. · Eat 4 to 5 servings of nuts, seeds, and legumes (cooked dried beans, lentils, and split peas) each week. A serving is 1/3 cup of nuts, 2 tablespoons of seeds, or ½ cup of cooked beans or peas. · Limit fats and oils to 2 to 3 servings each day. A serving is 1 teaspoon of vegetable oil or 2 tablespoons of salad dressing. · Limit sweets and added sugars to 5 servings or less a week. A serving is 1 tablespoon jelly or jam, ½ cup sorbet, or 1 cup of lemonade. · Eat less than 2,300 milligrams (mg) of sodium a day. If you limit your sodium to 1,500 mg a day, you can lower your blood pressure even more. Tips for success · Start small. Do not try to make dramatic changes to your diet all at once. You might feel that you are missing out on your favorite foods and then be more likely to not follow the plan. Make small changes, and stick with them. Once those changes become habit, add a few more changes. · Try some of the following: ? Make it a goal to eat a fruit or vegetable at every meal and at snacks. This will make it easy to get the recommended amount of fruits and vegetables each day. ? Try yogurt topped with fruit and nuts for a snack or healthy dessert. ? Add lettuce, tomato, cucumber, and onion to sandwiches. ? Combine a ready-made pizza crust with low-fat mozzarella cheese and lots of vegetable toppings. Try using tomatoes, squash, spinach, broccoli, carrots, cauliflower, and onions. ? Have a variety of cut-up vegetables with a low-fat dip as an appetizer instead of chips and dip. ? Sprinkle sunflower seeds or chopped almonds over salads.  Or try adding chopped walnuts or almonds to cooked vegetables. ? Try some vegetarian meals using beans and peas. Add garbanzo or kidney beans to salads. Make burritos and tacos with mashed rodriguez beans or black beans. Where can you learn more? Go to http://kylee-geno.info/. Enter U777 in the search box to learn more about \"DASH Diet: Care Instructions. \" Current as of: April 9, 2019 Content Version: 12.2 © 8773-5938 [employer]. Care instructions adapted under license by [employer] (which disclaims liability or warranty for this information). If you have questions about a medical condition or this instruction, always ask your healthcare professional. [address] any warranty or liability for your use of this information.

## 2019-11-03 VITALS
HEART RATE: 63 BPM | DIASTOLIC BLOOD PRESSURE: 78 MMHG | BODY MASS INDEX: 18.05 KG/M2 | SYSTOLIC BLOOD PRESSURE: 132 MMHG | WEIGHT: 115 LBS | HEIGHT: 67 IN | OXYGEN SATURATION: 93 % | RESPIRATION RATE: 14 BRPM | TEMPERATURE: 98.7 F

## 2019-11-04 NOTE — PROGRESS NOTES
HPI:   [place] is a 69y.o. year old female who presents today to for post-ED follow-up. 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 On 10/21/2019, she presented to the [facility] ED reporting difficulty with elevated blood pressure and a dull headache. She stated that her blood pressure has been elevated earlier in the week when visiting her gastroenterologist. On the day of presentation to the ED, she states that she was experiencing an achy dull headache that was mild in severity throughout the day. She became concerned when she went to bed and noticed throbbing pain in her hands and feet. She checked her blood pressure and noticed her systolic ranged from 381-574 mmHg. She had taken her metoprolol tartrate 50 mg bid as prescribed. She denied any shortness of breath, leg swelling, orthopnea, PND, visual changes, nausea/vomiting, lightheadedness, dizziness, confusion, speech difficulty, numbness, or weakness. She did admit to some chest tightness, but stated that it was not exertional and was her usual discomfort associated with reflux. Evaluation in the ED revealed that her blood pressure was 144/73. EKG showed sinus rhythm at 64 bpm and LBBB. Laboratory data included WBC 6.2, Hb 11.8/ Hct 36.9, creatinine 0.79/eGFR >60, troponin x 1 negative, and chest x-ray negative. While in the ED, her headache resolved and she was subsequently discharged. She presents today and reports that she has not been monitoring her blood pressure at home since discharge from the ED. She has not had a recurrence of the headaches. She does describe experiencing episodes with head fullness, flushing, and  \"prickly skin\" occurring in the evening after lying down.  She states that she did notice one episode where her heart rate appeared to be 150 bpm. She states that the episodes are very intermittent and do not occur every day. She denies any other associated symptoms. She does report that she has been under an increased amount of stress with regard to several of her family members. She is otherwise without specific complaints and feeling generally well. She has a history of hypertension and palpitations, treated currently with metoprolol. She has a history of dyslipidemia, not currently being treated with medication. She was evaluated by Dr. Raul Bermudez in 2012 for palpitations. Event monitor was placed, and showed a known LBBB and PVC's, but reportedly no other arrhythmias (report unavailable). In 9/2012, she also underwent a pharmacologic nuclear stress test which was a normal low risk study with EF 55%. She also had an echocardiogram, which showed low normal LV function (EF 50%), mild grade 1 diastolic dysfunction, trace MR, TR, and AI. She also has a history of fibromuscular dysplasia, and in 5/2009, underwent aortogram and renal arteriogram with balloon angioplasty. She has a known right carotid bruit, and her last carotid duplex (5/2014) showed mild atherosclerotic intraluminal plaquing with elevated velocities in the bilateral mid internal carotid arteries with a \"string of pearls\" appearance consistent with known fibromuscular dysplasia, causing a 50-69% stenosis of the bilateral internal carotid arteries. There was no change from prior study in 2011. She had been followed by Dr. Matt Lawrence, but states that she has been released from his care. She remains quite active, and denies any chest pain, shortness of breath at rest or with exertion, palpitations, lightheadedness, or edema. She underwent a repeat carotid duplex study (8/15/2018) showing mild (<50%) stenosis of the left internal carotid artery and moderate (50-69%) stenosis of the right internal carotid artery.                                              She has a history of rheumatoid arthritis, diagnosed when she was 37years old, treated currently with subcutaneous methotrexate and prednisone. She is being followed closely by Dr. Darcy Greenwood. She is not currently being treated with a biologic agent due to concern regarding recent multiple squamous cell carcinomas of the skin, for which she is being followed by Dr. Jim Mustafa. She reports that she was well controlled on Humira for several years, but had to discontinue treatment when it was no longer on her insurance formulary. She also has a history of osteopenia, with bone density studies performed by Dr. Darcy Greenwood. Her last bone density study was in 2/2019 showing T-scores:  femoral neck left -1.7  /right -1.9 and lumbar -2.1. She continues to take calcium and Vitamin D supplements. She has no history of pathologic fractures. She was recently diagnosed with familial hypocalciuric hypercalcemia with mildly elevated calcium 10.6, mildly elevated PTH 80, Vitamin D level 36.5, and 24 hour urine calcium of 105 (FeCa 1%) in 7/30/2018, which would be consistent with this diagnosis. She is being followed by Dr. Symone Kinney. She has a history of cervical degenerative joint and disc disease and chronic neck pain. She was being followed by Dr. Gucci Campuzano, who recommended surgery. She was seen by Dr. Ignacia Beth at [facility] for a second opinion, and cervical MRI (9/2015) showed multilevel degenerative disc and facet disease, worst at C5-C6; left central/subarticular disc protrusion with superimposed annular fissure noted at C5-C6 causing mass effect upon the left hemicord but no signal changes with severe left neuroforaminal narrowing at this level. Recommendation was for her to proceed with physical therapy, which did result in some improvement. She previously had been treated with gabapentin for pain control, but is currently only using Tylenol. She underwent acupuncture therapy with Dr. Gerardo Sears with only minimal improvement.  She also states that she was referred to Dr. Waddell Primrose for evaluation, but has not yet scheduled an appointment with him. She has a history of GERD. In 11/2017, she was having increasing difficulty with constipation alternating with diarrhea as well as increasing reflux symptoms. She states that she was evaluated by Dr. Horacio Conway and it was her opinion that she suffered from irritable bowel syndrome. She has made changes in her diet and increased her consumption of fiber with some improvement. She began experiencing worsening reflux symptoms in 12/2018 after attempting to wean omeprazole from 40 mg to 20 mg daily. She also was taking an increased dose of prednisone at the time for a flare of her RA. She was evaluated by Dr. Horacio Conway and attempted a trial of Protonix which she believed may be working better. She plans to continue taking it. She states that at the time of her worsening reflux, she also noted increasing palpitations and underwent evaluation by [facility] for Dr. Vimal Cassidy. She states that she was offered an event recorder to evaluate, but she noticed that her symptoms improved once her GERD was under control. She therefore declined further evaluation. She states that she has resumed taking omeprazole with reasonable control of her reflux symptoms. She underwent upper endoscopy by Dr. Chun Price in 11/2012 which showed a 2 cm hiatal hernia and pathology from a distal esophageal biopsy showing chronic inflammation. She had a screening colonoscopy in 7/14/2016 by Dr. Chun Price, which showed an adenomatous polyp (report unavailable). Recommendation was for follow-up in 5 years. She denies any abdominal pain, nausea, vomiting, melena, hematochezia, or change in bowel movements.      Past Medical History:   Diagnosis Date    Bowen's disease     Fibromuscular dysplasia (HCC)     GERD (gastroesophageal reflux disease)     Hiatal hernia     [facility] [address]     Hypertension     LBBB (left bundle branch block)     Premature ventricular contractions     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 [facility])     s/p balloon angioplasty 5/2009 [facility]    Rheumatoid arthritis ([address]     Skin cancer      Past Surgical History:   Procedure Laterality Date    HX ANGIOPLASTY      HX KNEE REPLACEMENT Bilateral 2009    partial     HX OTHER SURGICAL      removal of fibroid benign tumors from breast     HX PARTIAL HYSTERECTOMY      HX TONSILLECTOMY  1964     Current Outpatient Medications   Medication Sig    sertraline (ZOLOFT) 50 mg tablet Take 1 Tab by mouth daily.  metoprolol tartrate (LOPRESSOR) 50 mg tablet Take 1 Tab by mouth two (2) times a day.  METHOTREXATE SODIUM INJECTION 15 mg every seven (7) days.  omeprazole (PRILOSEC) 40 mg capsule Take 40 mg by mouth daily.  predniSONE (DELTASONE) 5 mg tablet Take  by mouth daily. Per pt now takes daily    multivitamin (ONE A DAY) tablet Take 1 Tab by Mouth Once a Day.  acetaminophen (TYLENOL) 500 mg tablet Take 500 mg by mouth every six (6) hours as needed.  cholecalciferol (VITAMIN D3) 400 unit tab tablet Take 2,000 Units by mouth.  folic acid (FOLVITE) 1 mg tablet Take 1 mg by mouth daily. No current facility-administered medications for this visit. Allergies and Intolerances: Allergies   Allergen Reactions    Hydrocodone Anaphylaxis    Nsaids (Non-Steroidal Anti-Inflammatory Drug) Other (comments)     None due to kidneys    Azithromycin Itching    Other Medication Other (comments)     GI intolerance to nonsteroidal antiinflammatory medications     Vicodin [Hydrocodone-Acetaminophen] Other (comments)     Family History: Her father passed away from a ruptured AAA. Her mother has osteoarthritis and COPD, and a maternal aunt had RA. Her brother recently passed away from multiple sclerosis. She has no family history of colon or breast cancer.   Family History   Problem Relation Age of Onset    Arthritis-osteo Mother     Elevated Lipids Mother     Heart Disease Mother         CHF    Lung Disease Mother     Hypertension Sister     Lung Disease Sister     Hypertension Brother     MS Brother      Social History:   She  reports that she has never smoked. She has never used smokeless tobacco. She is  with two children and one stepchild. 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 She drinks wine only occasionally. Social History     Substance and Sexual Activity   Alcohol Use Yes    Alcohol/week: 1.0 - 2.0 standard drinks    Types: 1 - 2 Glasses of wine per week     Immunization History:  Immunization History   Administered Date(s) Administered    Influenza High Dose Vaccine PF 10/29/2018    Influenza Vaccine 10/29/2013    Pneumococcal Conjugate (PCV-13) 12/14/2015    Pneumococcal Polysaccharide (PPSV-23) 11/15/2018    Tdap 10/02/2019       Review of Systems:   As above included in HPI. Otherwise 11 point review of systems negative including constitutional, skin, HENT, eyes, respiratory, cardiovascular, gastrointestinal, genitourinary, musculoskeletal, endocrine, hematologic, allergy, and neurologic. Physical:   Vitals:   BP: 132/78  HR: 63  WT: 115 lb (52.2 kg)  BMI:  18.01 kg/m2    Exam:   Patient appears in no apparent distress. Affect is appropriate. HEENT: PERRLA, anicteric; oropharynx clear without erythema, ulcers, or obvious exudate; no JVD, adenopathy or thyromegaly. Bilateral carotid bruits clearly evident. Lungs: clear to auscultation, no wheezes, rhonchi, or rales. Heart: regular rate and rhythm. No murmur, rubs, gallops  Abdomen: soft, nontender, nondistended, normal bowel sounds, no hepatosplenomegaly or masses. Extremities: without edema. Pulses 1-2+ bilaterally.     Review of Data:  Labs:  Admission on 10/21/2019, Discharged on 10/22/2019   Component Date Value Ref Range Status    WBC 10/21/2019 6.2  4.6 - 13.2 K/uL Final    RBC 10/21/2019 3.90* 4.20 - 5.30 M/uL Final    HGB 10/21/2019 11.8* 12.0 - 16.0 g/dL Final    HCT 10/21/2019 36.9  35.0 - 45.0 % Final    MCV 10/21/2019 94.6  74.0 - 97.0 FL Final    MCH 10/21/2019 30.3  24.0 - 34.0 PG Final    MCHC 10/21/2019 32.0  31.0 - 37.0 g/dL Final    RDW 10/21/2019 13.6  11.6 - 14.5 % Final    PLATELET 90/55/8667 644  135 - 420 K/uL Final    MPV 10/21/2019 9.8  9.2 - 11.8 FL Final    NEUTROPHILS 10/21/2019 68  40 - 73 % Final    LYMPHOCYTES 10/21/2019 21  21 - 52 % Final    MONOCYTES 10/21/2019 9  3 - 10 % Final    EOSINOPHILS 10/21/2019 1  0 - 5 % Final    BASOPHILS 10/21/2019 1  0 - 2 % Final    ABS. NEUTROPHILS 10/21/2019 4.3  1.8 - 8.0 K/UL Final    ABS. LYMPHOCYTES 10/21/2019 1.3  0.9 - 3.6 K/UL Final    ABS. MONOCYTES 10/21/2019 0.5  0.05 - 1.2 K/UL Final    ABS. EOSINOPHILS 10/21/2019 0.1  0.0 - 0.4 K/UL Final    ABS.  BASOPHILS 10/21/2019 0.0  0.0 - 0.1 K/UL Final    DF 10/21/2019 AUTOMATED    Final    Sodium 10/21/2019 141  136 - 145 mmol/L Final    Potassium 10/21/2019 3.7  3.5 - 5.5 mmol/L Final    Chloride 10/21/2019 107  100 - 111 mmol/L Final    CO2 10/21/2019 28  21 - 32 mmol/L Final    Anion gap 10/21/2019 6  3.0 - 18 mmol/L Final    Glucose 10/21/2019 118* 74 - 99 mg/dL Final    BUN 10/21/2019 13  7.0 - 18 MG/DL Final    Creatinine 10/21/2019 0.79  0.6 - 1.3 MG/DL Final    BUN/Creatinine ratio 10/21/2019 16  12 - 20   Final    GFR est AA 10/21/2019 >60  >60 ml/min/1.73m2 Final    GFR est non-AA 10/21/2019 >60  >60 ml/min/1.73m2 Final    Calcium 10/21/2019 9.9  8.5 - 10.1 MG/DL Final    CK 10/21/2019 97  26 - 192 U/L Final    CK - MB 10/21/2019 3.2  <3.6 ng/ml Final    CK-MB Index 10/21/2019 3.3  0.0 - 4.0 % Final    Troponin-I, QT 10/21/2019 <0.02  0.0 - 0.045 NG/ML Final    Ventricular Rate 10/21/2019 64  BPM Final    Atrial Rate 10/21/2019 64  BPM Final    P-R Interval 10/21/2019 174  ms Final    QRS Duration 10/21/2019 150  ms Final    Q-T Interval 10/21/2019 474  ms Final    QTC Calculation (Bezet) 10/21/2019 489 ms Final    Calculated P Axis 10/21/2019 50  degrees Final    Calculated R Axis 10/21/2019 -42  degrees Final    Calculated T Axis 10/21/2019 75  degrees Final    Diagnosis 10/21/2019    Final                    Value:Normal sinus rhythm  Left axis deviation  Left bundle branch block  Abnormal ECG  When compared with ECG of 27-JAN-2011 16:53,  Left bundle branch block is now present  Confirmed by Jorge Luis Abbott MD, Moon Bermudez ([id number]) on 10/22/2019 10:56:44 AM       Calculated 10 year ASCVD risk score: 11.1%    Health Maintenance:  Screening:    Mammogram: negative (2/2019) [facility]   PAP smear: well woman exams performed at [facility] by KIM Renee Se (last 2/2019)   Colorectal: colonoscopy (7/2016) tubular adenoma. Dr. Korey Carbajal. Due 2021. Now followed by Dr. Stevo Wayne. Depression: on Zoloft   DM (HbA1c/FPG): FPG 72 (5/2019)   Hepatitis C: negative (1/2/2018) Dr. Archana Cates. Falls: none   DEXA: osteopenia (last 2/2019). Performed by Dr. Archana Cates.    Glaucoma: regular eye exams with Dr. Suresh Robles (last 2/2019 ) every 6 months   Smoking: none   Vitamin D: 45.5 (5/2019)    Medicare Wellness: 6/6/2019    Impression:  Patient Active Problem List   Diagnosis Code    Intervertebral disc protrusion [id number]    Displacement of cervical intervertebral disc without myelopathy M50.20    Cervical spondylosis without myelopathy M47.812    Degeneration of cervical intervertebral disc M50.30    Brachial neuritis or radiculitis M54.12    Radiculopathy, cervical M54.12    Skin cancer C44.90    Rheumatoid arthritis ([facility] [address] M06.9    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 (HCC) I70.1    LBBB (left bundle branch block) I44.7    Hypertension I10    Hyperparathyroidism ([address] E21.3    Hiatal hernia K44.9    GERD (gastroesophageal reflux disease) K21.9    Fibromuscular dysplasia (HCC) I77.3    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    Irritable bowel syndrome with both constipation and diarrhea K58.2    Bilateral carotid bruits R09.89    H (familial hypocalciuric hypercalcemia) E83.52    Hyperlipidemia E78.5    Osteopenia of multiple sites M85.89    Vitamin D deficiency E55.9    Carotid stenosis, bilateral I65.23    Mouth pain K13.79       Plan:  Elevated blood pressure. She presented to the [facility] ED with a complaint of elevated blood pressure and a headache. Blood pressure in the ED was only mildly elevated at 144/73. Evaluation including CBC, CMP, troponin, EKG, and chest x-ray were unrevealing. Her headache resolved and she was subsequently discharged. She states that she has not been monitoring her blood pressure at home since discharge . She does report episodes of head fullness, flushing, and \"prickly skin\" upon lying down in bed at night, and states that this will usually resolve after several minutes as she attempts to calm herself. She denies any exertional or pleuritic chest pain, shortness of breath, worsening palpitations, lightheadedness, edema, calf pain or swelling, visual changes, PND, or orthopnea. Her blood pressure appears well control today. Upon further discussion, she acknowledges that she has been under an increased amount of stress and states that she believes her symptoms may be due to anxiety. She is currently taking Zoloft and is agreeable to increase her dose to 50 mg daily to see if it will help with her symptoms. She will begin to monitor her blood pressure at home and will call if persistently elevated. She has a routine appointment scheduled in six weeks and will follow up at that time. However she was instructed to call sooner if her symptoms did not improve or increased. Other issues:  1. Hypertension. Blood pressure appears reasonably well controlled on current regimen of metoprolol 50 mg bid, which also helps with control of palpitations. Recent increase in palpitations resolved after reflux symptoms improved, and pateint declined further evaluation by cardiology.  Renal function normal with creatinine 0.79/ eGFR >60. Will continue to follow. 2. Fibromuscular dysplasia. Previous angioplasty of renal arteries. Known moderate stenosis of bilateral carotid arteries, although last carotid duplex in 5/2014. Noted prominent carotid bruits and repeat carotid duplex obtained in 8/2018 showing moderate (50-69%) stenosis of the right ICA and mild (<50%) of the left ICA. Review of report also showed mild atherosclerosis at that time. Previously followed by Dr. Talat Webster of [facility], but given stability over many years, released from his care with plan to pursue further evaluation only if becomes symptomatic. Will continue to monitor. 3. Hyperlipidemia. Calculated 10 year ASCVD risk improved to 11.1 %, which places her in one of the four statin benefit groups as per new AHA/ACC guidelines (primary prevention: 10 year ASCVD risk >7.5%). In addition, mild atherosclerosis in the setting of fibromuscular dysplasia described on prior carotid duplex. Also at increased due to chronic inflammatory state related to rheumatoid arthritis. Given these factors would consider treatment with statin. Reviewed prior lipid panels in Care Everywhere, and last one found in 9/2012 with total chol 183/ TG 59/HDL 72/ LDL 99. Patient wished to attempt lifestyle changes prior to considering statin therapy, and she eliminated saturated fats in the form of butter and red meat from her diet. Repeat lipid panel today significantly improved with LDL 93 and HDL 83 which is reasonable control in this patient. Given multiple risk factors and known atherosclerosis, would consider initiating rosuvastatin if unable to maintain LDL <100. Will continue to follow. 4. Rheumatoid arthritis. On SC methotrexate and prednisone with fairly stable symptoms. Considering another biologic but hesitant given Bowen's disease. Being followed closely by Dr. Jerri Villatoro. Methotrexate on hold due to mucosal irritation.  Unclear etiology, but will prescribe Mycelex troches as has been previously helpful. Follow. 5. Mouth pain. No obvious lesions on exam, and no evidence of oral thrush. She has had gums evaluated by dentist and no abnormalities seen. Also evaluated by Dr. Annia Jimenes of ENT in the past with no etiology found. Differential diagnosis would include herpes simplex, aphthous stomatitis, xerostomia, or nutritional deficiencies (B12, B6, folate, Zinc, iron). Other eitiologies could include candidiasis, allergic contact dermatitis, diabetes, menopause, or thyroid disease. If no cause is found, may be classified as burning mouth syndrome. Tricyclics, pregabalin, gabapentin, and Mirapex have shown some benefit. She reports that she has had similar episodes in the past, but will resolve after several weeks without persistent symptoms. Has found some benefit with Mycelex troches and will order. Advised to take a multivitamin. Continue to follow. 6. Cervical degenerative disease. Experiencing chronic neck pain, treated previously with gabapentin. Some improvement with exercising as well, although continuing to experience pain. Attempted acupuncture therapy by Dr. Ravi Plascencia, but after 3 months, no significant benefit seen. Previously followed by Dr. Emma Oropeza, but referred to Dr. Guardado [place] by Dr. Melvin Juares. Has not yet scheduled evaluation, but not a significant complaint today. 7. Osteopenia. Last bone density scan 2/2019 . Using femoral neck T-scores, calculated FRAX score estimates her 10 year risk of a major osteoporetic fracture at 13 % and hip fracture at 3.1 %, which are an indication for biphosphonate treatment with hip fracture risk >3%. Being performed and followed by Dr. Melvin Juares. Continue calcium and Vitamin D. Encouraged exercise, particularly weight bearing activities. 8. Hyperparathyroidism.  Mild increase in PTH, mildly elevated calcium, normal Vitamin D level,and 24 hour urine calcium only 105 (FeCa 1%) is consistent with a diagnosis of familial hypocalciuric hypercalcemia. Being followed by Dr. Matthew Carey and told at most recent visit that would not need to follow-up for 2 years given benign condition. Advised to drink plenty of fluids. 9. GERD. Resumed daily omeprazole with good response. Symptoms of diarrhea alternating with constipation chronically thought to be secondary to irritable bowel syndrome. On probiotic. Reportedly changed diet and increased fiber with some improvement. 10. Bowen's disease. Being followed closely by Dr. Isaac Quintero for multiple squamous cell carcinomas in situ. 11. Health maintenance. Will address influenza vaccine at next visit. Completed pneumococcal series and Tdap. Given scripts for Shingrix vaccine. Mammogram and well women exams performed at [facility]. Continue regular eye exams with Dr. Yesica Velasco. Will request report. Vitamin D level now normal after increasing to 2000 U daily. Discussed recommendations regarding aspirin use and primary prevention, and patient wishing to discontinue. Medicare wellness visit up to date. Patient understands recommendations and agrees with plan. Follow-up as previously scheduled.

## 2019-12-04 ENCOUNTER — HOSPITAL ENCOUNTER (OUTPATIENT)
Dept: LAB | Age: 74
Discharge: HOME | End: 2019-12-04
Payer: MEDICARE

## 2019-12-04 ENCOUNTER — TELEPHONE (OUTPATIENT)
Dept: INTERNAL MEDICINE CLINIC | Age: 74
End: 2019-12-04

## 2019-12-04 ENCOUNTER — APPOINTMENT (OUTPATIENT)
Dept: INTERNAL MEDICINE CLINIC | Age: 74
End: 2019-12-04

## 2019-12-04 DIAGNOSIS — D04.9 BOWEN'S DISEASE: ICD-10-CM

## 2019-12-04 DIAGNOSIS — K13.79 MOUTH PAIN: ICD-10-CM

## 2019-12-04 DIAGNOSIS — M47.812 CERVICAL SPONDYLOSIS WITHOUT MYELOPATHY: ICD-10-CM

## 2019-12-04 DIAGNOSIS — M05.79 RHEUMATOID ARTHRITIS INVOLVING MULTIPLE SITES WITH POSITIVE RHEUMATOID FACTOR (HCC): ICD-10-CM

## 2019-12-04 DIAGNOSIS — M85.89 OSTEOPENIA OF MULTIPLE SITES: ICD-10-CM

## 2019-12-04 DIAGNOSIS — E78.5 HYPERLIPIDEMIA, UNSPECIFIED HYPERLIPIDEMIA TYPE: ICD-10-CM

## 2019-12-04 DIAGNOSIS — I10 ESSENTIAL HYPERTENSION: ICD-10-CM

## 2019-12-04 DIAGNOSIS — M54.12 RADICULOPATHY, CERVICAL: ICD-10-CM

## 2019-12-04 DIAGNOSIS — K21.9 GASTROESOPHAGEAL REFLUX DISEASE, ESOPHAGITIS PRESENCE NOT SPECIFIED: ICD-10-CM

## 2019-12-04 DIAGNOSIS — I70.1 RENAL ARTERY STENOSIS DUE TO FIBROMUSCULAR DYSPLASIA (HCC): ICD-10-CM

## 2019-12-04 DIAGNOSIS — E55.9 VITAMIN D DEFICIENCY: ICD-10-CM

## 2019-12-04 DIAGNOSIS — E21.3 HYPERPARATHYROIDISM (HCC): ICD-10-CM

## 2019-12-04 DIAGNOSIS — E83.52 FHH (FAMILIAL HYPOCALCIURIC HYPERCALCEMIA): ICD-10-CM

## 2019-12-04 DIAGNOSIS — I77.3 RENAL ARTERY STENOSIS DUE TO FIBROMUSCULAR DYSPLASIA (HCC): ICD-10-CM

## 2019-12-04 DIAGNOSIS — I65.23 CAROTID STENOSIS, BILATERAL: ICD-10-CM

## 2019-12-04 DIAGNOSIS — I77.3 FIBROMUSCULAR DYSPLASIA (HCC): ICD-10-CM

## 2019-12-04 DIAGNOSIS — K58.2 IRRITABLE BOWEL SYNDROME WITH BOTH CONSTIPATION AND DIARRHEA: ICD-10-CM

## 2019-12-04 LAB
BASOPHILS # BLD: 0 K/UL (ref 0–0.1)
BASOPHILS NFR BLD: 1 % (ref 0–2)
DIFFERENTIAL METHOD BLD: ABNORMAL
EOSINOPHIL # BLD: 0.1 K/UL (ref 0–0.4)
EOSINOPHIL NFR BLD: 2 % (ref 0–5)
ERYTHROCYTE [DISTWIDTH] IN BLOOD BY AUTOMATED COUNT: 14.8 % (ref 11.6–14.5)
HCT VFR BLD AUTO: 40.5 % (ref 35–45)
HGB BLD-MCNC: 12.3 G/DL (ref 12–16)
LYMPHOCYTES # BLD: 0.9 K/UL (ref 0.9–3.6)
LYMPHOCYTES NFR BLD: 15 % (ref 21–52)
MCH RBC QN AUTO: 30.1 PG (ref 24–34)
MCHC RBC AUTO-ENTMCNC: 30.4 G/DL (ref 31–37)
MCV RBC AUTO: 99.3 FL (ref 74–97)
MONOCYTES # BLD: 0.6 K/UL (ref 0.05–1.2)
MONOCYTES NFR BLD: 11 % (ref 3–10)
NEUTS SEG # BLD: 4 K/UL (ref 1.8–8)
NEUTS SEG NFR BLD: 71 % (ref 40–73)
PLATELET # BLD AUTO: 261 K/UL (ref 135–420)
PMV BLD AUTO: 10.8 FL (ref 9.2–11.8)
RBC # BLD AUTO: 4.08 M/UL (ref 4.2–5.3)
WBC # BLD AUTO: 5.7 K/UL (ref 4.6–13.2)

## 2019-12-04 PROCEDURE — 82607 VITAMIN B-12: CPT

## 2019-12-04 PROCEDURE — 84443 ASSAY THYROID STIM HORMONE: CPT

## 2019-12-04 PROCEDURE — 83735 ASSAY OF MAGNESIUM: CPT

## 2019-12-04 PROCEDURE — 36415 COLL VENOUS BLD VENIPUNCTURE: CPT

## 2019-12-04 PROCEDURE — 81001 URINALYSIS AUTO W/SCOPE: CPT

## 2019-12-04 PROCEDURE — 80061 LIPID PANEL: CPT

## 2019-12-04 PROCEDURE — 80053 COMPREHEN METABOLIC PANEL: CPT

## 2019-12-04 PROCEDURE — 82306 VITAMIN D 25 HYDROXY: CPT

## 2019-12-04 PROCEDURE — 85025 COMPLETE CBC W/AUTO DIFF WBC: CPT

## 2019-12-04 NOTE — TELEPHONE ENCOUNTER
Patient is here in the office to have labs drawn. She brought an order with her from Dr. Jone Nash for Albumin and ALT+AST. Wants to know if you can order those so she can have that done today as well. I put a copy in the order in your box.

## 2019-12-05 LAB
25(OH)D3 SERPL-MCNC: 41.8 NG/ML (ref 30–100)
ALBUMIN SERPL-MCNC: 4.1 G/DL (ref 3.4–5)
ALBUMIN/GLOB SERPL: 1.2 {RATIO} (ref 0.8–1.7)
ALP SERPL-CCNC: 71 U/L (ref 45–117)
ALT SERPL-CCNC: 38 U/L (ref 13–56)
ANION GAP SERPL CALC-SCNC: 2 MMOL/L (ref 3–18)
APPEARANCE UR: CLEAR
AST SERPL-CCNC: 30 U/L (ref 10–38)
BACTERIA URNS QL MICRO: ABNORMAL /HPF
BILIRUB SERPL-MCNC: 0.6 MG/DL (ref 0.2–1)
BILIRUB UR QL: NEGATIVE
BUN SERPL-MCNC: 11 MG/DL (ref 7–18)
BUN/CREAT SERPL: 14 (ref 12–20)
CALCIUM SERPL-MCNC: 10.6 MG/DL (ref 8.5–10.1)
CHLORIDE SERPL-SCNC: 106 MMOL/L (ref 100–111)
CHOLEST SERPL-MCNC: 229 MG/DL
CO2 SERPL-SCNC: 31 MMOL/L (ref 21–32)
COLOR UR: YELLOW
CREAT SERPL-MCNC: 0.78 MG/DL (ref 0.6–1.3)
EPITH CASTS URNS QL MICRO: NEGATIVE /LPF (ref 0–5)
FOLATE SERPL-MCNC: >20 NG/ML (ref 3.1–17.5)
GLOBULIN SER CALC-MCNC: 3.4 G/DL (ref 2–4)
GLUCOSE SERPL-MCNC: 65 MG/DL (ref 74–99)
GLUCOSE UR STRIP.AUTO-MCNC: NEGATIVE MG/DL
HDLC SERPL-MCNC: 85 MG/DL (ref 40–60)
HDLC SERPL: 2.7 {RATIO} (ref 0–5)
HGB UR QL STRIP: NEGATIVE
KETONES UR QL STRIP.AUTO: NEGATIVE MG/DL
LDLC SERPL CALC-MCNC: 126.8 MG/DL (ref 0–100)
LEUKOCYTE ESTERASE UR QL STRIP.AUTO: ABNORMAL
LIPID PROFILE,FLP: ABNORMAL
MAGNESIUM SERPL-MCNC: 2.1 MG/DL (ref 1.6–2.6)
NITRITE UR QL STRIP.AUTO: NEGATIVE
PH UR STRIP: 7.5 [PH] (ref 5–8)
POTASSIUM SERPL-SCNC: 4 MMOL/L (ref 3.5–5.5)
PROT SERPL-MCNC: 7.5 G/DL (ref 6.4–8.2)
PROT UR STRIP-MCNC: NEGATIVE MG/DL
RBC #/AREA URNS HPF: ABNORMAL /HPF (ref 0–5)
SODIUM SERPL-SCNC: 139 MMOL/L (ref 136–145)
SP GR UR REFRACTOMETRY: 1.02 (ref 1–1.03)
TRIGL SERPL-MCNC: 86 MG/DL (ref ?–150)
TSH SERPL DL<=0.05 MIU/L-ACNC: 1.62 UIU/ML (ref 0.36–3.74)
UROBILINOGEN UR QL STRIP.AUTO: 0.2 EU/DL (ref 0.2–1)
VIT B12 SERPL-MCNC: 615 PG/ML (ref 211–911)
VLDLC SERPL CALC-MCNC: 17.2 MG/DL
WBC URNS QL MICRO: ABNORMAL /HPF (ref 0–4)

## 2019-12-11 ENCOUNTER — OFFICE VISIT (OUTPATIENT)
Dept: INTERNAL MEDICINE CLINIC | Age: 74
End: 2019-12-11

## 2019-12-11 ENCOUNTER — TELEPHONE (OUTPATIENT)
Dept: FAMILY MEDICINE CLINIC | Age: 74
End: 2019-12-11

## 2019-12-11 VITALS
TEMPERATURE: 98 F | SYSTOLIC BLOOD PRESSURE: 138 MMHG | HEART RATE: 56 BPM | HEIGHT: 67 IN | RESPIRATION RATE: 14 BRPM | WEIGHT: 117 LBS | DIASTOLIC BLOOD PRESSURE: 60 MMHG | BODY MASS INDEX: 18.36 KG/M2

## 2019-12-11 DIAGNOSIS — M85.89 OSTEOPENIA OF MULTIPLE SITES: ICD-10-CM

## 2019-12-11 DIAGNOSIS — M05.79 RHEUMATOID ARTHRITIS INVOLVING MULTIPLE SITES WITH POSITIVE RHEUMATOID FACTOR (HCC): ICD-10-CM

## 2019-12-11 DIAGNOSIS — K21.9 GASTROESOPHAGEAL REFLUX DISEASE, ESOPHAGITIS PRESENCE NOT SPECIFIED: ICD-10-CM

## 2019-12-11 DIAGNOSIS — I77.3 RENAL ARTERY STENOSIS DUE TO FIBROMUSCULAR DYSPLASIA (HCC): ICD-10-CM

## 2019-12-11 DIAGNOSIS — M54.12 RADICULOPATHY, CERVICAL: ICD-10-CM

## 2019-12-11 DIAGNOSIS — I10 ESSENTIAL HYPERTENSION: Primary | ICD-10-CM

## 2019-12-11 DIAGNOSIS — E83.52 FHH (FAMILIAL HYPOCALCIURIC HYPERCALCEMIA): ICD-10-CM

## 2019-12-11 DIAGNOSIS — I77.3 FIBROMUSCULAR DYSPLASIA (HCC): ICD-10-CM

## 2019-12-11 DIAGNOSIS — E78.5 HYPERLIPIDEMIA, UNSPECIFIED HYPERLIPIDEMIA TYPE: ICD-10-CM

## 2019-12-11 DIAGNOSIS — K44.9 HIATAL HERNIA: ICD-10-CM

## 2019-12-11 DIAGNOSIS — M47.812 CERVICAL SPONDYLOSIS WITHOUT MYELOPATHY: ICD-10-CM

## 2019-12-11 DIAGNOSIS — M50.20 DISPLACEMENT OF CERVICAL INTERVERTEBRAL DISC WITHOUT MYELOPATHY: ICD-10-CM

## 2019-12-11 DIAGNOSIS — I65.23 CAROTID STENOSIS, BILATERAL: ICD-10-CM

## 2019-12-11 DIAGNOSIS — K58.2 IRRITABLE BOWEL SYNDROME WITH BOTH CONSTIPATION AND DIARRHEA: ICD-10-CM

## 2019-12-11 DIAGNOSIS — K14.6 BURNING MOUTH SYNDROME: ICD-10-CM

## 2019-12-11 DIAGNOSIS — I44.7 LBBB (LEFT BUNDLE BRANCH BLOCK): ICD-10-CM

## 2019-12-11 DIAGNOSIS — I70.1 RENAL ARTERY STENOSIS DUE TO FIBROMUSCULAR DYSPLASIA (HCC): ICD-10-CM

## 2019-12-11 DIAGNOSIS — R09.89 BILATERAL CAROTID BRUITS: ICD-10-CM

## 2019-12-11 DIAGNOSIS — Z23 ENCOUNTER FOR IMMUNIZATION: ICD-10-CM

## 2019-12-11 DIAGNOSIS — E55.9 VITAMIN D DEFICIENCY: ICD-10-CM

## 2019-12-11 DIAGNOSIS — D04.9 BOWEN'S DISEASE: ICD-10-CM

## 2019-12-11 DIAGNOSIS — E21.3 HYPERPARATHYROIDISM (HCC): ICD-10-CM

## 2019-12-11 RX ORDER — ATORVASTATIN CALCIUM 10 MG/1
10 TABLET, FILM COATED ORAL DAILY
Qty: 90 TAB | Refills: 2 | Status: SHIPPED | OUTPATIENT
Start: 2019-12-11 | End: 2020-06-03 | Stop reason: SINTOL

## 2019-12-11 RX ORDER — CEFUROXIME AXETIL 500 MG/1
500 TABLET ORAL 2 TIMES DAILY
Qty: 14 TAB | Refills: 0 | Status: SHIPPED | OUTPATIENT
Start: 2019-12-11 | End: 2019-12-18

## 2019-12-11 NOTE — PROGRESS NOTES
Chief Complaint   Patient presents with    Hypertension     6 moth follow up with labs. Health Maintenance Due   Topic Date Due    Influenza Age 5 to Adult  08/01/2019     Patient given influenza vaccine, FLUAD, in left deltoid, per verbal order from Dr. Brandie Ramirez with read back. Instructed patient to sit and wait 10-20 minutes before leaving the premises so that we can watch for any complications or adverse reactions. Patient given vaccine information statement handout before vaccine was given. Patient tolerated well without adverse reactions or complications. 1. Have you been to the ER, urgent care clinic or hospitalized since your last visit? NO.     2. Have you seen or consulted any other health care providers outside of the [address] [place] [address] since your last visit (Include any pap smears or colon screening)? YES, Last seen by Rheumatologist in Nov. 2019. Do you have an Advanced Directive? NO    Would you like information on Advanced Directives? NO    Learning Assessment 6/6/2019   PRIMARY LEARNER Patient   HIGHEST LEVEL OF EDUCATION - PRIMARY LEARNER  SOME COLLEGE   BARRIERS PRIMARY LEARNER NONE   CO-LEARNER CAREGIVER No   PRIMARY LANGUAGE ENGLISH    NEED No   LEARNER PREFERENCE PRIMARY DEMONSTRATION     LISTENING     READING   ANSWERED BY patientr   RELATIONSHIP SELF     Abuse Screening Questionnaire 12/11/2019   Do you ever feel afraid of your partner? N   Are you in a relationship with someone who physically or mentally threatens you? N   Is it safe for you to go home? Y     3 most recent PHQ Screens 12/11/2019   PHQ Not Done -   Little interest or pleasure in doing things Not at all   Feeling down, depressed, irritable, or hopeless Not at all   Total Score PHQ 2 0     Fall Risk Assessment, last 12 mths 12/11/2019   Able to walk? Yes   Fall in past 12 months?  No   Fall with injury? -   Number of falls in past 12 months -   Fall Risk Score -

## 2019-12-11 NOTE — PATIENT INSTRUCTIONS
Vaccine Information Statement Influenza (Flu) Vaccine (Inactivated or Recombinant): What You Need to Know Many Vaccine Information Statements are available in [place] and other languages. See www.immunize.org/vis Hojas de información sobre vacunas están disponibles en español y en muchos otros idiomas. Visite www.immunize.org/vis 1. Why get vaccinated? Influenza vaccine can prevent influenza (flu). Flu is a contagious disease that spreads around the [facility] every year, usually between October and May. Anyone can get the flu, but it is more dangerous for some people. Infants and young children, people 72years of age and older, pregnant women, and people with certain health conditions or a weakened immune system are at greatest risk of flu complications. Pneumonia, bronchitis, sinus infections and ear infections are examples of flu-related complications. If you have a medical condition, such as heart disease, cancer or diabetes, flu can make it worse. Flu can cause fever and chills, sore throat, muscle aches, fatigue, cough, headache, and runny or stuffy nose. Some people may have vomiting and diarrhea, though this is more common in children than adults. Each year thousands of people in the [facility] die from flu, and many more are hospitalized. Flu vaccine prevents millions of illnesses and flu-related visits to the doctor each year. 2. Influenza vaccines CDC recommends everyone 10months of age and older get vaccinated every flu season. Children 6 months through 6years of age may need 2 doses during a single flu season. Everyone else needs only 1 dose each flu season. It takes about 2 weeks for protection to develop after vaccination. There are many flu viruses, and they are always changing. Each year a new flu vaccine is made to protect against three or four viruses that are likely to cause disease in the upcoming flu season.  Even when the vaccine doesnt exactly match these viruses, it may still provide some protection. Influenza vaccine does not cause flu. Influenza vaccine may be given at the same time as other vaccines. 3. Talk with your health care provider Tell your vaccine provider if the person getting the vaccine: 
 Has had an allergic reaction after a previous dose of influenza vaccine, or has any severe, life-threatening allergies.  Has ever had Guillain-Barré Syndrome (also called GBS). In some cases, your health care provider may decide to postpone influenza vaccination to a future visit. People with minor illnesses, such as a cold, may be vaccinated. People who are moderately or severely ill should usually wait until they recover before getting influenza vaccine. Your health care provider can give you more information. 4. Risks of a reaction  Soreness, redness, and swelling where shot is given, fever, muscle aches, and headache can happen after influenza vaccine.  There may be a very small increased risk of Guillain-Barré Syndrome (GBS) after inactivated influenza vaccine (the flu shot). [facility] children who get the flu shot along with pneumococcal vaccine (PCV13), and/or DTaP vaccine at the same time might be slightly more likely to have a seizure caused by fever. Tell your health care provider if a child who is getting flu vaccine has ever had a seizure. People sometimes faint after medical procedures, including vaccination. Tell your provider if you feel dizzy or have vision changes or ringing in the ears. As with any medicine, there is a very remote chance of a vaccine causing a severe allergic reaction, other serious injury, or death. 5. What if there is a serious problem? An allergic reaction could occur after the vaccinated person leaves the clinic.  If you see signs of a severe allergic reaction (hives, swelling of the face and throat, difficulty breathing, a fast heartbeat, dizziness, or weakness), call 9-1-1 and get the person to the nearest hospital. 
 
For other signs that concern you, call your health care provider. Adverse reactions should be reported to the Vaccine Adverse Event Reporting System (VAERS). Your health care provider will usually file this report, or you can do it yourself. Visit the VAERS website at www.vaers. hhs.gov or call 0-606.307.2834. VAERS is only for reporting reactions, and VAERS staff do not give medical advice. 6. The National Vaccine Injury Compensation Program 
 
The [facility] Vaccine Injury Compensation Program (VICP) is a federal program that was created to compensate people who may have been injured by certain vaccines. Visit the VICP website at www.hrsa.gov/vaccinecompensation or call 4-842.576.3766 to learn about the program and about filing a claim. There is a time limit to file a claim for compensation. 7. How can I learn more?  Ask your health care provider.  Call your local or state health department.  Contact the Centers for Disease Control and Prevention (CDC): 
- Call 8-812.897.8056 (7-427-PGX-INFO) or 
- Visit CDCs influenza website at www.cdc.gov/flu Vaccine Information Statement (Interim) Inactivated Influenza Vaccine 8/15/2019 
42 ANGIE Stokes 870HT-97 Department of Health and [employer] Centers for Disease Control and Prevention Office Use Only Restart aspirin 81 mg every other day. Start Lipitor 10 mg daily

## 2019-12-15 PROBLEM — K14.6 BURNING MOUTH SYNDROME: Status: ACTIVE | Noted: 2019-06-11

## 2019-12-15 NOTE — PROGRESS NOTES
HPI:   [place] is a 69y.o. year old female who presents today to for a physical exam. 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 She was last seen on 10/30/2019 for post-ED follow-up and described episodes with head fullness, flushing, and  \"prickly skin\" occurring in the evening after lying down. She stated that she did notice one episode where her heart rate appeared to be 150 bpm. She stated that the episodes are very intermittent and do not occur every day, and were not associated with any other symptoms. She also reported that she had been under an increased amount of stress with regard to several of her family members, and felt that the episodes may be secondary to anxiety. Her dose of Zoloft was increased to 50 mg daily. She presents today for follow-up and is accompanied by her . She reports that the episodes of palpitations and head fullness seem to have resolved with the increase in dose of Zoloft. She has not been monitoring her blood pressure at home, but it was controlled at 136/68 at Dr. Koby Weinstein office. She does describe a new \"loose\" cough and chest congestion that has been present for the last 4-5 days. She denies any fever, facial pain, sore throat, headache, or chills. She states that she has not been coughing anything up, although the cough remains \"wet and loose\", and she believes that it is coming from her chest. She is otherwise without specific complaints and feeling generally well. On 10/21/2019, she presented to the [facility] ED reporting difficulty with elevated blood pressure and a dull headache.  She stated that her blood pressure has been elevated earlier in the week when visiting her gastroenterologist. On the day of presentation to the ED, she states that she was experiencing an achy dull headache that was mild in severity throughout the day. She became concerned when she went to bed and noticed throbbing pain in her hands and feet. She checked her blood pressure and noticed her systolic ranged from 727-901 mmHg. She had taken her metoprolol tartrate 50 mg bid as prescribed. She denied any shortness of breath, leg swelling, orthopnea, PND, visual changes, nausea/vomiting, lightheadedness, dizziness, confusion, speech difficulty, numbness, or weakness. She did admit to some chest tightness, but stated that it was not exertional and was her usual discomfort associated with reflux. Evaluation in the ED revealed that her blood pressure was 144/73. EKG showed sinus rhythm at 64 bpm and LBBB. Laboratory data included WBC 6.2, Hb 11.8/ Hct 36.9, creatinine 0.79/eGFR >60, troponin x 1 negative, and chest x-ray negative. While in the ED, her headache resolved and she was subsequently discharged. She has a history of hypertension and palpitations, treated currently with metoprolol. She has a history of dyslipidemia, not currently being treated with medication. She was evaluated by Dr. Keysha Britton in 2012 for palpitations. Event monitor was placed, and showed a known LBBB and PVC's, but reportedly no other arrhythmias (report unavailable). In 9/2012, she also underwent a pharmacologic nuclear stress test which was a normal low risk study with EF 55%. She also had an echocardiogram, which showed low normal LV function (EF 50%), mild grade 1 diastolic dysfunction, trace MR, TR, and AI. She also has a history of fibromuscular dysplasia, and in 5/2009, underwent aortogram and renal arteriogram with balloon angioplasty.  She has a known right carotid bruit, and her last carotid duplex (5/2014) showed mild atherosclerotic intraluminal plaquing with elevated velocities in the bilateral mid internal carotid arteries with a \"string of pearls\" appearance consistent with known fibromuscular dysplasia, causing a 50-69% stenosis of the bilateral internal carotid arteries. There was no change from prior study in 2011. She had been followed by Dr. Faina Montelongo, but states that she has been released from his care. She remains quite active, and denies any chest pain, shortness of breath at rest or with exertion, palpitations, lightheadedness, or edema. She underwent a repeat carotid duplex study (8/15/2018) showing mild (<50%) stenosis of the left internal carotid artery and moderate (50-69%) stenosis of the right internal carotid artery. She has a history of rheumatoid arthritis, diagnosed when she was 37years old, treated currently with subcutaneous methotrexate and prednisone. She is being followed closely by Dr. Alessia Moser. She is not currently being treated with a biologic agent due to concern regarding recent multiple squamous cell carcinomas of the skin, for which she is being followed by Dr. Hector Bianchi. She reports that she was well controlled on Humira for several years, but had to discontinue treatment when it was no longer on her insurance formulary. She also has a history of osteopenia, with bone density studies performed by Dr. Alessia Moser. Her last bone density study was in 2/2019 showing T-scores:  femoral neck left -1.7  /right -1.9 and lumbar -2.1. She continues to take calcium and Vitamin D supplements. She has no history of pathologic fractures. She was recently diagnosed with familial hypocalciuric hypercalcemia with mildly elevated calcium 10.6, mildly elevated PTH 80, Vitamin D level 36.5, and 24 hour urine calcium of 105 (FeCa 1%) in 7/30/2018, which would be consistent with this diagnosis. She is being followed by Dr. Jim Griffiths. She has a history of cervical degenerative joint and disc disease and chronic neck pain. She was being followed by Dr. Franky Carver, who recommended surgery.  She was seen by Dr. Contreras Albarado at [facility] for a second opinion, and cervical MRI (9/2015) showed multilevel degenerative disc and facet disease, worst at C5-C6; left central/subarticular disc protrusion with superimposed annular fissure noted at C5-C6 causing mass effect upon the left hemicord but no signal changes with severe left neuroforaminal narrowing at this level. Recommendation was for her to proceed with physical therapy, which did result in some improvement. She previously had been treated with gabapentin for pain control, but is currently only using Tylenol. She underwent acupuncture therapy with Dr. Luis Alberto Grier with only minimal improvement. She also states that she was referred to Dr. Mirta Hutchinson for evaluation, but has not yet scheduled an appointment with him. She has a history of GERD. In 11/2017, she was having increasing difficulty with constipation alternating with diarrhea as well as increasing reflux symptoms. She states that she was evaluated by Dr. Yissel Reid and it was her opinion that she suffered from irritable bowel syndrome. She has made changes in her diet and increased her consumption of fiber with some improvement. She began experiencing worsening reflux symptoms in 12/2018 after attempting to wean omeprazole from 40 mg to 20 mg daily. She also was taking an increased dose of prednisone at the time for a flare of her RA. She was evaluated by Dr. Yissel Reid and attempted a trial of Protonix which she believed may be working better. She plans to continue taking it. She states that at the time of her worsening reflux, she also noted increasing palpitations and underwent evaluation by THOMAS Barrera for Dr. Jacinta Guerra. She states that she was offered an event recorder to evaluate, but she noticed that her symptoms improved once her GERD was under control. She therefore declined further evaluation. She states that she has resumed taking omeprazole with reasonable control of her reflux symptoms.  She underwent upper endoscopy by Dr. Remy Nowak in 11/2012 which showed a 2 cm hiatal hernia and pathology from a distal esophageal biopsy showing chronic inflammation. She had a screening colonoscopy in 7/14/2016 by Dr. Jim Stewart, which showed an adenomatous polyp (report unavailable). Recommendation was for follow-up in 5 years. She denies any abdominal pain, nausea, vomiting, melena, hematochezia, or change in bowel movements. Past Medical History:   Diagnosis Date    Bowen's disease     Fibromuscular dysplasia (HCC)     GERD (gastroesophageal reflux disease)     Hiatal hernia     Hyperpar[facility]roidism ([facility] [address]     Hypertension     LBBB (left bundle branch block)     Premature ventricular contractions     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 (HCC)     s/p balloon angioplasty 5/2009 [facility]    Rheumatoid arthritis ([facility] [address]     Skin cancer      Past Surgical History:   Procedure Laterality Date    HX ANGIOPLASTY      HX KNEE REPLACEMENT Bilateral 2009    partial     HX OTHER SURGICAL      removal of fibroid benign tumors from breast     HX PARTIAL HYSTERECTOMY      HX TONSILLECTOMY  1964     Current Outpatient Medications   Medication Sig    atorvastatin (LIPITOR) 10 mg tablet Take 1 Tab by mouth daily.  sertraline (ZOLOFT) 50 mg tablet Take 1 Tab by mouth daily.  metoprolol tartrate (LOPRESSOR) 50 mg tablet Take 1 Tab by mouth two (2) times a day.  METHOTREXATE SODIUM INJECTION 15 mg every seven (7) days.  omeprazole (PRILOSEC) 40 mg capsule Take 40 mg by mouth daily. EVERY OTHER DAY    predniSONE (DELTASONE) 5 mg tablet Take  by mouth daily. Per pt now 2.5 daily    multivitamin (ONE A DAY) tablet Take 1 Tab by Mouth Once a Day.  acetaminophen (TYLENOL) 500 mg tablet Take 500 mg by mouth every six (6) hours as needed.  cholecalciferol (VITAMIN D3) 400 unit tab tablet Take 2,000 Units by mouth.  folic acid (FOLVITE) 1 mg tablet Take 1 mg by mouth daily.  cefUROXime (CEFTIN) 500 mg tablet Take 1 Tab by mouth two (2) times a day for 7 days.      No current facility-administered medications for this visit. Allergies and Intolerances: Allergies   Allergen Reactions    Hydrocodone Anaphylaxis    Nsaids (Non-Steroidal Anti-Inflammatory Drug) Other (comments)     None due to kidneys    Azithromycin Itching    Other Medication Other (comments)     GI intolerance to nonsteroidal antiinflammatory medications     Vicodin [Hydrocodone-Acetaminophen] Other (comments)     Family History: Her father passed away from a ruptured AAA. Her mother has osteoarthritis and COPD, and a maternal aunt had RA. Her brother recently passed away from multiple sclerosis. She has no family history of colon or breast cancer. Family History   Problem Relation Age of Onset    Arthritis-osteo Mother     Elevated Lipids Mother     Heart Disease Mother         CHF    Lung Disease Mother     Hypertension Sister     Lung Disease Sister     Hypertension Brother     MS Brother      Social History:   She  reports that she has never smoked. She has never used smokeless tobacco. She is  with two children and one stepchild. 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 She drinks wine only occasionally. Social History     Substance and Sexual Activity   Alcohol Use Yes    Alcohol/week: 1.0 - 2.0 standard drinks    Types: 1 - 2 Glasses of wine per week     Immunization History:  Immunization History   Administered Date(s) Administered    Influenza High Dose Vaccine PF 10/29/2018    Influenza Vaccine 10/29/2013    Influenza Vaccine (Tri) Adjuvanted 12/11/2019    Pneumococcal Conjugate (PCV-13) 12/14/2015    Pneumococcal Polysaccharide (PPSV-23) 11/15/2018    Tdap 10/02/2019       Review of Systems:   As above included in HPI. Otherwise 11 point review of systems negative including constitutional, skin, HENT, eyes, respiratory, cardiovascular, gastrointestinal, genitourinary, musculoskeletal, endocrine, hematologic, allergy, and neurologic.     Physical:   Vitals: BP: 138/60  HR: (!) 56  WT: 117 lb (53.1 kg)  BMI:  18.32 kg/m2    Exam:      Patient appears in no apparent distress. Affect is appropriate. HEENT --Anicteric sclerae, tympanic membranes normal,  ear canals normal.  PERRL, EOMI, conjunctiva and lids normal.   Sinuses were nontender, turbinates normal, hearing normal.  Oropharynx without  erythema, normal tongue, oral mucosa and tonsils. No cervical lymphadenopathy. No thyromegaly or JVD. Bilateral carotid bruits clearly evident. Carotid pulses 2+ with normal upstroke. Lungs --Clear to auscultation. No wheezing or rales. Heart --Regular rate and rhythm, no murmurs, rubs, gallops, or clicks. Chest wall --Nontender to palpation. PMI normal.  Abdomen -- Soft and nontender, no hepatosplenomegaly or masses. Extremities -- Without cyanosis, clubbing, edema. 2+ pulses equally and bilaterally. Normal looking digits, ROM intact  Neuro -- CN 2-12 intact, strength 5/5 with intact soft touch in all extremities, cerebellar  exam finger to nose test normal, 2+DTRs  Derm  no obvious abnormalities noted, no rash        Review of Data:  Labs:  Hospital Outpatient Visit on 12/04/2019   Component Date Value Ref Range Status    WBC 12/04/2019 5.7  4.6 - 13.2 K/uL Final    RBC 12/04/2019 4.08* 4.20 - 5.30 M/uL Final    HGB 12/04/2019 12.3  12.0 - 16.0 g/dL Final    HCT 12/04/2019 40.5  35.0 - 45.0 % Final    MCV 12/04/2019 99.3* 74.0 - 97.0 FL Final    MCH 12/04/2019 30.1  24.0 - 34.0 PG Final    MCHC 12/04/2019 30.4* 31.0 - 37.0 g/dL Final    RDW 12/04/2019 14.8* 11.6 - 14.5 % Final    PLATELET 04/30/8575 846  135 - 420 K/uL Final    MPV 12/04/2019 10.8  9.2 - 11.8 FL Final    NEUTROPHILS 12/04/2019 71  40 - 73 % Final    LYMPHOCYTES 12/04/2019 15* 21 - 52 % Final    MONOCYTES 12/04/2019 11* 3 - 10 % Final    EOSINOPHILS 12/04/2019 2  0 - 5 % Final    BASOPHILS 12/04/2019 1  0 - 2 % Final    ABS. NEUTROPHILS 12/04/2019 4.0  1.8 - 8.0 K/UL Final    ABS. LYMPHOCYTES 12/04/2019 0.9  0.9 - 3.6 K/UL Final    ABS. MONOCYTES 12/04/2019 0.6  0.05 - 1.2 K/UL Final    ABS. EOSINOPHILS 12/04/2019 0.1  0.0 - 0.4 K/UL Final    ABS. BASOPHILS 12/04/2019 0.0  0.0 - 0.1 K/UL Final    DF 12/04/2019 AUTOMATED    Final    LIPID PROFILE 12/04/2019        Final    Cholesterol, total 12/04/2019 229* <200 MG/DL Final    Triglyceride 12/04/2019 86  <150 MG/DL Final    HDL Cholesterol 12/04/2019 85* 40 - 60 MG/DL Final    LDL, calculated 12/04/2019 126.8* 0 - 100 MG/DL Final    VLDL, calculated 12/04/2019 17.2  MG/DL Final    CHOL/HDL Ratio 12/04/2019 2.7  0 - 5.0   Final    Magnesium 12/04/2019 2.1  1.6 - 2.6 mg/dL Final    Sodium 12/04/2019 139  136 - 145 mmol/L Final    Potassium 12/04/2019 4.0  3.5 - 5.5 mmol/L Final    Chloride 12/04/2019 106  100 - 111 mmol/L Final    CO2 12/04/2019 31  21 - 32 mmol/L Final    Anion gap 12/04/2019 2* 3.0 - 18 mmol/L Final    Glucose 12/04/2019 65* 74 - 99 mg/dL Final    BUN 12/04/2019 11  7.0 - 18 MG/DL Final    Creatinine 12/04/2019 0.78  0.6 - 1.3 MG/DL Final    BUN/Creatinine ratio 12/04/2019 14  12 - 20   Final    GFR est AA 12/04/2019 >60  >60 ml/min/1.73m2 Final    GFR est non-AA 12/04/2019 >60  >60 ml/min/1.73m2 Final    Calcium 12/04/2019 10.6* 8.5 - 10.1 MG/DL Final    Bilirubin, total 12/04/2019 0.6  0.2 - 1.0 MG/DL Final    ALT (SGPT) 12/04/2019 38  13 - 56 U/L Final    AST (SGOT) 12/04/2019 30  10 - 38 U/L Final    Alk.  phosphatase 12/04/2019 71  45 - 117 U/L Final    Protein, total 12/04/2019 7.5  6.4 - 8.2 g/dL Final    Albumin 12/04/2019 4.1  3.4 - 5.0 g/dL Final    Globulin 12/04/2019 3.4  2.0 - 4.0 g/dL Final    A-G Ratio 12/04/2019 1.2  0.8 - 1.7   Final    TSH 12/04/2019 1.62  0.36 - 3.74 uIU/mL Final    Color 12/04/2019 YELLOW    Final    Appearance 12/04/2019 CLEAR    Final    Specific gravity 12/04/2019 1.016  1.005 - 1.030   Final    pH (UA) 12/04/2019 7.5  5.0 - 8.0   Final    Protein 12/04/2019 NEGATIVE   NEG mg/dL Final    Glucose 12/04/2019 NEGATIVE   NEG mg/dL Final    Ketone 12/04/2019 NEGATIVE   NEG mg/dL Final    Bilirubin 12/04/2019 NEGATIVE   NEG   Final    Blood 12/04/2019 NEGATIVE   NEG   Final    Urobilinogen 12/04/2019 0.2  0.2 - 1.0 EU/dL Final    Nitrites 12/04/2019 NEGATIVE   NEG   Final    Leukocyte Esterase 12/04/2019 SMALL* NEG   Final    WBC 12/04/2019 0 to 3  0 - 4 /hpf Final    RBC 12/04/2019 0 to 3  0 - 5 /hpf Final    Epithelial cells 12/04/2019 NEGATIVE   0 - 5 /lpf Final    Bacteria 12/04/2019 1+* NEG /hpf Final    Vitamin D 25-Hydroxy 12/04/2019 41.8  30 - 100 ng/mL Final    Vitamin B12 12/04/2019 615  211 - 911 pg/mL Final    Folate 12/04/2019 >20.0* 3.10 - 17.50 ng/mL Final     Calculated 10 year ASCVD risk score: 19.6 %    Health Maintenance:  Screening:    Mammogram: negative (2/2019) [place]   PAP smear: well woman exams performed at [facility] by KIM Crowe (last 2/2019)   Colorectal: colonoscopy (7/2016) tubular adenoma. Dr. Ava Oliveira. Due 2021. Now followed by Dr. Godwin Cheung. Depression: on Zoloft   DM (HbA1c/FPG): FPG 65 (12/2019)   Hepatitis C: negative (1/2/2018) Dr. Kimberly Yan. Falls: none   DEXA: osteopenia (last 2/2019). Performed by Dr. Kimberly Yan.    Glaucoma: regular eye exams with Dr. Grady Grove (last 8/2019 ) every 6 months   Smoking: none   Vitamin D: 41.8 (12/2019)    Medicare Wellness: 6/6/2019    Impression:  Patient Active Problem List   Diagnosis Code    Intervertebral disc protrusion [id number]    Displacement of cervical intervertebral disc without myelopathy M50.20    Cervical spondylosis without myelopathy M47.812    Degeneration of cervical intervertebral disc M50.30    Brachial neuritis or radiculitis M54.12    Radiculopathy, cervical M54.12    Skin cancer C44.90    Rheumatoid arthritis ([address] M06.9    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 (HCC) I70.1    LBBB (left bundle branch block) I44.7  Hypertension I10    Hyperparathyroidism ([address] E21.3    Hiatal hernia K44.9    GERD (gastroesophageal reflux disease) K21.9    Fibromuscular dysplasia (HCC) I77.3    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    Irritable bowel syndrome with both constipation and diarrhea K58.2    Bilateral carotid bruits R09.89    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    Hyperlipidemia E78.5    Osteopenia of multiple sites M85.89    Vitamin D deficiency E55.9    Carotid stenosis, bilateral I65.23    Mouth pain K13.79       Plan:  1. Hypertension. Blood pressure appears reasonably well controlled on current regimen of metoprolol 50 mg bid, which also helps with control of palpitations. Recent elevation prompting ED evaluation appeared secondary to anxiety since improved to baseline after increasing dose of Zoloft. Recent increase in palpitations resolved after reflux symptoms improved, and pateint declined further evaluation by cardiology. Renal function normal with creatinine 0.78/ eGFR >60. Will continue to follow. 2. Fibromuscular dysplasia. Previous angioplasty of renal arteries. History of moderate stenosis of bilateral carotid arteries, although last carotid duplex in 5/2014. Noted prominent carotid bruits and repeat carotid duplex obtained in 8/2018 showing moderate (50-69%) stenosis of the right ICA and mild (<50%) of the left ICA. Review of report also showed mild atherosclerosis at that time. Previously followed by Dr. Dunia Ac of [facility], but given stability over many years, released from his care with plan to pursue further evaluation only if becomes symptomatic. Will obtain surveillance carotid duplex scan to monitor. Order placed. 3. Hyperlipidemia. Calculated 10 year ASCVD risk improved to 19.6 %, which places her in one of the four statin benefit groups as per new AHA/ACC guidelines (primary prevention: 10 year ASCVD risk >7.5%).  In addition, mild atherosclerosis in the setting of fibromuscular dysplasia described on prior carotid duplex, and moderate stenosis of right ICA reported. Also at increased due to chronic inflammatory state related to rheumatoid arthritis. Given these factors would consider treatment with statin. Unsuccessful attempts at maintaining level for LDL< 70 and BMI 18 does not allow for any weight loss. Repeat lipid panel today again elevated with  and HDL 85. Given multiple risk factors and known atherosclerosis, will begin treatment with atorvastatin 10 mg daily. Also, given that has evidence of atherosclerosis and moderate right ICA stenosis, advised patient to restart aspirin, but may take every other day to minimize bruising. Will continue to follow. 4. Rheumatoid arthritis. On SC methotrexate and prednisone with fairly stable symptoms. Considering another biologic but hesitant given Bowen's disease. Being followed closely by Dr. Lorrie Berger. Methotrexate has been resumed given improvement in mucosal irritation. Follow. 5. Mouth pain. No obvious lesions on exam, and no evidence of oral thrush. She has had gums evaluated by dentist and no abnormalities seen. Also evaluated by Dr. Artemus Cabot of ENT in the past with no etiology found. Differential diagnosis would include herpes simplex, aphthous stomatitis, xerostomia, or nutritional deficiencies (B12, B6, folate, Zinc, iron). Other eitiologies could include candidiasis, allergic contact dermatitis, diabetes, menopause, or thyroid disease. 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 Tricyclics, pregabalin, gabapentin, and Mirapex have shown some benefit. She reports that she has had similar episodes in the past, but will resolve after several weeks without persistent symptoms. Has found some benefit with Mycelex troches and will order. Advised to take a multivitamin. Continue to follow. 6. Cervical degenerative disease. Experiencing chronic neck pain, treated previously with gabapentin.  Some improvement with exercising as well, although continuing to experience pain. Attempted acupuncture therapy by Dr. Lady De La Paz, but after 3 months, no significant benefit seen. Previously followed by Dr. Ivana Ro, but referred to Dr. Tee Horowitz by Dr. Eileen Rosario. Has not yet scheduled evaluation, but not a significant complaint today. 7. Osteopenia. Last bone density scan 2/2019 . Using femoral neck T-scores, calculated FRAX score estimates her 10 year risk of a major osteoporetic fracture at 13 % and hip fracture at 3.1 %, which are an indication for biphosphonate treatment with hip fracture risk >3%. Being performed and followed by Dr. Eileen Rosario. Continue calcium and Vitamin D. Encouraged exercise, particularly weight bearing activities. 8. Hyperparathyroidism. Mild increase in PTH, mildly elevated calcium, normal Vitamin D level,and 24 hour urine calcium only 105 (FeCa 1%) is consistent with a diagnosis of familial hypocalciuric hypercalcemia. Being followed by Dr. Flakita Borges and told at most recent visit that would not need to follow-up for 2 years given benign condition. Advised to drink plenty of fluids. 9. GERD. Resumed omeprazole and taking every other day with good response. Symptoms of diarrhea alternating with constipation chronically thought to be secondary to irritable bowel syndrome. On probiotic. Reportedly changed diet and increased fiber with some improvement. 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 11. Cough. Patient stating that she feels may be improving, and exam benign, so not wishing treatment currently. However, patient is on immunosuppression so advised to call if develops any worsening and will prescribe antibiotic. 12. Health maintenance. Will give influenza vaccine today. Completed pneumococcal series and Tdap. Given script for Shingrix vaccine, and patient stated that Dr. Eileen Rosario agreed that she may proceed. Mammogram and well women exams performed at [facility].  Continue regular eye exams with Dr. Polo Quevedo. Vitamin D level now normal after increasing to 2000 U daily. Medicare wellness visit up to date. Total time: 40 minutes spent with the patient in face-to-face consultation of which greater than 50% was spent on counseling, answering questions and/or coordination of care. Complex medical review and management performed. Patient understands recommendations and agrees with plan. Follow-up in 6 weeks.

## 2019-12-17 ENCOUNTER — HOSPITAL ENCOUNTER (OUTPATIENT)
Dept: VASCULAR SURGERY | Age: 74
Discharge: HOME | End: 2019-12-17
Attending: INTERNAL MEDICINE
Payer: MEDICARE

## 2019-12-17 DIAGNOSIS — I77.3 FIBROMUSCULAR DYSPLASIA (HCC): ICD-10-CM

## 2019-12-17 DIAGNOSIS — I65.23 CAROTID STENOSIS, BILATERAL: ICD-10-CM

## 2019-12-17 PROCEDURE — 93880 EXTRACRANIAL BILAT STUDY: CPT

## 2019-12-18 LAB
LEFT CCA DIST DIAS: 17.5 CM/S
LEFT CCA DIST SYS: 66.8 CM/S
LEFT CCA MID DIAS: 27.39 CM/S
LEFT CCA MID SYS: 110.13 CM/S
LEFT CCA PROX DIAS: 23.5 CM/S
LEFT CCA PROX SYS: 116 CM/S
LEFT ECA DIAS: 11.61 CM/S
LEFT ECA SYS: 60.8 CM/S
LEFT ICA DIST DIAS: 35.9 CM/S
LEFT ICA DIST SYS: 110.9 CM/S
LEFT ICA MID DIAS: 27.7 CM/S
LEFT ICA MID SYS: 116.8 CM/S
LEFT ICA PROX DIAS: 17.5 CM/S
LEFT ICA PROX SYS: 51 CM/S
LEFT ICA/CCA SYS: 1.75
LEFT VERTEBRAL DIAS: 21.45 CM/S
LEFT VERTEBRAL SYS: 72.6 CM/S
RIGHT CCA DIST DIAS: 15 CM/S
RIGHT CCA DIST SYS: 70.1 CM/S
RIGHT CCA MID DIAS: 18.9 CM/S
RIGHT CCA MID SYS: 97.63 CM/S
RIGHT CCA PROX DIAS: 17.5 CM/S
RIGHT CCA PROX SYS: 90.4 CM/S
RIGHT ECA DIAS: 19.01 CM/S
RIGHT ECA SYS: 88.9 CM/S
RIGHT ICA DIST DIAS: 30.7 CM/S
RIGHT ICA DIST SYS: 135.4 CM/S
RIGHT ICA MID DIAS: 63.1 CM/S
RIGHT ICA MID SYS: 303.5 CM/S
RIGHT ICA PROX DIAS: 9.1 CM/S
RIGHT ICA PROX SYS: 54.3 CM/S
RIGHT ICA/CCA SYS: 4.3
RIGHT VERTEBRAL DIAS: 26.77 CM/S
RIGHT VERTEBRAL SYS: 88.9 CM/S

## 2019-12-20 ENCOUNTER — TELEPHONE (OUTPATIENT)
Dept: INTERNAL MEDICINE CLINIC | Age: 74
End: 2019-12-20

## 2019-12-20 NOTE — TELEPHONE ENCOUNTER
Carotid duplex scan (12/17/2019)  Details     Reading Physician Reading Date Result Priority   Yaneth Biggs MD 12/18/2019 Routine      Result Text     Moderate homogenous plaque seen left internal carotid artery 50 to 69% stenosis. Stenosis is at the mid ICA and may be upper limits of moderate. Mild heterogeneous plaque left internal carotid artery less than 50% stenosis. Vertebrals are antegrade bilateral.  Subclavian's appear normal bilateral.       Please let the patient know that her carotid duplex scan showed moderate stenosis (50-69%) of the left internal carotid artery. There was mild plaquing (< 50%) of the right internal carotid artery. This represents no significant change from last year's study. Please also inquire how she has been doing on atorvastatin. Thanks.

## 2019-12-23 NOTE — TELEPHONE ENCOUNTER
Called patient and verified full name and date of birth. Informed patient of Dr. Racquel Rodríguez' message and she verbalized understanding stating that she has been doing well on Atorvastatin.

## 2019-12-30 ENCOUNTER — TELEPHONE (OUTPATIENT)
Dept: INTERNAL MEDICINE CLINIC | Age: 74
End: 2019-12-30

## 2019-12-30 DIAGNOSIS — J06.9 UPPER RESPIRATORY TRACT INFECTION, UNSPECIFIED TYPE: Primary | ICD-10-CM

## 2019-12-30 RX ORDER — CEFUROXIME AXETIL 500 MG/1
500 TABLET ORAL 2 TIMES DAILY
Qty: 20 TAB | Refills: 0 | Status: SHIPPED | OUTPATIENT
Start: 2019-12-30 | End: 2020-02-09 | Stop reason: ALTCHOICE

## 2019-12-30 NOTE — TELEPHONE ENCOUNTER
Clinical Pharmacy - Warfarin Dosing Consult     Pharmacy has been consulted to manage this patient s warfarin therapy.  Indication: Other - specify in comments;Atrial Fibrillation  Therapy Goal: INR 2-3  Provider/Team: Cards 2  Warfarin Prior to Admission: Yes  Warfarin PTA Regimen: PTA at OSH on 7/2 was on 4mg daily for 2 days. At OSH was on until 7/9, off since then. Before stopping got 5, 2, 7.5, 7.5, 5 on 7/5-7/9  Significant drug interactions: Amiodarone (increased INR); aspirin, plavix, high intensity heparin (all increase bleed risk)  Recent documented change in oral intake/nutrition: No  Dose Comments: LV thrombus & Afib    INR   Date Value Ref Range Status   07/20/2018 1.35 (H) 0.86 - 1.14 Final   07/19/2018 1.18 (H) 0.86 - 1.14 Final       Recommend warfarin 5 mg today.  Pharmacy will monitor Mariusz Martinezs daily and order warfarin doses to achieve specified goal.      Please contact pharmacy as soon as possible if the warfarin needs to be held for a procedure or if the warfarin goals change.       Pt calling, says she thinks she is going to need additional antibiotics. Says she felt like she was getting better but she is again having congestion, yellow mucus. No fever she is aware of but is achy.     Pt is aware doctor is out of the office

## 2020-01-16 ENCOUNTER — HOSPITAL ENCOUNTER (OUTPATIENT)
Dept: LAB | Age: 75
Discharge: HOME | End: 2020-01-16
Payer: MEDICARE

## 2020-01-16 LAB
25(OH)D3 SERPL-MCNC: 35.6 NG/ML (ref 30–100)
BASOPHILS # BLD: 0 K/UL (ref 0–0.1)
BASOPHILS NFR BLD: 0 % (ref 0–2)
DIFFERENTIAL METHOD BLD: ABNORMAL
EOSINOPHIL # BLD: 0.1 K/UL (ref 0–0.4)
EOSINOPHIL NFR BLD: 2 % (ref 0–5)
ERYTHROCYTE [DISTWIDTH] IN BLOOD BY AUTOMATED COUNT: 14.1 % (ref 11.6–14.5)
HCT VFR BLD AUTO: 37.2 % (ref 35–45)
HGB BLD-MCNC: 11.6 G/DL (ref 12–16)
LYMPHOCYTES # BLD: 0.9 K/UL (ref 0.9–3.6)
LYMPHOCYTES NFR BLD: 17 % (ref 21–52)
MCH RBC QN AUTO: 29.4 PG (ref 24–34)
MCHC RBC AUTO-ENTMCNC: 31.2 G/DL (ref 31–37)
MCV RBC AUTO: 94.4 FL (ref 74–97)
MONOCYTES # BLD: 0.5 K/UL (ref 0.05–1.2)
MONOCYTES NFR BLD: 10 % (ref 3–10)
NEUTS SEG # BLD: 3.8 K/UL (ref 1.8–8)
NEUTS SEG NFR BLD: 71 % (ref 40–73)
PLATELET # BLD AUTO: 287 K/UL (ref 135–420)
PMV BLD AUTO: 11.2 FL (ref 9.2–11.8)
RBC # BLD AUTO: 3.94 M/UL (ref 4.2–5.3)
WBC # BLD AUTO: 5.3 K/UL (ref 4.6–13.2)

## 2020-01-16 PROCEDURE — 85025 COMPLETE CBC W/AUTO DIFF WBC: CPT

## 2020-01-16 PROCEDURE — 82306 VITAMIN D 25 HYDROXY: CPT

## 2020-01-16 PROCEDURE — 36415 COLL VENOUS BLD VENIPUNCTURE: CPT

## 2020-01-16 PROCEDURE — 80061 LIPID PANEL: CPT

## 2020-01-16 PROCEDURE — 80053 COMPREHEN METABOLIC PANEL: CPT

## 2020-01-17 ENCOUNTER — TELEPHONE (OUTPATIENT)
Dept: INTERNAL MEDICINE CLINIC | Age: 75
End: 2020-01-17

## 2020-01-17 LAB
ALBUMIN SERPL-MCNC: 3.8 G/DL (ref 3.4–5)
ALBUMIN/GLOB SERPL: 1.1 {RATIO} (ref 0.8–1.7)
ALP SERPL-CCNC: 78 U/L (ref 45–117)
ALT SERPL-CCNC: 64 U/L (ref 13–56)
ANION GAP SERPL CALC-SCNC: 9 MMOL/L (ref 3–18)
AST SERPL-CCNC: 46 U/L (ref 10–38)
BILIRUB SERPL-MCNC: 0.4 MG/DL (ref 0.2–1)
BUN SERPL-MCNC: 13 MG/DL (ref 7–18)
BUN/CREAT SERPL: 17 (ref 12–20)
CALCIUM SERPL-MCNC: 10.4 MG/DL (ref 8.5–10.1)
CHLORIDE SERPL-SCNC: 106 MMOL/L (ref 100–111)
CHOLEST SERPL-MCNC: 165 MG/DL
CO2 SERPL-SCNC: 25 MMOL/L (ref 21–32)
CREAT SERPL-MCNC: 0.75 MG/DL (ref 0.6–1.3)
GLOBULIN SER CALC-MCNC: 3.4 G/DL (ref 2–4)
GLUCOSE SERPL-MCNC: 74 MG/DL (ref 74–99)
HDLC SERPL-MCNC: 82 MG/DL (ref 40–60)
HDLC SERPL: 2 {RATIO} (ref 0–5)
LDLC SERPL CALC-MCNC: 72 MG/DL (ref 0–100)
LIPID PROFILE,FLP: ABNORMAL
POTASSIUM SERPL-SCNC: 4.4 MMOL/L (ref 3.5–5.5)
PROT SERPL-MCNC: 7.2 G/DL (ref 6.4–8.2)
SODIUM SERPL-SCNC: 140 MMOL/L (ref 136–145)
TRIGL SERPL-MCNC: 55 MG/DL (ref ?–150)
VLDLC SERPL CALC-MCNC: 11 MG/DL

## 2020-01-17 NOTE — TELEPHONE ENCOUNTER
[facility] calling requesting lab results from yesterday. Lab results have been faxed to Dr Tremayne Crews.

## 2020-01-23 ENCOUNTER — TELEPHONE (OUTPATIENT)
Dept: INTERNAL MEDICINE CLINIC | Age: 75
End: 2020-01-23

## 2020-01-23 NOTE — TELEPHONE ENCOUNTER
Pt had to cancel her appt today at 1:30 due to IBS flare-up, wants to know if she could be seen relatively soon.   Please advise her at [phone]

## 2020-02-05 ENCOUNTER — HOSPITAL ENCOUNTER (OUTPATIENT)
Dept: LAB | Age: 75
Discharge: HOME | End: 2020-02-05
Payer: MEDICARE

## 2020-02-05 ENCOUNTER — OFFICE VISIT (OUTPATIENT)
Dept: INTERNAL MEDICINE CLINIC | Age: 75
End: 2020-02-05

## 2020-02-05 DIAGNOSIS — K21.9 GASTROESOPHAGEAL REFLUX DISEASE, ESOPHAGITIS PRESENCE NOT SPECIFIED: ICD-10-CM

## 2020-02-05 DIAGNOSIS — D64.9 ANEMIA, UNSPECIFIED TYPE: ICD-10-CM

## 2020-02-05 DIAGNOSIS — E78.5 HYPERLIPIDEMIA, UNSPECIFIED HYPERLIPIDEMIA TYPE: ICD-10-CM

## 2020-02-05 DIAGNOSIS — I77.3 FIBROMUSCULAR DYSPLASIA (HCC): ICD-10-CM

## 2020-02-05 DIAGNOSIS — I77.3 RENAL ARTERY STENOSIS DUE TO FIBROMUSCULAR DYSPLASIA (HCC): ICD-10-CM

## 2020-02-05 DIAGNOSIS — K14.6 BURNING MOUTH SYNDROME: ICD-10-CM

## 2020-02-05 DIAGNOSIS — I70.1 RENAL ARTERY STENOSIS DUE TO FIBROMUSCULAR DYSPLASIA (HCC): ICD-10-CM

## 2020-02-05 DIAGNOSIS — R09.89 BILATERAL CAROTID BRUITS: ICD-10-CM

## 2020-02-05 DIAGNOSIS — E55.9 VITAMIN D DEFICIENCY: ICD-10-CM

## 2020-02-05 DIAGNOSIS — I10 ESSENTIAL HYPERTENSION: ICD-10-CM

## 2020-02-05 DIAGNOSIS — C44.90 SKIN CANCER: ICD-10-CM

## 2020-02-05 DIAGNOSIS — M05.79 RHEUMATOID ARTHRITIS INVOLVING MULTIPLE SITES WITH POSITIVE RHEUMATOID FACTOR (HCC): ICD-10-CM

## 2020-02-05 DIAGNOSIS — M85.89 OSTEOPENIA OF MULTIPLE SITES: ICD-10-CM

## 2020-02-05 DIAGNOSIS — M05.79 RHEUMATOID ARTHRITIS INVOLVING MULTIPLE SITES WITH POSITIVE RHEUMATOID FACTOR (HCC): Primary | ICD-10-CM

## 2020-02-05 DIAGNOSIS — R74.01 ELEVATED TRANSAMINASE LEVEL: ICD-10-CM

## 2020-02-05 DIAGNOSIS — M47.812 CERVICAL SPONDYLOSIS WITHOUT MYELOPATHY: ICD-10-CM

## 2020-02-05 DIAGNOSIS — M54.12 RADICULOPATHY, CERVICAL: ICD-10-CM

## 2020-02-05 DIAGNOSIS — I65.23 CAROTID STENOSIS, BILATERAL: ICD-10-CM

## 2020-02-05 DIAGNOSIS — E83.52 FHH (FAMILIAL HYPOCALCIURIC HYPERCALCEMIA): ICD-10-CM

## 2020-02-05 DIAGNOSIS — E21.3 HYPERPARATHYROIDISM (HCC): ICD-10-CM

## 2020-02-05 DIAGNOSIS — D04.9 BOWEN'S DISEASE: ICD-10-CM

## 2020-02-05 DIAGNOSIS — K58.2 IRRITABLE BOWEL SYNDROME WITH BOTH CONSTIPATION AND DIARRHEA: ICD-10-CM

## 2020-02-05 DIAGNOSIS — I44.7 LBBB (LEFT BUNDLE BRANCH BLOCK): ICD-10-CM

## 2020-02-05 LAB
ALBUMIN SERPL-MCNC: 3.8 G/DL (ref 3.4–5)
ALBUMIN/GLOB SERPL: 1.1 {RATIO} (ref 0.8–1.7)
ALP SERPL-CCNC: 82 U/L (ref 45–117)
ALT SERPL-CCNC: 85 U/L (ref 13–56)
ANION GAP SERPL CALC-SCNC: 3 MMOL/L (ref 3–18)
AST SERPL-CCNC: 66 U/L (ref 10–38)
BILIRUB SERPL-MCNC: 0.4 MG/DL (ref 0.2–1)
BUN SERPL-MCNC: 10 MG/DL (ref 7–18)
BUN/CREAT SERPL: 14 (ref 12–20)
CALCIUM SERPL-MCNC: 10.3 MG/DL (ref 8.5–10.1)
CHLORIDE SERPL-SCNC: 105 MMOL/L (ref 100–111)
CO2 SERPL-SCNC: 32 MMOL/L (ref 21–32)
CREAT SERPL-MCNC: 0.74 MG/DL (ref 0.6–1.3)
GLOBULIN SER CALC-MCNC: 3.5 G/DL (ref 2–4)
GLUCOSE SERPL-MCNC: 82 MG/DL (ref 74–99)
POTASSIUM SERPL-SCNC: 4.3 MMOL/L (ref 3.5–5.5)
PROT SERPL-MCNC: 7.3 G/DL (ref 6.4–8.2)
SODIUM SERPL-SCNC: 140 MMOL/L (ref 136–145)

## 2020-02-05 PROCEDURE — 80053 COMPREHEN METABOLIC PANEL: CPT

## 2020-02-05 NOTE — PROGRESS NOTES
Chief Complaint   Patient presents with    Hypertension     6 week follow up with labs. There are no preventive care reminders to display for this patient. 1. Have you been to the ER, urgent care clinic or hospitalized since your last visit? NO.     2. Have you seen or consulted any other health care providers outside of the [address] [place] [address] since your last visit (Include any pap smears or colon screening)? NO      Do you have an Advanced Directive? NO    Would you like information on Advanced Directives?  NO    Learning Assessment 6/6/2019   PRIMARY LEARNER Patient   HIGHEST LEVEL OF EDUCATION - PRIMARY LEARNER  SOME COLLEGE   BARRIERS PRIMARY LEARNER NONE   CO-LEARNER CAREGIVER No   PRIMARY LANGUAGE ENGLISH    NEED No   LEARNER PREFERENCE PRIMARY DEMONSTRATION     LISTENING     READING   ANSWERED BY patientr   RELATIONSHIP SELF

## 2020-02-05 NOTE — PATIENT INSTRUCTIONS
Learning About High Cholesterol What is high cholesterol? Cholesterol is a type of fat in your blood. It is needed for many body functions, such as making new cells. Cholesterol is made by your body. It also comes from food you eat. If you have too much cholesterol, it starts to build up in your arteries. This is called hardening of the arteries, or atherosclerosis. High cholesterol raises your risk of a heart attack and stroke. There are different types of cholesterol. LDL is the \"bad\" cholesterol. High LDL can raise your risk for heart disease, heart attack, and stroke. HDL is the \"good\" cholesterol. High HDL is linked with a lower risk for heart disease, heart attack, and stroke. Your cholesterol levels help your doctor find out your risk for having a heart attack or stroke. How can you prevent high cholesterol? A heart-healthy lifestyle can help you prevent high cholesterol. This lifestyle helps lower your risk for a heart attack and stroke. · Eat heart-healthy foods. ? Eat fruits, vegetables, whole grains (like oatmeal), dried beans and peas, nuts and seeds, soy products (like tofu), and fat-free or low-fat dairy products. ? Replace butter, margarine, and hydrogenated or partially hydrogenated oils with olive and canola oils. (Canola oil margarine without trans fat is fine.) ? Replace red meat with fish, poultry, and soy protein (like tofu). ? Limit processed and packaged foods like chips, crackers, and cookies. · Be active. Exercise can improve your cholesterol level. Get at least 30 minutes of exercise on most days of the week. Walking is a good choice. You also may want to do other activities, such as running, swimming, cycling, or playing tennis or team sports. · Stay at a healthy weight. Lose weight if you need to. · Don't smoke. If you need help quitting, talk to your doctor about stop-smoking programs and medicines. These can increase your chances of quitting for good. How is high cholesterol treated? The goal of treatment is to reduce your chances of having a heart attack or stroke. The goal is not to lower your cholesterol numbers only. · You may make lifestyle changes, such as eating healthy foods, not smoking, losing weight, and being more active. · You may have to take medicine. Follow-up care is a key part of your treatment and safety. Be sure to make and go to all appointments, and call your doctor if you are having problems. It's also a good idea to know your test results and keep a list of the medicines you take. Where can you learn more? Go to http://kylee-geno.info/. Enter P015 in the search box to learn more about \"Learning About High Cholesterol. \" Current as of: April 9, 2019 Content Version: 12.2 © 9834-7735 [employer]. Care instructions adapted under license by [employer] (which disclaims liability or warranty for this information). If you have questions about a medical condition or this instruction, always ask your healthcare professional. [place] [address] any warranty or liability for your use of this information. High Cholesterol: Care Instructions Your Care Instructions Cholesterol is a type of fat in your blood. It is needed for many body functions, such as making new cells. Cholesterol is made by your body. It also comes from food you eat. High cholesterol means that you have too much of the fat in your blood. This raises your risk of a heart attack and stroke. LDL and HDL are part of your total cholesterol. LDL is the \"bad\" cholesterol. High LDL can raise your risk for heart disease, heart attack, and stroke. HDL is the \"good\" cholesterol. It helps clear bad cholesterol from the body. High HDL is linked with a lower risk of heart disease, heart attack, and stroke.  
Your cholesterol levels help your doctor find out your risk for having a heart attack or stroke. You and your doctor can talk about whether you need to lower your risk and what treatment is best for you. A heart-healthy lifestyle along with medicines can help lower your cholesterol and your risk. The way you choose to lower your risk will depend on how high your risk is for heart attack and stroke. It will also depend on how you feel about taking medicines. Follow-up care is a key part of your treatment and safety. Be sure to make and go to all appointments, and call your doctor if you are having problems. It's also a good idea to know your test results and keep a list of the medicines you take. How can you care for yourself at home? · Eat a variety of foods every day. Good choices include fruits, vegetables, whole grains (like oatmeal), dried beans and peas, nuts and seeds, soy products (like tofu), and fat-free or low-fat dairy products. · Replace butter, margarine, and hydrogenated or partially hydrogenated oils with olive and canola oils. (Canola oil margarine without trans fat is fine.) · Replace red meat with fish, poultry, and soy protein (like tofu). · Limit processed and packaged foods like chips, crackers, and cookies. · Bake, broil, or steam foods. Don't dennis them. · Be physically active. Get at least 30 minutes of exercise on most days of the week. Walking is a good choice. You also may want to do other activities, such as running, swimming, cycling, or playing tennis or team sports. · Stay at a healthy weight or lose weight by making the changes in eating and physical activity listed above. Losing just a small amount of weight, even 5 to 10 pounds, can reduce your risk for having a heart attack or stroke. · Do not smoke. When should you call for help? Watch closely for changes in your health, and be sure to contact your doctor if: 
  · You need help making lifestyle changes.  
  · You have questions about your medicine. Where can you learn more? Go to http://kylee-geno.info/. Enter X750 in the search box to learn more about \"High Cholesterol: Care Instructions. \" Current as of: April 9, 2019 Content Version: 12.2 © 4330-7938 [employer]. Care instructions adapted under license by [employer] (which disclaims liability or warranty for this information). If you have questions about a medical condition or this instruction, always ask your healthcare professional. [address] any warranty or liability for your use of this information.

## 2020-02-07 ENCOUNTER — TELEPHONE (OUTPATIENT)
Dept: INTERNAL MEDICINE CLINIC | Age: 75
End: 2020-02-07

## 2020-02-07 NOTE — TELEPHONE ENCOUNTER
No visits with results within 2 Day(s) from this visit. Latest known visit with results is:   Hospital Outpatient Visit on 02/05/2020   Component Date Value Ref Range Status    Sodium 02/05/2020 140  136 - 145 mmol/L Final    Potassium 02/05/2020 4.3  3.5 - 5.5 mmol/L Final    Chloride 02/05/2020 105  100 - 111 mmol/L Final    CO2 02/05/2020 32  21 - 32 mmol/L Final    Anion gap 02/05/2020 3  3.0 - 18 mmol/L Final    Glucose 02/05/2020 82  74 - 99 mg/dL Final    BUN 02/05/2020 10  7.0 - 18 MG/DL Final    Creatinine 02/05/2020 0.74  0.6 - 1.3 MG/DL Final    BUN/Creatinine ratio 02/05/2020 14  12 - 20   Final    GFR est AA 02/05/2020 >60  >60 ml/min/1.73m2 Final    GFR est non-AA 02/05/2020 >60  >60 ml/min/1.73m2 Final    Calcium 02/05/2020 10.3* 8.5 - 10.1 MG/DL Final    Bilirubin, total 02/05/2020 0.4  0.2 - 1.0 MG/DL Final    ALT (SGPT) 02/05/2020 85* 13 - 56 U/L Final    AST (SGOT) 02/05/2020 66* 10 - 38 U/L Final    Alk. phosphatase 02/05/2020 82  45 - 117 U/L Final    Protein, total 02/05/2020 7.3  6.4 - 8.2 g/dL Final    Albumin 02/05/2020 3.8  3.4 - 5.0 g/dL Final    Globulin 02/05/2020 3.5  2.0 - 4.0 g/dL Final    A-G Ratio 02/05/2020 1.1  0.8 - 1.7   Final     Reviewed lab test results. Showed further increase in transaminases to ALT 85 and AST 66 (AST 64 and ALT 46 on 1/16/2020). Previously normal, and last normal check 12/4/2019. Started on atorvastatin 10 mg daily on 12/10/2019. Patient also on methotrexate for rheumatoid arthritis. Called and discussed with Dr. Pebbles Evans. Patient did not have labs performed during the interim period of labs that were performed in our office. Has been continuing to receive weekly methotrexate injections, although did miss a few injections in early January while on antibiotics. Given that only new variable is atorvastatin, will hold to see if liver function improves.  She has follow-up appointment and lab work with rheumatology in 3 weeks, so  Gagan Aleman will recheck liver function tests. Also, recommending evaluation by GI with ultrasound/fibrosure testing to assess degree of effect of long term methotrexate treatment. Patient already follows with Dr. Jenna Villarreal. Called and discussed with patient. Instructed to hold atorvastatin. Discussed evaluation by Dr. Jenna Villarreal, and will schedule appointment. Will reassess at follow-up visit in 3 months and reconsider statin therapy.

## 2020-02-09 ENCOUNTER — TELEPHONE (OUTPATIENT)
Dept: INTERNAL MEDICINE CLINIC | Age: 75
End: 2020-02-09

## 2020-02-09 VITALS
WEIGHT: 115 LBS | TEMPERATURE: 98.3 F | DIASTOLIC BLOOD PRESSURE: 64 MMHG | HEIGHT: 67 IN | HEART RATE: 69 BPM | SYSTOLIC BLOOD PRESSURE: 126 MMHG | RESPIRATION RATE: 18 BRPM | OXYGEN SATURATION: 97 % | BODY MASS INDEX: 18.05 KG/M2

## 2020-02-09 PROBLEM — D64.9 ANEMIA: Status: ACTIVE | Noted: 2020-02-09

## 2020-02-09 PROBLEM — R74.01 ELEVATED TRANSAMINASE LEVEL: Status: ACTIVE | Noted: 2020-02-09

## 2020-02-10 NOTE — PROGRESS NOTES
HPI:   [place] is a 69y.o. year old female who presents today to for a routine visit. 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 She is accompanied by her . She reports today that she is doing reasonably well. She states that her upper respiratory infection responded to treatment with Ceftin. She states that she held her methotrexate for several weeks while she was ill, and restarted it approximately two weeks ago. She states that she has been having some difficulty with increasing bilateral hand pain, but is hopeful that it will improve with resumption of methotrexate. However, she acknowledges that she is considering addition of a biologic to prevent further joint destruction. She states that she has started treatment with atorvastatin and has been tolerating it without difficulty. She is otherwise without specific complaints and is feeling generally well. On 10/21/2019, she presented to the [facility] ED reporting difficulty with elevated blood pressure and a dull headache. She stated that her blood pressure has been elevated earlier in the week when visiting her gastroenterologist. On the day of presentation to the ED, she states that she was experiencing an achy dull headache that was mild in severity throughout the day. She became concerned when she went to bed and noticed throbbing pain in her hands and feet. She checked her blood pressure and noticed her systolic ranged from 214-859 mmHg. She had taken her metoprolol tartrate 50 mg bid as prescribed. She denied any shortness of breath, leg swelling, orthopnea, PND, visual changes, nausea/vomiting, lightheadedness, dizziness, confusion, speech difficulty, numbness, or weakness.   She did admit to some chest tightness, but stated that it was not exertional and was her usual discomfort associated with reflux. Evaluation in the ED revealed that her blood pressure was 144/73. EKG showed sinus rhythm at 64 bpm and LBBB. Laboratory data included WBC 6.2, Hb 11.8/ Hct 36.9, creatinine 0.79/eGFR >60, troponin x 1 negative, and chest x-ray negative. While in the ED, her headache resolved and she was subsequently discharged. She was seen in follow-up on 10/30/2019 and described episodes with head fullness, flushing, and  \"prickly skin\" occurring in the evening after lying down. She stated that she did notice one episode where her heart rate appeared to be 150 bpm. She also reported that she had been under an increased amount of stress with regard to several of her family members, and felt that the episodes may be secondary to anxiety. Her dose of Zoloft was increased to 50 mg daily, and she reported that the episodes of palpitations and head fullness resolved with the increase in dose of Zoloft. She has a history of hypertension and palpitations, treated currently with metoprolol. She has a history of dyslipidemia, not currently being treated with medication. She was evaluated by Dr. Vicki Middleton in 2012 for palpitations. Event monitor was placed, and showed a known LBBB and PVC's, but reportedly no other arrhythmias (report unavailable). In 9/2012, she also underwent a pharmacologic nuclear stress test which was a normal low risk study with EF 55%. She also had an echocardiogram, which showed low normal LV function (EF 50%), mild grade 1 diastolic dysfunction, trace MR, TR, and AI. She also has a history of fibromuscular dysplasia, and in 5/2009, underwent aortogram and renal arteriogram with balloon angioplasty.  She has a known right carotid bruit, and her last carotid duplex (5/2014) showed mild atherosclerotic intraluminal plaquing with elevated velocities in the bilateral mid internal carotid arteries with a \"string of pearls\" appearance consistent with known fibromuscular dysplasia, causing a 50-69% stenosis of the bilateral internal carotid arteries. There was no change from prior study in 2011. She had been followed by Dr. Sarah Cruz, but states that she has been released from his care. She remains quite active, and denies any chest pain, shortness of breath at rest or with exertion, palpitations, lightheadedness, or edema. She underwent a repeat carotid duplex study (8/15/2018) showing mild (<50%) stenosis of the left internal carotid artery and moderate (50-69%) stenosis of the right internal carotid artery. Repeat performed 12/17/2019 showed moderate homogenous plaque in right ICA (50 to 69% stenosis). Stenosis is at the mid ICA and may be upper limits of moderate; mild heterogeneous plaque left ICA (< 50% stenosis). She has a history of rheumatoid arthritis, diagnosed when she was 37years old, treated currently with subcutaneous methotrexate and prednisone. She is being followed closely by Dr. Rafael Sequeira. She is not currently being treated with a biologic agent due to concern regarding recent multiple squamous cell carcinomas of the skin, for which she is being followed by Dr. Mirna Herndon. She reports that she was well controlled on Humira for several years, but had to discontinue treatment when it was no longer on her insurance formulary. She also has a history of osteopenia, with bone density studies performed by Dr. Rafael Sequeira. Her last bone density study was in 2/2019 showing T-scores:  femoral neck left -1.7  /right -1.9 and lumbar -2.1. She continues to take calcium and Vitamin D supplements. She has no history of pathologic fractures. She was recently diagnosed with familial hypocalciuric hypercalcemia with mildly elevated calcium 10.6, mildly elevated PTH 80, Vitamin D level 36.5, and 24 hour urine calcium of 105 (FeCa 1%) in 7/30/2018, which would be consistent with this diagnosis. She is being followed by Dr. Tawny Damian.      She has a history of cervical degenerative joint and disc disease and chronic neck pain. She was being followed by Dr. Luis Borrego, who recommended surgery. She was seen by Dr. Jodi Reyes at [facility] for a second opinion, and cervical MRI (9/2015) showed multilevel degenerative disc and facet disease, worst at C5-C6; left central/subarticular disc protrusion with superimposed annular fissure noted at C5-C6 causing mass effect upon the left hemicord but no signal changes with severe left neuroforaminal narrowing at this level. Recommendation was for her to proceed with physical therapy, which did result in some improvement. She previously had been treated with gabapentin for pain control, but is currently only using Tylenol. She underwent acupuncture therapy with Dr. Tom Ortiz with only minimal improvement. She also states that she was referred to Dr. Betty Curran for evaluation, but has not yet scheduled an appointment with him. She has a history of GERD. In 11/2017, she was having increasing difficulty with constipation alternating with diarrhea as well as increasing reflux symptoms. She states that she was evaluated by Dr. Jostin Hardy and it was her opinion that she suffered from irritable bowel syndrome. She has made changes in her diet and increased her consumption of fiber with some improvement. She began experiencing worsening reflux symptoms in 12/2018 after attempting to wean omeprazole from 40 mg to 20 mg daily. She also was taking an increased dose of prednisone at the time for a flare of her RA. She was evaluated by Dr. Jostin Hardy and attempted a trial of Protonix which she believed may be working better. She plans to continue taking it. She states that at the time of her worsening reflux, she also noted increasing palpitations and underwent evaluation by THOMAS Palmer for Dr. Tru Anna. She states that she was offered an event recorder to evaluate, but she noticed that her symptoms improved once her GERD was under control. She therefore declined further evaluation. She states that she has resumed taking omeprazole with reasonable control of her reflux symptoms. She underwent upper endoscopy by Dr. Briana Flor in 11/2012 which showed a 2 cm hiatal hernia and pathology from a distal esophageal biopsy showing chronic inflammation. She had a screening colonoscopy in 7/14/2016 by Dr. Briana Flor, which showed an adenomatous polyp (report unavailable). Recommendation was for follow-up in 5 years. She denies any abdominal pain, nausea, vomiting, melena, hematochezia, or change in bowel movements. Past Medical History:   Diagnosis Date    Bowen's disease     Fibromuscular dysplasia (HCC)     GERD (gastroesophageal reflux disease)     Hiatal hernia     Hyperparathyroidism ([facility] [address]     Hypertension     LBBB (left bundle branch block)     Premature ventricular contractions     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 (HCC)     s/p balloon angioplasty 5/2009 [facility]    Rheumatoid arthritis ([facility] [address]     Skin cancer      Past Surgical History:   Procedure Laterality Date    HX ANGIOPLASTY      HX KNEE REPLACEMENT Bilateral 2009    partial     HX OTHER SURGICAL      removal of fibroid benign tumors from breast     HX PARTIAL HYSTERECTOMY      HX TONSILLECTOMY  1964     Current Outpatient Medications   Medication Sig    atorvastatin (LIPITOR) 10 mg tablet Take 1 Tab by mouth daily.  sertraline (ZOLOFT) 50 mg tablet Take 1 Tab by mouth daily.  metoprolol tartrate (LOPRESSOR) 50 mg tablet Take 1 Tab by mouth two (2) times a day.  METHOTREXATE SODIUM INJECTION 15 mg every seven (7) days.  omeprazole (PRILOSEC) 40 mg capsule Take 40 mg by mouth every other day.  predniSONE (DELTASONE) 5 mg tablet Take  by mouth daily. Per pt now 2.5 daily    multivitamin (ONE A DAY) tablet Take 1 Tab by Mouth Once a Day.  acetaminophen (TYLENOL) 500 mg tablet Take 500 mg by mouth every six (6) hours as needed.     cholecalciferol (VITAMIN D3) 400 unit tab tablet Take 2,000 Units by mouth.  folic acid (FOLVITE) 1 mg tablet Take 1 mg by mouth daily. No current facility-administered medications for this visit. Allergies and Intolerances: Allergies   Allergen Reactions    Hydrocodone Anaphylaxis    Nsaids (Non-Steroidal Anti-Inflammatory Drug) Other (comments)     None due to kidneys    Azithromycin Itching    Other Medication Other (comments)     GI intolerance to nonsteroidal antiinflammatory medications     Vicodin [Hydrocodone-Acetaminophen] Other (comments)     Family History: Her father passed away from a ruptured AAA. Her mother has osteoarthritis and COPD, and a maternal aunt had RA. Her brother recently passed away from multiple sclerosis. She has no family history of colon or breast cancer. Family History   Problem Relation Age of Onset    Arthritis-osteo Mother     Elevated Lipids Mother     Heart Disease Mother         CHF    Lung Disease Mother     Hypertension Sister     Lung Disease Sister     Hypertension Brother     MS Brother      Social History:   She  reports that she has never smoked. She has never used smokeless tobacco. She is  with two children and one stepchild. 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 She drinks wine only occasionally. Social History     Substance and Sexual Activity   Alcohol Use Yes    Alcohol/week: 1.0 - 2.0 standard drinks    Types: 1 - 2 Glasses of wine per week     Immunization History:  Immunization History   Administered Date(s) Administered    Influenza High Dose Vaccine PF 10/29/2018    Influenza Vaccine 10/29/2013    Influenza Vaccine (Tri) Adjuvanted 12/11/2019    Pneumococcal Conjugate (PCV-13) 12/14/2015    Pneumococcal Polysaccharide (PPSV-23) 11/15/2018    Tdap 10/02/2019       Review of Systems:   As above included in HPI.   Otherwise 11 point review of systems negative including constitutional, skin, HENT, eyes, respiratory, cardiovascular, gastrointestinal, genitourinary, musculoskeletal, endocrine, hematologic, allergy, and neurologic. Physical:   Vitals:   BP: 126/64  HR: 69  WT: 115 lb (52.2 kg)  BMI:  18.01 kg/m2    Exam:      Patient appears in no apparent distress. Affect is appropriate. HEENT --Anicteric sclerae, tympanic membranes normal,  ear canals normal.  PERRL, EOMI, conjunctiva and lids normal.   Sinuses were nontender, turbinates normal, hearing normal.  Oropharynx without  erythema, normal tongue, oral mucosa and tonsils. No cervical lymphadenopathy. No thyromegaly or JVD. Bilateral carotid bruits evident. Carotid pulses 2+ with normal upstroke. Lungs --Clear to auscultation. No wheezing or rales. Heart --Regular rate and rhythm, no murmurs, rubs, gallops, or clicks. Chest wall --Nontender to palpation. PMI normal.  Abdomen -- Soft and nontender, no hepatosplenomegaly or masses. Extremities -- Without edema. Arthritic changes of bilateral hands without erythema or warmth. Mild MCP and PIP swelling. Review of Data:  Labs:  Hospital Outpatient Visit on 01/16/2020   Component Date Value Ref Range Status    Vitamin D 25-Hydroxy 01/16/2020 35.6  30 - 100 ng/mL Final    WBC 01/16/2020 5.3  4.6 - 13.2 K/uL Final    RBC 01/16/2020 3.94* 4.20 - 5.30 M/uL Final    HGB 01/16/2020 11.6* 12.0 - 16.0 g/dL Final    HCT 01/16/2020 37.2  35.0 - 45.0 % Final    MCV 01/16/2020 94.4  74.0 - 97.0 FL Final    MCH 01/16/2020 29.4  24.0 - 34.0 PG Final    MCHC 01/16/2020 31.2  31.0 - 37.0 g/dL Final    RDW 01/16/2020 14.1  11.6 - 14.5 % Final    PLATELET 80/49/1019 071  135 - 420 K/uL Final    MPV 01/16/2020 11.2  9.2 - 11.8 FL Final    NEUTROPHILS 01/16/2020 71  40 - 73 % Final    LYMPHOCYTES 01/16/2020 17* 21 - 52 % Final    MONOCYTES 01/16/2020 10  3 - 10 % Final    EOSINOPHILS 01/16/2020 2  0 - 5 % Final    BASOPHILS 01/16/2020 0  0 - 2 % Final    ABS. NEUTROPHILS 01/16/2020 3.8  1.8 - 8.0 K/UL Final    ABS.  LYMPHOCYTES 01/16/2020 0.9  0.9 - 3.6 K/UL Final  ABS. MONOCYTES 01/16/2020 0.5  0.05 - 1.2 K/UL Final    ABS. EOSINOPHILS 01/16/2020 0.1  0.0 - 0.4 K/UL Final    ABS. BASOPHILS 01/16/2020 0.0  0.0 - 0.1 K/UL Final    DF 01/16/2020 AUTOMATED    Final    LIPID PROFILE 01/16/2020        Final    Cholesterol, total 01/16/2020 165  <200 MG/DL Final    Triglyceride 01/16/2020 55  <150 MG/DL Final    HDL Cholesterol 01/16/2020 82* 40 - 60 MG/DL Final    LDL, calculated 01/16/2020 72  0 - 100 MG/DL Final    VLDL, calculated 01/16/2020 11  MG/DL Final    CHOL/HDL Ratio 01/16/2020 2.0  0 - 5.0   Final    Sodium 01/16/2020 140  136 - 145 mmol/L Final    Potassium 01/16/2020 4.4  3.5 - 5.5 mmol/L Final    Chloride 01/16/2020 106  100 - 111 mmol/L Final    CO2 01/16/2020 25  21 - 32 mmol/L Final    Anion gap 01/16/2020 9  3.0 - 18 mmol/L Final    Glucose 01/16/2020 74  74 - 99 mg/dL Final    BUN 01/16/2020 13  7.0 - 18 MG/DL Final    Creatinine 01/16/2020 0.75  0.6 - 1.3 MG/DL Final    BUN/Creatinine ratio 01/16/2020 17  12 - 20   Final    GFR est AA 01/16/2020 >60  >60 ml/min/1.73m2 Final    GFR est non-AA 01/16/2020 >60  >60 ml/min/1.73m2 Final    Calcium 01/16/2020 10.4* 8.5 - 10.1 MG/DL Final    Bilirubin, total 01/16/2020 0.4  0.2 - 1.0 MG/DL Final    ALT (SGPT) 01/16/2020 64* 13 - 56 U/L Final    AST (SGOT) 01/16/2020 46* 10 - 38 U/L Final    Alk. phosphatase 01/16/2020 78  45 - 117 U/L Final    Protein, total 01/16/2020 7.2  6.4 - 8.2 g/dL Final    Albumin 01/16/2020 3.8  3.4 - 5.0 g/dL Final    Globulin 01/16/2020 3.4  2.0 - 4.0 g/dL Final    A-G Ratio 01/16/2020 1.1  0.8 - 1.7   Final     Health Maintenance:  Screening:    M[facility]: negative (2/2019) [facility]   PAP smear: well woman exams performed at [facility] by KIM Menezes (last 2/2019)   Colorectal: colonoscopy (7/2016) tubular adenoma. Dr. Heather Medina. Due 2021. Now followed by Dr. Arcenio Romo.    Depression: on Zoloft   DM (HbA1c/FPG): FPG 74 (1/2020)   Hepatitis C: negative (1/2/2018) Dr. Hunter Mckeon. Falls: none   DEXA: osteopenia (last 2/2019). Performed by Dr. Hunter Mckeon. Glaucoma: regular eye exams with Dr. Haley Friedman (last 8/2019 ) every 6 months   Smoking: none   Vitamin D: 35.6 (1/2020)    Medicare Wellness: 6/6/2019    Impression:  Patient Active Problem List   Diagnosis Code    Intervertebral disc protrusion [id number]    Displacement of cervical intervertebral disc without myelopathy M50.20    Cervical spondylosis without myelopathy M47.812    Degeneration of cervical intervertebral disc M50.30    Brachial neuritis or radiculitis M54.12    Radiculopathy, cervical M54.12    Skin cancer C44.90    Rheumatoid arthritis ([facility] [address] M06.9    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 (HCC) I70.1    LBBB (left bundle branch block) I44.7    Hypertension I10    Hyperparathyroidism ([facility] [address] E21.3    Hiatal hernia K44.9    GERD (gastroesophageal reflux disease) K21.9    Fibromuscular dysplasia (HCC) I77.3    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    Irritable bowel syndrome with both constipation and diarrhea K58.2    Bilateral carotid bruits R09.89    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    Hyperlipidemia E78.5    Osteopenia of multiple sites M85.89    Vitamin D deficiency E55.9    Carotid stenosis, bilateral I65.23    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 Hypertension. Blood pressure appearswell controlled today on current regimen of metoprolol 50 mg bid, which also helps with control of palpitations. Recent elevation prompting ED evaluation appeared secondary to anxiety since improved to baseline after increasing dose of Zoloft. Recent increase in palpitations resolved after reflux symptoms improved, and pateint declined further evaluation by cardiology. Renal function remains normal with creatinine 0.75/ eGFR >60. Will continue to follow. 2. Fibromuscular dysplasia.  Previous angioplasty of renal arteries. History of moderate stenosis of bilateral carotid arteries on carotid duplex in 5/2014. Noted prominent carotid bruits on physical exam, and repeat carotid duplex obtained in 8/2018 showing moderate (50-69%) stenosis of the right ICA and mild (<50%) of the left ICA. Review of report also showed mild atherosclerosis at that time. Previously followed by [facility] Vee Officer [facility], but given stability over many years, released from his care with plan to pursue further evaluation only if becomes symptomatic. Repeat carotid duplex scan obtained 12/17/2019 showed persistence of moderate stenosis of right ICA. Discussed need to prevent progression, and patient on aspirin 81 mg every other day. Started on atorvastatin last visit. Continue to monitor. 3. Hyperlipidemia. Calculated 10 year ASCVD risk improved to 19.6 %, which placed her in one of the four statin benefit groups as per new AHA/ACC guidelines (primary prevention: 10 year ASCVD risk >7.5%). In addition, mild atherosclerosis in the setting of fibromuscular dysplasia described on prior carotid duplex, and moderate stenosis of right ICA reported. Also at increased due to chronic inflammatory state related to rheumatoid arthritis. Given these factors advised that needed to consider treatment with a statin. Unsuccessful attempts at maintaining level for LDL< 70 and BMI 18 does not allow for any weight loss. Repeat lipid panel 12/2019 with elevated with  and HDL 85. Given multiple risk factors and known atherosclerosis, started on atorvastatin 10 mg daily. Lipid panel significantly improved with LDL 72 and HDL 82. However, noted increase in transaminases with AST 46 and ALT 64. Will repeat today to reassess. 4. Rheumatoid arthritis. On SC methotrexate and prednisone. Considering a biologic but hesitant given Bowen's disease. Being followed closely by Dr. Gagan Aleman. Methotrexate has been resumed given improvement in URI symptoms.  Noting some increase in joint symptoms, but will follow-up with Dr. Henna Askew at upcoming visit. Elevated transaminases on most recent labs. Will repeat today. 5. Mouth pain. No obvious lesions on exam, and no evidence of oral thrush. She has had gums evaluated by dentist and no abnormalities seen. Also evaluated by Dr. Paresh Ceron of ENT in the past with no etiology found. Differential diagnosis would include herpes simplex, aphthous stomatitis, xerostomia, or nutritional deficiencies (B12, B6, folate, Zinc, iron). Other eitiologies could include candidiasis, allergic contact dermatitis, diabetes, menopause, or thyroid disease. 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 Tricyclics, pregabalin, gabapentin, and Mirapex have shown some benefit. She reports that she has had similar episodes in the past, but will resolve after several weeks without persistent symptoms. Has found some benefit with Mycelex troches in the past. Advised to take a multivitamin. Continue to follow. 6. Cervical degenerative disease. Experiencing chronic neck pain, treated previously with gabapentin. Some improvement with exercising as well, although continuing to experience pain. Attempted acupuncture therapy by Dr. Greg Franklin, but after 3 months, no significant benefit seen. Previously followed by Dr. Almyra Castleman, but referred to Dr. Thom Luis by Dr. Henna Askew. Has not yet scheduled evaluation, but not a significant complaint today. 7. Osteopenia. Last bone density scan 2/2019 . Using femoral neck T-scores, calculated FRAX score estimates her 10 year risk of a major osteoporetic fracture at 13 % and hip fracture at 3.1 %, which are an indication for biphosphonate treatment with hip fracture risk >3%. Being performed and followed by Dr. Henna Askew. Continue calcium and Vitamin D. Encouraged exercise, particularly weight bearing activities. 8. Hyperparathyroidism.  Mild increase in PTH, mildly elevated calcium, normal Vitamin D level,and 24 hour urine calcium only 105 (FeCa 1%) is consistent with a diagnosis of familial hypocalciuric hypercalcemia. Being followed by Dr. Mj Parks and told at most recent visit that would not need to follow-up for 2 years given benign condition. Advised to drink plenty of fluids. 9. GERD. Resumed omeprazole and taking every other day with good response. Symptoms of diarrhea alternating with constipation chronically thought to be secondary to irritable bowel syndrome. On probiotic. Reportedly changed diet and increased fiber with some improvement. 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 11. Mild anemia. Present intermittently. B12 and folate levels normal in 12/2019. Will check iron studies and gammopathy panel at next visit. May be secondary to methotrexate. 12. Health maintenance. Already received influenza vaccine. Completed pneumococcal series and Tdap. Given script for Shingrix vaccine, and patient stated that Dr. Henna Askew agreed that she may proceed. Mammogram and well women exams performed at [facility]. Continue regular eye exams with Dr. Dao Estimable. Vitamin D level now normal after increasing to 2000 U daily. Medicare wellness visit up to date. Patient understands recommendations and agrees with plan. Follow-up in 6 weeks.       [facility] Outpatient Visit on 02/05/2020   Component Date Value Ref Range Status    Sodium 02/05/2020 140  136 - 145 mmol/L Final    Potassium 02/05/2020 4.3  3.5 - 5.5 mmol/L Final    Chloride 02/05/2020 105  100 - 111 mmol/L Final    CO2 02/05/2020 32  21 - 32 mmol/L Final    Anion gap 02/05/2020 3  3.0 - 18 mmol/L Final    Glucose 02/05/2020 82  74 - 99 mg/dL Final    BUN 02/05/2020 10  7.0 - 18 MG/DL Final    Creatinine 02/05/2020 0.74  0.6 - 1.3 MG/DL Final    BUN/Creatinine ratio 02/05/2020 14  12 - 20   Final    GFR est AA 02/05/2020 >60  >60 ml/min/1.73m2 Final    GFR est non-AA 02/05/2020 >60  >60 ml/min/1.73m2 Final    Calcium 02/05/2020 10.3* 8.5 - 10.1 MG/DL Final    Bilirubin, total 02/05/2020 0.4  0.2 - 1.0 MG/DL Final    ALT (SGPT) 02/05/2020 85* 13 - 56 U/L Final    AST (SGOT) 02/05/2020 66* 10 - 38 U/L Final    Alk. phosphatase 02/05/2020 82  45 - 117 U/L Final    Protein, total 02/05/2020 7.3  6.4 - 8.2 g/dL Final    Albumin 02/05/2020 3.8  3.4 - 5.0 g/dL Final    Globulin 02/05/2020 3.5  2.0 - 4.0 g/dL Final    A-G Ratio 02/05/2020 1.1  0.8 - 1.7   Final     Reviewed lab test results. Showed further increase in transaminases to ALT 85 and AST 66 (AST 64 and ALT 46 on 1/16/2020). Previously normal, and last normal check 12/4/2019. Started on atorvastatin 10 mg daily on 12/10/2019. Patient also on methotrexate for rheumatoid arthritis.     Called and discussed with Dr. Gagan Aleman. Patient did not have labs performed during the interim period of labs that were performed in our office. Has been continuing to receive weekly methotrexate injections, although did miss a few injections in early January while on antibiotics. Given that only new variable is atorvastatin, will hold to see if liver function improves. She has follow-up appointment and lab work with rheumatology in 3 weeks, so Dr. Gagan Aelman will recheck liver function tests. Also, recommending evaluation by GI with ultrasound/fibrosure testing to assess degree of effect of long term methotrexate treatment. Patient already follows with Dr. Deena Daley and discussed with patient. Instructed to hold atorvastatin. Discussed evaluation by Dr. Jenna Villarreal, and will schedule appointment. Will reassess at follow-up visit in 3 months and reconsider statin therapy.

## 2020-02-10 NOTE — TELEPHONE ENCOUNTER
Please fax office note from 2/5/2020, and labs from 12/4/2019, 1/16/2020, and 2/5/2020 to Dr. Yas Grove for her review. Thanks.

## 2020-05-05 NOTE — PROGRESS NOTES
Ob patient (40w1d) believes her water may have just broke. Patient states when she woke up she noticed some slightly bloody water leaking out. Patient is not having any contractions. Patient is aware that Dr. Fabian is out of the office, and is requesting a call back from the on-call nurse to discuss.          SUBJECTIVE  Chief Complaint   Patient presents with    Other     Rheumatoid arthritis involving multiple sites with positive rheumatoid factor     Neck Pain     Cervical spondylosis without myelopathy     Patient presents for possible acupuncture. She unfortunately has not had any relief with her latest set of traditional treatments (x3). She did not have any relief after the initial TENS treatments with needling. She has continued neck pain localized to her trapezius, now worse on her right side. She has had RA for greater than 30 years. She has had chronic neck pain for the past 3-4 years but usually that was on the left. She has seen a few different spine specialists and was given a mixed picture on surgical versus nonsurgical options. She saw a physiatrist and was sent to PT about 1.5 years ago. She was on a small dose of neurontin which they stopped since they did not believe it was making a big difference at a suboptimal dose. She is currently seeing Dr. Alejo Stevens (ortho). She sees Dr. Danielle Ward for rheumatology. Her prior MRI does show left and right sided findings that can account for her pains. OBJECTIVE    Blood pressure 110/70, pulse 72, temperature 98.1 °F (36.7 °C), temperature source Oral, resp. rate 16, height 5' 7\" (1.702 m), weight 118 lb (53.5 kg), SpO2 99 %. General:  alert, cooperative, well appearing, in no apparent distress. Psych: normal affect. Mood good. Oriented x 3. Judgement and insight intact. ASSESSMENT / PLAN    ICD-10-CM ICD-9-CM    1. Cervical spondylosis without myelopathy M47.812 721.0    2. Rheumatoid arthritis involving multiple sites with positive rheumatoid factor (HCC) M05.79 714.0      I have suggested that we discontinue acupuncture as it has not been helpful. Due to her lack of response to the initial TENS needling, I do not think there is much of a myofascial nature to her pain.   I do think that her pain is from the deeper pathology and do think there is nerve involvement. I am not so sure that a biologic is the treatment of choice if that is her main symptom she wants to get better. It may be more of an injection or trial of neurontin again which may be more effective. And of course surgical options are viable. I did prescribe a trial of a lidoderm patch to see if that gives her any relief. 15 minutes of face to face time spent with the patient with at least 50% on counseling on above medical issues. All chart history elements were reviewed by me at the time of the visit even though marked at time of note closure. Patient understands our medical plan. Patient has provided input and agrees with goals. Alternatives have been explained and offered. All questions answered. The patient is to call if condition worsens or fails to improve. Follow-up Disposition:  Return as needed .

## 2020-05-28 ENCOUNTER — HOSPITAL ENCOUNTER (OUTPATIENT)
Dept: LAB | Age: 75
Discharge: HOME | End: 2020-05-28
Payer: MEDICARE

## 2020-05-28 ENCOUNTER — APPOINTMENT (OUTPATIENT)
Dept: INTERNAL MEDICINE CLINIC | Age: 75
End: 2020-05-28

## 2020-05-28 DIAGNOSIS — E78.5 HYPERLIPIDEMIA, UNSPECIFIED HYPERLIPIDEMIA TYPE: ICD-10-CM

## 2020-05-28 DIAGNOSIS — M05.79 RHEUMATOID ARTHRITIS INVOLVING MULTIPLE SITES WITH POSITIVE RHEUMATOID FACTOR (HCC): ICD-10-CM

## 2020-05-28 DIAGNOSIS — K14.6 BURNING MOUTH SYNDROME: ICD-10-CM

## 2020-05-28 DIAGNOSIS — I44.7 LBBB (LEFT BUNDLE BRANCH BLOCK): ICD-10-CM

## 2020-05-28 DIAGNOSIS — I77.3 RENAL ARTERY STENOSIS DUE TO FIBROMUSCULAR DYSPLASIA (HCC): ICD-10-CM

## 2020-05-28 DIAGNOSIS — E55.9 VITAMIN D DEFICIENCY: ICD-10-CM

## 2020-05-28 DIAGNOSIS — M47.812 CERVICAL SPONDYLOSIS WITHOUT MYELOPATHY: ICD-10-CM

## 2020-05-28 DIAGNOSIS — K21.9 GASTROESOPHAGEAL REFLUX DISEASE, ESOPHAGITIS PRESENCE NOT SPECIFIED: ICD-10-CM

## 2020-05-28 DIAGNOSIS — C44.90 SKIN CANCER: ICD-10-CM

## 2020-05-28 DIAGNOSIS — M85.89 OSTEOPENIA OF MULTIPLE SITES: ICD-10-CM

## 2020-05-28 DIAGNOSIS — D04.9 BOWEN'S DISEASE: ICD-10-CM

## 2020-05-28 DIAGNOSIS — R74.01 ELEVATED TRANSAMINASE LEVEL: ICD-10-CM

## 2020-05-28 DIAGNOSIS — D64.9 ANEMIA, UNSPECIFIED TYPE: ICD-10-CM

## 2020-05-28 DIAGNOSIS — R09.89 BILATERAL CAROTID BRUITS: ICD-10-CM

## 2020-05-28 DIAGNOSIS — K58.2 IRRITABLE BOWEL SYNDROME WITH BOTH CONSTIPATION AND DIARRHEA: ICD-10-CM

## 2020-05-28 DIAGNOSIS — M54.12 RADICULOPATHY, CERVICAL: ICD-10-CM

## 2020-05-28 DIAGNOSIS — I65.23 CAROTID STENOSIS, BILATERAL: ICD-10-CM

## 2020-05-28 DIAGNOSIS — I10 ESSENTIAL HYPERTENSION: ICD-10-CM

## 2020-05-28 DIAGNOSIS — E83.52 FHH (FAMILIAL HYPOCALCIURIC HYPERCALCEMIA): ICD-10-CM

## 2020-05-28 DIAGNOSIS — I70.1 RENAL ARTERY STENOSIS DUE TO FIBROMUSCULAR DYSPLASIA (HCC): ICD-10-CM

## 2020-05-28 DIAGNOSIS — E21.3 HYPERPARATHYROIDISM (HCC): ICD-10-CM

## 2020-05-28 DIAGNOSIS — I77.3 FIBROMUSCULAR DYSPLASIA (HCC): ICD-10-CM

## 2020-05-28 LAB
25(OH)D3 SERPL-MCNC: 36.3 NG/ML (ref 30–100)
ALBUMIN SERPL-MCNC: 4 G/DL (ref 3.4–5)
ALBUMIN/GLOB SERPL: 1.3 {RATIO} (ref 0.8–1.7)
ALP SERPL-CCNC: 74 U/L (ref 45–117)
ALT SERPL-CCNC: 26 U/L (ref 13–56)
ANION GAP SERPL CALC-SCNC: 4 MMOL/L (ref 3–18)
AST SERPL-CCNC: 27 U/L (ref 10–38)
BASOPHILS # BLD: 0 K/UL (ref 0–0.1)
BASOPHILS NFR BLD: 1 % (ref 0–2)
BILIRUB SERPL-MCNC: 0.6 MG/DL (ref 0.2–1)
BUN SERPL-MCNC: 15 MG/DL (ref 7–18)
BUN/CREAT SERPL: 20 (ref 12–20)
CALCIUM SERPL-MCNC: 10 MG/DL (ref 8.5–10.1)
CHLORIDE SERPL-SCNC: 108 MMOL/L (ref 100–111)
CHOLEST SERPL-MCNC: 194 MG/DL
CO2 SERPL-SCNC: 30 MMOL/L (ref 21–32)
CREAT SERPL-MCNC: 0.74 MG/DL (ref 0.6–1.3)
DIFFERENTIAL METHOD BLD: ABNORMAL
EOSINOPHIL # BLD: 0.1 K/UL (ref 0–0.4)
EOSINOPHIL NFR BLD: 2 % (ref 0–5)
ERYTHROCYTE [DISTWIDTH] IN BLOOD BY AUTOMATED COUNT: 14.6 % (ref 11.6–14.5)
FERRITIN SERPL-MCNC: 31 NG/ML (ref 8–388)
GLOBULIN SER CALC-MCNC: 3.2 G/DL (ref 2–4)
GLUCOSE SERPL-MCNC: 75 MG/DL (ref 74–99)
HCT VFR BLD AUTO: 37 % (ref 35–45)
HDLC SERPL-MCNC: 76 MG/DL (ref 40–60)
HDLC SERPL: 2.6 {RATIO} (ref 0–5)
HGB BLD-MCNC: 11.7 G/DL (ref 12–16)
IRON SATN MFR SERPL: 23 % (ref 20–50)
IRON SERPL-MCNC: 76 UG/DL (ref 50–175)
LDLC SERPL CALC-MCNC: 98.4 MG/DL (ref 0–100)
LIPID PROFILE,FLP: ABNORMAL
LYMPHOCYTES # BLD: 1.3 K/UL (ref 0.9–3.6)
LYMPHOCYTES NFR BLD: 31 % (ref 21–52)
MCH RBC QN AUTO: 29.4 PG (ref 24–34)
MCHC RBC AUTO-ENTMCNC: 31.6 G/DL (ref 31–37)
MCV RBC AUTO: 93 FL (ref 74–97)
MONOCYTES # BLD: 0.5 K/UL (ref 0.05–1.2)
MONOCYTES NFR BLD: 11 % (ref 3–10)
NEUTS SEG # BLD: 2.3 K/UL (ref 1.8–8)
NEUTS SEG NFR BLD: 55 % (ref 40–73)
PLATELET # BLD AUTO: 273 K/UL (ref 135–420)
PMV BLD AUTO: 10.7 FL (ref 9.2–11.8)
POTASSIUM SERPL-SCNC: 4.2 MMOL/L (ref 3.5–5.5)
PROT SERPL-MCNC: 7.2 G/DL (ref 6.4–8.2)
RBC # BLD AUTO: 3.98 M/UL (ref 4.2–5.3)
SODIUM SERPL-SCNC: 142 MMOL/L (ref 136–145)
TIBC SERPL-MCNC: 334 UG/DL (ref 250–450)
TRIGL SERPL-MCNC: 98 MG/DL (ref ?–150)
VLDLC SERPL CALC-MCNC: 19.6 MG/DL
WBC # BLD AUTO: 4.1 K/UL (ref 4.6–13.2)

## 2020-05-28 PROCEDURE — 36415 COLL VENOUS BLD VENIPUNCTURE: CPT

## 2020-05-28 PROCEDURE — 80053 COMPREHEN METABOLIC PANEL: CPT

## 2020-05-28 PROCEDURE — 82728 ASSAY OF FERRITIN: CPT

## 2020-05-28 PROCEDURE — 83540 ASSAY OF IRON: CPT

## 2020-05-28 PROCEDURE — 82306 VITAMIN D 25 HYDROXY: CPT

## 2020-05-28 PROCEDURE — 82784 ASSAY IGA/IGD/IGG/IGM EACH: CPT

## 2020-05-28 PROCEDURE — 80061 LIPID PANEL: CPT

## 2020-05-28 PROCEDURE — 85025 COMPLETE CBC W/AUTO DIFF WBC: CPT

## 2020-06-01 LAB
ALBUMIN SERPL ELPH-MCNC: 3.8 G/DL (ref 2.9–4.4)
ALBUMIN/GLOB SERPL: 1.4 {RATIO} (ref 0.7–1.7)
ALPHA1 GLOB SERPL ELPH-MCNC: 0.2 G/DL (ref 0–0.4)
ALPHA2 GLOB SERPL ELPH-MCNC: 0.8 G/DL (ref 0.4–1)
B-GLOBULIN SERPL ELPH-MCNC: 0.9 G/DL (ref 0.7–1.3)
GAMMA GLOB SERPL ELPH-MCNC: 1 G/DL (ref 0.4–1.8)
GLOBULIN SER-MCNC: 2.9 G/DL (ref 2.2–3.9)
IGA SERPL-MCNC: 259 MG/DL (ref 64–422)
IGG SERPL-MCNC: 1023 MG/DL (ref 586–1602)
IGM SERPL-MCNC: 76 MG/DL (ref 26–217)
INTERPRETATION SERPL IEP-IMP: ABNORMAL
KAPPA LC FREE SER-MCNC: 25.2 MG/L (ref 3.3–19.4)
KAPPA LC FREE/LAMBDA FREE SER: 1.58 {RATIO} (ref 0.26–1.65)
LAMBDA LC FREE SERPL-MCNC: 16 MG/L (ref 5.7–26.3)
M PROTEIN SERPL ELPH-MCNC: ABNORMAL G/DL
PROT SERPL-MCNC: 6.7 G/DL (ref 6–8.5)

## 2020-06-03 ENCOUNTER — TELEPHONE (OUTPATIENT)
Dept: INTERNAL MEDICINE CLINIC | Age: 75
End: 2020-06-03

## 2020-06-03 ENCOUNTER — VIRTUAL VISIT (OUTPATIENT)
Dept: INTERNAL MEDICINE CLINIC | Age: 75
End: 2020-06-03

## 2020-06-03 DIAGNOSIS — M85.89 OSTEOPENIA OF MULTIPLE SITES: ICD-10-CM

## 2020-06-03 DIAGNOSIS — I10 ESSENTIAL HYPERTENSION: Primary | ICD-10-CM

## 2020-06-03 DIAGNOSIS — Z71.89 ACP (ADVANCE CARE PLANNING): ICD-10-CM

## 2020-06-03 DIAGNOSIS — I70.1 RENAL ARTERY STENOSIS DUE TO FIBROMUSCULAR DYSPLASIA (HCC): ICD-10-CM

## 2020-06-03 DIAGNOSIS — I77.3 FIBROMUSCULAR DYSPLASIA (HCC): ICD-10-CM

## 2020-06-03 DIAGNOSIS — K21.9 GASTROESOPHAGEAL REFLUX DISEASE, ESOPHAGITIS PRESENCE NOT SPECIFIED: ICD-10-CM

## 2020-06-03 DIAGNOSIS — E78.5 HYPERLIPIDEMIA, UNSPECIFIED HYPERLIPIDEMIA TYPE: ICD-10-CM

## 2020-06-03 DIAGNOSIS — D64.9 ANEMIA, UNSPECIFIED TYPE: ICD-10-CM

## 2020-06-03 DIAGNOSIS — D04.9 BOWEN'S DISEASE: ICD-10-CM

## 2020-06-03 DIAGNOSIS — K14.6 BURNING MOUTH SYNDROME: ICD-10-CM

## 2020-06-03 DIAGNOSIS — I77.3 RENAL ARTERY STENOSIS DUE TO FIBROMUSCULAR DYSPLASIA (HCC): ICD-10-CM

## 2020-06-03 DIAGNOSIS — I65.23 CAROTID STENOSIS, BILATERAL: ICD-10-CM

## 2020-06-03 DIAGNOSIS — Z00.00 MEDICARE ANNUAL WELLNESS VISIT, SUBSEQUENT: ICD-10-CM

## 2020-06-03 DIAGNOSIS — E21.3 HYPERPARATHYROIDISM (HCC): ICD-10-CM

## 2020-06-03 DIAGNOSIS — M05.79 RHEUMATOID ARTHRITIS INVOLVING MULTIPLE SITES WITH POSITIVE RHEUMATOID FACTOR (HCC): ICD-10-CM

## 2020-06-03 DIAGNOSIS — K58.2 IRRITABLE BOWEL SYNDROME WITH BOTH CONSTIPATION AND DIARRHEA: ICD-10-CM

## 2020-06-03 DIAGNOSIS — E55.9 VITAMIN D DEFICIENCY: ICD-10-CM

## 2020-06-03 DIAGNOSIS — M47.812 CERVICAL SPONDYLOSIS WITHOUT MYELOPATHY: ICD-10-CM

## 2020-06-03 DIAGNOSIS — E83.52 FHH (FAMILIAL HYPOCALCIURIC HYPERCALCEMIA): ICD-10-CM

## 2020-06-03 PROBLEM — R74.01 ELEVATED TRANSAMINASE LEVEL: Status: RESOLVED | Noted: 2020-02-09 | Resolved: 2020-06-03

## 2020-06-03 NOTE — ACP (ADVANCE CARE PLANNING)
Advance Care Planning       Advance Care Planning (ACP) Physician/NP/PA (Provider) Conversation        Date of ACP Conversation: 6/3/2020    Conversation Conducted with:   Patient with Decision Making Shashank Castillo Maker:    Current Designated Health Care Decision Maker:   (If there is a valid [place] named in the [address]" box in the ACP activity, but it is not visible above, be sure to open that field and then select the health care decision maker relationship (ie \"primary\") in the blank space to the right of the name.)    Note: Assess and validate information in current ACP documents, as indicated. If no Authorized Decision Maker has previously been identified, then patient chooses [place]:  \"Who would you like to name as your primary health care decision-maker? \"    Name: Mer Haywood   Relationship:  Phone number: [phone]  Nenita Render this person be reached easily? \" YES  \"Who would you like to name as your back-up decision maker? \"   Name: Sascha Layton  Relationship: daughter Phone number: [phone]  Nenita Render this person be reached easily? \" YES    Note: If the relationship of these Decision-Makers to the patient does NOT follow your state's Next of Kin hierarchy, recommend that patient complete ACP document that meets state-specific requirements to allow them to act on the patient's behalf when appropriate. Care Preferences:    Hospitalization: \"If your health worsens and it becomes clear that your chance of recovery is unlikely, what would your preference be regarding hospitalization? \"  If the patient would want hospitalization, answer \"yes\". If the patient would prefer comfort-focused treatment without hospitalization, answer \"no\". yes      Ventilation:   \"If you were in your present state of health and suddenly became very ill and were unable to breathe on your own, what would your preference be about the use of a ventilator (breathing machine) if it was available to you? \"    If patient would desire the use of a ventilator (breathing machine), answer \"yes\", if not answer \"no\":yes    \"If your health worsens and it becomes clear that your chance of recovery is unlikely, what would your preference be about the use of a ventilator (breathing machine) if it was available to you? \"   no, if no hope of maningful recovery      Resuscitation:  \"CPR works best to restart the heart when there is a sudden event, like a heart attack, in someone who is otherwise healthy. Unfortunately, CPR does not typically restart the heart for people who have serious health conditions or who are very sick. \"    \"In the event your heart stopped as a result of an underlying serious health condition, would you want attempts to be made to restart your heart (answer \"yes\" for attempt to resuscitate) or would you prefer a natural death (answer \"no\" for do not attempt to resuscitate)? \"   no    NOTE: If the patient has a valid advance directive AND provides care preference(s) that are inconsistent with that prior directive, advise the patient to consider either: creating a new advance directive that complies with state-specific requirements; or, if that is not possible, orally revoking that prior directive in accordance with state-specific requirements, which must be documented in the EHR. Conversation Outcomes / Follow-Up Plan: Patient has not completed an advance directive previously. She states that she would designate her  and daughter as her healthcare agents. She expresses that she does not wish life prolonging procedures for end of life care. However, with regard to COVID-19, she would wish the interventions as outlined.     Recommended completion of Advance Directive      Length of Voluntary ACP Conversation in minutes:  16 minutes      Maranda Cartagena MD

## 2020-06-03 NOTE — PROGRESS NOTES
Tony Fisher is a 69 y.o. female who was seen by synchronous (real-time) audio-video technology on 6/3/2020. Consent: Tony Fisher, who was seen by synchronous (real-time) audio-video technology, and/or her healthcare decision maker, is aware that this patient-initiated, Telehealth encounter on 6/3/2020 is a billable service, with coverage as determined by her insurance carrier. She is aware that she may receive a bill and has provided verbal consent to proceed: Yes. HPI:   Tony Fisher is a 69y.o. 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 She reports today that she is doing reasonably well. She reports that she has been following social distancing guidelines, and will wear a mask when going outside. She states that she has been having difficulty with increasing neck and hand pain, and recommendation was to begin hydroxychloroquine to see if will provide benefit. She continues to use Tylenol as needed, and remains on methotrexate and prednisone. However, she reports that she has decreased her dose of prednisone to 2.5 mg daily. She is otherwise without specific complaints and is feeling generally well. On 10/21/2019, she presented to the [facility] ED reporting difficulty with elevated blood pressure and a dull headache. She stated that her blood pressure has been elevated earlier in the week when visiting her gastroenterologist. On the day of presentation to the ED, she states that she was experiencing an achy dull headache that was mild in severity throughout the day. She became concerned when she went to bed and noticed throbbing pain in her hands and feet. She checked her blood pressure and noticed her systolic ranged from 413-615 mmHg.  She had taken her metoprolol tartrate 50 mg bid as prescribed. She denied any shortness of breath, leg swelling, orthopnea, PND, visual changes, nausea/vomiting, lightheadedness, dizziness, confusion, speech difficulty, numbness, or weakness. She did admit to some chest tightness, but stated that it was not exertional and was her usual discomfort associated with reflux. Evaluation in the ED revealed that her blood pressure was 144/73. EKG showed sinus rhythm at 64 bpm and LBBB. Laboratory data included WBC 6.2, Hb 11.8/ Hct 36.9, creatinine 0.79/eGFR >60, troponin x 1 negative, and chest x-ray negative. While in the ED, her headache resolved and she was subsequently discharged. She was seen in follow-up on 10/30/2019 and described episodes with head fullness, flushing, and  \"prickly skin\" occurring in the evening after lying down. She stated that she did notice one episode where her heart rate appeared to be 150 bpm. She also reported that she had been under an increased amount of stress with regard to several of her family members, and felt that the episodes may be secondary to anxiety. Her dose of Zoloft was increased to 50 mg daily, and she reported that the episodes of palpitations and head fullness resolved with the increase in dose of Zoloft. She has a history of hypertension and palpitations, treated currently with metoprolol. She has a history of dyslipidemia, not currently being treated with medication. She was evaluated by Dr. Lorraine Hernandez in 2012 for palpitations. Event monitor was placed, and showed a known LBBB and PVC's, but reportedly no other arrhythmias (report unavailable). In 9/2012, she also underwent a pharmacologic nuclear stress test which was a normal low risk study with EF 55%. She also had an echocardiogram, which showed low normal LV function (EF 50%), mild grade 1 diastolic dysfunction, trace MR, TR, and AI.  She also has a history of fibromuscular dysplasia, and in 5/2009, underwent aortogram and renal arteriogram with balloon angioplasty. She has a known right carotid bruit, and her last carotid duplex (5/2014) showed mild atherosclerotic intraluminal plaquing with elevated velocities in the bilateral mid internal carotid arteries with a \"string of pearls\" appearance consistent with known fibromuscular dysplasia, causing a 50-69% stenosis of the bilateral internal carotid arteries. There was no change from prior study in 2011. She had been followed by Dr. Dee Dee De Dios, but states that she has been released from his care. She remains quite active, and denies any chest pain, shortness of breath at rest or with exertion, palpitations, lightheadedness, or edema. She underwent a repeat carotid duplex study (8/15/2018) showing mild (<50%) stenosis of the left internal carotid artery and moderate (50-69%) stenosis of the right internal carotid artery. Repeat performed 12/17/2019 showed moderate homogenous plaque in right ICA (50 to 69% stenosis). Stenosis is at the mid ICA and may be upper limits of moderate; mild heterogeneous plaque left ICA (< 50% stenosis). She has a history of rheumatoid arthritis, diagnosed when she was 37years old, treated currently with subcutaneous methotrexate and prednisone. She is being followed closely by Dr. Shelley Lafleur. She is not currently being treated with a biologic agent due to concern regarding recent multiple squamous cell carcinomas of the skin, for which she is being followed by Dr. Conard Spatz. She reports that she was well controlled on Humira for several years, but had to discontinue treatment when it was no longer on her insurance formulary. She also has a history of osteopenia, with bone density studies performed by Dr. Shelley Lafleur. Her last bone density study was in 2/2019 showing T-scores:  femoral neck left -1.7  /right -1.9 and lumbar -2.1. She continues to take calcium and Vitamin D supplements. She has no history of pathologic fractures.  She was recently diagnosed with familial hypocalciuric hypercalcemia with mildly elevated calcium 10.6, mildly elevated PTH 80, Vitamin D level 36.5, and 24 hour urine calcium of 105 (FeCa 1%) in 7/30/2018, which would be consistent with this diagnosis. She is being followed by Dr. Karlos Nunez. She has a history of cervical degenerative joint and disc disease and chronic neck pain. She was being followed by Dr. Deejay Doan, who recommended surgery. She was seen by Dr. Huan Gordon at [facility] for a second opinion, and cervical MRI (9/2015) showed multilevel degenerative disc and facet disease, worst at C5-C6; left central/subarticular disc protrusion with superimposed annular fissure noted at C5-C6 causing mass effect upon the left hemicord but no signal changes with severe left neuroforaminal narrowing at this level. Recommendation was for her to proceed with physical therapy, which did result in some improvement. She previously had been treated with gabapentin for pain control, but is currently only using Tylenol. She underwent acupuncture therapy with Dr. Sola Lujan with only minimal improvement. She also states that she was referred to Dr. Dav Burr for evaluation, but has not yet scheduled an appointment with him. She has a history of GERD. In 11/2017, she was having increasing difficulty with constipation alternating with diarrhea as well as increasing reflux symptoms. She states that she was evaluated by Dr. Brenda Maurer and it was her opinion that she suffered from irritable bowel syndrome. She has made changes in her diet and increased her consumption of fiber with some improvement. She began experiencing worsening reflux symptoms in 12/2018 after attempting to wean omeprazole from 40 mg to 20 mg daily. She also was taking an increased dose of prednisone at the time for a flare of her RA. She was evaluated by Dr. Brenda Maurer and attempted a trial of Protonix which she believed may be working better. She plans to continue taking it.  She states that at the time of her worsening reflux, she also noted increasing palpitations and underwent evaluation by THOMAS Serrano for Dr. King Epley. She states that she was offered an event recorder to evaluate, but she noticed that her symptoms improved once her GERD was under control. She therefore declined further evaluation. She states that she has resumed taking omeprazole with reasonable control of her reflux symptoms. She underwent upper endoscopy by Dr. James Jang in 11/2012 which showed a 2 cm hiatal hernia and pathology from a distal esophageal biopsy showing chronic inflammation. She had a screening colonoscopy in 7/14/2016 by Dr. James Jang, which showed an adenomatous polyp (report unavailable). Recommendation was for follow-up in 5 years. She denies any abdominal pain, nausea, vomiting, melena, hematochezia, or change in bowel movements. Past Medical History:   Diagnosis Date    Bowen's disease     Fibromuscular dysplasia (HCC)     GERD (gastroesophageal reflux disease)     Hiatal hernia     Hyperparathyroidism ([address]     Hypertension     LBBB (left bundle branch block)     Premature ventricular contractions     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 (HCC)     s/p balloon angioplasty 5/2009 [facility]    Rheumatoid arthritis ([facility] [address]     Skin cancer      Past Surgical History:   Procedure Laterality Date    HX ANGIOPLASTY      HX KNEE REPLACEMENT Bilateral 2009    partial     HX OTHER SURGICAL      removal of fibroid benign tumors from breast     HX PARTIAL HYSTERECTOMY      HX TONSILLECTOMY  1964     Current Outpatient Medications   Medication Sig    sertraline (ZOLOFT) 50 mg tablet Take 1 Tab by mouth daily.  metoprolol tartrate (LOPRESSOR) 50 mg tablet Take 1 Tab by mouth two (2) times a day.  METHOTREXATE SODIUM INJECTION 15 mg every seven (7) days.  omeprazole (PRILOSEC) 40 mg capsule Take 40 mg by mouth every other day.  predniSONE (DELTASONE) 5 mg tablet Take 2.5 mg by mouth daily.  Per pt now 2.5 daily    multivitamin (ONE A DAY) tablet Take 1 Tab by Mouth Once a Day.  acetaminophen (TYLENOL) 500 mg tablet Take 500 mg by mouth every six (6) hours as needed.  cholecalciferol (VITAMIN D3) 400 unit tab tablet Take 2,000 Units by mouth.  folic acid (FOLVITE) 1 mg tablet Take 1 mg by mouth daily. No current facility-administered medications for this visit. Allergies and Intolerances: Allergies   Allergen Reactions    Hydrocodone Anaphylaxis    Nsaids (Non-Steroidal Anti-Inflammatory Drug) Other (comments)     None due to kidneys    Azithromycin Itching    Other Medication Other (comments)     GI intolerance to nonsteroidal antiinflammatory medications     Vicodin [Hydrocodone-Acetaminophen] Other (comments)     Family History: Her father passed away from a ruptured AAA. Her mother has osteoarthritis and COPD, and a maternal aunt had RA. Her brother recently passed away from multiple sclerosis. She has no family history of colon or breast cancer. Family History   Problem Relation Age of Onset    Arthritis-osteo Mother     Elevated Lipids Mother     Heart Disease Mother         CHF    Lung Disease Mother     Hypertension Sister     Lung Disease Sister     Hypertension Brother     MS Brother      Social History:   She  reports that she has never smoked. She has never used smokeless tobacco. She is  with two children and one stepchild. 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 She drinks wine only occasionally.    Social History     Substance and Sexual Activity   Alcohol Use Yes    Alcohol/week: 1.0 - 2.0 standard drinks    Types: 1 - 2 Glasses of wine per week     Immunization History:  Immunization History   Administered Date(s) Administered    Influenza High Dose Vaccine PF 10/29/2018    Influenza Vaccine 10/29/2013    Influenza Vaccine (Tri) Adjuvanted 12/11/2019    Pneumococcal Conjugate (PCV-13) 12/14/2015    Pneumococcal Polysaccharide (PPSV-23) 11/15/2018    Tdap 10/02/2019       Review of Systems:   As above included in HPI. Otherwise 11 point review of systems negative including constitutional, skin, HENT, eyes, respiratory, cardiovascular, gastrointestinal, genitourinary, musculoskeletal, endocrine, hematologic, allergy, and neurologic. Physical:   Objective:   Vital Signs: (As obtained by patient/caregiver at home)  There were no vitals taken for this visit.      Constitutional: [x] Appears well-developed and well-nourished [x] No apparent distress      [] Abnormal -     Mental status: [x] Alert and awake  [x] Oriented to person/place/time [x] Able to follow commands    [] Abnormal -     Eyes:   EOM    [x]  Normal    [] Abnormal -   Sclera  [x]  Normal    [] Abnormal -          Discharge [x]  None visible   [] Abnormal -     HENT: [x] Normocephalic, atraumatic  [] Abnormal -   [x] Mouth/Throat: Mucous membranes are moist    External Ears [x] Normal  [] Abnormal -    Neck: [x] No visualized mass [] Abnormal -     Pulmonary/Chest: [x] Respiratory effort normal   [x] No visualized signs of difficulty breathing or respiratory distress        [] Abnormal -      Musculoskeletal:   [x] Normal gait with no signs of ataxia         [x] Normal range of motion of neck        [] Abnormal -     Neurological:        [x] No Facial Asymmetry (Cranial nerve 7 motor function) (limited exam due to video visit)          [x] No gaze palsy        [] Abnormal -          Skin:        [x] No significant exanthematous lesions or discoloration noted on facial skin         [] Abnormal -            Psychiatric:       [x] Normal Affect [] Abnormal -        [x] No Hallucinations        Review of Data:  Labs:  Hospital Outpatient Visit on 05/28/2020   Component Date Value Ref Range Status    WBC 05/28/2020 4.1* 4.6 - 13.2 K/uL Final    RBC 05/28/2020 3.98* 4.20 - 5.30 M/uL Final    HGB 05/28/2020 11.7* 12.0 - 16.0 g/dL Final    HCT 05/28/2020 37.0  35.0 - 45.0 % Final    MCV 05/28/2020 93.0 74.0 - 97.0 FL Final    MCH 05/28/2020 29.4  24.0 - 34.0 PG Final    MCHC 05/28/2020 31.6  31.0 - 37.0 g/dL Final    RDW 05/28/2020 14.6* 11.6 - 14.5 % Final    PLATELET 78/96/7372 002  135 - 420 K/uL Final    MPV 05/28/2020 10.7  9.2 - 11.8 FL Final    NEUTROPHILS 05/28/2020 55  40 - 73 % Final    LYMPHOCYTES 05/28/2020 31  21 - 52 % Final    MONOCYTES 05/28/2020 11* 3 - 10 % Final    EOSINOPHILS 05/28/2020 2  0 - 5 % Final    BASOPHILS 05/28/2020 1  0 - 2 % Final    ABS. NEUTROPHILS 05/28/2020 2.3  1.8 - 8.0 K/UL Final    ABS. LYMPHOCYTES 05/28/2020 1.3  0.9 - 3.6 K/UL Final    ABS. MONOCYTES 05/28/2020 0.5  0.05 - 1.2 K/UL Final    ABS. EOSINOPHILS 05/28/2020 0.1  0.0 - 0.4 K/UL Final    ABS. BASOPHILS 05/28/2020 0.0  0.0 - 0.1 K/UL Final    DF 05/28/2020 AUTOMATED    Final    LIPID PROFILE 05/28/2020        Final    Cholesterol, total 05/28/2020 194  <200 MG/DL Final    Triglyceride 05/28/2020 98  <150 MG/DL Final    HDL Cholesterol 05/28/2020 76* 40 - 60 MG/DL Final    LDL, calculated 05/28/2020 98.4  0 - 100 MG/DL Final    VLDL, calculated 05/28/2020 19.6  MG/DL Final    CHOL/HDL Ratio 05/28/2020 2.6  0 - 5.0   Final    Sodium 05/28/2020 142  136 - 145 mmol/L Final    Potassium 05/28/2020 4.2  3.5 - 5.5 mmol/L Final    Chloride 05/28/2020 108  100 - 111 mmol/L Final    CO2 05/28/2020 30  21 - 32 mmol/L Final    Anion gap 05/28/2020 4  3.0 - 18 mmol/L Final    Glucose 05/28/2020 75  74 - 99 mg/dL Final    BUN 05/28/2020 15  7.0 - 18 MG/DL Final    Creatinine 05/28/2020 0.74  0.6 - 1.3 MG/DL Final    BUN/Creatinine ratio 05/28/2020 20  12 - 20   Final    GFR est AA 05/28/2020 >60  >60 ml/min/1.73m2 Final    GFR est non-AA 05/28/2020 >60  >60 ml/min/1.73m2 Final    Calcium 05/28/2020 10.0  8.5 - 10.1 MG/DL Final    Bilirubin, total 05/28/2020 0.6  0.2 - 1.0 MG/DL Final    ALT (SGPT) 05/28/2020 26  13 - 56 U/L Final    AST (SGOT) 05/28/2020 27  10 - 38 U/L Final    Alk. phosphatase 05/28/2020 74  45 - 117 U/L Final    Protein, total 05/28/2020 7.2  6.4 - 8.2 g/dL Final    Albumin 05/28/2020 4.0  3.4 - 5.0 g/dL Final    Globulin 05/28/2020 3.2  2.0 - 4.0 g/dL Final    A-G Ratio 05/28/2020 1.3  0.8 - 1.7   Final    Vitamin D 25-Hydroxy 05/28/2020 36.3  30 - 100 ng/mL Final    Ferritin 05/28/2020 31  8 - 388 NG/ML Final    Immunoglobulin G, Qt. 05/28/2020 1,023  586 - 1,602 mg/dL Final    Immunoglobulin A, Qt. 05/28/2020 259  64 - 422 mg/dL Final    Immunoglobulin M, Qt. 05/28/2020 76  26 - 217 mg/dL Final    Protein, total 05/28/2020 6.7  6.0 - 8.5 g/dL Final    Albumin 05/28/2020 3.8  2.9 - 4.4 g/dL Final    Alpha-1-Globulin 05/28/2020 0.2  0.0 - 0.4 g/dL Final    Alpha-2-Globulin 05/28/2020 0.8  0.4 - 1.0 g/dL Final    Beta Globulin 05/28/2020 0.9  0.7 - 1.3 g/dL Final    Gamma Globulin 05/28/2020 1.0  0.4 - 1.8 g/dL Final    M-Crescencio 05/28/2020 Not Observed  Not Observed g/dL Final    Globulin, total 05/28/2020 2.9  2.2 - 3.9 g/dL Final    A/G ratio 05/28/2020 1.4  0.7 - 1.7   Final    Immunofixation Result 05/28/2020 Comment    Final    Free Kappa Lt Chains, serum 05/28/2020 25.2* 3.3 - 19.4 mg/L Final    Free Lambda Lt Chains, serum 05/28/2020 16.0  5.7 - 26.3 mg/L Final    Kappa/Lambda ratio, serum 05/28/2020 1.58  0.26 - 1.65   Final    Iron 05/28/2020 76  50 - 175 ug/dL Final    TIBC 05/28/2020 334  250 - 450 ug/dL Final    Iron % saturation 05/28/2020 23  20 - 50 % Final     Health Maintenance:  Screening:    Mammogram: negative (2/2010) [facility]   PAP smear: well woman exams performed at [facility] by KIM Cantrell (last 2/2010)   Colorectal: colonoscopy (7/2016) tubular adenoma. Dr. James Jang. Due 2021. Now followed by Dr. Van Wen. Depression: on Zoloft   DM (HbA1c/FPG): FPG 75 (5/2020)   Hepatitis C: negative (1/2/2018) Dr. Danny Boudreaux. Falls: none   DEXA: osteopenia (last 2/2019). Performed by Dr. Danny Boudreaux.    Glaucoma: regular eye exams with Dr. Raven Vazquez (last 8/2019 ) every 6 months   Smoking: none   Vitamin D: 36.3 (5/2020)    Medicare Wellness: today    Impression:  Patient Active Problem List   Diagnosis Code    Displacement of cervical intervertebral disc without myelopathy M50.20    Cervical spondylosis without myelopathy M47.812    Brachial neuritis or radiculitis M54.12    Radiculopathy, cervical M54.12    Skin cancer C44.90    Rheumatoid arthritis ([facility] [address] M06.9    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 (HCC) I70.1, I77.3    LBBB (left bundle branch block) I44.7    Hypertension I10    Hyperparathyroidism ([facility] [address] E21.3    Hiatal hernia K44.9    GERD (gastroesophageal reflux disease) K21.9    Fibromuscular dysplasia (HCC) I77.3    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    Irritable bowel syndrome with both constipation and diarrhea K58.2    Bilateral carotid bruits R09.89    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    Hyperlipidemia E78.5    Osteopenia of multiple sites M85.89    Vitamin D deficiency E55.9    Carotid stenosis, bilateral I65.23    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 Hypertension. Blood pressure has been well controlled today on current regimen of metoprolol 50 mg bid, which also helps with control of palpitations. Recent elevation prompting ED evaluation appeared secondary to anxiety since improved to baseline after increasing dose of Zoloft. Reports no difficulty with palpitations currently. Renal function remains normal with creatinine 0.74/ eGFR >60. Will continue to follow. 2. Fibromuscular dysplasia. Previous angioplasty of renal arteries. History of moderate stenosis of bilateral carotid arteries on carotid duplex in 5/2014. Noted prominent carotid bruits on physical exam, and repeat carotid duplex obtained in 8/2018 showing moderate (50-69%) stenosis of the right ICA and mild (<50%) of the left ICA. Review of report also showed mild atherosclerosis at that time. Previously followed by Dr. Marty Monterroso of [facility], but given stability over many years, released from his care with plan to pursue further evaluation only if becomes symptomatic. Repeat carotid duplex scan obtained 12/17/2019 showed persistence of moderate stenosis of right ICA. Discussed need to prevent progression, and patient remains on aspirin 81 mg every other day. Did not tolerate atorvastatin due to elevated LFT's so discontinued. Will continue to monitor. 3. Hyperlipidemia. Calculated 10 year ASCVD risk improved to 19.6 %, which placed her in one of the four statin benefit groups as per new AHA/ACC guidelines (primary prevention: 10 year ASCVD risk >7.5%). In addition, mild atherosclerosis in the setting of fibromuscular dysplasia described on prior carotid duplex, and moderate stenosis of right ICA reported. Also at increased risk due to chronic inflammatory state related to rheumatoid arthritis. Unsuccessful attempts at maintaining level for LDL< 70 and BMI 18 does not allow for any weight loss. Repeat lipid panel 12/2019 with elevated with  and HDL 85, and patient started on atorvastatin 10 mg daily. Lipid panel significantly improved with LDL 72 and HDL 82. However, noted increase in transaminases with AST 46 and ALT 64 (1/16/2020), and repeat showed further increase in transaminases to ALT 85 and AST 66 (2/5/2020). Atorvastatin discontinued and noted normalization of LFT's today. Will hold off on consideration of further therapy given significant improvement today with LDL 98 and HDL 76. Advised continued attempts to maintain with dietary changes. 4. Rheumatoid arthritis. On SC weekly methotrexate and prednisone 2.5 mg daily. Still with joint complaints, but now to begin hydoxychloroquine to see if can help control. Being followed closely by Dr. Neyda Jimenez. 5. Elevated LFT's. Transaminases noted to increase after started atorvastatin. On chronic methotrexate for RA.  Statin discontinued and LFT's normalized. Evaluated by Dr. Luis Draper and lab evaluation and RUQ ultrasound normal in 3/2020. Presumed secondary to statin in the setting of long term methotrexate therapy for RA. Statin discontinued and LFT's remain normal today. 6. Cervical degenerative disease. Experiencing chronic neck pain, treated previously with gabapentin. Some improvement with exercising as well, although continuing to experience pain. Attempted acupuncture therapy by Dr. Preeti Gustafson, but after 3 months, no significant benefit seen. Previously followed by Dr. Aaron Woodall, but referred to Dr. JOHNSON [facility] by Dr. Trista Miles, but did not schedule. Was finding physical therapy to be beneficial, but had to stop due to pandemic. Planning to resume in near future. 7. Osteopenia. Last bone density scan 2/2019 . Using femoral neck T-scores, calculated FRAX score estimates her 10 year risk of a major osteoporetic fracture at 13 % and hip fracture at 3.1 %, which are an indication for biphosphonate treatment with hip fracture risk >3%. Being performed and followed by Dr. Trista Miles. Continue calcium and Vitamin D. Encouraged exercise, particularly weight bearing activities. 8. Hyperparathyroidism. Mild increase in PTH, mildly elevated calcium, normal Vitamin D level,and 24 hour urine calcium only 105 (FeCa 1%) is consistent with a diagnosis of familial hypocalciuric hypercalcemia. Being followed by Dr. Gilma Dangelo and told at most recent visit that would not need to follow-up for 2 years given benign condition. Advised to drink plenty of fluids. 9. GERD. Resumed omeprazole and taking every other day with good response. Symptoms of diarrhea alternating with constipation chronically thought to be secondary to irritable bowel syndrome. On probiotic, and suggested fiber supplement today. Reportedly changed diet and increased fiber with some improvement. 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  Attempting to establish with new dermatologist.   11. Mild anemia. Present intermittently. B12 and folate levels normal in 12/2019. Iron studies and gammopathy panel normal today. Table and likely secondary to methotrexate. 12. Mouth pain. Now resolved. Reports intermittent difficulty and evaluated by Dr. Ava Jarrell of ENT in the past with no etiology found. Differential diagnosis would include herpes simplex, aphthous stomatitis, xerostomia, or nutritional deficiencies (B12, B6, folate, Zinc, iron), or candidiasis. 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 Tricyclics, pregabalin, gabapentin, and Mirapex have shown some benefit. She reports that she has had similar episodes in the past, but will resolve after several weeks without persistent symptoms. Has found some benefit with Mycelex troches in the past. Advised to take a multivitamin. Continue to follow. 13. Health maintenance. Already received influenza vaccine. Completed pneumococcal series and Tdap. Has not yet completed Shingrix vaccine. Mammogram and well women exams performed at [facility]. Will request reports. Continue regular eye exams with Dr. Tiny Landon. Vitamin D level now normal after increasing to 2000 U daily. In addition, an annual Medicare wellness visit was done today. Patient understands recommendations and agrees with plan. Follow-up in 6 months.       Called and discussed with Dr. Mandeep Cartwright. Patient did not have labs performed during the interim period of labs that were performed in our office. Has been continuing to receive weekly methotrexate injections, although did miss a few injections in early January while on antibiotics. Given that only new variable is atorvastatin, will hold to see if liver function improves. She has follow-up appointment and lab work with rheumatology in 3 weeks, so Dr. Mandeep Cartwright will recheck liver function tests.  Also, recommending evaluation by GI with ultrasound/fibrosure testing to assess degree of effect of long term methotrexate treatment. Patient already follows with Dr. Taryn Kaplan and discussed with patient. Instructed to hold atorvastatin. Discussed evaluation by Dr. Nicol Kerns, and will schedule appointment. Will reassess at follow-up visit in 3 months and reconsider statin therapy. We discussed the expected course, resolution and complications of the diagnosis(es) in detail. Medication risks, benefits, costs, interactions, and alternatives were discussed as indicated. I advised her to contact the office if her condition worsens, changes or fails to improve as anticipated. She expressed understanding with the diagnosis(es) and plan. Bhargavi Bunch is a 69 y.o. female who was evaluated by a video visit encounter for concerns as above. Patient identification was verified prior to start of the visit. A caregiver was present when appropriate. Due to this being a TeleHealth encounter (During ORQCJ-90 public health emergency), evaluation of the following organ systems was limited: Vitals/Constitutional/EENT/Resp/CV/GI//MS/Neuro/Skin/Heme-Lymph-Imm. Pursuant to the emergency declaration under the [facility], 1135 waiver authority and the [employer] Act, this Virtual  Visit was conducted, with patient's (and/or legal guardian's) consent, to reduce the patient's risk of exposure to COVID-19 and provide necessary medical care. Services were provided through a video synchronous discussion virtually to substitute for in-person clinic visit. Patient was at home and provider was located in the office.         Shanelle Busby MD

## 2020-06-03 NOTE — TELEPHONE ENCOUNTER
Please schedule patient for appointment with labs prior in 6 months. Please request recent eye exam from Dr. Crista Izaguirre and recent mammogram from NP Mcmahon Furn[facility] at [facility]. Thank you.

## 2020-06-03 NOTE — PROGRESS NOTES
This is the Subsequent Medicare Annual Wellness Exam, performed 12 months or more after the Initial AWV or the last Subsequent AWV    Consent: Xavier Campuzano, who was seen by synchronous (real-time) audio-video technology, and/or her healthcare decision maker, is aware that this patient-initiated, Telehealth encounter on 6/3/2020 is a billable service. While AWVs are fully covered by Medicare, any services rendered on this date that are not included in an AWV are subject to additional billing, with coverage as determined by her insurance carrier. She is aware that she may receive a bill for any such additional services and has provided verbal consent to proceed: Yes. I have reviewed the patient's medical history in detail and updated the computerized patient record.      History     Patient Active Problem List   Diagnosis Code    Displacement of cervical intervertebral disc without myelopathy M50.20    Cervical spondylosis without myelopathy M47.812    Brachial neuritis or radiculitis M54.12    Radiculopathy, cervical M54.12    Skin cancer C44.90    Rheumatoid arthritis ([facility] [address] [address] M06.9    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 (HCC) I70.1, I77.3    LBBB (left bundle branch block) I44.7    Hypertension I10    Hyperparathyroidism ([address] E21.3    Hiatal hernia K44.9    GERD (gastroesophageal reflux disease) K21.9    Fibromuscular dysplasia (HCC) I77.3    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    Irritable bowel syndrome with both constipation and diarrhea K58.2    Bilateral carotid bruits R09.89    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    Hyperlipidemia E78.5    Osteopenia of multiple sites M85.89    Vitamin D deficiency E55.9    Carotid stenosis, bilateral I65.23    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    Anemia D64.9     Past Medical History:   Diagnosis Date    Bowen's disease     Fibromuscular dysplasia (HCC)     GERD (gastroesophageal reflux disease)     Hiatal hernia     Hyperparathyroidism ([facility] [address]     Hypertension     LBBB (left bundle branch block)     Premature ventricular contractions     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 [facility])     s/p balloon angioplasty 5/2009 [facility]    Rheumatoid arthritis ([facility] [address]     Skin cancer       Past Surgical History:   Procedure Laterality Date    HX ANGIOPLASTY      HX KNEE REPLACEMENT Bilateral 2009    partial     HX OTHER SURGICAL      removal of fibroid benign tumors from breast     HX PARTIAL HYSTERECTOMY      HX TONSILLECTOMY  1964     Current Outpatient Medications   Medication Sig Dispense Refill    sertraline (ZOLOFT) 50 mg tablet Take 1 Tab by mouth daily. 90 Tab 2    metoprolol tartrate (LOPRESSOR) 50 mg tablet Take 1 Tab by mouth two (2) times a day. 180 Tab 2    METHOTREXATE SODIUM INJECTION 15 mg every seven (7) days.  omeprazole (PRILOSEC) 40 mg capsule Take 40 mg by mouth every other day.  predniSONE (DELTASONE) 5 mg tablet Take 2.5 mg by mouth daily. Per pt now 2.5 daily      multivitamin (ONE A DAY) tablet Take 1 Tab by Mouth Once a Day.  acetaminophen (TYLENOL) 500 mg tablet Take 500 mg by mouth every six (6) hours as needed.  cholecalciferol (VITAMIN D3) 400 unit tab tablet Take 2,000 Units by mouth.  folic acid (FOLVITE) 1 mg tablet Take 1 mg by mouth daily.   0     Allergies   Allergen Reactions    Hydrocodone Anaphylaxis    Nsaids (Non-Steroidal Anti-Inflammatory Drug) Other (comments)     None due to kidneys    Azithromycin Itching    Other Medication Other (comments)     GI intolerance to nonsteroidal antiinflammatory medications     Vicodin [Hydrocodone-Acetaminophen] Other (comments)       Family History   Problem Relation Age of Onset    Arthritis-osteo Mother     Elevated Lipids Mother     Heart Disease Mother         CHF    Lung Disease Mother     Hypertension Sister     Lung Disease Sister     Hypertension Brother     MS Brother      Social History Tobacco Use    Smoking status: Never Smoker    Smokeless tobacco: Never Used   Substance Use Topics    Alcohol use: Yes     Alcohol/week: 1.0 - 2.0 standard drinks     Types: 1 - 2 Glasses of wine per week       Depression Risk Factor Screening:     3 most recent PHQ Screens 6/3/2020   PHQ Not Done -   Little interest or pleasure in doing things Not at all   Feeling down, depressed, irritable, or hopeless Not at all   Total Score PHQ 2 0       Alcohol Risk Factor Screening:   Do you average 1 drink per night or more than 7 drinks a week:  No    On any one occasion in the past three months have you have had more than 3 drinks containing alcohol:  No      Functional Ability and Level of Safety:   Hearing: Hearing is good. Activities of Daily Living: The home contains: no safety equipment. Patient does total self care    Ambulation: with no difficulty    Fall Risk:  Fall Risk Assessment, last 12 mths 6/3/2020   Able to walk? Yes   Fall in past 12 months?  No   Fall with injury? -   Number of falls in past 12 months -   Fall Risk Score -       Abuse Screen:  Patient is not abused    Cognitive Screening   Has your family/caregiver stated any concerns about your memory: no  Cognitive Screening: none performed    Patient Care Team   Patient Care Team:  Jony Ty MD as PCP - General (Internal Medicine)  Jony Ty MD as PCP - REHABILITATION [facility] Provider  Alex Vazquez MD as Physician (Physical Medicine and Rehab)  Ernst Geller DO (Rheumatology)  Theresa Saldaña MD (Gastroenterology)  Miguel Lozano MD (Ophthalmology)  Sarina Albrecht MD (Cardiology)  Ricardo Castillo MD (Endocrinology)  Claudean Chinchilla (Orthopedic Surgery)  Donna Hall NP (Nurse Practitioner)    Assessment/Plan   Education and counseling provided:  Are appropriate based on today's review and evaluation  End-of-Life planning (with patient's consent)  Influenza Vaccine  Screening Mammography  Colorectal cancer screening tests  Cardiovascular screening blood test  Bone mass measurement (DEXA)  Screening for glaucoma  Diabetes screening test  Shingrix vaccine    Diagnoses and all orders for this visit:    1. Essential hypertension  -     CBC WITH AUTOMATED DIFF; Future  -     LIPID PANEL; Future  -     MAGNESIUM; Future  -     METABOLIC PANEL, COMPREHENSIVE; Future  -     TSH 3RD GENERATION; Future  -     URINALYSIS W/MICROSCOPIC; Future  -     VITAMIN D, 25 HYDROXY; Future    2. 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 (HCC)  -     CBC WITH AUTOMATED DIFF; Future  -     LIPID PANEL; Future  -     MAGNESIUM; Future  -     METABOLIC PANEL, COMPREHENSIVE; Future  -     TSH 3RD GENERATION; Future  -     URINALYSIS W/MICROSCOPIC; Future  -     VITAMIN D, 25 HYDROXY; Future    3. Fibromuscular dysplasia (HCC)  -     CBC WITH AUTOMATED DIFF; Future  -     LIPID PANEL; Future  -     MAGNESIUM; Future  -     METABOLIC PANEL, COMPREHENSIVE; Future  -     TSH 3RD GENERATION; Future  -     URINALYSIS W/MICROSCOPIC; Future  -     VITAMIN D, 25 HYDROXY; Future    4. Rheumatoid arthritis involving multiple sites with positive rheumatoid factor (HCC)  -     CBC WITH AUTOMATED DIFF; Future  -     LIPID PANEL; Future  -     MAGNESIUM; Future  -     METABOLIC PANEL, COMPREHENSIVE; Future  -     TSH 3RD GENERATION; Future  -     URINALYSIS W/MICROSCOPIC; Future  -     VITAMIN D, 25 HYDROXY; Future    5. Hyperlipidemia, unspecified hyperlipidemia type  -     CBC WITH AUTOMATED DIFF; Future  -     LIPID PANEL; Future  -     MAGNESIUM; Future  -     METABOLIC PANEL, COMPREHENSIVE; Future  -     TSH 3RD GENERATION; Future  -     URINALYSIS W/MICROSCOPIC; Future  -     VITAMIN D, 25 HYDROXY; Future    6. Osteopenia of multiple sites  -     CBC WITH AUTOMATED DIFF; Future  -     LIPID PANEL; Future  -     MAGNESIUM; Future  -     METABOLIC PANEL, COMPREHENSIVE; Future  -     TSH 3RD GENERATION;  Future  -     URINALYSIS W/MICROSCOPIC; Future  -     VITAMIN D, 25 HYDROXY; Future    7. Cervical spondylosis without myelopathy  -     CBC WITH AUTOMATED DIFF; Future  -     LIPID PANEL; Future  -     MAGNESIUM; Future  -     METABOLIC PANEL, COMPREHENSIVE; Future  -     TSH 3RD GENERATION; Future  -     URINALYSIS W/MICROSCOPIC; Future  -     VITAMIN D, 25 HYDROXY; Future    8. Vitamin D deficiency  -     CBC WITH AUTOMATED DIFF; Future  -     LIPID PANEL; Future  -     MAGNESIUM; Future  -     METABOLIC PANEL, COMPREHENSIVE; Future  -     TSH 3RD GENERATION; Future  -     URINALYSIS W/MICROSCOPIC; Future  -     VITAMIN D, 25 HYDROXY; Future    9. Bowen's disease  -     CBC WITH AUTOMATED DIFF; Future  -     LIPID PANEL; Future  -     MAGNESIUM; Future  -     METABOLIC PANEL, COMPREHENSIVE; Future  -     TSH 3RD GENERATION; Future  -     URINALYSIS W/MICROSCOPIC; Future  -     VITAMIN D, 25 HYDROXY; Future    10. Hyperparathyroidism ([address]  -     CBC WITH AUTOMATED DIFF; Future  -     LIPID PANEL; Future  -     MAGNESIUM; Future  -     METABOLIC PANEL, COMPREHENSIVE; Future  -     TSH 3RD GENERATION; Future  -     URINALYSIS W/MICROSCOPIC; Future  -     VITAMIN D, 25 HYDROXY; Future    11. Burning mouth syndrome  -     CBC WITH AUTOMATED DIFF; Future  -     LIPID PANEL; Future  -     MAGNESIUM; Future  -     METABOLIC PANEL, COMPREHENSIVE; Future  -     TSH 3RD GENERATION; Future  -     URINALYSIS W/MICROSCOPIC; Future  -     VITAMIN D, 25 HYDROXY; Future    12. Gastroesophageal reflux disease, esophagitis presence not specified  -     CBC WITH AUTOMATED DIFF; Future  -     LIPID PANEL; Future  -     MAGNESIUM; Future  -     METABOLIC PANEL, COMPREHENSIVE; Future  -     TSH 3RD GENERATION; Future  -     URINALYSIS W/MICROSCOPIC; Future  -     VITAMIN D, 25 HYDROXY; Future    13. Irritable bowel syndrome with both constipation and diarrhea  -     CBC WITH AUTOMATED DIFF; Future  -     LIPID PANEL; Future  -     MAGNESIUM;  Future  - METABOLIC PANEL, COMPREHENSIVE; Future  -     TSH 3RD GENERATION; Future  -     URINALYSIS W/MICROSCOPIC; Future  -     VITAMIN D, 25 HYDROXY; Future    14. Carotid stenosis, bilateral  -     CBC WITH AUTOMATED DIFF; Future  -     LIPID PANEL; Future  -     MAGNESIUM; Future  -     METABOLIC PANEL, COMPREHENSIVE; Future  -     TSH 3RD GENERATION; Future  -     URINALYSIS W/MICROSCOPIC; Future  -     VITAMIN D, 25 HYDROXY; Future    15. 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 Anemia, unspecified type  -     CBC WITH AUTOMATED DIFF; Future  -     LIPID PANEL; Future  -     MAGNESIUM; Future  -     METABOLIC PANEL, COMPREHENSIVE; Future  -     TSH 3RD GENERATION; Future  -     URINALYSIS W/MICROSCOPIC; Future  -     VITAMIN D, 25 HYDROXY; Future    17. Medicare annual wellness visit, subsequent    18. ACP (advance care planning)        Health Maintenance Due   Topic Date Due    GLAUCOMA SCREENING Q2Y  08/01/2020       Paramjit Paul is a 69 y.o. female who was evaluated by an audio-video encounter for concerns as above. Patient identification was verified prior to start of the visit. A caregiver was present when appropriate. Due to this being a TeleHealth encounter (During URUFO-51 public health emergency), evaluation of the following organ systems was limited: Vitals/Constitutional/EENT/Resp/CV/GI//MS/Neuro/Skin/Heme-Lymph-Imm.   Pursuant to the emergency declaration under the [facility], 1135 waiver authority and the [employer] Act, this Virtual Visit was conducted, with patient's (and/or legal guardian's) consent, to reduce the patient's risk of exposure to COVID-19 and provide necessary medical care. Services were provided through a synchronous discussion virtually to substitute for in-person clinic visit. I was in the office. The patient was at home.       Ericka Stinson MD

## 2020-06-03 NOTE — PATIENT INSTRUCTIONS
Medicare Wellness Visit, Female The best way to improve and maintain good health is to have a healthy lifestyle by eating a well-balanced diet, exercising regularly, limiting alcohol and stopping smoking. Regular visits with your physician or non-physician health care provider also support your good health. Preventive screening tests can find health problems before they become diseases or illnesses. Preventive services such as immunizations prevent serious infections. All people over age 72 should have a Pneumovax and a Prevnar-13 shot to prevent potentially life threatening infections with the pneumococcus bacteria, a common cause of pneumonia. These are once in a lifetime unless you and your provider decide differently. All people over 65 should have a yearly influenza vaccine or \"flu\" shot. This does not prevent infection with cold viruses but has been proven to prevent hospitalization and death from influenza. Although Medicare part B \"regular Medicare\" currently only covers tetanus vaccination in the context of an injury, a tetanus vaccine (Tdap or Td) is recommended every 10 years. A shingles vaccine is recommended once in a lifetime after age 61. The Shingles vaccine is also not covered by Medicare part B. Note, however, that both the Shingles vaccine and Tdap/Td are generally covered by secondary carriers. Please check your coverage and out of pocket expenses. Consider contacting your local health department because it may stock these vaccines for a reasonable charge. We currently have documentation of the following immunization history for you: 
Immunization History Administered Date(s) Administered  Influenza High Dose Vaccine PF 10/29/2018  Influenza Vaccine 10/29/2013  Influenza Vaccine (Tri) Adjuvanted 12/11/2019  Pneumococcal Conjugate (PCV-13) 12/14/2015  Pneumococcal Polysaccharide (PPSV-23) 11/15/2018  Tdap 10/02/2019 Screening for infection with Hepatitis C is recommended for anyone born between [birth year] through [address]. The table at the bottom of this document indicates the status of this and other preventive services. A bone mass density test (DEXA) to screen for osteoporosis or thinning of the bones should be done at least once after age 72 and may be done up to every 2 years as determined by you and your health care provider. The most recent DEXA we have on file for you is: DEXA Results (most recent): No results found for this or any previous visit. Screening for diabetes mellitus with a blood sugar test (glucose) should be done at least every 3 years until age 79. You and your health care provider may decide whether to continue screening after age 79. The most recent blood glucose we have on file for you is:  
Lab Results Component Value Date/Time Glucose 75 05/28/2020 08:12 AM  
 
 
 
Glaucoma is a disease of the eye due to increased ocular pressure that can lead to blindness. People with risk factors for glaucoma ( race, diabetes, family history) should be screened at least every 2 years by an eye professional.  
 
Cardiovascular screening tests that check for elevated lipids or cholesterol (fatty part of blood) which can lead to heart disease and strokes should be done every 4-6 years through age 79. You and your health care provider may decide whether to continue screening after age 79. The most recent lipid panel we have on file for you is:  
Lab Results Component Value Date/Time  Cholesterol, total 194 05/28/2020 08:12 AM  
 HDL Cholesterol 76 (H) 05/28/2020 08:12 AM  
 LDL, calculated 98.4 05/28/2020 08:12 AM  
 VLDL, calculated 19.6 05/28/2020 08:12 AM  
 Triglyceride 98 05/28/2020 08:12 AM  
 CHOL/HDL Ratio 2.6 05/28/2020 08:12 AM  
 
 
Colorectal cancer screening that evaluates for blood or polyps in your colon for people with average risk should be done yearly as a stool test, every five years as a flexible sigmoidoscope or every 10 years as a colonoscopy up to age 76. You and your health care provider may decide whether to continue screening after age 76. Breast cancer screening with a mammogram is recommended at least once every 2 years  for women age 54-69. You and your health care provider may decide whether to continue screening after age 76. The most recent mammogram we have on file for you is: [facility]RA[facility]N Results (most recent): No results found for this or any previous visit. Screening for cervical cancer with a pap smear is recommended for all women with a cervix until age 72. The frequency of this test is based on the details of her prior pap smear testing. You and your health care provider may decide whether to continue screening after age 72. People who have smoked the equivalent of 1 pack per day for 30 years or more may benefit from screening for lung cancer with a yearly low dose CT scan until they have been non smokers for 15 years or competing health conditions render this unlikely to be beneficial. Our records show: n/a Your Medicare Wellness Exam is recommended annually. Here is a list of your current Health Maintenance items with a due date: 
Health Maintenance Topic Date Due  GLAUCOMA SCREENING Q2Y  08/01/2020  Shingrix Vaccine Age 50> (1 of 2) 11/02/2020 (Originally 10/25/1996)  Influenza Age 5 to Adult  08/01/2020  Breast Cancer Screen Mammogram  09/27/2020  Lipid Screen  05/28/2021  Medicare Yearly Exam  06/04/2021  Colonoscopy  07/14/2021  
 DTaP/Tdap/Td series (2 - Td) 10/02/2029  Hepatitis C Screening  Completed  Bone Densitometry (Dexa) Screening  Completed  Pneumococcal 65+ years  Completed

## 2020-08-27 RX ORDER — SERTRALINE HYDROCHLORIDE 50 MG/1
TABLET, FILM COATED ORAL
Qty: 90 TAB | Refills: 2 | Status: SHIPPED | OUTPATIENT
Start: 2020-08-27 | End: 2021-06-11

## 2020-11-23 ENCOUNTER — HOSPITAL ENCOUNTER (OUTPATIENT)
Dept: LAB | Age: 75
Discharge: HOME | End: 2020-11-23
Payer: MEDICARE

## 2020-11-23 ENCOUNTER — APPOINTMENT (OUTPATIENT)
Dept: INTERNAL MEDICINE CLINIC | Age: 75
End: 2020-11-23

## 2020-11-23 DIAGNOSIS — I77.3 RENAL ARTERY STENOSIS DUE TO FIBROMUSCULAR DYSPLASIA (HCC): ICD-10-CM

## 2020-11-23 DIAGNOSIS — M85.89 OSTEOPENIA OF MULTIPLE SITES: ICD-10-CM

## 2020-11-23 DIAGNOSIS — I10 ESSENTIAL HYPERTENSION: ICD-10-CM

## 2020-11-23 DIAGNOSIS — K21.9 GASTROESOPHAGEAL REFLUX DISEASE: ICD-10-CM

## 2020-11-23 DIAGNOSIS — E78.5 HYPERLIPIDEMIA, UNSPECIFIED HYPERLIPIDEMIA TYPE: ICD-10-CM

## 2020-11-23 DIAGNOSIS — D64.9 ANEMIA, UNSPECIFIED TYPE: ICD-10-CM

## 2020-11-23 DIAGNOSIS — I65.23 CAROTID STENOSIS, BILATERAL: ICD-10-CM

## 2020-11-23 DIAGNOSIS — I77.3 FIBROMUSCULAR DYSPLASIA (HCC): ICD-10-CM

## 2020-11-23 DIAGNOSIS — M47.812 CERVICAL SPONDYLOSIS WITHOUT MYELOPATHY: ICD-10-CM

## 2020-11-23 DIAGNOSIS — K14.6 BURNING MOUTH SYNDROME: ICD-10-CM

## 2020-11-23 DIAGNOSIS — K58.2 IRRITABLE BOWEL SYNDROME WITH BOTH CONSTIPATION AND DIARRHEA: ICD-10-CM

## 2020-11-23 DIAGNOSIS — I70.1 RENAL ARTERY STENOSIS DUE TO FIBROMUSCULAR DYSPLASIA (HCC): ICD-10-CM

## 2020-11-23 DIAGNOSIS — E55.9 VITAMIN D DEFICIENCY: ICD-10-CM

## 2020-11-23 DIAGNOSIS — E83.52 FHH (FAMILIAL HYPOCALCIURIC HYPERCALCEMIA): ICD-10-CM

## 2020-11-23 DIAGNOSIS — E21.3 HYPERPARATHYROIDISM (HCC): ICD-10-CM

## 2020-11-23 DIAGNOSIS — M05.79 RHEUMATOID ARTHRITIS INVOLVING MULTIPLE SITES WITH POSITIVE RHEUMATOID FACTOR (HCC): ICD-10-CM

## 2020-11-23 DIAGNOSIS — D04.9 BOWEN'S DISEASE: ICD-10-CM

## 2020-11-23 LAB
25(OH)D3 SERPL-MCNC: 45.5 NG/ML (ref 30–100)
ALBUMIN SERPL-MCNC: 4.1 G/DL (ref 3.4–5)
ALBUMIN/GLOB SERPL: 1.3 {RATIO} (ref 0.8–1.7)
ALP SERPL-CCNC: 66 U/L (ref 45–117)
ALT SERPL-CCNC: 24 U/L (ref 13–56)
ANION GAP SERPL CALC-SCNC: 4 MMOL/L (ref 3–18)
APPEARANCE UR: CLEAR
AST SERPL-CCNC: 27 U/L (ref 10–38)
BASOPHILS # BLD: 0 K/UL (ref 0–0.1)
BASOPHILS NFR BLD: 1 % (ref 0–2)
BILIRUB SERPL-MCNC: 0.5 MG/DL (ref 0.2–1)
BILIRUB UR QL: NEGATIVE
BUN SERPL-MCNC: 12 MG/DL (ref 7–18)
BUN/CREAT SERPL: 16 (ref 12–20)
CALCIUM SERPL-MCNC: 10.3 MG/DL (ref 8.5–10.1)
CHLORIDE SERPL-SCNC: 107 MMOL/L (ref 100–111)
CHOLEST SERPL-MCNC: 193 MG/DL
CO2 SERPL-SCNC: 30 MMOL/L (ref 21–32)
COLOR UR: YELLOW
CREAT SERPL-MCNC: 0.73 MG/DL (ref 0.6–1.3)
DIFFERENTIAL METHOD BLD: ABNORMAL
EOSINOPHIL # BLD: 0.2 K/UL (ref 0–0.4)
EOSINOPHIL NFR BLD: 3 % (ref 0–5)
ERYTHROCYTE [DISTWIDTH] IN BLOOD BY AUTOMATED COUNT: 14.1 % (ref 11.6–14.5)
GLOBULIN SER CALC-MCNC: 3.1 G/DL (ref 2–4)
GLUCOSE SERPL-MCNC: 80 MG/DL (ref 74–99)
GLUCOSE UR STRIP.AUTO-MCNC: NEGATIVE MG/DL
HCT VFR BLD AUTO: 38 % (ref 35–45)
HDLC SERPL-MCNC: 81 MG/DL (ref 40–60)
HDLC SERPL: 2.4 {RATIO} (ref 0–5)
HGB BLD-MCNC: 11.7 G/DL (ref 12–16)
HGB UR QL STRIP: NEGATIVE
KETONES UR QL STRIP.AUTO: NEGATIVE MG/DL
LDLC SERPL CALC-MCNC: 95 MG/DL (ref 0–100)
LEUKOCYTE ESTERASE UR QL STRIP.AUTO: NEGATIVE
LIPID PROFILE,FLP: ABNORMAL
LYMPHOCYTES # BLD: 0.9 K/UL (ref 0.9–3.6)
LYMPHOCYTES NFR BLD: 17 % (ref 21–52)
MAGNESIUM SERPL-MCNC: 2.1 MG/DL (ref 1.6–2.6)
MCH RBC QN AUTO: 29.8 PG (ref 24–34)
MCHC RBC AUTO-ENTMCNC: 30.8 G/DL (ref 31–37)
MCV RBC AUTO: 96.9 FL (ref 74–97)
MONOCYTES # BLD: 0.6 K/UL (ref 0.05–1.2)
MONOCYTES NFR BLD: 11 % (ref 3–10)
NEUTS SEG # BLD: 3.6 K/UL (ref 1.8–8)
NEUTS SEG NFR BLD: 68 % (ref 40–73)
NITRITE UR QL STRIP.AUTO: NEGATIVE
PH UR STRIP: 7 [PH] (ref 5–8)
PLATELET # BLD AUTO: 234 K/UL (ref 135–420)
PMV BLD AUTO: 11.2 FL (ref 9.2–11.8)
POTASSIUM SERPL-SCNC: 4 MMOL/L (ref 3.5–5.5)
PROT SERPL-MCNC: 7.2 G/DL (ref 6.4–8.2)
PROT UR STRIP-MCNC: NEGATIVE MG/DL
RBC # BLD AUTO: 3.92 M/UL (ref 4.2–5.3)
SODIUM SERPL-SCNC: 141 MMOL/L (ref 136–145)
SP GR UR REFRACTOMETRY: 1.02 (ref 1–1.03)
TRIGL SERPL-MCNC: 85 MG/DL (ref ?–150)
TSH SERPL DL<=0.05 MIU/L-ACNC: 2.45 UIU/ML (ref 0.36–3.74)
UROBILINOGEN UR QL STRIP.AUTO: 0.2 EU/DL (ref 0.2–1)
VLDLC SERPL CALC-MCNC: 17 MG/DL
WBC # BLD AUTO: 5.2 K/UL (ref 4.6–13.2)

## 2020-11-23 PROCEDURE — 36415 COLL VENOUS BLD VENIPUNCTURE: CPT

## 2020-11-23 PROCEDURE — 82306 VITAMIN D 25 HYDROXY: CPT

## 2020-11-23 PROCEDURE — 80061 LIPID PANEL: CPT

## 2020-11-23 PROCEDURE — 85025 COMPLETE CBC W/AUTO DIFF WBC: CPT

## 2020-11-23 PROCEDURE — 81003 URINALYSIS AUTO W/O SCOPE: CPT

## 2020-11-23 PROCEDURE — 83735 ASSAY OF MAGNESIUM: CPT

## 2020-11-23 PROCEDURE — 84443 ASSAY THYROID STIM HORMONE: CPT

## 2020-11-23 PROCEDURE — 80053 COMPREHEN METABOLIC PANEL: CPT

## 2020-12-03 ENCOUNTER — VIRTUAL VISIT (OUTPATIENT)
Dept: INTERNAL MEDICINE CLINIC | Age: 75
End: 2020-12-03
Payer: MEDICARE

## 2020-12-03 ENCOUNTER — TELEPHONE (OUTPATIENT)
Dept: INTERNAL MEDICINE CLINIC | Age: 75
End: 2020-12-03

## 2020-12-03 DIAGNOSIS — M47.812 CERVICAL SPONDYLOSIS WITHOUT MYELOPATHY: ICD-10-CM

## 2020-12-03 DIAGNOSIS — M05.79 RHEUMATOID ARTHRITIS INVOLVING MULTIPLE SITES WITH POSITIVE RHEUMATOID FACTOR (HCC): ICD-10-CM

## 2020-12-03 DIAGNOSIS — E55.9 VITAMIN D DEFICIENCY: ICD-10-CM

## 2020-12-03 DIAGNOSIS — M85.89 OSTEOPENIA OF MULTIPLE SITES: ICD-10-CM

## 2020-12-03 DIAGNOSIS — I77.3 RENAL ARTERY STENOSIS DUE TO FIBROMUSCULAR DYSPLASIA (HCC): ICD-10-CM

## 2020-12-03 DIAGNOSIS — R00.2 PALPITATIONS: ICD-10-CM

## 2020-12-03 DIAGNOSIS — E83.52 FHH (FAMILIAL HYPOCALCIURIC HYPERCALCEMIA): ICD-10-CM

## 2020-12-03 DIAGNOSIS — E78.5 HYPERLIPIDEMIA, UNSPECIFIED HYPERLIPIDEMIA TYPE: ICD-10-CM

## 2020-12-03 DIAGNOSIS — D04.9 BOWEN'S DISEASE: ICD-10-CM

## 2020-12-03 DIAGNOSIS — I10 ESSENTIAL HYPERTENSION: Primary | ICD-10-CM

## 2020-12-03 DIAGNOSIS — I77.3 FIBROMUSCULAR DYSPLASIA (HCC): ICD-10-CM

## 2020-12-03 DIAGNOSIS — I65.23 CAROTID STENOSIS, BILATERAL: ICD-10-CM

## 2020-12-03 DIAGNOSIS — I70.1 RENAL ARTERY STENOSIS DUE TO FIBROMUSCULAR DYSPLASIA (HCC): ICD-10-CM

## 2020-12-03 DIAGNOSIS — M54.12 RADICULOPATHY, CERVICAL: ICD-10-CM

## 2020-12-03 DIAGNOSIS — D64.9 ANEMIA, UNSPECIFIED TYPE: ICD-10-CM

## 2020-12-03 DIAGNOSIS — E21.3 HYPERPARATHYROIDISM (HCC): ICD-10-CM

## 2020-12-03 PROCEDURE — G0463 HOSPITAL OUTPT CLINIC VISIT: HCPCS | Performed by: INTERNAL MEDICINE

## 2020-12-03 PROCEDURE — G8427 DOCREV CUR MEDS BY ELIG CLIN: HCPCS | Performed by: INTERNAL MEDICINE

## 2020-12-03 PROCEDURE — 1090F PRES/ABSN URINE INCON ASSESS: CPT | Performed by: INTERNAL MEDICINE

## 2020-12-03 PROCEDURE — G8399 PT W/DXA RESULTS DOCUMENT: HCPCS | Performed by: INTERNAL MEDICINE

## 2020-12-03 PROCEDURE — G9899 SCRN MAM PERF RSLTS DOC: HCPCS | Performed by: INTERNAL MEDICINE

## 2020-12-03 PROCEDURE — 99214 OFFICE O/P EST MOD 30 MIN: CPT | Performed by: INTERNAL MEDICINE

## 2020-12-03 PROCEDURE — 3017F COLORECTAL CA SCREEN DOC REV: CPT | Performed by: INTERNAL MEDICINE

## 2020-12-03 PROCEDURE — 1101F PT FALLS ASSESS-DOCD LE1/YR: CPT | Performed by: INTERNAL MEDICINE

## 2020-12-03 PROCEDURE — G8432 DEP SCR NOT DOC, RNG: HCPCS | Performed by: INTERNAL MEDICINE

## 2020-12-03 PROCEDURE — G8756 NO BP MEASURE DOC: HCPCS | Performed by: INTERNAL MEDICINE

## 2020-12-07 PROBLEM — R00.2 PALPITATIONS: Status: ACTIVE | Noted: 2020-12-07

## 2020-12-07 NOTE — PROGRESS NOTES
Ladan Guidry is a 77 y.o. female who was seen by synchronous (real-time) audio-video technology on 12/3/2020. HPI:   Ladan Guidry is a 77y.o. year old female who presents today for a routine visit. 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 She reports today that she is doing reasonably well. She reports that she has been following social distancing guidelines, and will wear a mask when going outside. She states that she successfully weaned from taking prednisone, and started hydroxychloroquine approximately two months ago. She had been tolerating it until this last weekend, when she noted an increase in palpitations and became concerned that it was due to the medication, so she discontinued it. She denies any associated chest pain, shortness of breath, or lightheadedness. She continues to use Tylenol as needed for pain, and remains on SC methotrexate for treatment of RA. She is otherwise without specific complaints and is feeling generally well. On 10/21/2019, she presented to the [facility] ED reporting difficulty with elevated blood pressure and a dull headache. She stated that her blood pressure has been elevated earlier in the week when visiting her gastroenterologist. On the day of presentation to the ED, she states that she was experiencing an achy dull headache that was mild in severity throughout the day. She became concerned when she went to bed and noticed throbbing pain in her hands and feet. She checked her blood pressure and noticed her systolic ranged from 337-295 mmHg. She had taken her metoprolol tartrate 50 mg bid as prescribed.  She denied any shortness of breath, leg swelling, orthopnea, PND, visual changes, nausea/vomiting, lightheadedness, dizziness, confusion, speech difficulty, numbness, or weakness. She did admit to some chest tightness, but stated that it was not exertional and was her usual discomfort associated with reflux. Evaluation in the ED revealed that her blood pressure was 144/73. EKG showed sinus rhythm at 64 bpm and LBBB. Laboratory data included WBC 6.2, Hb 11.8/ Hct 36.9, creatinine 0.79/eGFR >60, troponin x 1 negative, and chest x-ray negative. While in the ED, her headache resolved and she was subsequently discharged. She was seen in follow-up on 10/30/2019 and described episodes with head fullness, flushing, and  \"prickly skin\" occurring in the evening after lying down. She stated that she did notice one episode where her heart rate appeared to be 150 bpm. She also reported that she had been under an increased amount of stress with regard to several of her family members, and felt that the episodes may be secondary to anxiety. Her dose of Zoloft was increased to 50 mg daily, and she reported that the episodes of palpitations and head fullness resolved with the increase in dose of Zoloft. She has a history of hypertension and palpitations, treated currently with metoprolol. She has a history of dyslipidemia, not currently being treated with medication. She was evaluated by Dr. Dylan Cuello in 2012 for palpitations. Event monitor was placed, and showed a known LBBB and PVC's, but reportedly no other arrhythmias (report unavailable). In 9/2012, she also underwent a pharmacologic nuclear stress test which was a normal low risk study with EF 55%. She also had an echocardiogram, which showed low normal LV function (EF 50%), mild grade 1 diastolic dysfunction, trace MR, TR, and AI. She also has a history of fibromuscular dysplasia, and in 5/2009, underwent aortogram and renal arteriogram with balloon angioplasty.  She has a known right carotid bruit, and her last carotid duplex (5/2014) showed mild atherosclerotic intraluminal plaquing with elevated velocities in the bilateral mid internal carotid arteries with a \"string of pearls\" appearance consistent with known fibromuscular dysplasia, causing a 50-69% stenosis of the bilateral internal carotid arteries. There was no change from prior study in 2011. She had been followed by Dr. Mariaa Velazquez, but states that she has been released from his care. She remains quite active, and denies any chest pain, shortness of breath at rest or with exertion, palpitations, lightheadedness, or edema. She underwent a repeat carotid duplex study (8/15/2018) showing mild (<50%) stenosis of the left internal carotid artery and moderate (50-69%) stenosis of the right internal carotid artery. Repeat performed 12/17/2019 showed moderate homogenous plaque in right ICA (50 to 69% stenosis). Stenosis is at the mid ICA and may be upper limits of moderate; mild heterogeneous plaque left ICA (< 50% stenosis). She has a history of rheumatoid arthritis, diagnosed when she was 37years old, treated currently with subcutaneous methotrexate and prednisone. She is being followed closely by Dr. Sheeba Jordan. She is not currently being treated with a biologic agent due to concern regarding recent multiple squamous cell carcinomas of the skin, for which she is being followed by Dr. Clara Graf. She reports that she was well controlled on Humira for several years, but had to discontinue treatment when it was no longer on her insurance formulary. She also has a history of osteopenia, with bone density studies performed by Dr. Sheeba Jordan. Her last bone density study was in 2/2019 showing T-scores:  femoral neck left -1.7  /right -1.9 and lumbar -2.1. She continues to take calcium and Vitamin D supplements. She has no history of pathologic fractures.  She was recently diagnosed with familial hypocalciuric hypercalcemia with mildly elevated calcium 10.6, mildly elevated PTH 80, Vitamin D level 36.5, and 24 hour urine calcium of 105 (FeCa 1%) in 7/30/2018, which would be consistent with this diagnosis. She is being followed by Dr. Claudetta Dresser. She has a history of cervical degenerative joint and disc disease and chronic neck pain. She was being followed by Dr. Denise Cohn, who recommended surgery. She was seen by Dr. Jade Fabian at [facility] for a second opinion, and cervical MRI (9/2015) showed multilevel degenerative disc and facet disease, worst at C5-C6; left central/subarticular disc protrusion with superimposed annular fissure noted at C5-C6 causing mass effect upon the left hemicord but no signal changes with severe left neuroforaminal narrowing at this level. Recommendation was for her to proceed with physical therapy, which did result in some improvement. She previously had been treated with gabapentin for pain control, but is currently only using Tylenol. She underwent acupuncture therapy with Dr. Kiah Duffy with only minimal improvement. She also states that she was referred to Dr. Dontrell Kinney for evaluation, but has not yet scheduled an appointment with him. She has a history of GERD. In 11/2017, she was having increasing difficulty with constipation alternating with diarrhea as well as increasing reflux symptoms. She states that she was evaluated by Dr. Augustina Briseno and it was her opinion that she suffered from irritable bowel syndrome. She has made changes in her diet and increased her consumption of fiber with some improvement. She began experiencing worsening reflux symptoms in 12/2018 after attempting to wean omeprazole from 40 mg to 20 mg daily. She also was taking an increased dose of prednisone at the time for a flare of her RA. She was evaluated by Dr. Augustina Briseno and attempted a trial of Protonix which she believed may be working better. She plans to continue taking it. She states that at the time of her worsening reflux, she also noted increasing palpitations and underwent evaluation by THOMAS Posey for Dr. Alaina Jiang.  She states that she was offered an event recorder to evaluate, but she noticed that her symptoms improved once her GERD was under control. She therefore declined further evaluation. She states that she has resumed taking omeprazole with reasonable control of her reflux symptoms. She underwent upper endoscopy by Dr. Armando Garduno in 11/2012 which showed a 2 cm hiatal hernia and pathology from a distal esophageal biopsy showing chronic inflammation. She had a screening colonoscopy in 7/14/2016 by Dr. Armando Garduno, which showed an adenomatous polyp (report unavailable). Recommendation was for follow-up in 5 years. She denies any abdominal pain, nausea, vomiting, melena, hematochezia, or change in bowel movements. Past Medical History:   Diagnosis Date    Bowen's disease     Fibromuscular dysplasia (HCC)     GERD (gastroesophageal reflux disease)     Hiatal hernia     [facility] ([facility]     Hypertension     LBBB (left bundle branch block)     Premature ventricular contractions     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 (HCC)     s/p balloon angioplasty 5/2009 [facility]    Rheumatoid arthritis ([facility] [address]     Skin cancer      Past Surgical History:   Procedure Laterality Date    HX ANGIOPLASTY      HX KNEE REPLACEMENT Bilateral 2009    partial     HX OTHER SURGICAL      removal of fibroid benign tumors from breast     HX PARTIAL HYSTERECTOMY      HX TONSILLECTOMY  1964     Current Outpatient Medications   Medication Sig    sertraline (ZOLOFT) 50 mg tablet take 1 tablet by mouth once daily    metoprolol tartrate (LOPRESSOR) 50 mg tablet Take 1 Tab by mouth two (2) times a day.  METHOTREXATE SODIUM INJECTION 15 mg every seven (7) days.  omeprazole (PRILOSEC) 40 mg capsule Take 40 mg by mouth every other day.  predniSONE (DELTASONE) 5 mg tablet Take 2.5 mg by mouth daily. Per pt now 2.5 daily    multivitamin (ONE A DAY) tablet Take 1 Tab by Mouth Once a Day.  acetaminophen (TYLENOL) 500 mg tablet Take 500 mg by mouth every six (6) hours as needed.     cholecalciferol (VITAMIN D3) 400 unit tab tablet Take 2,000 Units by mouth.  folic acid (FOLVITE) 1 mg tablet Take 1 mg by mouth daily. No current facility-administered medications for this visit. Allergies and Intolerances: Allergies   Allergen Reactions    Hydrocodone Anaphylaxis    Nsaids (Non-Steroidal Anti-Inflammatory Drug) Other (comments)     None due to kidneys    Azithromycin Itching    Other Medication Other (comments)     GI intolerance to nonsteroidal antiinflammatory medications     Vicodin [Hydrocodone-Acetaminophen] Other (comments)     Family History: Her father passed away from a ruptured AAA. Her mother has osteoarthritis and COPD, and a maternal aunt had RA. Her brother recently passed away from multiple sclerosis. She has no family history of colon or breast cancer. Family History   Problem Relation Age of Onset    Arthritis-osteo Mother     Elevated Lipids Mother     Heart Disease Mother         CHF    Lung Disease Mother     Hypertension Sister     Lung Disease Sister     Hypertension Brother     MS Brother      Social History:   She  reports that she has never smoked. She has never used smokeless tobacco. She is  with two children and one stepchild. 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 She drinks wine only occasionally. Social History     Substance and Sexual Activity   Alcohol Use Yes    Alcohol/week: 1.0 - 2.0 standard drinks    Types: 1 - 2 Glasses of wine per week     Immunization History:  Immunization History   Administered Date(s) Administered    Influenza High Dose Vaccine PF 10/29/2018    Influenza Vaccine 10/29/2013    Influenza Vaccine (Tri) Adjuvanted (>65 Yrs FLUAD TRI 59072) 12/11/2019    Pneumococcal Conjugate (PCV-13) 12/14/2015    Pneumococcal Polysaccharide (PPSV-23) 11/15/2018    Tdap 10/02/2019       Review of Systems:   As above included in HPI.   Otherwise 11 point review of systems negative including constitutional, skin, HENT, eyes, respiratory, cardiovascular, gastrointestinal, genitourinary, musculoskeletal, endocrine, hematologic, allergy, and neurologic. Physical:   Objective:   Vital Signs: (As obtained by patient/caregiver at home)  There were no vitals taken for this visit.      Constitutional: [x] Appears well-developed and well-nourished [x] No apparent distress      [] Abnormal -     Mental status: [x] Alert and awake  [x] Oriented to person/place/time [x] Able to follow commands    [] Abnormal -     Eyes:   EOM    [x]  Normal    [] Abnormal -   Sclera  [x]  Normal    [] Abnormal -          Discharge [x]  None visible   [] Abnormal -     HENT: [x] Normocephalic, atraumatic  [] Abnormal -   [x] Mouth/Throat: Mucous membranes are moist    External Ears [x] Normal  [] Abnormal -    Neck: [x] No visualized mass [] Abnormal -     Pulmonary/Chest: [x] Respiratory effort normal   [x] No visualized signs of difficulty breathing or respiratory distress        [] Abnormal -      Musculoskeletal:   [x] Normal gait with no signs of ataxia         [x] Normal range of motion of neck        [] Abnormal -     Neurological:        [x] No Facial Asymmetry (Cranial nerve 7 motor function) (limited exam due to video visit)          [x] No gaze palsy        [] Abnormal -          Skin:        [x] No significant exanthematous lesions or discoloration noted on facial skin         [] Abnormal -            Psychiatric:       [x] Normal Affect [] Abnormal -        [x] No Hallucinations        Review of Data:  Labs:  Hospital Outpatient Visit on 11/23/2020   Component Date Value Ref Range Status    WBC 11/23/2020 5.2  4.6 - 13.2 K/uL Final    RBC 11/23/2020 3.92* 4.20 - 5.30 M/uL Final    HGB 11/23/2020 11.7* 12.0 - 16.0 g/dL Final    HCT 11/23/2020 38.0  35.0 - 45.0 % Final    MCV 11/23/2020 96.9  74.0 - 97.0 FL Final    MCH 11/23/2020 29.8  24.0 - 34.0 PG Final    MCHC 11/23/2020 30.8* 31.0 - 37.0 g/dL Final    RDW 11/23/2020 14.1  11.6 - 14.5 % Final    PLATELET 71/02/6314 036  135 - 420 K/uL Final    MPV 11/23/2020 11.2  9.2 - 11.8 FL Final    NEUTROPHILS 11/23/2020 68  40 - 73 % Final    LYMPHOCYTES 11/23/2020 17* 21 - 52 % Final    MONOCYTES 11/23/2020 11* 3 - 10 % Final    EOSINOPHILS 11/23/2020 3  0 - 5 % Final    BASOPHILS 11/23/2020 1  0 - 2 % Final    ABS. NEUTROPHILS 11/23/2020 3.6  1.8 - 8.0 K/UL Final    ABS. LYMPHOCYTES 11/23/2020 0.9  0.9 - 3.6 K/UL Final    ABS. MONOCYTES 11/23/2020 0.6  0.05 - 1.2 K/UL Final    ABS. EOSINOPHILS 11/23/2020 0.2  0.0 - 0.4 K/UL Final    ABS. BASOPHILS 11/23/2020 0.0  0.0 - 0.1 K/UL Final    DF 11/23/2020 AUTOMATED    Final    LIPID PROFILE 11/23/2020        Final    Cholesterol, total 11/23/2020 193  <200 MG/DL Final    Triglyceride 11/23/2020 85  <150 MG/DL Final    HDL Cholesterol 11/23/2020 81* 40 - 60 MG/DL Final    LDL, calculated 11/23/2020 95  0 - 100 MG/DL Final    VLDL, calculated 11/23/2020 17  MG/DL Final    CHOL/HDL Ratio 11/23/2020 2.4  0 - 5.0   Final    Magnesium 11/23/2020 2.1  1.6 - 2.6 mg/dL Final    Sodium 11/23/2020 141  136 - 145 mmol/L Final    Potassium 11/23/2020 4.0  3.5 - 5.5 mmol/L Final    Chloride 11/23/2020 107  100 - 111 mmol/L Final    CO2 11/23/2020 30  21 - 32 mmol/L Final    Anion gap 11/23/2020 4  3.0 - 18 mmol/L Final    Glucose 11/23/2020 80  74 - 99 mg/dL Final    BUN 11/23/2020 12  7.0 - 18 MG/DL Final    Creatinine 11/23/2020 0.73  0.6 - 1.3 MG/DL Final    BUN/Creatinine ratio 11/23/2020 16  12 - 20   Final    GFR est AA 11/23/2020 >60  >60 ml/min/1.73m2 Final    GFR est non-AA 11/23/2020 >60  >60 ml/min/1.73m2 Final    Calcium 11/23/2020 10.3* 8.5 - 10.1 MG/DL Final    Bilirubin, total 11/23/2020 0.5  0.2 - 1.0 MG/DL Final    ALT (SGPT) 11/23/2020 24  13 - 56 U/L Final    AST (SGOT) 11/23/2020 27  10 - 38 U/L Final    Alk.  phosphatase 11/23/2020 66  45 - 117 U/L Final    Protein, total 11/23/2020 7.2  6.4 - 8.2 g/dL Final    Albumin 11/23/2020 4.1  3.4 - 5.0 g/dL Final    Globulin 11/23/2020 3.1  2.0 - 4.0 g/dL Final    A-G Ratio 11/23/2020 1.3  0.8 - 1.7   Final    TSH 11/23/2020 2.45  0.36 - 3.74 uIU/mL Final    Vitamin D 25-Hydroxy 11/23/2020 45.5  30 - 100 ng/mL Final    Color 11/23/2020 YELLOW    Final    Appearance 11/23/2020 CLEAR    Final    Specific gravity 11/23/2020 1.020  1.003 - 1.030   Final    pH (UA) 11/23/2020 7.0  5.0 - 8.0   Final    Protein 11/23/2020 Negative  NEG mg/dL Final    Glucose 11/23/2020 Negative  NEG mg/dL Final    Ketone 11/23/2020 Negative  NEG mg/dL Final    Bilirubin 11/23/2020 Negative  NEG   Final    Blood 11/23/2020 Negative  NEG   Final    Urobilinogen 11/23/2020 0.2  0.2 - 1.0 EU/dL Final    Nitrites 11/23/2020 Negative  NEG   Final    Leukocyte Esterase 11/23/2020 Negative  NEG   Final         Health Maintenance:  Screening:    Mammogram: negative (10/2020) [facility]   PAP smear: well woman exams performed at [facility] by KIM Velasco (last 10/2020)   Colorectal: colonoscopy (7/2016) tubular adenoma. Dr. Sandra Blankenship. Due 7/2021. Now followed by Dr. Melodie Benton. Depression: on Zoloft   DM (HbA1c/FPG): FPG 80 (11/2020)   Hepatitis C: negative (1/2/2018) Dr. Krupa Orona. Falls: none   DEXA: osteopenia (last 2/2019). Performed by Dr. Krupa Orona.    Glaucoma: regular eye exams with Dr. Tennille Tavares (last 8/2020 ) every 6 months   Smoking: none   Vitamin D: 45.5 (11/2020)    Medicare Wellness: 6/3/2020      Impression:  Patient Active Problem List   Diagnosis Code    Displacement of cervical intervertebral disc without myelopathy M50.20    Cervical spondylosis without myelopathy M47.812    Brachial neuritis or radiculitis M54.12    Radiculopathy, cervical M54.12    Skin cancer C44.90    Rheumatoid arthritis ([facility] [address] M06.9    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 (HCC) I70.1, I77.3    LBBB (left bundle branch block) I44.7    Hypertension I10    Hyperparathyroidism ([address] E21.3    Hiatal hernia K44.9    GERD (gastroesophageal reflux disease) K21.9    Fibromuscular dysplasia (HCC) I77.3    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    Irritable bowel syndrome with both constipation and diarrhea K58.2    Bilateral carotid bruits R09.89    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    Hyperlipidemia E78.5    Osteopenia of multiple sites M85.89    Vitamin D deficiency E55.9    Carotid stenosis, bilateral I65.23    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 Hypertension. Reports blood pressure has been well controlled on current regimen of metoprolol 50 mg bid, which also helps with control of palpitations. Recent elevation prompting ED evaluation appeared secondary to anxiety since improved to baseline after increasing dose of Zoloft. Reports noted increase in palpitations following initiation of Plaquenil, and advised to follow-up with cardiology for an event monitor so as to clarify if associated with underlying arrhythmia. Renal function remains normal with creatinine 0.73/ eGFR >60. Will continue to follow. 2. Fibromuscular dysplasia. Previous angioplasty of renal arteries. History of moderate stenosis of bilateral carotid arteries on carotid duplex in 5/2014. Noted prominent carotid bruits on physical exam, and repeat carotid duplex obtained in 8/2018 showing moderate (50-69%) stenosis of the right ICA and mild (<50%) of the left ICA. Review of report also showed mild atherosclerosis at that time. Previously followed by Dr. Mahad Brown of [facility], but given stability over many years, released from his care with plan to pursue further evaluation only if becomes symptomatic. Repeat carotid duplex scan obtained 12/17/2019 showed persistence of moderate stenosis of right ICA. Discussed need to prevent progression, and patient remains on aspirin 81 mg every other day. Did not tolerate atorvastatin due to elevated LFT's so discontinued.  Will continue to monitor. 3. Hyperlipidemia. Calculated 10 year ASCVD risk improved to 19.6 %, which placed her in one of the four statin benefit groups as per new AHA/ACC guidelines (primary prevention: 10 year ASCVD risk >7.5%). In addition, mild atherosclerosis in the setting of fibromuscular dysplasia described on prior carotid duplex, and moderate stenosis of right ICA reported. Also at increased risk due to chronic inflammatory state related to rheumatoid arthritis. Unsuccessful attempts at maintaining level for LDL< 70 and BMI 18 does not allow for any weight loss. Repeat lipid panel 12/2019 with elevated with  and HDL 85, and patient started on atorvastatin 10 mg daily. Lipid panel significantly improved with LDL 72 and HDL 82. However, noted increase in transaminases with AST 46 and ALT 64 (1/16/2020), and repeat showed further increase in transaminases to ALT 85 and AST 66 (2/5/2020). Atorvastatin discontinued and noted normalization of LFT's today. Significant improvement today with LDL 95 and HDL 81 so no reason to resume treatment. Will continue to monitor. 4. Rheumatoid arthritis. On SC weekly methotrexate and weaned off prednisone with initiation of Plaquenil. However, concerned that developed increased palpitations after starting Plaquenil and discontinued over weekend. Advised to follow-up with cardiology as plan was to proceed with event monitor if palpitations recurred. Being followed closely by Dr. Kev Ryder. 5. Elevated LFT's. Transaminases noted to increase after started atorvastatin. On chronic methotrexate for RA. Statin discontinued and LFT's normalized. Evaluated by Dr. Yordy Ortega and lab evaluation and RUQ ultrasound normal in 3/2020. Presumed secondary to statin in the setting of long term methotrexate therapy for RA. Statin discontinued and LFT's remain normal today. 6. Cervical degenerative disease. Experiencing chronic neck pain, treated previously with gabapentin.  Some improvement with exercising as well, although continuing to experience pain. Attempted acupuncture therapy by Dr. Vicky Matos, but after 3 months, no significant benefit seen. Previously followed by Dr. Joshua Bianchi, but referred to Dr. Joselo Prado by Dr. Manfred Chadwick, but did not schedule. Was finding physical therapy to be beneficial, but had to stop due to pandemic. Continue to monitor. 7. Osteopenia. Last bone density scan 2/2019 . Using femoral neck T-scores, calculated FRAX score estimates her 10 year risk of a major osteoporetic fracture at 13 % and hip fracture at 3.1 %, which are an indication for biphosphonate treatment with hip fracture risk >3%. Being performed and followed by Dr. Manfred Chadwick. Continue calcium and Vitamin D. Encouraged exercise, particularly weight bearing activities. 8. Hyperparathyroidism. Mild increase in PTH, mildly elevated calcium, normal Vitamin D level,and 24 hour urine calcium only 105 (FeCa 1%) is consistent with a diagnosis of familial hypocalciuric hypercalcemia. Being followed by Dr. Herbert Bettencourt and told at most recent visit that would not need to follow-up for 2 years given benign condition. Advised to drink plenty of fluids. 9. GERD. Resumed omeprazole and taking every other day with good response. Symptoms of diarrhea alternating with constipation chronically thought to be secondary to irritable bowel syndrome. On probiotic, and suggested fiber supplement today. Reportedly changed diet and increased fiber with some improvement. 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 Following with [facility] dermatology. 11. Mild anemia. Present intermittently. B12 and folate levels normal in 12/2019. Iron studies and gammopathy panel normal in 5/2020. Stable and likely secondary to methotrexate. 12. Mouth pain. Now resolved. Reports intermittent difficulty and evaluated by Dr. Elodia Serna of ENT in the past with no etiology found.  Differential diagnosis would include herpes simplex, aphthous stomatitis, xerostomia, or nutritional deficiencies (B12, B6, folate, Zinc, iron), or candidiasis. 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 Tricyclics, pregabalin, gabapentin, and Mirapex have shown some benefit. She reports that she has had similar episodes in the past, but will resolve after several weeks without persistent symptoms. Has found some benefit with Mycelex troches in the past. Advised to take a multivitamin. Continue to follow. 13. Health maintenance. Already received influenza vaccine. Completed pneumococcal series and Tdap. Has not yet completed Shingrix vaccine. Mammogram and well women exams performed at [facility] and up to date. Continue regular eye exams with Dr. Heena Rosenberg. Vitamin D level now normal after increasing to 4000 U daily. Medicare wellness visit up to date. Patient understands recommendations and agrees with plan. Follow-up in 6 months. We discussed the expected course, resolution and complications of the diagnosis(es) in detail. Medication risks, benefits, costs, interactions, and alternatives were discussed as indicated. I advised her to contact the office if her condition worsens, changes or fails to improve as anticipated. She expressed understanding with the diagnosis(es) and plan. Becky Lofton, who was evaluated through a patient-initiated, synchronous (real-time) audio-video encounter, and/or her healthcare decision maker, is aware that it is a billable service, with coverage as determined by her insurance carrier. She provided verbal consent to proceed: Yes, and patient identification was verified. It was conducted pursuant to the emergency declaration under the [facility], [address] [place] [address] authority and the [employer] General Act. A caregiver was present when appropriate. Ability to conduct physical exam was limited. I was in the office. The patient was at home.       Farida Maier MD

## 2021-06-03 ENCOUNTER — HOSPITAL ENCOUNTER (OUTPATIENT)
Dept: LAB | Age: 76
Discharge: HOME | End: 2021-06-03
Payer: MEDICARE

## 2021-06-03 ENCOUNTER — APPOINTMENT (OUTPATIENT)
Dept: INTERNAL MEDICINE CLINIC | Age: 76
End: 2021-06-03

## 2021-06-03 DIAGNOSIS — M47.812 CERVICAL SPONDYLOSIS WITHOUT MYELOPATHY: ICD-10-CM

## 2021-06-03 DIAGNOSIS — D04.9 BOWEN'S DISEASE: ICD-10-CM

## 2021-06-03 DIAGNOSIS — M54.12 RADICULOPATHY, CERVICAL: ICD-10-CM

## 2021-06-03 DIAGNOSIS — I77.3 RENAL ARTERY STENOSIS DUE TO FIBROMUSCULAR DYSPLASIA (HCC): ICD-10-CM

## 2021-06-03 DIAGNOSIS — E78.5 HYPERLIPIDEMIA, UNSPECIFIED HYPERLIPIDEMIA TYPE: ICD-10-CM

## 2021-06-03 DIAGNOSIS — R00.2 PALPITATIONS: ICD-10-CM

## 2021-06-03 DIAGNOSIS — E21.3 HYPERPARATHYROIDISM (HCC): ICD-10-CM

## 2021-06-03 DIAGNOSIS — E55.9 VITAMIN D DEFICIENCY: ICD-10-CM

## 2021-06-03 DIAGNOSIS — M05.79 RHEUMATOID ARTHRITIS INVOLVING MULTIPLE SITES WITH POSITIVE RHEUMATOID FACTOR (HCC): ICD-10-CM

## 2021-06-03 DIAGNOSIS — D64.9 ANEMIA, UNSPECIFIED TYPE: ICD-10-CM

## 2021-06-03 DIAGNOSIS — E83.52 FHH (FAMILIAL HYPOCALCIURIC HYPERCALCEMIA): ICD-10-CM

## 2021-06-03 DIAGNOSIS — I77.3 FIBROMUSCULAR DYSPLASIA (HCC): ICD-10-CM

## 2021-06-03 DIAGNOSIS — I70.1 RENAL ARTERY STENOSIS DUE TO FIBROMUSCULAR DYSPLASIA (HCC): ICD-10-CM

## 2021-06-03 DIAGNOSIS — I65.23 CAROTID STENOSIS, BILATERAL: ICD-10-CM

## 2021-06-03 DIAGNOSIS — I10 ESSENTIAL HYPERTENSION: ICD-10-CM

## 2021-06-03 DIAGNOSIS — M85.89 OSTEOPENIA OF MULTIPLE SITES: ICD-10-CM

## 2021-06-03 LAB
25(OH)D3 SERPL-MCNC: 64.8 NG/ML (ref 30–100)
ALBUMIN SERPL-MCNC: 4 G/DL (ref 3.4–5)
ALBUMIN/GLOB SERPL: 1.4 {RATIO} (ref 0.8–1.7)
ALP SERPL-CCNC: 71 U/L (ref 45–117)
ALT SERPL-CCNC: 25 U/L (ref 13–56)
ANION GAP SERPL CALC-SCNC: 5 MMOL/L (ref 3–18)
AST SERPL-CCNC: 31 U/L (ref 10–38)
BASOPHILS # BLD: 0.1 K/UL (ref 0–0.1)
BASOPHILS NFR BLD: 1 % (ref 0–2)
BILIRUB SERPL-MCNC: 0.5 MG/DL (ref 0.2–1)
BUN SERPL-MCNC: 11 MG/DL (ref 7–18)
BUN/CREAT SERPL: 14 (ref 12–20)
CALCIUM SERPL-MCNC: 9.9 MG/DL (ref 8.5–10.1)
CHLORIDE SERPL-SCNC: 105 MMOL/L (ref 100–111)
CHOLEST SERPL-MCNC: 184 MG/DL
CO2 SERPL-SCNC: 32 MMOL/L (ref 21–32)
CREAT SERPL-MCNC: 0.8 MG/DL (ref 0.6–1.3)
DIFFERENTIAL METHOD BLD: ABNORMAL
EOSINOPHIL # BLD: 0.1 K/UL (ref 0–0.4)
EOSINOPHIL NFR BLD: 2 % (ref 0–5)
ERYTHROCYTE [DISTWIDTH] IN BLOOD BY AUTOMATED COUNT: 13.8 % (ref 11.6–14.5)
GLOBULIN SER CALC-MCNC: 2.8 G/DL (ref 2–4)
GLUCOSE SERPL-MCNC: 74 MG/DL (ref 74–99)
HCT VFR BLD AUTO: 36.1 % (ref 35–45)
HDLC SERPL-MCNC: 77 MG/DL (ref 40–60)
HDLC SERPL: 2.4 {RATIO} (ref 0–5)
HGB BLD-MCNC: 11.6 G/DL (ref 12–16)
LDLC SERPL CALC-MCNC: 93 MG/DL (ref 0–100)
LIPID PROFILE,FLP: ABNORMAL
LYMPHOCYTES # BLD: 0.9 K/UL (ref 0.9–3.6)
LYMPHOCYTES NFR BLD: 18 % (ref 21–52)
MAGNESIUM SERPL-MCNC: 2.3 MG/DL (ref 1.6–2.6)
MCH RBC QN AUTO: 30.3 PG (ref 24–34)
MCHC RBC AUTO-ENTMCNC: 32.1 G/DL (ref 31–37)
MCV RBC AUTO: 94.3 FL (ref 74–97)
MONOCYTES # BLD: 0.5 K/UL (ref 0.05–1.2)
MONOCYTES NFR BLD: 10 % (ref 3–10)
NEUTS SEG # BLD: 3.5 K/UL (ref 1.8–8)
NEUTS SEG NFR BLD: 69 % (ref 40–73)
PLATELET # BLD AUTO: 249 K/UL (ref 135–420)
PMV BLD AUTO: 10.8 FL (ref 9.2–11.8)
POTASSIUM SERPL-SCNC: 4.1 MMOL/L (ref 3.5–5.5)
PROT SERPL-MCNC: 6.8 G/DL (ref 6.4–8.2)
RBC # BLD AUTO: 3.83 M/UL (ref 4.2–5.3)
SODIUM SERPL-SCNC: 142 MMOL/L (ref 136–145)
TRIGL SERPL-MCNC: 70 MG/DL (ref ?–150)
VLDLC SERPL CALC-MCNC: 14 MG/DL
WBC # BLD AUTO: 5 K/UL (ref 4.6–13.2)

## 2021-06-03 PROCEDURE — 82306 VITAMIN D 25 HYDROXY: CPT

## 2021-06-03 PROCEDURE — 83735 ASSAY OF MAGNESIUM: CPT

## 2021-06-03 PROCEDURE — 85025 COMPLETE CBC W/AUTO DIFF WBC: CPT

## 2021-06-03 PROCEDURE — 80061 LIPID PANEL: CPT

## 2021-06-03 PROCEDURE — 80053 COMPREHEN METABOLIC PANEL: CPT

## 2021-06-10 ENCOUNTER — DOCUMENTATION ONLY (OUTPATIENT)
Dept: INTERNAL MEDICINE CLINIC | Age: 76
End: 2021-06-10

## 2021-06-10 ENCOUNTER — OFFICE VISIT (OUTPATIENT)
Dept: INTERNAL MEDICINE CLINIC | Age: 76
End: 2021-06-10
Payer: MEDICARE

## 2021-06-10 VITALS
HEIGHT: 67 IN | BODY MASS INDEX: 17.74 KG/M2 | DIASTOLIC BLOOD PRESSURE: 62 MMHG | TEMPERATURE: 97.9 F | HEART RATE: 67 BPM | SYSTOLIC BLOOD PRESSURE: 112 MMHG | RESPIRATION RATE: 16 BRPM | OXYGEN SATURATION: 97 % | WEIGHT: 113 LBS

## 2021-06-10 DIAGNOSIS — I65.23 CAROTID STENOSIS, BILATERAL: ICD-10-CM

## 2021-06-10 DIAGNOSIS — I77.3 RENAL ARTERY STENOSIS DUE TO FIBROMUSCULAR DYSPLASIA (HCC): ICD-10-CM

## 2021-06-10 DIAGNOSIS — M05.79 RHEUMATOID ARTHRITIS INVOLVING MULTIPLE SITES WITH POSITIVE RHEUMATOID FACTOR (HCC): ICD-10-CM

## 2021-06-10 DIAGNOSIS — Z13.31 SCREENING FOR DEPRESSION: ICD-10-CM

## 2021-06-10 DIAGNOSIS — E21.3 HYPERPARATHYROIDISM (HCC): ICD-10-CM

## 2021-06-10 DIAGNOSIS — K58.2 IRRITABLE BOWEL SYNDROME WITH BOTH CONSTIPATION AND DIARRHEA: ICD-10-CM

## 2021-06-10 DIAGNOSIS — I70.1 RENAL ARTERY STENOSIS DUE TO FIBROMUSCULAR DYSPLASIA (HCC): ICD-10-CM

## 2021-06-10 DIAGNOSIS — R00.2 PALPITATIONS: ICD-10-CM

## 2021-06-10 DIAGNOSIS — D64.9 ANEMIA, UNSPECIFIED TYPE: ICD-10-CM

## 2021-06-10 DIAGNOSIS — I10 ESSENTIAL HYPERTENSION: Primary | ICD-10-CM

## 2021-06-10 DIAGNOSIS — Z71.89 ADVANCED DIRECTIVES, COUNSELING/DISCUSSION: ICD-10-CM

## 2021-06-10 DIAGNOSIS — K21.9 GASTROESOPHAGEAL REFLUX DISEASE, UNSPECIFIED WHETHER ESOPHAGITIS PRESENT: ICD-10-CM

## 2021-06-10 DIAGNOSIS — Z85.828 HISTORY OF NONMELANOMA SKIN CANCER: ICD-10-CM

## 2021-06-10 DIAGNOSIS — D04.9 BOWEN'S DISEASE: ICD-10-CM

## 2021-06-10 DIAGNOSIS — E78.5 HYPERLIPIDEMIA, UNSPECIFIED HYPERLIPIDEMIA TYPE: ICD-10-CM

## 2021-06-10 DIAGNOSIS — I77.3 FIBROMUSCULAR DYSPLASIA (HCC): ICD-10-CM

## 2021-06-10 DIAGNOSIS — E83.52 FHH (FAMILIAL HYPOCALCIURIC HYPERCALCEMIA): ICD-10-CM

## 2021-06-10 DIAGNOSIS — M85.89 OSTEOPENIA OF MULTIPLE SITES: ICD-10-CM

## 2021-06-10 DIAGNOSIS — Z00.00 MEDICARE ANNUAL WELLNESS VISIT, SUBSEQUENT: ICD-10-CM

## 2021-06-10 PROCEDURE — 1101F PT FALLS ASSESS-DOCD LE1/YR: CPT | Performed by: INTERNAL MEDICINE

## 2021-06-10 PROCEDURE — G8536 NO DOC ELDER MAL SCRN: HCPCS | Performed by: INTERNAL MEDICINE

## 2021-06-10 PROCEDURE — G8754 DIAS BP LESS 90: HCPCS | Performed by: INTERNAL MEDICINE

## 2021-06-10 PROCEDURE — G0439 PPPS, SUBSEQ VISIT: HCPCS | Performed by: INTERNAL MEDICINE

## 2021-06-10 PROCEDURE — 99497 ADVNCD CARE PLAN 30 MIN: CPT | Performed by: INTERNAL MEDICINE

## 2021-06-10 PROCEDURE — 99214 OFFICE O/P EST MOD 30 MIN: CPT | Performed by: INTERNAL MEDICINE

## 2021-06-10 PROCEDURE — G8510 SCR DEP NEG, NO PLAN REQD: HCPCS | Performed by: INTERNAL MEDICINE

## 2021-06-10 PROCEDURE — G8419 CALC BMI OUT NRM PARAM NOF/U: HCPCS | Performed by: INTERNAL MEDICINE

## 2021-06-10 PROCEDURE — 1090F PRES/ABSN URINE INCON ASSESS: CPT | Performed by: INTERNAL MEDICINE

## 2021-06-10 PROCEDURE — G8752 SYS BP LESS 140: HCPCS | Performed by: INTERNAL MEDICINE

## 2021-06-10 PROCEDURE — G9899 SCRN MAM PERF RSLTS DOC: HCPCS | Performed by: INTERNAL MEDICINE

## 2021-06-10 PROCEDURE — 3017F COLORECTAL CA SCREEN DOC REV: CPT | Performed by: INTERNAL MEDICINE

## 2021-06-10 PROCEDURE — G8399 PT W/DXA RESULTS DOCUMENT: HCPCS | Performed by: INTERNAL MEDICINE

## 2021-06-10 PROCEDURE — G8427 DOCREV CUR MEDS BY ELIG CLIN: HCPCS | Performed by: INTERNAL MEDICINE

## 2021-06-10 PROCEDURE — G0463 HOSPITAL OUTPT CLINIC VISIT: HCPCS | Performed by: INTERNAL MEDICINE

## 2021-06-10 RX ORDER — GUAIFENESIN 100 MG/5ML
81 LIQUID (ML) ORAL
COMMUNITY

## 2021-06-10 RX ORDER — HYDROXYCHLOROQUINE SULFATE 200 MG/1
200 TABLET, FILM COATED ORAL DAILY
COMMUNITY
Start: 2021-05-26

## 2021-06-10 NOTE — PATIENT INSTRUCTIONS
Heart-Healthy Diet: Care Instructions Your Care Instructions A heart-healthy diet has lots of vegetables, fruits, nuts, beans, and whole grains, and is low in salt. It limits foods that are high in saturated fat, such as meats, cheeses, and fried foods. It may be hard to change your diet, but even small changes can lower your risk of heart attack and heart disease. Follow-up care is a key part of your treatment and safety. Be sure to make and go to all appointments, and call your doctor if you are having problems. It's also a good idea to know your test results and keep a list of the medicines you take. How can you care for yourself at home? Watch your portions · Learn what a serving is. A \"serving\" and a \"portion\" are not always the same thing. Make sure that you are not eating larger portions than are recommended. For example, a serving of pasta is ½ cup. A serving size of meat is 2 to 3 ounces. A 3-ounce serving is about the size of a deck of cards. Measure serving sizes until you are good at [place]" them. Keep in mind that restaurants often serve portions that are 2 or 3 times the size of one serving. · To keep your energy level up and keep you from feeling hungry, eat often but in smaller portions. · Eat only the number of calories you need to stay at a healthy weight. If you need to lose weight, eat fewer calories than your body burns (through exercise and other physical activity). Eat more fruits and vegetables · Eat a variety of fruit and vegetables every day. Dark green, deep orange, red, or yellow fruits and vegetables are especially good for you. Examples include spinach, carrots, peaches, and berries. · Keep carrots, celery, and other veggies handy for snacks. Buy fruit that is in season and store it where you can see it so that you will be tempted to eat it. · Cook dishes that have a lot of veggies in them, such as stir-fries and soups. Limit saturated and trans fat · Read food labels, and try to avoid saturated and trans fats. They increase your risk of heart disease. · Use olive or canola oil when you cook. · Bake, broil, grill, or steam foods instead of frying them. · Choose lean meats instead of high-fat meats such as hot dogs and sausages. Cut off all visible fat when you prepare meat. · Eat fish, skinless poultry, and meat alternatives such as soy products instead of high-fat meats. Soy products, such as tofu, may be especially good for your heart. · Choose low-fat or fat-free milk and dairy products. Eat foods high in fiber · Eat a variety of grain products every day. Include whole-grain foods that have lots of fiber and nutrients. Examples of whole-grain foods include oats, whole wheat bread, and brown rice. · Buy whole-grain breads and cereals, instead of white bread or pastries. Limit salt and sodium · Limit how much salt and sodium you eat to help lower your blood pressure. · Taste food before you salt it. Add only a little salt when you think you need it. With time, your taste buds will adjust to less salt. · Eat fewer snack items, fast foods, and other high-salt, processed foods. Check food labels for the amount of sodium in packaged foods. · Choose low-sodium versions of canned goods (such as soups, vegetables, and beans). Limit sugar · Limit drinks and foods with added sugar. These include candy, desserts, and soda pop. Limit alcohol · Limit alcohol to no more than 2 drinks a day for men and 1 drink a day for women. Too much alcohol can cause health problems. When should you call for help? Watch closely for changes in your health, and be sure to contact your doctor if: 
  · You would like help planning heart-healthy meals. Where can you learn more? Go to http://www.[employer].com/ Enter V137 in the search box to learn more about \"Heart-Healthy Diet: Care Instructions. \" Current as of: August 22, 2019               Content Version: 12.6 © 2699-6932 [employer]. Care instructions adapted under license by [employer] (which disclaims liability or warranty for this information). If you have questions about a medical condition or this instruction, always ask your healthcare professional. [address] any warranty or liability for your use of this information. Medicare Wellness Visit, Female The best way to improve and maintain good health is to have a healthy lifestyle by eating a well-balanced diet, exercising regularly, limiting alcohol and stopping smoking. Regular visits with your physician or non-physician health care provider also support your good health. Preventive screening tests can find health problems before they become diseases or illnesses. Preventive services such as immunizations prevent serious infections. All people over age 72 should have a Pneumovax and a Prevnar-13 shot to prevent potentially life threatening infections with the pneumococcus bacteria, a common cause of pneumonia. These are once in a lifetime unless you and your provider decide differently. All people over 65 should have a yearly influenza vaccine or \"flu\" shot. This does not prevent infection with cold viruses but has been proven to prevent hospitalization and death from influenza. Although Medicare part B \"regular Medicare\" currently only covers tetanus vaccination in the context of an injury, a tetanus vaccine (Tdap or Td) is recommended every 10 years. A shingles vaccine is recommended once in a lifetime after age 61. The Shingles vaccine is also not covered by Medicare part B. Note, however, that both the Shingles vaccine and Tdap/Td are generally covered by secondary carriers. Please check your coverage and out of pocket expenses. Consider contacting your local health department because it may stock these vaccines for a reasonable charge.  
 
We currently have documentation of the following immunization history for you: 
Immunization History Administered Date(s) Administered  Covid-19, MODERNA, Mrna, Lnp-s, Pf, 100mcg/0.5mL 02/24/2021  Influenza High Dose Vaccine PF 10/29/2018  Influenza Vaccine 10/29/2013  Influenza Vaccine (Tri) Adjuvanted (>65 Yrs FLUAD TRI 41405) 12/11/2019  Pneumococcal Conjugate (PCV-13) 12/14/2015  Pneumococcal Polysaccharide (PPSV-23) 11/15/2018  Tdap 10/02/2019 Screening for infection with Hepatitis C is recommended for anyone born between [birth year] through [address]. The table at the bottom of this document indicates the status of this and other preventive services. A bone mass density test (DEXA) to screen for osteoporosis or thinning of the bones should be done at least once after age 72 and may be done up to every 2 years as determined by you and your health care provider. The most recent DEXA we have on file for you is: DEXA Results (most recent): No results found for this or any previous visit. Screening for diabetes mellitus with a blood sugar test (glucose) should be done at least every 3 years until age 79. You and your health care provider may decide whether to continue screening after age 79. The most recent blood glucose we have on file for you is:  
Lab Results Component Value Date/Time Glucose 74 06/03/2021 11:19 AM  
 
 
 
Glaucoma is a disease of the eye due to increased ocular pressure that can lead to blindness. People with risk factors for glaucoma ( race, diabetes, family history) should be screened at least every 2 years by an eye professional.  
 
Cardiovascular screening tests that check for elevated lipids or cholesterol (fatty part of blood) which can lead to heart disease and strokes should be done every 4-6 years through age 79. You and your health care provider may decide whether to continue screening after age 79.  The most recent lipid panel we have on file for you is: Lab Results Component Value Date/Time Cholesterol, total 184 06/03/2021 11:19 AM  
 HDL Cholesterol 77 (H) 06/03/2021 11:19 AM  
 LDL, calculated 93 06/03/2021 11:19 AM  
 VLDL, calculated 14 06/03/2021 11:19 AM  
 Triglyceride 70 06/03/2021 11:19 AM  
 CHOL/HDL Ratio 2.4 06/03/2021 11:19 AM  
 
 
Colorectal cancer screening that evaluates for blood or polyps in your colon for people with average risk should be done yearly as a stool test, every five years as a flexible sigmoidoscope or every 10 years as a colonoscopy up to age 76. You and your health care provider may decide whether to continue screening after age 76. Breast cancer screening with a mammogram is recommended at least once every 2 years  for women age 54-69. You and your health care provider may decide whether to continue screening after age 76. The most recent mammogram we have on file for you is: EFRAÍN Results (most recent): No results found for this or any previous visit. Screening for cervical cancer with a pap smear is recommended for all women with a cervix until age 72. The frequency of this test is based on the details of her prior pap smear testing. You and your health care provider may decide whether to continue screening after age 72. People who have smoked the equivalent of 1 pack per day for 30 years or more may benefit from screening for lung cancer with a yearly low dose CT scan until they have been non smokers for 15 years or competing health conditions render this unlikely to be beneficial. Our records show: n/a Your Medicare Wellness Exam is recommended annually. Here is a list of your current Health Maintenance items with a due date: 
Health Maintenance Topic Date Due  Shingrix Vaccine Age 50> (1 of 2) Never done  Colorectal Cancer Screening Combo  07/14/2021  Breast Cancer Screen Mammogram  10/02/2021  Medicare Yearly Exam  06/11/2022  Lipid Screen  06/03/2026  
 DTaP/Tdap/Td series (2 - Td or Tdap) 10/02/2029  Hepatitis C Screening  Completed  Bone Densitometry (Dexa) Screening  Completed  Flu Vaccine  Completed  COVID-19 Vaccine  Completed  Pneumococcal 65+ years  Completed

## 2021-06-10 NOTE — ACP (ADVANCE CARE PLANNING)
Advance Care Planning     Advance Care Planning (ACP) Physician/NP/PA Conversation      Date of Conversation: 6/10/2021  Conducted with: Patient with Decision Making Capacity    Healthcare Decision Maker:     Primary Decision Maker: Gilmar Friday - Spouse - [phone]    Secondary Decision Maker: Thalia Malave - Daughter - [phone]    Secondary Decision Maker: Margarita Bowers - Daughter - [phone]  Click here to complete [address] including selection of the Healthcare Decision Maker Relationship (ie \"Primary\")  Today we documented Decision Maker(s) consistent with Legal Next of Kin hierarchy. Care Preferences:    Hospitalization: \"If your health worsens and it becomes clear that your chance of recovery is unlikely, what would be your preference regarding hospitalization? \"  The patient would prefer hospitalization. Ventilation: \"If you were unable to breathe on your own and your chance of recovery was unlikely, what would be your preference about the use of a ventilator (breathing machine) if it was available to you? \"   The patient is unsure. Resuscitation: \"In the event your heart stopped as a result of an underlying serious health condition, would you want attempts to be made to restart your heart, or would you prefer a natural death? \"   The patient is unsure.     Additional topics discussed: treatment goals, benefit/burden of treatment options, ventilation preferences, hospitalization preferences, resuscitation preferences and end of life care preferences (vegetative state/imminent death)    Conversation Outcomes / Follow-Up Plan:   ACP in process - information provided, considering goals and options  Reviewed DNR/DNI and patient elects Full Code (Attempt Resuscitation)     Length of Voluntary ACP Conversation in minutes:  16 minutes    Yudith Kilpatrick MD

## 2021-06-10 NOTE — PROGRESS NOTES
Dr. Cruz Person has agreed to take her daughter Malorie Merrill, as well her Granddaughter Rosanne Grimes as new patients.

## 2021-06-10 NOTE — PROGRESS NOTES
1. Have you been to the ER, urgent care clinic or hospitalized since your last visit? NO.     2. Have you seen or consulted any other health care providers outside of the [address] [place] [address] since your last visit (Include any pap smears or colon screening)?  [address] [facility],[address]

## 2021-06-10 NOTE — PROGRESS NOTES
This is the Subsequent Medicare Annual Wellness Exam, performed 12 months or more after the Initial AWV or the last Subsequent AWV    I have reviewed the patient's medical history in detail and updated the computerized patient record. Assessment/Plan   Education and counseling provided:  Are appropriate based on today's review and evaluation  End-of-Life planning (with patient's consent)  Influenza Vaccine  Screening Mammography  Colorectal cancer screening tests  Cardiovascular screening blood test  Bone mass measurement (DEXA)  Screening for glaucoma  Diabetes screening test    1. Essential hypertension  -     CBC WITH AUTOMATED DIFF; Future  -     LIPID PANEL; Future  -     MAGNESIUM; Future  -     METABOLIC PANEL, COMPREHENSIVE; Future  -     TSH 3RD GENERATION; Future  -     URINALYSIS W/MICROSCOPIC; Future  -     VITAMIN D, 25 HYDROXY; Future  2. Hyperlipidemia, unspecified hyperlipidemia type  -     CBC WITH AUTOMATED DIFF; Future  -     LIPID PANEL; Future  -     MAGNESIUM; Future  -     METABOLIC PANEL, COMPREHENSIVE; Future  -     TSH 3RD GENERATION; Future  -     URINALYSIS W/MICROSCOPIC; Future  -     VITAMIN D, 25 HYDROXY; Future  3. Fibromuscular dysplasia (HCC)  -     CBC WITH AUTOMATED DIFF; Future  -     LIPID PANEL; Future  -     MAGNESIUM; Future  -     METABOLIC PANEL, COMPREHENSIVE; Future  -     TSH 3RD GENERATION; Future  -     URINALYSIS W/MICROSCOPIC; Future  -     VITAMIN D, 25 HYDROXY; Future  4. 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 (HCC)  -     CBC WITH AUTOMATED DIFF; Future  -     LIPID PANEL; Future  -     MAGNESIUM; Future  -     METABOLIC PANEL, COMPREHENSIVE; Future  -     TSH 3RD GENERATION; Future  -     URINALYSIS W/MICROSCOPIC; Future  -     VITAMIN D, 25 HYDROXY; Future  5. Carotid stenosis, bilateral  -     CBC WITH AUTOMATED DIFF; Future  -     LIPID PANEL; Future  -     MAGNESIUM; Future  -     METABOLIC PANEL, COMPREHENSIVE;  Future  -     TSH 3RD GENERATION; Future  -     URINALYSIS W/MICROSCOPIC; Future  -     VITAMIN D, 25 HYDROXY; Future  6. Palpitations  -     CBC WITH AUTOMATED DIFF; Future  -     LIPID PANEL; Future  -     MAGNESIUM; Future  -     METABOLIC PANEL, COMPREHENSIVE; Future  -     TSH 3RD GENERATION; Future  -     URINALYSIS W/MICROSCOPIC; Future  -     VITAMIN D, 25 HYDROXY; Future  7. Rheumatoid arthritis involving multiple sites with positive rheumatoid factor (HCC)  -     CBC WITH AUTOMATED DIFF; Future  -     LIPID PANEL; Future  -     MAGNESIUM; Future  -     METABOLIC PANEL, COMPREHENSIVE; Future  -     TSH 3RD GENERATION; Future  -     URINALYSIS W/MICROSCOPIC; Future  -     VITAMIN D, 25 HYDROXY; Future  8. Osteopenia of multiple sites  -     CBC WITH AUTOMATED DIFF; Future  -     LIPID PANEL; Future  -     MAGNESIUM; Future  -     METABOLIC PANEL, COMPREHENSIVE; Future  -     TSH 3RD GENERATION; Future  -     URINALYSIS W/MICROSCOPIC; Future  -     VITAMIN D, 25 HYDROXY; Future  9. Bowen's disease  -     CBC WITH AUTOMATED DIFF; Future  -     LIPID PANEL; Future  -     MAGNESIUM; Future  -     METABOLIC PANEL, COMPREHENSIVE; Future  -     TSH 3RD GENERATION; Future  -     URINALYSIS W/MICROSCOPIC; Future  -     VITAMIN D, 25 HYDROXY; Future  10. History of nonmelanoma skin cancer  -     CBC WITH AUTOMATED DIFF; Future  -     LIPID PANEL; Future  -     MAGNESIUM; Future  -     METABOLIC PANEL, COMPREHENSIVE; Future  -     TSH 3RD GENERATION; Future  -     URINALYSIS W/MICROSCOPIC; Future  -     VITAMIN D, 25 HYDROXY; Future  11. Hyperparathyroidism ([address]  -     CBC WITH AUTOMATED DIFF; Future  -     LIPID PANEL; Future  -     MAGNESIUM; Future  -     METABOLIC PANEL, COMPREHENSIVE; Future  -     TSH 3RD GENERATION; Future  -     URINALYSIS W/MICROSCOPIC; Future  -     VITAMIN D, 25 HYDROXY; Future  12. Gastroesophageal reflux disease, unspecified whether esophagitis present  -     CBC WITH AUTOMATED DIFF;  Future  -     LIPID PANEL; Future  -     MAGNESIUM; Future  -     METABOLIC PANEL, COMPREHENSIVE; Future  -     TSH 3RD GENERATION; Future  -     URINALYSIS W/MICROSCOPIC; Future  -     VITAMIN D, 25 HYDROXY; Future  13. 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 Anemia, unspecified type  -     CBC WITH AUTOMATED DIFF; Future  -     LIPID PANEL; Future  -     MAGNESIUM; Future  -     METABOLIC PANEL, COMPREHENSIVE; Future  -     TSH 3RD GENERATION; Future  -     URINALYSIS W/MICROSCOPIC; Future  -     VITAMIN D, 25 HYDROXY; Future  16. Medicare annual wellness visit, subsequent  -     ADVANCE CARE PLANNING FIRST 27 MINS  17. Advanced directives, counseling/discussion  -     ADVANCE CARE PLANNING FIRST 27 MINS  25. Screening for depression       Depression Risk Factor Screening     3 most recent PHQ Screens 6/10/2021   PHQ Not Done -   Little interest or pleasure in doing things Not at all   Feeling down, depressed, irritable, or hopeless Not at all   Total Score PHQ 2 0       Alcohol Risk Screen    Do you average more than 1 drink per night or more than 7 drinks a week:  No    On any one occasion in the past three months have you have had more than 3 drinks containing alcohol:  No        Functional Ability and Level of Safety    Hearing: Hearing is good. Activities of Daily Living: The home contains: no safety equipment.   Patient does total self care      Ambulation: with mild difficulty     Fall Risk:  Fall Risk Assessment, last 12 mths 6/10/2021   Able to walk? Yes   Fall in past 12 months? 0   Do you feel unsteady? 0   Are you worried about falling 0   Number of falls in past 12 months -   Fall with injury? -      Abuse Screen:  Patient is not abused       Cognitive Screening    Has your family/caregiver stated any concerns about your memory: no     Cognitive Screening: none performed    Health Maintenance Due     There are no preventive care reminders to display for this patient.     Patient Care Team   Patient Care Team:  Jana Cobb MD as PCP - General (Internal Medicine)  Jana Cobb MD as PCP - REHABILITATION [facility] Provider  Clemencia Manuel MD as Physician (Physical Medicine and Rehabilitation)  Farrah Mendoza DO (Rheumatology)  Alie Valadez MD (Gastroenterology)  Bobbi Bergman MD (Ophthalmology)  Maia Kim MD (Cardiology)  Dickson Alpers, MD (Endocrinology)  Nydia Silva NP as Nurse Practitioner (Gynecology)  Too Ward MD (Dermatology)    History     Patient Active Problem List   Diagnosis Code    Displacement of cervical intervertebral disc without myelopathy M50.20    Cervical spondylosis without myelopathy M47.812    Brachial neuritis or radiculitis M54.12    Radiculopathy, cervical M54.12    History of nonmelanoma skin cancer Z85.828    Rheumatoid arthritis ([facility] [address] M06.9    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 (HCC) I70.1, I77.3    LBBB (left bundle branch block) I44.7    Hypertension I10    Hyperparathyroidism ([address] E21.3    Hiatal hernia K44.9    GERD (gastroesophageal reflux disease) K21.9    Fibromuscular dysplasia ([address] I77.3    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    Irritable bowel syndrome with both constipation and diarrhea K58.2    Bilateral carotid bruits R09.89    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    Anemia D64.9    Palpitations R00.2     Past Medical History:   Diagnosis Date    Bowen's disease     Fibromuscular dysplasia (HCC)     GERD (gastroesophageal reflux disease)     Hiatal hernia     Hyperparathyroidism ([facility] [address]     Hypertension     LBBB (left bundle branch block)     Premature ventricular contractions     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 (HCC)     s/p balloon angioplasty 5/2009 [facility]    Rheumatoid arthritis ([facility] [address]     Skin cancer       Past Surgical History:   Procedure Laterality Date    HX ANGIOPLASTY      HX KNEE REPLACEMENT Bilateral 2009    partial     HX OTHER SURGICAL      removal of fibroid benign tumors from breast     HX PARTIAL HYSTERECTOMY      HX TONSILLECTOMY  1964     Current Outpatient Medications   Medication Sig Dispense Refill    aspirin 81 mg chewable tablet Take 81 mg by mouth. Every three days      metoprolol tartrate (LOPRESSOR) 50 mg tablet Take 1 Tab by mouth two (2) times a day. 180 Tab 2    METHOTREXATE SODIUM INJECTION 15 mg every seven (7) days.  omeprazole (PRILOSEC) 40 mg capsule Take 40 mg by mouth every other day.  predniSONE (DELTASONE) 5 mg tablet Take 2.5 mg by mouth every other day. Per pt now 2.5 daily      multivitamin (ONE A DAY) tablet Take 1 Tab by Mouth Once a Day.  acetaminophen (TYLENOL) 500 mg tablet Take 500 mg by mouth every six (6) hours as needed.  cholecalciferol (VITAMIN D3) 400 unit tab tablet Take 2,000 Units by mouth.  folic acid (FOLVITE) 1 mg tablet Take 1 mg by mouth daily. 0    sertraline (ZOLOFT) 50 mg tablet take 1 tablet by mouth once daily 90 Tablet 2    hydrOXYchloroQUINE (PLAQUENIL) 200 mg tablet Take 200 mg by mouth daily.        Allergies   Allergen Reactions    Hydrocodone Anaphylaxis    Nsaids (Non-Steroidal Anti-Inflammatory Drug) Other (comments)     None due to kidneys    Azithromycin Itching    Other Medication Other (comments)     GI intolerance to nonsteroidal antiinflammatory medications     Vicodin [Hydrocodone-Acetaminophen] Other (comments)       Family History   Problem Relation Age of Onset    Arthritis-osteo Mother     Elevated Lipids Mother     Heart Disease Mother         CHF    Lung Disease Mother     Hypertension Sister     Lung Disease Sister     Hypertension Brother     MS Brother      Social History     Tobacco Use    Smoking status: Never Smoker    Smokeless tobacco: Never Used   Substance Use Topics    Alcohol use:  Yes     Alcohol/week: 1.0 - 2.0 standard drinks     Types: 1 - 2 Glasses of wine per week         Rukhsana Holloway MD

## 2021-06-11 RX ORDER — SERTRALINE HYDROCHLORIDE 50 MG/1
TABLET, FILM COATED ORAL
Qty: 90 TABLET | Refills: 2 | Status: SHIPPED | OUTPATIENT
Start: 2021-06-11 | End: 2022-03-28

## 2021-06-12 NOTE — PROGRESS NOTES
HPI:   [place] is a 77y.o. year old female who presents today for a routine visit. 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 She reports today that she is doing reasonably well. She has completed the [address] 19 vaccine series. She states that she is now taking hydroxychloroquine 200 mg daily without difficulty. She is in the process of weaning prednisone and is taking 2.5 mg every other day. She states that due to continued joint inflammation, it is recommend by Dr. Maribeth Amaral that she begin Cimzia. However, she remains hesitant. She also states that Dr. Brandon Hannah is recommending Efudex to treat her multiple skin lesions, but she is also hesitant to do this. She continues to use Tylenol as needed for pain, and remains on SC methotrexate for treatment of RA. She is otherwise without specific complaints and is feeling generally well. Summary of prior hospitalizations and medical history: On 10/21/2019, she presented to the [facility] ED reporting difficulty with elevated blood pressure and a dull headache. She stated that her blood pressure has been elevated earlier in the week when visiting her gastroenterologist. On the day of presentation to the ED, she states that she was experiencing an achy dull headache that was mild in severity throughout the day. She became concerned when she went to bed and noticed throbbing pain in her hands and feet. She checked her blood pressure and noticed her systolic ranged from 575-208 mmHg. She had taken her metoprolol tartrate 50 mg bid as prescribed. She denied any shortness of breath, leg swelling, orthopnea, PND, visual changes, nausea/vomiting, lightheadedness, dizziness, confusion, speech difficulty, numbness, or weakness.   She did admit to some chest tightness, but stated that it was not exertional and was her usual discomfort associated with reflux. Evaluation in the ED revealed that her blood pressure was 144/73. EKG showed sinus rhythm at 64 bpm and LBBB. Laboratory data included WBC 6.2, Hb 11.8/ Hct 36.9, creatinine 0.79/eGFR >60, troponin x 1 negative, and chest x-ray negative. While in the ED, her headache resolved and she was subsequently discharged. She was seen in follow-up on 10/30/2019 and described episodes with head fullness, flushing, and  \"prickly skin\" occurring in the evening after lying down. She stated that she did notice one episode where her heart rate appeared to be 150 bpm. She also reported that she had been under an increased amount of stress with regard to several of her family members, and felt that the episodes may be secondary to anxiety. Her dose of Zoloft was increased to 50 mg daily, and she reported that the episodes of palpitations and head fullness resolved with the increase in dose of Zoloft. She has a history of hypertension and palpitations, treated currently with metoprolol. She has a history of dyslipidemia, not currently being treated with medication. She was evaluated by Dr. Sunil Berry in 2012 for palpitations. Event monitor was placed, and showed a known LBBB and PVC's, but reportedly no other arrhythmias (report unavailable). In 9/2012, she also underwent a pharmacologic nuclear stress test which was a normal low risk study with EF 55%. She also had an echocardiogram, which showed low normal LV function (EF 50%), mild grade 1 diastolic dysfunction, trace MR, TR, and AI. She also has a history of fibromuscular dysplasia, and in 5/2009, underwent aortogram and renal arteriogram with balloon angioplasty.  She has a known right carotid bruit, and her last carotid duplex (5/2014) showed mild atherosclerotic intraluminal plaquing with elevated velocities in the bilateral mid internal carotid arteries with a \"string of pearls\" appearance consistent with known fibromuscular dysplasia, causing a 50-69% stenosis of the bilateral internal carotid arteries. There was no change from prior study in 2011. She had been followed by Dr. Nila Turner, but states that she has been released from his care. She remains quite active, and denies any chest pain, shortness of breath at rest or with exertion, palpitations, lightheadedness, or edema. She underwent a repeat carotid duplex study (8/15/2018) showing mild (<50%) stenosis of the left internal carotid artery and moderate (50-69%) stenosis of the right internal carotid artery. Repeat performed 12/17/2019 showed moderate homogenous plaque in right ICA (50 to 69% stenosis). Stenosis is at the mid ICA and may be upper limits of moderate; mild heterogeneous plaque left ICA (< 50% stenosis). She has a history of rheumatoid arthritis, diagnosed when she was 37years old, treated currently with subcutaneous methotrexate and prednisone. She is being followed closely by Dr. Susan Silveira. She is not currently being treated with a biologic agent due to concern regarding recent multiple squamous cell carcinomas of the skin, for which she was followed by Dr. Domnick Soulier and now by Dr. Johnnie Livingston. She reports that she was well controlled on Humira for several years, but had to discontinue treatment when it was no longer on her insurance formulary. She also has a history of osteopenia, with bone density studies performed by Dr. Susan Silveira. Her last bone density study was in 2/2019 showing T-scores:  femoral neck left -1.7  /right -1.9 and lumbar -2.1. She continues to take calcium and Vitamin D supplements. She has no history of pathologic fractures. She was recently diagnosed with familial hypocalciuric hypercalcemia with mildly elevated calcium 10.6, mildly elevated PTH 80, Vitamin D level 36.5, and 24 hour urine calcium of 105 (FeCa 1%) in 7/30/2018, which would be consistent with this diagnosis.  She is being followed by Dr. Mack Bonilla. She has a history of cervical degenerative joint and disc disease and chronic neck pain. She was being followed by Dr. Cassy Garcia, who recommended surgery. She was seen by Dr. Ashtyn Main at [facility] for a second opinion, and cervical MRI (9/2015) showed multilevel degenerative disc and facet disease, worst at C5-C6; left central/subarticular disc protrusion with superimposed annular fissure noted at C5-C6 causing mass effect upon the left hemicord but no signal changes with severe left neuroforaminal narrowing at this level. Recommendation was for her to proceed with physical therapy, which did result in some improvement. She previously had been treated with gabapentin for pain control, but is currently only using Tylenol. She underwent acupuncture therapy with Dr. Bobbi Greenberg with only minimal improvement. She also states that she was referred to Dr. Radha Jara for evaluation, but did not schedule since symptoms have gradually improved. She has a history of GERD. In 11/2017, she was having increasing difficulty with constipation alternating with diarrhea as well as increasing reflux symptoms. She states that she was evaluated by Dr. Gayatri Marin and it was her opinion that she suffered from irritable bowel syndrome. She has made changes in her diet and increased her consumption of fiber with some improvement. She began experiencing worsening reflux symptoms in 12/2018 after attempting to wean omeprazole from 40 mg to 20 mg daily. She also was taking an increased dose of prednisone at the time for a flare of her RA. She was evaluated by Dr. Gayatri Marin and attempted a trial of Protonix which she believed may be working better. She plans to continue taking it. She states that at the time of her worsening reflux, she also noted increasing palpitations and underwent evaluation by THOMAS Ocasio for Dr. Christopher Acosta.  She states that she was offered an event recorder to evaluate, but she noticed that her symptoms improved once her GERD was under control. She therefore declined further evaluation. She states that she has resumed taking omeprazole with reasonable control of her reflux symptoms. She underwent upper endoscopy by Dr. Julio Escamilla in 11/2012 which showed a 2 cm hiatal hernia and pathology from a distal esophageal biopsy showing chronic inflammation. She had a screening colonoscopy in 7/14/2016 by Dr. Julio Escamilla, which showed an adenomatous polyp (report unavailable). Recommendation was for follow-up in 5 years. She denies any abdominal pain, nausea, vomiting, melena, hematochezia, or change in bowel movements. Past Medical History:   Diagnosis Date    Bowen's disease     Fibromuscular dysplasia (HCC)     GERD (gastroesophageal reflux disease)     Hiatal hernia     Hyperparathyroidism ([facility] [address]     Hypertension     LBBB (left bundle branch block)     Premature ventricular contractions     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 (HCC)     s/p balloon angioplasty 5/2009 [facility]    Rheumatoid arthritis ([facility] [address]     Skin cancer      Past Surgical History:   Procedure Laterality Date    HX ANGIOPLASTY      HX KNEE REPLACEMENT Bilateral 2009    partial     HX OTHER SURGICAL      removal of fibroid benign tumors from breast     HX PARTIAL HYSTERECTOMY      HX TONSILLECTOMY  1964     Current Outpatient Medications   Medication Sig    aspirin 81 mg chewable tablet Take 81 mg by mouth. Every three days    metoprolol tartrate (LOPRESSOR) 50 mg tablet Take 1 Tab by mouth two (2) times a day.  METHOTREXATE SODIUM INJECTION 15 mg every seven (7) days.  omeprazole (PRILOSEC) 40 mg capsule Take 40 mg by mouth every other day.  predniSONE (DELTASONE) 5 mg tablet Take 2.5 mg by mouth every other day. Per pt now 2.5 daily    multivitamin (ONE A DAY) tablet Take 1 Tab by Mouth Once a Day.  acetaminophen (TYLENOL) 500 mg tablet Take 500 mg by mouth every six (6) hours as needed.     cholecalciferol (VITAMIN D3) 400 unit tab tablet Take 2,000 Units by mouth.  folic acid (FOLVITE) 1 mg tablet Take 1 mg by mouth daily.  sertraline (ZOLOFT) 50 mg tablet take 1 tablet by mouth once daily    hydrOXYchloroQUINE (PLAQUENIL) 200 mg tablet Take 200 mg by mouth daily. No current facility-administered medications for this visit. Allergies and Intolerances: Allergies   Allergen Reactions    Hydrocodone Anaphylaxis    Nsaids (Non-Steroidal Anti-Inflammatory Drug) Other (comments)     None due to kidneys    Azithromycin Itching    Other Medication Other (comments)     GI intolerance to nonsteroidal antiinflammatory medications     Vicodin [Hydrocodone-Acetaminophen] Other (comments)     Family History: Her father passed away from a ruptured AAA. Her mother has osteoarthritis and COPD, and a maternal aunt had RA. Her brother recently passed away from multiple sclerosis. She has no family history of colon or breast cancer. Family History   Problem Relation Age of Onset    Arthritis-osteo Mother     Elevated Lipids Mother     Heart Disease Mother         CHF    Lung Disease Mother     Hypertension Sister     Lung Disease Sister     Hypertension Brother     MS Brother      Social History:   She  reports that she has never smoked. She has never used smokeless tobacco. She is  with two children and one stepchild. 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 She drinks wine only occasionally.      Social History     Substance and Sexual Activity   Alcohol Use Yes    Alcohol/week: 1.0 - 2.0 standard drinks    Types: 1 - 2 Glasses of wine per week     Immunization History:  Immunization History   Administered Date(s) Administered    Covid-19, MODERNA, Mrna, Lnp-s, Pf, 100mcg/0.5mL 02/24/2021    Influenza High Dose Vaccine PF 10/29/2018    Influenza Vaccine 10/29/2013    Influenza Vaccine (Tri) Adjuvanted (>65 Yrs FLUAD TRI 36476) 12/11/2019    Pneumococcal Conjugate (PCV-13) 12/14/2015    Pneumococcal Polysaccharide (PPSV-23) 11/15/2018    Tdap 10/02/2019       Review of Systems:   As above included in HPI. Otherwise 11 point review of systems negative including constitutional, skin, HENT, eyes, respiratory, cardiovascular, gastrointestinal, genitourinary, musculoskeletal, endocrine, hematologic, allergy, and neurologic. Physical:   Objective:   Vital Signs: (As obtained by patient/caregiver at home)  Visit Vitals  /62   Pulse 67   Temp 97.9 °F (36.6 °C) (Temporal)   Resp 16   Ht 5' 7\" (1.702 m)   Wt 113 lb (51.3 kg)   SpO2 97%   BMI 17.70 kg/m²      Patient appears in no apparent distress. Affect is appropriate. HEENT: PERRLA, anicteric, no JVD, adenopathy or thyromegaly. No carotid bruits or radiated murmur. Lungs: clear to auscultation, no wheezes, rhonchi, or rales. Heart: regular rate and rhythm. No murmur, rubs, gallops  Abdomen: soft, nontender, nondistended, normal bowel sounds, no hepatosplenomegaly or masses. Extremities: without edema. Review of Data:  Labs:  Hospital Outpatient Visit on 06/03/2021   Component Date Value Ref Range Status    WBC 06/03/2021 5.0  4.6 - 13.2 K/uL Final    RBC 06/03/2021 3.83* 4.20 - 5.30 M/uL Final    HGB 06/03/2021 11.6* 12.0 - 16.0 g/dL Final    HCT 06/03/2021 36.1  35.0 - 45.0 % Final    MCV 06/03/2021 94.3  74.0 - 97.0 FL Final    MCH 06/03/2021 30.3  24.0 - 34.0 PG Final    MCHC 06/03/2021 32.1  31.0 - 37.0 g/dL Final    RDW 06/03/2021 13.8  11.6 - 14.5 % Final    PLATELET 69/32/0019 220  135 - 420 K/uL Final    MPV 06/03/2021 10.8  9.2 - 11.8 FL Final    NEUTROPHILS 06/03/2021 69  40 - 73 % Final    LYMPHOCYTES 06/03/2021 18* 21 - 52 % Final    MONOCYTES 06/03/2021 10  3 - 10 % Final    EOSINOPHILS 06/03/2021 2  0 - 5 % Final    BASOPHILS 06/03/2021 1  0 - 2 % Final    ABS. NEUTROPHILS 06/03/2021 3.5  1.8 - 8.0 K/UL Final    ABS. LYMPHOCYTES 06/03/2021 0.9  0.9 - 3.6 K/UL Final    ABS.  MONOCYTES 06/03/2021 0.5  0.05 - 1.2 K/UL Final    ABS. EOSINOPHILS 06/03/2021 0.1  0.0 - 0.4 K/UL Final    ABS. BASOPHILS 06/03/2021 0.1  0.0 - 0.1 K/UL Final    DF 06/03/2021 AUTOMATED    Final    LIPID PROFILE 06/03/2021        Final    Cholesterol, total 06/03/2021 184  <200 MG/DL Final    Triglyceride 06/03/2021 70  <150 MG/DL Final    HDL Cholesterol 06/03/2021 77* 40 - 60 MG/DL Final    LDL, calculated 06/03/2021 93  0 - 100 MG/DL Final    VLDL, calculated 06/03/2021 14  MG/DL Final    CHOL/HDL Ratio 06/03/2021 2.4  0 - 5.0   Final    Magnesium 06/03/2021 2.3  1.6 - 2.6 mg/dL Final    Sodium 06/03/2021 142  136 - 145 mmol/L Final    Potassium 06/03/2021 4.1  3.5 - 5.5 mmol/L Final    Chloride 06/03/2021 105  100 - 111 mmol/L Final    CO2 06/03/2021 32  21 - 32 mmol/L Final    Anion gap 06/03/2021 5  3.0 - 18 mmol/L Final    Glucose 06/03/2021 74  74 - 99 mg/dL Final    BUN 06/03/2021 11  7.0 - 18 MG/DL Final    Creatinine 06/03/2021 0.80  0.6 - 1.3 MG/DL Final    BUN/Creatinine ratio 06/03/2021 14  12 - 20   Final    GFR est AA 06/03/2021 >60  >60 ml/min/1.73m2 Final    GFR est non-AA 06/03/2021 >60  >60 ml/min/1.73m2 Final    Calcium 06/03/2021 9.9  8.5 - 10.1 MG/DL Final    Bilirubin, total 06/03/2021 0.5  0.2 - 1.0 MG/DL Final    ALT (SGPT) 06/03/2021 25  13 - 56 U/L Final    AST (SGOT) 06/03/2021 31  10 - 38 U/L Final    Alk. phosphatase 06/03/2021 71  45 - 117 U/L Final    Protein, total 06/03/2021 6.8  6.4 - 8.2 g/dL Final    Albumin 06/03/2021 4.0  3.4 - 5.0 g/dL Final    Globulin 06/03/2021 2.8  2.0 - 4.0 g/dL Final    A-G Ratio 06/03/2021 1.4  0.8 - 1.7   Final    Vitamin D 25-Hydroxy 06/03/2021 64.8  30 - 100 ng/mL Final         Health Maintenance:  Screening:    Mammogram: negative (10/2020) [facility]   PAP smear: well woman exams performed at [facility] by KIM Reed (last 10/2020)   Colorectal: colonoscopy (7/2016) tubular adenoma.  Scheduled in 7/2021 with Dr. Shena Lopez   Depression: on Zoloft   DM (HbA1c/FPG): FPG 74 (6/2021)   Hepatitis C: negative (1/2/2018) Dr. Ban Ordaz. Falls: none   DEXA: osteopenia (last 2/2019). Performed by Dr. Ban Ordaz. Glaucoma: regular eye exams with Dr. Manolo Cedeno (last 12/2020 ) every 6 months   Smoking: none   Vitamin D: 64.8 (6/2021)    Medicare Wellness: today      Impression:  Patient Active Problem List   Diagnosis Code    Displacement of cervical intervertebral disc without myelopathy M50.20    Cervical spondylosis without myelopathy M47.812    Brachial neuritis or radiculitis M54.12    Radiculopathy, cervical M54.12    Skin cancer C44.90    Rheumatoid arthritis ([facility] [address] M06.9    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 (HCC) I70.1, I77.3    LBBB (left bundle branch block) I44.7    Hypertension I10    Hyperparathyroidism ([facility] [address] E21.3    Hiatal hernia K44.9    GERD (gastroesophageal reflux disease) K21.9    Fibromuscular dysplasia (HCC) I77.3    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    Irritable bowel syndrome with both constipation and diarrhea K58.2    Bilateral carotid bruits R09.89    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    Hyperlipidemia E78.5    Osteopenia of multiple sites M85.89    Vitamin D deficiency E55.9    Carotid stenosis, bilateral I65.23    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    Anemia D64.9    Palpitations R00.2       Plan:  1. Hypertension. Blood pressure has been well controlled on current regimen of metoprolol 50 mg bid, which also helps with control of palpitations. Renal function remains normal with creatinine 0.80/ eGFR >60. Will continue to follow. 2. Fibromuscular dysplasia. Previous angioplasty of renal arteries. History of moderate stenosis of bilateral carotid arteries on carotid duplex in 5/2014. Noted prominent carotid bruits on physical exam, and repeat carotid duplex obtained in 8/2018 showing moderate (50-69%) stenosis of the right ICA and mild (<50%) of the left ICA.  Review of report also showed mild atherosclerosis at that time. Previously followed by Dr. Tomasa Santiago of [facility], but given stability over many years, released from his care with plan to pursue further evaluation only if becomes symptomatic. Repeat carotid duplex scan obtained 12/17/2019 showed persistence of moderate stenosis of right ICA. Discussed need to prevent progression, and patient remains on aspirin 81 mg every third day. Did not tolerate atorvastatin due to elevated LFT's so discontinued. Will continue to monitor. 3. Hyperlipidemia. Calculated 10 year ASCVD risk improved to 14.6 %, which placed her in one of the four statin benefit groups as per new AHA/ACC guidelines (primary prevention: 10 year ASCVD risk >7.5%). In addition, mild atherosclerosis in the setting of fibromuscular dysplasia described on prior carotid duplex, and moderate stenosis of right ICA reported. Also at increased risk due to chronic inflammatory state related to rheumatoid arthritis. Repeat lipid panel 12/2019 with elevated with  and HDL 85, and patient started on atorvastatin 10 mg daily. Lipid panel significantly improved with LDL 72 and HDL 82, but developed increase in transaminases with AST 46 and ALT 64 (1/16/2020), and repeat ALT 85 and AST 66 (2/5/2020). Atorvastatin discontinued and LFT's normalized. Significant improvement today with LDL 93 and HDL 77 so no reason to resume treatment. Will continue to monitor. 4. Rheumatoid arthritis. On SC weekly methotrexate and with initiation of Plaquenil 200 mg daily able to wean prednisone to 2.5 mg every other day . Attempting to wean prednisone, and advised by Dr. Felix Randall that should begin a biologic for better control of joint inflammation. Considering Cimzia. Patient remains hesitant due to skin precancerous lesions, but being addressed by Dr. Mateusz Chopra. Being followed by Dr. Felix Randall. 5. Elevated LFT's. Transaminases noted to increase after started atorvastatin. On chronic methotrexate for RA. Statin discontinued and LFT's normalized. Evaluated by Dr. Gayatri Marin and lab evaluation and RUQ ultrasound normal in 3/2020. Presumed secondary to statin in the setting of long term methotrexate therapy for RA. Statin discontinued and LFT's remain normal today. 6. Cervical degenerative disease. Experiences chronic neck pain, treated previously with gabapentin. Some improvement with exercising. Attempted acupuncture therapy by Dr. Bobbi Greenberg, but after 3 months, no significant benefit seen. Previously followed by Dr. Cassy Garcia, but referred to Dr. Radha Jara by Dr. Jayden Butts, but did not schedule. Feels that overall improved and not a significant issue today. Will follow. 7. Osteopenia. Last bone density scan 2/2019 . Using femoral neck T-scores, calculated FRAX score estimates her 10 year risk of a major osteoporetic fracture at 13 % and hip fracture at 3.1 %, which are an indication for biphosphonate treatment with hip fracture risk >3%. Being performed and followed by Dr. Jayden Butts. She reports that she had a repeat bone density study which showed worsening osteopenia, and states that Dr. Jayden Butts is recommending treatment. However, she is hesitant to begin. Continue calcium and Vitamin D. Encouraged exercise, particularly weight bearing activities. Fall precautions stressed. 8. Hyperparathyroidism. Mild increase in PTH, mildly elevated calcium, normal Vitamin D level,and 24 hour urine calcium only 105 (FeCa 1%) is consistent with a diagnosis of familial hypocalciuric hypercalcemia. Being followed by Dr. Mack Bonilla every 2 years given benign condition. Advised to drink plenty of fluids. 9. GERD. Resumed omeprazole and taking every other day with good response. Symptoms of diarrhea alternating with constipation chronically thought to be secondary to irritable bowel syndrome. On probiotic. Reports improved today. 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 Following with Dr. Hilda Lopes. Recommending topical treatment with Efudex. Continues to be hesitant to begin treatment with a biologic for her RA given her extensive premalignant skin cancers. 11. Mild anemia. Present intermittently. B12 and folate levels normal in 12/2019. Iron studies and gammopathy panel normal in 5/2020. Stable and likely secondary to methotrexate. Will continue to monitor. 12. Mouth pain. Now resolved. Reports intermittent difficulty and evaluated by Dr. Nuria Fan of ENT in the past with no etiology found. Differential diagnosis would include herpes simplex, aphthous stomatitis, xerostomia, or nutritional deficiencies (B12, B6, folate, Zinc, iron), or candidiasis. 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 Tricyclics, pregabalin, gabapentin, and Mirapex have shown some benefit. She reports that she has had similar episodes in the past, but will resolve after several weeks without persistent symptoms. Has found some benefit with Mycelex troches in the past. Advised to take a multivitamin. Reports no recent recurrence. Continue to follow. 13. Health maintenance. Completed the [employer] Peter COVID-19 vaccine series. Completed pneumococcal series and Tdap. Has not yet completed Shingrix vaccine but is planning to obtain. Mammogram and well women exams performed at [facility] and up to date. Continue regular eye exams with Dr. Jesús Temple. Vitamin D level now normal on 4000 U daily. In addition, an annual Medicare wellness visit was done today. Patient understands recommendations and agrees with plan. Follow-up in 6 months.

## 2021-07-20 NOTE — PERIOP NOTES
PRE-SURGICAL INSTRUCTIONS        Patient's Name:  Florrie Goltz      Today's Date:  7/20/2021              Surgery Date:  7/27/2021                1. Do NOT eat or drink anything, including candy, gum, or ice chips after midnight on 7/26/2021, unless you have specific instructions from your surgeon or anesthesia provider to do so.  2. You may brush your teeth before coming to the hospital.  3. No smoking 24 hours prior to the day of surgery. 4. No alcohol 24 hours prior to the day of surgery. 5. No recreational drugs for one week prior to the day of surgery. 6. Leave all valuables, including money/purse, at home. 7. Remove all jewelry, nail polish, acrylic nails, and makeup (including mascara); no lotions powders, deodorant, or perfume/cologne/after shave on the skin. 8. Glasses/contact lenses and dentures may be worn to the hospital.  They will be removed prior to surgery. 9. Call your doctor if symptoms of a cold or illness develop within 24-48 hours prior to your surgery. 10.  AN ADULT MUST DRIVE YOU HOME AFTER OUTPATIENT SURGERY. 11.  If you are having an outpatient procedure, please make arrangements for a responsible adult to be with you for 24 hours after your surgery. 12.  NO VISITORS in the hospital at this time for outpatient procedures. Exceptions may be made for surgical admissions, per nursing unit guidelines      Special Instructions:      Bring list of CURRENT medications. Bring inhaler. Bring CPAP machine. Bring any pertinent legal medical records. Take these medications the morning of surgery with a sip of water:  Metoprolol  Follow physician instructions about insulin. Follow physician instructions about stopping anticoagulants. Complete bowel prep per MD instructions. On the day of surgery, come in the main entrance of [facility] in [place]. Let the  at the desk know you are there for surgery.   A staff member will come escort you to the surgical area on the second floor. If you have any questions or concerns, please do not hesitate to call:     (Prior to the day of surgery) PAT department:  [phone]   (Day of surgery) Pre-Op department:  [phone]    These surgical instructions were reviewed with patient during the PAT phone call.

## 2021-07-26 ENCOUNTER — ANESTHESIA EVENT (OUTPATIENT)
Dept: ENDOSCOPY | Age: 76
End: 2021-07-26
Payer: MEDICARE

## 2021-07-27 ENCOUNTER — HOSPITAL ENCOUNTER (OUTPATIENT)
Age: 76
Setting detail: OUTPATIENT SURGERY
Discharge: HOME | End: 2021-07-27
Attending: INTERNAL MEDICINE | Admitting: INTERNAL MEDICINE
Payer: MEDICARE

## 2021-07-27 ENCOUNTER — ANESTHESIA (OUTPATIENT)
Dept: ENDOSCOPY | Age: 76
End: 2021-07-27
Payer: MEDICARE

## 2021-07-27 VITALS
SYSTOLIC BLOOD PRESSURE: 134 MMHG | WEIGHT: 115 LBS | OXYGEN SATURATION: 100 % | BODY MASS INDEX: 18.05 KG/M2 | HEART RATE: 53 BPM | DIASTOLIC BLOOD PRESSURE: 58 MMHG | RESPIRATION RATE: 12 BRPM | HEIGHT: 67 IN

## 2021-07-27 PROCEDURE — 88305 TISSUE EXAM BY PATHOLOGIST: CPT

## 2021-07-27 PROCEDURE — 93005 ELECTROCARDIOGRAM TRACING: CPT

## 2021-07-27 PROCEDURE — 74011000250 HC RX REV CODE- 250: Performed by: ANESTHESIOLOGY

## 2021-07-27 PROCEDURE — 77030013992 HC SNR POLYP ENDOSC BSC -B: Performed by: INTERNAL MEDICINE

## 2021-07-27 PROCEDURE — 74011250637 HC RX REV CODE- 250/637: Performed by: NURSE ANESTHETIST, CERTIFIED REGISTERED

## 2021-07-27 PROCEDURE — 76060000034 HC ANESTHESIA 1.5 TO 2 HR: Performed by: INTERNAL MEDICINE

## 2021-07-27 PROCEDURE — 00811 ANES LWR INTST NDSC NOS: CPT | Performed by: ANESTHESIOLOGY

## 2021-07-27 PROCEDURE — 76040000007: Performed by: INTERNAL MEDICINE

## 2021-07-27 PROCEDURE — 74011250636 HC RX REV CODE- 250/636: Performed by: NURSE ANESTHETIST, CERTIFIED REGISTERED

## 2021-07-27 PROCEDURE — 77030021593 HC FCPS BIOP ENDOSC BSC -A: Performed by: INTERNAL MEDICINE

## 2021-07-27 PROCEDURE — 2709999900 HC NON-CHARGEABLE SUPPLY: Performed by: INTERNAL MEDICINE

## 2021-07-27 PROCEDURE — 74011250636 HC RX REV CODE- 250/636: Performed by: ANESTHESIOLOGY

## 2021-07-27 PROCEDURE — 99100 ANES PT EXTEME AGE<1 YR&>70: CPT | Performed by: ANESTHESIOLOGY

## 2021-07-27 RX ORDER — SODIUM CHLORIDE 0.9 % (FLUSH) 0.9 %
5-40 SYRINGE (ML) INJECTION EVERY 8 HOURS
Status: CANCELLED | OUTPATIENT
Start: 2021-07-27

## 2021-07-27 RX ORDER — LIDOCAINE HYDROCHLORIDE 20 MG/ML
INJECTION, SOLUTION EPIDURAL; INFILTRATION; INTRACAUDAL; PERINEURAL AS NEEDED
Status: DISCONTINUED | OUTPATIENT
Start: 2021-07-27 | End: 2021-07-27 | Stop reason: HOSPADM

## 2021-07-27 RX ORDER — LIDOCAINE HYDROCHLORIDE 10 MG/ML
0.1 INJECTION, SOLUTION EPIDURAL; INFILTRATION; INTRACAUDAL; PERINEURAL AS NEEDED
Status: DISCONTINUED | OUTPATIENT
Start: 2021-07-27 | End: 2021-07-27 | Stop reason: HOSPADM

## 2021-07-27 RX ORDER — SODIUM CHLORIDE 0.9 % (FLUSH) 0.9 %
5-40 SYRINGE (ML) INJECTION AS NEEDED
Status: CANCELLED | OUTPATIENT
Start: 2021-07-27

## 2021-07-27 RX ORDER — FAMOTIDINE 20 MG/1
20 TABLET, FILM COATED ORAL ONCE
Status: COMPLETED | OUTPATIENT
Start: 2021-07-27 | End: 2021-07-27

## 2021-07-27 RX ORDER — PROPOFOL 10 MG/ML
INJECTION, EMULSION INTRAVENOUS AS NEEDED
Status: DISCONTINUED | OUTPATIENT
Start: 2021-07-27 | End: 2021-07-27 | Stop reason: HOSPADM

## 2021-07-27 RX ORDER — DEXTROMETHORPHAN/PSEUDOEPHED 2.5-7.5/.8
1.2 DROPS ORAL
Status: CANCELLED | OUTPATIENT
Start: 2021-07-27

## 2021-07-27 RX ORDER — FLUMAZENIL 0.1 MG/ML
0.2 INJECTION INTRAVENOUS
Status: CANCELLED | OUTPATIENT
Start: 2021-07-27 | End: 2021-07-27

## 2021-07-27 RX ORDER — EPINEPHRINE 0.1 MG/ML
1 INJECTION INTRACARDIAC; INTRAVENOUS
Status: CANCELLED | OUTPATIENT
Start: 2021-07-27 | End: 2021-07-28

## 2021-07-27 RX ORDER — ATROPINE SULFATE 0.1 MG/ML
0.5 INJECTION INTRAVENOUS
Status: CANCELLED | OUTPATIENT
Start: 2021-07-27 | End: 2021-07-28

## 2021-07-27 RX ORDER — SODIUM CHLORIDE, SODIUM LACTATE, POTASSIUM CHLORIDE, CALCIUM CHLORIDE 600; 310; 30; 20 MG/100ML; MG/100ML; MG/100ML; MG/100ML
25 INJECTION, SOLUTION INTRAVENOUS CONTINUOUS
Status: DISCONTINUED | OUTPATIENT
Start: 2021-07-27 | End: 2021-07-27 | Stop reason: HOSPADM

## 2021-07-27 RX ORDER — NALOXONE HYDROCHLORIDE 0.4 MG/ML
0.4 INJECTION, SOLUTION INTRAMUSCULAR; INTRAVENOUS; SUBCUTANEOUS
Status: CANCELLED | OUTPATIENT
Start: 2021-07-27 | End: 2021-07-27

## 2021-07-27 RX ADMIN — PROPOFOL 20 MG: 10 INJECTION, EMULSION INTRAVENOUS at 12:25

## 2021-07-27 RX ADMIN — PROPOFOL 50 MG: 10 INJECTION, EMULSION INTRAVENOUS at 11:48

## 2021-07-27 RX ADMIN — PROPOFOL 20 MG: 10 INJECTION, EMULSION INTRAVENOUS at 12:06

## 2021-07-27 RX ADMIN — PROPOFOL 50 MG: 10 INJECTION, EMULSION INTRAVENOUS at 11:47

## 2021-07-27 RX ADMIN — PROPOFOL 20 MG: 10 INJECTION, EMULSION INTRAVENOUS at 12:13

## 2021-07-27 RX ADMIN — PROPOFOL 20 MG: 10 INJECTION, EMULSION INTRAVENOUS at 11:54

## 2021-07-27 RX ADMIN — SODIUM CHLORIDE, SODIUM LACTATE, POTASSIUM CHLORIDE, AND CALCIUM CHLORIDE 25 ML/HR: 600; 310; 30; 20 INJECTION, SOLUTION INTRAVENOUS at 10:45

## 2021-07-27 RX ADMIN — PROPOFOL 20 MG: 10 INJECTION, EMULSION INTRAVENOUS at 11:51

## 2021-07-27 RX ADMIN — PROPOFOL 20 MG: 10 INJECTION, EMULSION INTRAVENOUS at 12:29

## 2021-07-27 RX ADMIN — PROPOFOL 20 MG: 10 INJECTION, EMULSION INTRAVENOUS at 12:19

## 2021-07-27 RX ADMIN — FAMOTIDINE 20 MG: 20 TABLET ORAL at 10:46

## 2021-07-27 RX ADMIN — LIDOCAINE HYDROCHLORIDE 40 MG: 20 INJECTION, SOLUTION EPIDURAL; INFILTRATION; INTRACAUDAL; PERINEURAL at 11:46

## 2021-07-27 RX ADMIN — PROPOFOL 20 MG: 10 INJECTION, EMULSION INTRAVENOUS at 12:02

## 2021-07-27 RX ADMIN — PROPOFOL 20 MG: 10 INJECTION, EMULSION INTRAVENOUS at 12:10

## 2021-07-27 RX ADMIN — PROPOFOL 20 MG: 10 INJECTION, EMULSION INTRAVENOUS at 11:58

## 2021-07-27 RX ADMIN — PROPOFOL 20 MG: 10 INJECTION, EMULSION INTRAVENOUS at 12:15

## 2021-07-27 RX ADMIN — PROPOFOL 20 MG: 10 INJECTION, EMULSION INTRAVENOUS at 12:22

## 2021-07-27 NOTE — H&P
[email or web address]  [phone]    GASTROENTEROLOGY Pre-Procedure H and P      Impression/Plan:   1. This patient is consented for a colonoscopy for h/o colon polyp and altered bowel habits      Chief Complaint: h/o colon polyp and altered bowel habits      HPI:  Maria L Hill is a 77 y.o. female who presents for a colonoscopy for h/o colon polyp and altered bowel habits. 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)     s/p balloon angioplasty 5/2009 [facility]    Rheumatoid arthritis ([facility] [address]     Skin cancer        PSH:   Past Surgical History:   Procedure Laterality Date    HX ANGIOPLASTY      HX COLONOSCOPY      HX KNEE REPLACEMENT Bilateral 2009    partial     HX OTHER SURGICAL      removal of fibroid benign tumors from breast     HX PARTIAL HYSTERECTOMY      HX TONSILLECTOMY  1964       Social HX:   Social History     Socioeconomic History    Marital status:      Spouse name: Not on file    Number of children: Not on file    Years of education: Not on file    Highest education level: Not on file   Occupational History    Occupation: Retired   Tobacco Use    Smoking status: Never Smoker    Smokeless tobacco: Never Used   Vaping Use    Vaping Use: Never used   Substance and Sexual Activity    Alcohol use:  Yes     Alcohol/week: 1.0 - 2.0 standard drinks     Types: 1 - 2 Glasses of wine per week    Drug use: No    Sexual activity: Not Currently   Other Topics Concern    Not on file   Social History Narrative    Not on file     Social Determinants of Health     Financial Resource Strain:     Difficulty of Paying Living Expenses:    Food Insecurity:     Worried About Running Out of Food in the Last Year:    [address] in the Last Year:    Transportation Needs:     Lack of Transportation (Medical):  Lack of Transportation (Non-Medical):    Physical Activity:     Days of Exercise per Week:     Minutes of Exercise per Session:    Stress:     Feeling of Stress :    Social Connections:     Frequency of Communication with Friends and Family:     Frequency of Social Gatherings with Friends and Family:     Attends Cheondoism Services:     Active Member of Clubs or Organizations:     Attends Club or Organization Meetings:     Marital Status:    Intimate Partner Violence:     Fear of Current or Ex-Partner:     Emotionally Abused:     Physically Abused:     Sexually Abused:        FHX:   Family History   Problem Relation Age of Onset    Arthritis-osteo Mother     Elevated Lipids Mother     Heart Disease Mother         CHF    Lung Disease Mother     Hypertension Sister     Lung Disease Sister     Hypertension Brother     MS Brother        Allergy:   Allergies   Allergen Reactions    Hydrocodone Anaphylaxis    Nsaids (Non-Steroidal Anti-Inflammatory Drug) Other (comments)     None due to kidneys    Azithromycin Itching    Other Medication Other (comments)     GI intolerance to nonsteroidal antiinflammatory medications     Vicodin [Hydrocodone-Acetaminophen] Other (comments)       Home Medications:     Medications Prior to Admission   Medication Sig    Psyllium Husk-Sucrose 3.4 gram/7 gram powd Take  by mouth.  sertraline (ZOLOFT) 50 mg tablet take 1 tablet by mouth once daily    aspirin 81 mg chewable tablet Take 81 mg by mouth. Every three days    hydrOXYchloroQUINE (PLAQUENIL) 200 mg tablet Take 200 mg by mouth daily.  metoprolol tartrate (LOPRESSOR) 50 mg tablet Take 1 Tab by mouth two (2) times a day.  METHOTREXATE SODIUM INJECTION 17.5 mg every seven (7) days.  omeprazole (PRILOSEC) 40 mg capsule Take 40 mg by mouth every other day.  predniSONE (DELTASONE) 5 mg tablet Take 2.5 mg by mouth daily. Per pt now 2.5 daily    multivitamin (ONE A DAY) tablet Take 1 Tab by Mouth Once a Day.  acetaminophen (TYLENOL) 500 mg tablet Take 500 mg by mouth every six (6) hours as needed.  cholecalciferol (VITAMIN D3) 400 unit tab tablet Take 4,000 Units by mouth.  folic acid (FOLVITE) 1 mg tablet Take 1 mg by mouth daily. Review of Systems:     A complete 10 point review of systems was performed and pertinents are as per the HPI. Remainder of the review of systems was negative. Visit Vitals  /75   Pulse 71   Ht 5' 6.5\" (1.689 m)   Wt 52.2 kg (115 lb)   SpO2 100%   BMI 18.28 kg/m²       Physical Assessment:     General: alert, cooperative, no acute distress, appears stated age. HEENT: normocephalic, no scleral icterus, moist mucous membranes, EOMs intact, no neck masses noted. Respiratory: lungs clear to auscultation bilaterally. Cardiovascular: regular rate and rhythm, no murmurs, rubs or gallops. Abdomen: normal bowel sounds, soft, non-tender to palpation. Extremities: no lower extremity edema, no cyanosis or clubbing. Neuro: alert and oriented x 3; non-focal exam.  Skin: no rashes. Psych: normal mood and affect. Linda Smith MD  [facility]  [email or web address]  Phone: [phone]  Pager: [phone]  [email or web address]

## 2021-07-27 NOTE — ANESTHESIA POSTPROCEDURE EVALUATION
Procedure(s):  COLONOSCOPY with polypectomies with bx's. MAC    Anesthesia Post Evaluation      Multimodal analgesia: multimodal analgesia used between 6 hours prior to anesthesia start to PACU discharge  Patient location during evaluation: bedside  Patient participation: complete - patient participated  Level of consciousness: awake  Pain score: 0  Airway patency: patent  Anesthetic complications: no  Cardiovascular status: acceptable  Respiratory status: acceptable  Hydration status: acceptable  Post anesthesia nausea and vomiting:  none      INITIAL Post-op Vital signs:   Vitals Value Taken Time   /58 07/27/21 1310   Temp     Pulse 49 07/27/21 1316   Resp 16 07/27/21 1316   SpO2 100 % 07/27/21 1252   Vitals shown include unvalidated device data.

## 2021-07-27 NOTE — PROCEDURES
[email or web address] [address]  [place], [address]      Brief Procedure Note    Ruth Aguillon  1945  [id number]    Date of Procedure: 7/27/2021    Preoperative diagnosis: Liver function tests abnormal:  794.8 - R94.5  GERD:  530.81 - K21.9  Altered bowel function:  787.99 - R19.4  Constipation alternates with diarrhea:  787.99 - R19.7  Urgent desire for stool:  787.63 - R15.2  Personal history of colonic polyps:  V12.72 - Z86.010  Encounter for screening for other viral diseases:  V73.0 - Z11.59  Body mass index less than 19, adult:  V85.0 - Z68.1    Postoperative diagnosis: diverticulosis, ascending polyps x 2, cecal polyps x 2, random colon bx's R/O microscopic colitis, transverse polyp (not retrieved), hemorrhoids    Type of Anesthesia: MAC (Monitored anesthesia care)    Description of findings: same as post op dx    Procedure: Procedure(s):  COLONOSCOPY with polypectomies with bx's    :  Dr. Izabela Brown MD    Assistant(s): Endoscopy Technician-1: Hurman Osgood Float Staff: Eva Morris RN    EBL:None    Specimens:   ID Type Source Tests Collected by Time Destination   1 : ascending polyps Preservative Colon, Ascending  Essie Gonzalez MD 7/27/2021 1158 Pathology   2 : cecal polyps Preservative Cecum  Essie Gonzalez MD 7/27/2021 1201 Pathology   3 : random colon bx's Preservative Colon  Essie Gonzalez MD 7/27/2021 1213 Pathology       Findings: See printed and scanned procedure note    Complications: None    Dr. Izabela Brown MD  7/27/2021  12:39 PM

## 2021-07-27 NOTE — ANESTHESIA PREPROCEDURE EVALUATION
Relevant Problems   No relevant active problems       Anesthetic History               Review of Systems / Medical History  Patient summary reviewed, nursing notes reviewed and pertinent labs reviewed    Pulmonary                   Neuro/Psych              Cardiovascular    Hypertension: well controlled        Dysrhythmias : PVC      Exercise tolerance: >4 METS  Comments: LBBB     GI/Hepatic/Renal     GERD: well controlled           Endo/Other             Other Findings   Comments: Fibromuscular dysplasia w/ consequent renal artery stenosis  hyperparathyroidism           Physical Exam    Airway  Mallampati: II  TM Distance: 4 - 6 cm  Neck ROM: normal range of motion   Mouth opening: Normal     Cardiovascular  Regular rate and rhythm,  S1 and S2 normal,  no murmur, click, rub, or gallop  Rhythm: regular  Rate: normal         Dental    Dentition: Caps/crowns and Poor dentition     Pulmonary  Breath sounds clear to auscultation               Abdominal  GI exam deferred       Other Findings            Anesthetic Plan    ASA: 3  Anesthesia type: MAC          Induction: Intravenous  Anesthetic plan and risks discussed with: Patient

## 2021-07-29 LAB
ATRIAL RATE: 63 BPM
CALCULATED P AXIS, ECG09: 56 DEGREES
CALCULATED R AXIS, ECG10: -65 DEGREES
CALCULATED T AXIS, ECG11: 78 DEGREES
DIAGNOSIS, 93000: NORMAL
P-R INTERVAL, ECG05: 174 MS
Q-T INTERVAL, ECG07: 494 MS
QRS DURATION, ECG06: 148 MS
QTC CALCULATION (BEZET), ECG08: 505 MS
VENTRICULAR RATE, ECG03: 63 BPM

## 2021-09-29 ENCOUNTER — TELEPHONE (OUTPATIENT)
Dept: INTERNAL MEDICINE CLINIC | Age: 76
End: 2021-09-29

## 2021-09-29 ENCOUNTER — HOSPITAL ENCOUNTER (EMERGENCY)
Age: 76
Discharge: HOME | End: 2021-09-29
Attending: STUDENT IN AN ORGANIZED HEALTH CARE EDUCATION/TRAINING PROGRAM
Payer: MEDICARE

## 2021-09-29 VITALS
RESPIRATION RATE: 20 BRPM | BODY MASS INDEX: 18.49 KG/M2 | OXYGEN SATURATION: 100 % | HEART RATE: 76 BPM | HEIGHT: 66 IN | WEIGHT: 115.08 LBS | DIASTOLIC BLOOD PRESSURE: 59 MMHG | SYSTOLIC BLOOD PRESSURE: 155 MMHG | TEMPERATURE: 98.4 F

## 2021-09-29 DIAGNOSIS — U07.1 COVID-19: Primary | ICD-10-CM

## 2021-09-29 PROCEDURE — 99283 EMERGENCY DEPT VISIT LOW MDM: CPT

## 2021-09-29 PROCEDURE — 74011000258 HC RX REV CODE- 258: Performed by: STUDENT IN AN ORGANIZED HEALTH CARE EDUCATION/TRAINING PROGRAM

## 2021-09-29 PROCEDURE — 96413 CHEMO IV INFUSION 1 HR: CPT

## 2021-09-29 PROCEDURE — M0245 HC BAMLAN AND ETESEV INFUSION: HCPCS

## 2021-09-29 PROCEDURE — M0239 BAMLANIVIMAB-XXXX INFUSION: HCPCS

## 2021-09-29 PROCEDURE — 74011000636 HC RX REV CODE- 636: Performed by: STUDENT IN AN ORGANIZED HEALTH CARE EDUCATION/TRAINING PROGRAM

## 2021-09-29 RX ADMIN — SODIUM CHLORIDE: 9 INJECTION, SOLUTION INTRAVENOUS at 20:48

## 2021-09-29 NOTE — TELEPHONE ENCOUNTER
Pt asking to speak to Nurse regarding recent dx of covid on 9/28/21, wants to know what protocols she should be following

## 2021-09-29 NOTE — ED TRIAGE NOTES
Pt reports testing positive for covid on Tuesday, pt reports head cold symptoms, fever, and sore throat. Pt sent by provider for regenero,.

## 2021-09-29 NOTE — TELEPHONE ENCOUNTER
Called and spoke with patient she reports testing positive for COVID yesterday. Her daughter works at [employer] and she was test there since she was having symptoms. They did not do a PCR test. She said she just feels like she has the flu or a bad cold. She is running a fever of 100, her head feels full. She denies any SOB, some slight nausea at times. She is taking tylenol every 6 hours by the end of the 6 hours she is feeling pretty bad and then it takes a while for the medication to kick in. She wants to know what other recommendations Caio List has.

## 2021-09-29 NOTE — TELEPHONE ENCOUNTER
Called and spoke with patient. Reports that developed symptoms on 9/27/2021 of fever, headache, sore throat, and nasal congestion. Underwent testing yesterday for COVID-19 and was positive. Reports having persistent fever to 100.6 with headache, sore throat, nasal congestion and cough. Reports taking Tylenol  mg every 6 hours. Patient completed the [facility] COVID-19 vaccination series on 3/24/2021. Called and spoke with the [facility] ED attending. Monoclonal antibody available. Instructed patient to present to the [facility] ED for monoclonal antibody infusion.

## 2021-09-30 ENCOUNTER — PATIENT OUTREACH (OUTPATIENT)
Dept: CASE MANAGEMENT | Age: 76
End: 2021-09-30

## 2021-09-30 NOTE — ED NOTES
Assumed care by Dr. Suly Parks  Patient received antibody infusion for COVID-19  Feels well on reassessment  Patient will be discharged with PMD follow-up

## 2021-09-30 NOTE — PROGRESS NOTES
Patient contacted regarding COVID-19 diagnosis. Discussed COVID-19 related testing which was available at this time. Test results were positive. Patient informed of results, if available? yes. Care Transition Nurse contacted the patient by telephone to perform post discharge assessment. Call within 2 business days of discharge: Yes Verified name and  with patient as identifiers. Provided introduction to self, and explanation of the CTN/ACM role, and reason for call due to risk factors for infection and/or exposure to COVID-19. Symptoms reviewed with patient who verbalized the following symptoms: fever, fatigue and sore throat and head cold symptoms      Due to no new or worsening symptoms encounter was not routed to provider for escalation. Discussed follow-up appointments. If no appointment was previously scheduled, appointment scheduling offered:  yes. [facility] follow up appointment(s):   Future Appointments   Date Time Provider Fidel Lynn   2021  8:25 AM [facility] LAB VISIT [facility] BS AMB   2021 10:30 AM Nona Dempsey MD [facility]     Non-[facility] follow up appointment(s): n/a    Interventions to address risk factors: Scheduled appointment with PCP-Ke- patient stated that she will call for f/u     Advance Care Planning:   Does patient have an Advance Directive: decision makers updated. Educated patient about risk for severe COVID-19 due to risk factors according to CDC guidelines. CTN reviewed discharge instructions, medical action plan and red flag symptoms with the patient who verbalized understanding. Discussed COVID vaccination status: yes. Patient is vaccinated and has had [employer] Stacks for both injections. Education provided on COVID-19 vaccination as appropriate. Discussed exposure protocols and quarantine with CDC Guidelines.  Patient was given an opportunity to verbalize any questions and concerns and agrees to contact CTN or health care provider for questions related to their healthcare. Reviewed and educated patient on any new and changed medications related to discharge diagnosis     Was patient discharged with a pulse oximeter? no Discussed and confirmed pulse oximeter discharge instructions and when to notify provider or seek emergency care. CTN provided contact information. Plan for follow-up call in 5-7 days based on severity of symptoms and risk factors. Patient stated that she ws doing ok. She is not sleeping and stated that she lost her taste and smell yesterday. Advised the patient to drink plenty of fluids- she stated that she will do better at that. Also encouraged patient to wipe things down in the home such as door knobs and faucets to keep germs down. Will follow.

## 2021-09-30 NOTE — ED PROVIDER NOTES
Patient is a 69-year-old female with a history of rheumatoid arthritis (on methotrexate), and hypertension. Patient presents today with a primary complaint of Covid symptoms, patient is on day 4 of symptoms and was tested positive for Covid yesterday and presents with a request for monoclonal antibody infusion. Patient reports no recent fever, difficulty breathing, chest pain, abdominal pain, nausea/vomiting, or diarrhea.            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)     s/[facility]oplasty 5/[facility]    Rheumatoid arthritis ([address]     Skin cancer        Past Surgical History:   Procedure Laterality Date    COLONOSCOPY N/A 7/27/2021    COLONOSCOPY with polypectomies with bx's performed by Tu Espinoza MD at [facility]    HX ANGIOPLASTY      HX COLONOSCOPY      HX KNEE REPLACEMENT Bilateral 2009    partial     HX OTHER SURGICAL      removal of fibroid benign tumors from breast     HX PARTIAL HYSTERECTOMY      HX TONSILLECTOMY  1964         Family History:   Problem Relation Age of Onset    Arthritis-osteo Mother     Elevated Lipids Mother     Heart Disease Mother         CHF    Lung Disease Mother     Hypertension Sister     Lung Disease Sister     Hypertension Brother     MS Brother        Social History     Socioeconomic History    Marital status:      Spouse name: Not on file    Number of children: Not on file    Years of education: Not on file    Highest education level: Not on file   Occupational History    Occupation: Retired   Tobacco Use    Smoking status: Never Smoker    Smokeless tobacco: Never Used   Vaping Use    Vaping Use: Never used Substance and Sexual Activity    Alcohol use: Yes     Alcohol/week: 1.0 - 2.0 standard drinks     Types: 1 - 2 Glasses of wine per week    Drug use: No    Sexual activity: Not Currently   Other Topics Concern    Not on file   Social History Narrative    Not on file     Social Determinants of Health     Financial Resource Strain:     Difficulty of Paying Living Expenses:    Food Insecurity:     Worried About Running Out of Food in the Last Year:     Ran Out of Food in the Last Year:    Transportation Needs:     Lack of Transportation (Medical):  Lack of Transportation (Non-Medical):    Physical Activity:     Days of Exercise per Week:     Minutes of Exercise per Session:    Stress:     Feeling of Stress :    Social Connections:     Frequency of Communication with Friends and Family:     Frequency of Social Gatherings with Friends and Family:     Attends Hindu Services:     Active Member of Clubs or Organizations:     Attends Club or Organization Meetings:     Marital Status:    Intimate Partner Violence:     Fear of Current or Ex-Partner:     Emotionally Abused:     Physically Abused:     Sexually Abused: ALLERGIES: Hydrocodone, Nsaids (non-steroidal anti-inflammatory drug), Azithromycin, Other medication, and Vicodin [hydrocodone-acetaminophen]    Review of Systems   Constitutional: Positive for fatigue. Negative for chills and fever. HENT: Negative for rhinorrhea and sore throat. Eyes: Negative for discharge and redness. Respiratory: Negative for cough and shortness of breath. Cardiovascular: Negative for chest pain and leg swelling. Gastrointestinal: Negative for abdominal pain, diarrhea, nausea and vomiting. Genitourinary: Negative for difficulty urinating and dysuria. Musculoskeletal: Negative for back pain and neck pain. Skin: Negative for rash and wound. Neurological: Negative for syncope, light-headedness and headaches.        Vitals:    09/29/21 1742 09/29/21 1801   BP: (!) 129/57    Pulse: 68    Resp: 14    Temp: 98.8 °F (37.1 °C)    SpO2: 98%    Weight: 52.2 kg (115 lb 1.3 oz)    Height:  5' 6.5\" (1.689 m)            Physical Exam  Constitutional:       General: She is not in acute distress. Appearance: She is not ill-appearing, toxic-appearing or diaphoretic. HENT:      Head: Normocephalic and atraumatic. Right Ear: External ear normal.      Left Ear: External ear normal.      Nose: No congestion or rhinorrhea. Mouth/Throat:      Mouth: Mucous membranes are moist.      Pharynx: No oropharyngeal exudate or posterior oropharyngeal erythema. Eyes:      General:         Right eye: No discharge. Left eye: No discharge. Pupils: Pupils are equal, round, and reactive to light. Neck:      Vascular: No carotid bruit. Cardiovascular:      Rate and Rhythm: Normal rate and regular rhythm. Heart sounds: No murmur heard. No friction rub. No gallop. Pulmonary:      Effort: Pulmonary effort is normal. No respiratory distress. Breath sounds: No stridor. No wheezing, rhonchi or rales. Abdominal:      General: Abdomen is flat. There is no distension. Tenderness: There is no right CVA tenderness, left CVA tenderness, guarding or rebound. Musculoskeletal:         General: No swelling, tenderness, deformity or signs of injury. Cervical back: No rigidity or tenderness. Right lower leg: No edema. Left lower leg: No edema. Lymphadenopathy:      Cervical: No cervical adenopathy. Skin:     General: Skin is warm. Capillary Refill: Capillary refill takes less than 2 seconds. Coloration: Skin is not jaundiced or pale. Findings: No bruising, erythema, lesion or rash. Neurological:      General: No focal deficit present. Mental Status: She is alert and oriented to person, place, and time. Sensory: No sensory deficit. Motor: No weakness.    Psychiatric:         Mood and Affect: Mood normal.          MDM  Number of Diagnoses or Management Options  Diagnosis management comments: Patient presents with primary complaint of COVID-19 infection, patient otherwise well-appearing no indication for further radiographic or blood testing at this time given patient's clinical stability. We will proceed with monoclonal antibody as patient does qualify. I informed her that she may have an extended stay in the emergency department due to needing to be brought from the main hospital but she expresses understanding and wishes to stay and receive the infusion.          Procedures

## 2021-10-07 ENCOUNTER — PATIENT OUTREACH (OUTPATIENT)
Dept: CASE MANAGEMENT | Age: 76
End: 2021-10-07

## 2021-10-07 ENCOUNTER — VIRTUAL VISIT (OUTPATIENT)
Dept: INTERNAL MEDICINE CLINIC | Age: 76
End: 2021-10-07
Payer: MEDICARE

## 2021-10-07 DIAGNOSIS — D84.9 IMMUNOSUPPRESSED STATUS (HCC): ICD-10-CM

## 2021-10-07 DIAGNOSIS — U07.1 COVID-19 VIRUS INFECTION: Primary | ICD-10-CM

## 2021-10-07 DIAGNOSIS — I10 PRIMARY HYPERTENSION: ICD-10-CM

## 2021-10-07 DIAGNOSIS — I77.3 FIBROMUSCULAR DYSPLASIA (HCC): ICD-10-CM

## 2021-10-07 DIAGNOSIS — M05.79 RHEUMATOID ARTHRITIS INVOLVING MULTIPLE SITES WITH POSITIVE RHEUMATOID FACTOR (HCC): ICD-10-CM

## 2021-10-07 PROCEDURE — G8399 PT W/DXA RESULTS DOCUMENT: HCPCS | Performed by: INTERNAL MEDICINE

## 2021-10-07 PROCEDURE — G8756 NO BP MEASURE DOC: HCPCS | Performed by: INTERNAL MEDICINE

## 2021-10-07 PROCEDURE — G0463 HOSPITAL OUTPT CLINIC VISIT: HCPCS | Performed by: INTERNAL MEDICINE

## 2021-10-07 PROCEDURE — 1101F PT FALLS ASSESS-DOCD LE1/YR: CPT | Performed by: INTERNAL MEDICINE

## 2021-10-07 PROCEDURE — G9899 SCRN MAM PERF RSLTS DOC: HCPCS | Performed by: INTERNAL MEDICINE

## 2021-10-07 PROCEDURE — 99214 OFFICE O/P EST MOD 30 MIN: CPT | Performed by: INTERNAL MEDICINE

## 2021-10-07 PROCEDURE — G8427 DOCREV CUR MEDS BY ELIG CLIN: HCPCS | Performed by: INTERNAL MEDICINE

## 2021-10-07 PROCEDURE — 1090F PRES/ABSN URINE INCON ASSESS: CPT | Performed by: INTERNAL MEDICINE

## 2021-10-07 PROCEDURE — 3017F COLORECTAL CA SCREEN DOC REV: CPT | Performed by: INTERNAL MEDICINE

## 2021-10-07 PROCEDURE — G8432 DEP SCR NOT DOC, RNG: HCPCS | Performed by: INTERNAL MEDICINE

## 2021-10-07 NOTE — PROGRESS NOTES
10/7/2021  3:54 PM    CTN reviewed patient chart and then attempted to call patient to see how she was feeling this week. She was unavailable at the time of the call. A voice mail message was left a t both number on chart for patient. I introduced myself, explained the purpose of the call and left my contact information oin message. Requested that patient return my call when she was available. Will follow.

## 2021-10-10 PROBLEM — D84.9 IMMUNOSUPPRESSED STATUS (HCC): Status: ACTIVE | Noted: 2021-10-10

## 2021-10-10 PROBLEM — U07.1 COVID-19 VIRUS INFECTION: Status: ACTIVE | Noted: 2021-10-10

## 2021-10-11 NOTE — PROGRESS NOTES
Elisabet Alexander is a 77 y.o. female who was seen by synchronous (real-time) audio-video technology on 10/7/2021. HPI:   Elisabet Alexander is a 77y.o. year old female who presents today for post ED follow-up. 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 She has completed the [address] 19 vaccine series, but did not obtain a third dose given her immunosuppressed status since she states that she was not aware of the new recommendation. On 9/29/2021, she contacted the office and reports that she had tested positive by rapid testing for COVID-19 on 9/28/2021 after developing symptoms 1 day prior of fever to 100.6 °F, headache, sore throat, and nasal congestion with cough. She stated that she also lost her sense of taste and smell. She was taking Tylenol  mg every 6 hours and ibuprofen with persistent fever. Given her immunosuppressed status and high risk of developing severe disease, she was advised to present to the [facility] ED and received bamlanivimab-etesevimab monoclonal antibody infusion without complications. She presents today for follow-up. She reports that she is improving. She states that her fever resolved after the antibody infusion and she has not had recurrence for the last 6 days. She is continuing to experience headache, nasal congestion, cough, and loss of taste and smell. She has been monitoring her oxygen saturation with a pulse oximeter and reports that it has been stable at 95% on room air. She denies any difficulty with dyspnea, brain fog, diarrhea, or myalgias. She states that she has been taking melatonin and sleeping well. She reports that several days prior to the onset of her symptoms, she and her  attended an indoor class reunion.  She states that she did keep on her mask during the entire event. She also states that her  has remained well. She is otherwise without new complaints. Summary of prior hospitalizations and medical history: On 10/21/2019, she presented to the [facility] ED reporting difficulty with elevated blood pressure and a dull headache. She stated that her blood pressure has been elevated earlier in the week when visiting her gastroenterologist. On the day of presentation to the ED, she states that she was experiencing an achy dull headache that was mild in severity throughout the day. She became concerned when she went to bed and noticed throbbing pain in her hands and feet. She checked her blood pressure and noticed her systolic ranged from 176-726 mmHg. She had taken her metoprolol tartrate 50 mg bid as prescribed. She denied any shortness of breath, leg swelling, orthopnea, PND, visual changes, nausea/vomiting, lightheadedness, dizziness, confusion, speech difficulty, numbness, or weakness. She did admit to some chest tightness, but stated that it was not exertional and was her usual discomfort associated with reflux. Evaluation in the ED revealed that her blood pressure was 144/73. EKG showed sinus rhythm at 64 bpm and LBBB. Laboratory data included WBC 6.2, Hb 11.8/ Hct 36.9, creatinine 0.79/eGFR >60, troponin x 1 negative, and chest x-ray negative. While in the ED, her headache resolved and she was subsequently discharged. She was seen in follow-up on 10/30/2019 and described episodes with head fullness, flushing, and  \"prickly skin\" occurring in the evening after lying down. She stated that she did notice one episode where her heart rate appeared to be 150 bpm. She also reported that she had been under an increased amount of stress with regard to several of her family members, and felt that the episodes may be secondary to anxiety.  Her dose of Zoloft was increased to 50 mg daily, and she reported that the episodes of palpitations and head fullness resolved with the increase in dose of Zoloft. She has a history of hypertension and palpitations, treated currently with metoprolol. She has a history of dyslipidemia, not currently being treated with medication. She was evaluated by Dr. Radha Garcia in 2012 for palpitations. Event monitor was placed, and showed a known LBBB and PVC's, but reportedly no other arrhythmias (report unavailable). In 9/2012, she also underwent a pharmacologic nuclear stress test which was a normal low risk study with EF 55%. She also had an echocardiogram, which showed low normal LV function (EF 50%), mild grade 1 diastolic dysfunction, trace MR, TR, and AI. She also has a history of fibromuscular dysplasia, and in 5/2009, underwent aortogram and renal arteriogram with balloon angioplasty. She has a known right carotid bruit, and her last carotid duplex (5/2014) showed mild atherosclerotic intraluminal plaquing with elevated velocities in the bilateral mid internal carotid arteries with a \"string of pearls\" appearance consistent with known fibromuscular dysplasia, causing a 50-69% stenosis of the bilateral internal carotid arteries. There was no change from prior study in 2011. She had been followed by Dr. Eloisa Ohara, but states that she has been released from his care. She remains quite active, and denies any chest pain, shortness of breath at rest or with exertion, palpitations, lightheadedness, or edema. She underwent a repeat carotid duplex study (8/15/2018) showing mild (<50%) stenosis of the left internal carotid artery and moderate (50-69%) stenosis of the right internal carotid artery. Repeat performed 12/17/2019 showed moderate homogenous plaque in right ICA (50 to 69% stenosis). Stenosis is at the mid ICA and may be upper limits of moderate; mild heterogeneous plaque left ICA (< 50% stenosis).     She has a history of rheumatoid arthritis, diagnosed when she was 37years old, treated currently with subcutaneous methotrexate and hydroxychloroquine. She previously was also treated with prednisone, but has been able to successfully wean from taking it. She is being followed closely by Dr. Nighat Kelly. She is not currently being treated with a biologic agent due to concern regarding recent multiple squamous cell carcinomas of the skin, for which she was followed by Dr. Jerome Miner and now by Dr. Alejandro Rubio. She reports that she was well controlled on Humira for several years, but had to discontinue treatment when it was no longer on her insurance formulary. She also has a history of osteopenia, with bone density studies performed by Dr. Nighat Kelly. Her last bone density study was in 2/2019 showing T-scores:  femoral neck left -1.7  /right -1.9 and lumbar -2.1. She continues to take calcium and Vitamin D supplements. She has no history of pathologic fractures. She was recently diagnosed with familial hypocalciuric hypercalcemia with mildly elevated calcium 10.6, mildly elevated PTH 80, Vitamin D level 36.5, and 24 hour urine calcium of 105 (FeCa 1%) in 7/30/2018, which would be consistent with this diagnosis. She is being followed by Dr. Farnaz Casiano. She has a history of cervical degenerative joint and disc disease and chronic neck pain. She was being followed by Dr. Izabella Byrne, who recommended surgery. She was seen by Dr. Thomas Doshi at [facility] for a second opinion, and cervical MRI (9/2015) showed multilevel degenerative disc and facet disease, worst at C5-C6; left central/subarticular disc protrusion with superimposed annular fissure noted at C5-C6 causing mass effect upon the left hemicord but no signal changes with severe left neuroforaminal narrowing at this level. Recommendation was for her to proceed with physical therapy, which did result in some improvement. She previously had been treated with gabapentin for pain control, but is currently only using Tylenol. She underwent acupuncture therapy with Dr. Gray Garcia with only minimal improvement.  She also states that she was referred to Dr. Carmita Webb for evaluation, but did not schedule since symptoms have gradually improved. She has a history of GERD. In 11/2017, she was having increasing difficulty with constipation alternating with diarrhea as well as increasing reflux symptoms. She states that she was evaluated by Dr. Jake Hurtado and it was her opinion that she suffered from irritable bowel syndrome. She has made changes in her diet and increased her consumption of fiber with some improvement. She began experiencing worsening reflux symptoms in 12/2018 after attempting to wean omeprazole from 40 mg to 20 mg daily. She also was taking an increased dose of prednisone at the time for a flare of her RA. She was evaluated by Dr. Jake Hurtado and attempted a trial of Protonix which she believed may be working better. She plans to continue taking it. She states that at the time of her worsening reflux, she also noted increasing palpitations and underwent evaluation by [facility] for Dr. Ryan Chong. She states that she was offered an event recorder to evaluate, but she noticed that her symptoms improved once her GERD was under control. She therefore declined further evaluation. She states that she has resumed taking omeprazole with reasonable control of her reflux symptoms. She underwent upper endoscopy by Dr. Du Ramirez in 11/2012 which showed a 2 cm hiatal hernia and pathology from a distal esophageal biopsy showing chronic inflammation. She had a screening colonoscopy in 7/14/2016 by Dr. Du Ramirez, which showed an adenomatous polyp (report unavailable). She had a repeat screening colonoscopy on 7/27/2021 by Dr. Caroline Scott showing mild diverticulosis in the sigmoid colon, two sessile 5-6 mm polyps in the cecum (pathology: tubular adenoma), two sessile 5-7 mm polyps in the ascending colon (pathology: tubular adenoma), and one sessile 1 cm polyp in the transverse colon (polyp not retrieved). Follow-up recommended for 3 years.  She denies any abdominal pain, nausea, vomiting, melena, hematochezia, or change in bowel movements. Past Medical History:   Diagnosis Date    Bowen's disease     Fibromuscular dysplasia (HCC)     GERD (gastroesophageal reflux disease)     Hiatal hernia     Hyperparathyroidism ([address]     Hypertension     LBBB (left bundle branch block)     Premature ventricular contractions     benign    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 (HCC)     s/p balloon angioplasty 5/2009 [facility]    Rheumatoid arthritis ([address]     Skin cancer      Past Surgical History:   Procedure Laterality Date    COLONOSCOPY N/A 7/27/2021    COLONOSCOPY with polypectomies with bx's performed by Jeannette Dao MD at [facility] ENDOSCOPY    HX ANGIOPLASTY      HX COLONOSCOPY      HX KNEE REPLACEMENT Bilateral 2009    partial     HX OTHER SURGICAL      removal of fibroid benign tumors from breast     HX PARTIAL HYSTERECTOMY      HX TONSILLECTOMY  1964     Current Outpatient Medications   Medication Sig    Psyllium Husk-Sucrose 3.4 gram/7 gram powd Take  by mouth.  sertraline (ZOLOFT) 50 mg tablet take 1 tablet by mouth once daily    aspirin 81 mg chewable tablet Take 81 mg by mouth. Every three days    hydrOXYchloroQUINE (PLAQUENIL) 200 mg tablet Take 200 mg by mouth daily.  metoprolol tartrate (LOPRESSOR) 50 mg tablet Take 1 Tab by mouth two (2) times a day.  METHOTREXATE SODIUM INJECTION 17.5 mg every seven (7) days.  omeprazole (PRILOSEC) 40 mg capsule Take 40 mg by mouth every other day.  multivitamin (ONE A DAY) tablet Take 1 Tab by Mouth Once a Day.  acetaminophen (TYLENOL) 500 mg tablet Take 500 mg by mouth every six (6) hours as needed.  cholecalciferol (VITAMIN D3) 400 unit tab tablet Take 4,000 Units by mouth.  folic acid (FOLVITE) 1 mg tablet Take 1 mg by mouth daily. No current facility-administered medications for this visit. Allergies and Intolerances:    Allergies   Allergen Reactions    Hydrocodone Anaphylaxis    Nsaids (Non-Steroidal Anti-Inflammatory Drug) Other (comments)     None due to kidneys    Azithromycin Itching    Other Medication Other (comments)     GI intolerance to nonsteroidal antiinflammatory medications     Vicodin [Hydrocodone-Acetaminophen] Other (comments)     Family History: Her father passed away from a ruptured AAA. Her mother has osteoarthritis and COPD, and a maternal aunt had RA. Her brother recently passed away from multiple sclerosis. She has no family history of colon or breast cancer. Family History   Problem Relation Age of Onset    Arthritis-osteo Mother     Elevated Lipids Mother     Heart Disease Mother         CHF    Lung Disease Mother     Hypertension Sister     Lung Disease Sister     Hypertension Brother     MS Brother      Social History:   She  reports that she has never smoked. She has never used smokeless tobacco. She is  with two children and one stepchild. 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 She drinks wine only occasionally. Social History     Substance and Sexual Activity   Alcohol Use Yes    Alcohol/week: 1.0 - 2.0 standard drinks    Types: 1 - 2 Glasses of wine per week     Immunization History:  Immunization History   Administered Date(s) Administered    Covid-19, MODERNA, Mrna, Lnp-s, Pf, 100mcg/0.5mL 02/24/2021, 03/24/2021    Influenza High Dose Vaccine PF 10/29/2018    Influenza Vaccine 10/29/2013    Influenza Vaccine (Tri) Adjuvanted (>65 Yrs FLUAD TRI 33635) 12/11/2019    Pneumococcal Conjugate (PCV-13) 12/14/2015    Pneumococcal Polysaccharide (PPSV-23) 11/15/2018    Tdap 10/02/2019       Review of Systems:   As above included in HPI. Otherwise 11 point review of systems negative including constitutional, skin, HENT, eyes, respiratory, cardiovascular, gastrointestinal, genitourinary, musculoskeletal, endocrine, hematologic, allergy, and neurologic.     Physical:   There were no vitals taken for this visit. Constitutional: [x] Appears well-developed and well-nourished [x] No apparent distress      [] Abnormal -     Mental status: [x] Alert and awake  [x] Oriented to person/place/time [x] Able to follow commands    [] Abnormal -     Eyes:   EOM    [x]  Normal    [] Abnormal -   Sclera  [x]  Normal    [] Abnormal -          Discharge [x]  None visible   [] Abnormal -     HENT: [x] Normocephalic, atraumatic  [] Abnormal -   [x] Mouth/Throat: Mucous membranes are moist    External Ears [x] Normal  [] Abnormal -    Neck: [x] No visualized mass [] Abnormal -     Pulmonary/Chest: [x] Respiratory effort normal   [x] No visualized signs of difficulty breathing or respiratory distress        [] Abnormal -      Musculoskeletal:   [x] Normal gait with no signs of ataxia         [x] Normal range of motion of neck        [] Abnormal -     Neurological:        [x] No Facial Asymmetry (Cranial nerve 7 motor function) (limited exam due to video visit)          [x] No gaze palsy        [] Abnormal -          Skin:        [x] No significant exanthematous lesions or discoloration noted on facial skin         [] Abnormal -            Psychiatric:       [x] Normal Affect [] Abnormal -        [x] No Hallucinations    Other pertinent observable physical exam findings:-              Review of Data:  Labs:  No visits with results within 1 Month(s) from this visit.    Latest known visit with results is:   Admission on 07/27/2021, Discharged on 07/27/2021   Component Date Value Ref Range Status    Ventricular Rate 07/27/2021 63  BPM Final    Atrial Rate 07/27/2021 63  BPM Final    P-R Interval 07/27/2021 174  ms Final    QRS Duration 07/27/2021 148  ms Final    Q-T Interval 07/27/2021 494  ms Final    QTC Calculation (Bezet) 07/27/2021 505  ms Final    Calculated P Axis 07/27/2021 56  degrees Final    Calculated R Axis 07/27/2021 -65  degrees Final    Calculated T Axis 07/27/2021 78  degrees Final    Diagnosis 07/27/2021    Final                    Value:Normal sinus rhythm  Left axis deviation  Left bundle branch block  Abnormal ECG  When compared with ECG of 21-OCT-2019 23:15,  No significant change was found  Confirmed by Sofya Sheikh ([id number]) on 7/29/2021 8:56:40 AM           Health Maintenance:  Screening:    Mammogram: negative (10/2020) [facility]   PAP smear: well woman exams performed at [facility] by KIM Vargas (last 10/2020)   Colorectal: colonoscopy (7/2021) tubular adenomas. Dr. Claudio Delay. Due 7/2024. Depression: on Zoloft   DM (HbA1c/FPG): FPG 74 (6/2021)   Hepatitis C: negative (1/2/2018) Dr. Nury Nova. Falls: none   DEXA: osteopenia (last 2/2019). Performed by Dr. Nury Nova.    Glaucoma: regular eye exams with Dr. Dewey Kelley (last 12/2020 ) every 6 months   Smoking: none   Vitamin D: 64.8 (6/2021)    Medicare Wellness: 6/10/2021      Impression:  Patient Active Problem List   Diagnosis Code    Displacement of cervical intervertebral disc without myelopathy M50.20    Cervical spondylosis without myelopathy M47.812    Brachial neuritis or radiculitis M54.12    Radiculopathy, cervical M54.12    History of nonmelanoma skin cancer Z85.828    Rheumatoid arthritis ([facility] M06.9    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 (HCC) I70.1, I77.3    LBBB (left bundle branch block) I44.7    Hypertension I10    Hyperparathyroidism ([address] E21.3    Hiatal hernia K44.9    GERD (gastroesophageal reflux disease) K21.9    Fibromuscular dysplasia (HCC) I77.3    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    Irritable bowel syndrome with both constipation and diarrhea K58.2    Bilateral carotid bruits R09.89    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    Hyperlipidemia E78.5    Osteopenia of multiple sites M85.89    Vitamin D deficiency E55.9    Carotid stenosis, bilateral I65.23    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    Anemia D64.9    Palpitations R00.2       Plan:  COVID-19 infection. Patient is fully vaccinated with the Moderna COVID-19 vaccine series, but did not receive a third dose given her immunosuppressed status. On 9/27/2021, she noted the onset of fever, sore throat, nasal congestion, headache, and cough, and underwent rapid testing on 9/28/2021 which was positive for SARS-CoV-2. She reports that she had attended an indoor class reunion several days prior to the onset of her symptoms, although reports that she did wear a mask during the entire event. She contacted the office on 9/29/2021 and was sent to [facility] to receive bamlanivimab-etesevimab monoclonal antibody infusion. She tolerated it without complications. Reports has been afebrile for the last 6 days, but continuing to experience loss of taste and smell, nasal congestion, headache, and cough. SpO2 maintained at 95% on room air. Advised to continue with symptomatic treatment, and discussed that she should remain in isolation for at least 10 days following her positive SARS-CoV-2 test result and until symptoms improve. Discussed that should not receive additional dose of Moderna COVID-19 vaccine for 90 days following infusion of monoclonal antibody (after 12/29/2021). Other chronic issues:  1. Hypertension. Blood pressure has been well controlled on current regimen of metoprolol tartrate, and reports dose increased by cardiology to 75 mg bid to help control palpitations. Renal function has been normal with creatinine 0.80/ eGFR >60 in 6/2021. Will continue to follow. 2. Fibromuscular dysplasia. Previous angioplasty of renal arteries. History of moderate stenosis of bilateral carotid arteries on carotid duplex in 5/2014. Noted prominent carotid bruits on physical exam, and repeat carotid duplex obtained in 8/2018 showing moderate (50-69%) stenosis of the right ICA and mild (<50%) of the left ICA. Review of report also showed mild atherosclerosis at that time.  Previously followed by Dr. Thony Davis of [facility], but given stability over many years, released from his care with plan to pursue further evaluation only if becomes symptomatic. Repeat carotid duplex scan obtained 12/17/2019 showed persistence of moderate stenosis of right ICA. On aspirin 81 mg every third day. Did not tolerate atorvastatin due to elevated LFT's so discontinued. Will continue to monitor. 3. Hyperlipidemia. Calculated 10 year ASCVD risk improved to 14.6 %, which placed her in one of the four statin benefit groups as per new AHA/ACC guidelines (primary prevention: 10 year ASCVD risk >7.5%). In addition, mild atherosclerosis in the setting of fibromuscular dysplasia described on prior carotid duplex, and moderate stenosis of right ICA reported. Also at increased risk due to chronic inflammatory state related to rheumatoid arthritis. Repeat lipid panel 12/2019 with elevated with  and HDL 85, and patient started on atorvastatin 10 mg daily. Lipid panel significantly improved with LDL 72 and HDL 82, but developed increase in transaminases with AST 46 and ALT 64 (1/16/2020), and repeat ALT 85 and AST 66 (2/5/2020). Atorvastatin discontinued and LFT's normalized. Significant improvement in 6/2021 with LDL 93 and HDL 77 so no reason to resume treatment. Will continue to monitor. 4. Rheumatoid arthritis. On SC weekly methotrexate and hydroxychloroquine 200 mg daily. Reports that able to wean prednisone and reports discontinued. Currently holding methotrexate due to SARS-CoV-2 infection and advised to consult with Dr. Carina Salinas on when to resume. Continuing to discuss with Dr. Carina Salinas treatment with a biologic for better control of joint inflammation. However, patient remains hesitant due to skin precancerous lesions, but being addressed by Dr. Aristeo Roca. Being followed by Dr. Carina Salinas. 5. Elevated LFT's. Transaminases noted to increase after started atorvastatin. On chronic methotrexate for RA. Statin discontinued and LFT's normalized. Evaluated by Dr. Suhail John and lab evaluation and RUQ ultrasound normal in 3/2020. Presumed secondary to statin in the setting of long term methotrexate therapy for RA. Statin discontinued and LFT's remain normal today. 6. Cervical degenerative disease. Experiences chronic neck pain, treated previously with gabapentin. Some improvement with exercising. Attempted accupuncture therapy by Dr. Be Riley, but after 3 months, no significant benefit seen. Previously followed by Dr. Matthew Fu, but referred to Dr. Anne Bauman but never scheduled. Feels that overall improved and not felt to be a significant issue. 7. Osteopenia. Last bone density scan in 2/2019. Using femoral neck T-scores, calculated FRAX score estimates her 10 year risk of a major osteoporetic fracture at 13 % and hip fracture at 3.1 %, which are an indication for biphosphonate treatment with hip fracture risk >3%. Being performed and followed by Dr. Ramón Molina. She reports that she had a repeat bone density study which showed worsening osteopenia, and states that Dr. Ramón Molina is recommending treatment. However, she is hesitant to begin. Continue calcium and Vitamin D. Encouraged exercise, particularly weight bearing activities. Fall precautions stressed. 8. Hyperparathyroidism. Mild increase in PTH, mildly elevated calcium, normal Vitamin D level,and 24 hour urine calcium only 105 (FeCa 1%) consistent with a diagnosis of familial hypocalciuric hypercalcemia. Being followed by Dr. Joaquim Ely every 2 years given benign condition. Advised to drink plenty of fluids. 9. GERD. Resumed omeprazole and taking every other day with good response. Symptoms of diarrhea alternating with constipation chronically thought to be secondary to irritable bowel syndrome. On probiotic. 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 Following with Dr. Anca Donahue. Recommending topical treatment with Efudex.   Continues to be hesitant to begin treatment with a biologic for her RA given her extensive premalignant skin lesions. 11. Mild anemia. Present intermittently. B12 and folate levels normal in 12/2019. Iron studies and gammopathy panel normal in 5/2020. Stable and likely secondary to methotrexate. Will continue to monitor. 12. Mouth pain. Reports intermittent difficulty and evaluated by Dr. Jennifer Dasilva of ENT in the past with no etiology found. Differential diagnosis would include herpes simplex, aphthous stomatitis, xerostomia, or nutritional deficiencies (B12, B6, folate, Zinc, iron), or candidiasis. 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 Tricyclics, pregabalin, gabapentin, and Mirapex have shown some benefit. Also has found some benefit with Mycelex troches in the past. Advised to take a multivitamin. Reports no recent recurrence. 13. Health maintenance. Completed Moderna COVID-19 vaccine series, but did not obtain third dose despite immunosuppressed status. Has not yet completed Shingrix vaccine but is planning to obtain. Other immunizations up-to-date. Mammogram and well women exams performed at [facility] and up to date. Colonoscopy completed and repeat due in 3 years. Continue regular eye exams with Dr. Orlando Mathur. Vitamin D level has been normal on 4000 U daily. Medicare wellness visit up to date. Patient understands recommendations and agrees with plan. Follow-up as previously scheduled. We discussed the expected course, resolution and complications of the diagnosis(es) in detail. Medication risks, benefits, costs, interactions, and alternatives were discussed as indicated. I advised her to contact the office if her condition worsens, changes or fails to improve as anticipated. She expressed understanding with the diagnosis(es) and plan. Danielle Navarrete, was evaluated through a synchronous (real-time) audio-video encounter. The patient (or guardian if applicable) is aware that this is a billable service.  Verbal consent to proceed has been obtained within the past 12 months. The visit was conducted pursuant to the emergency declaration under the [address] [place] [address] and the [employer] General Act. Patient identification was verified, and a caregiver was present when appropriate. The patient was located in a state where the provider was credentialed to provide care.     Jewell Harrison MD

## 2021-10-14 ENCOUNTER — PATIENT OUTREACH (OUTPATIENT)
Dept: CASE MANAGEMENT | Age: 76
End: 2021-10-14

## 2021-10-14 NOTE — PROGRESS NOTES
Patient resolved from [address] episode on 10/14/2021. Discussed COVID-19 related testing which was available at this time. Test results were positive. Patient informed of results, if available? yes     Patient/family has been provided the following resources and education related to COVID-19:                         Signs, symptoms and red flags related to COVID-19            [facility] exposure and quarantine guidelines            [employer] exposure contact - [phone]            Contact for their local Department of Health                 Patient currently reports that the following symptoms have improved:  no new symptoms and no worsening symptoms. No further outreach scheduled with this CTN/ACM/LPN/HC/ MA. Episode of Care resolved. Patient has this CTN/ACM/LPN/HC/MA contact information if future needs arise.

## 2021-12-06 ENCOUNTER — APPOINTMENT (OUTPATIENT)
Dept: INTERNAL MEDICINE CLINIC | Age: 76
End: 2021-12-06

## 2021-12-06 ENCOUNTER — HOSPITAL ENCOUNTER (OUTPATIENT)
Dept: LAB | Age: 76
Discharge: HOME | End: 2021-12-06
Payer: MEDICARE

## 2021-12-06 DIAGNOSIS — I70.1 RENAL ARTERY STENOSIS DUE TO FIBROMUSCULAR DYSPLASIA (HCC): ICD-10-CM

## 2021-12-06 DIAGNOSIS — I77.3 RENAL ARTERY STENOSIS DUE TO FIBROMUSCULAR DYSPLASIA (HCC): ICD-10-CM

## 2021-12-06 DIAGNOSIS — M05.79 RHEUMATOID ARTHRITIS INVOLVING MULTIPLE SITES WITH POSITIVE RHEUMATOID FACTOR (HCC): ICD-10-CM

## 2021-12-06 DIAGNOSIS — Z85.828 HISTORY OF NONMELANOMA SKIN CANCER: ICD-10-CM

## 2021-12-06 DIAGNOSIS — I65.23 CAROTID STENOSIS, BILATERAL: ICD-10-CM

## 2021-12-06 DIAGNOSIS — E78.5 HYPERLIPIDEMIA, UNSPECIFIED HYPERLIPIDEMIA TYPE: ICD-10-CM

## 2021-12-06 DIAGNOSIS — K58.2 IRRITABLE BOWEL SYNDROME WITH BOTH CONSTIPATION AND DIARRHEA: ICD-10-CM

## 2021-12-06 DIAGNOSIS — E83.52 FHH (FAMILIAL HYPOCALCIURIC HYPERCALCEMIA): ICD-10-CM

## 2021-12-06 DIAGNOSIS — K21.9 GASTROESOPHAGEAL REFLUX DISEASE, UNSPECIFIED WHETHER ESOPHAGITIS PRESENT: ICD-10-CM

## 2021-12-06 DIAGNOSIS — I10 ESSENTIAL HYPERTENSION: ICD-10-CM

## 2021-12-06 DIAGNOSIS — D64.9 ANEMIA, UNSPECIFIED TYPE: ICD-10-CM

## 2021-12-06 DIAGNOSIS — D04.9 BOWEN'S DISEASE: ICD-10-CM

## 2021-12-06 DIAGNOSIS — R00.2 PALPITATIONS: ICD-10-CM

## 2021-12-06 DIAGNOSIS — M85.89 OSTEOPENIA OF MULTIPLE SITES: ICD-10-CM

## 2021-12-06 DIAGNOSIS — I77.3 FIBROMUSCULAR DYSPLASIA (HCC): ICD-10-CM

## 2021-12-06 DIAGNOSIS — E21.3 HYPERPARATHYROIDISM (HCC): ICD-10-CM

## 2021-12-06 LAB
25(OH)D3 SERPL-MCNC: 68.5 NG/ML (ref 30–100)
ALBUMIN SERPL-MCNC: 4.1 G/DL (ref 3.4–5)
ALBUMIN/GLOB SERPL: 1.3 {RATIO} (ref 0.8–1.7)
ALP SERPL-CCNC: 75 U/L (ref 45–117)
ALT SERPL-CCNC: 24 U/L (ref 13–56)
ANION GAP SERPL CALC-SCNC: 2 MMOL/L (ref 3–18)
APPEARANCE UR: ABNORMAL
AST SERPL-CCNC: 24 U/L (ref 10–38)
BACTERIA URNS QL MICRO: NEGATIVE /HPF
BASOPHILS # BLD: 0 K/UL (ref 0–0.1)
BASOPHILS NFR BLD: 1 % (ref 0–2)
BILIRUB SERPL-MCNC: 0.5 MG/DL (ref 0.2–1)
BILIRUB UR QL: NEGATIVE
BUN SERPL-MCNC: 13 MG/DL (ref 7–18)
BUN/CREAT SERPL: 16 (ref 12–20)
CALCIUM SERPL-MCNC: 10.2 MG/DL (ref 8.5–10.1)
CHLORIDE SERPL-SCNC: 106 MMOL/L (ref 100–111)
CHOLEST SERPL-MCNC: 199 MG/DL
CO2 SERPL-SCNC: 32 MMOL/L (ref 21–32)
COLOR UR: YELLOW
CREAT SERPL-MCNC: 0.8 MG/DL (ref 0.6–1.3)
DIFFERENTIAL METHOD BLD: ABNORMAL
EOSINOPHIL # BLD: 0 K/UL (ref 0–0.4)
EOSINOPHIL NFR BLD: 1 % (ref 0–5)
EPITH CASTS URNS QL MICRO: NORMAL /LPF (ref 0–5)
ERYTHROCYTE [DISTWIDTH] IN BLOOD BY AUTOMATED COUNT: 13.9 % (ref 11.6–14.5)
GLOBULIN SER CALC-MCNC: 3.2 G/DL (ref 2–4)
GLUCOSE SERPL-MCNC: 74 MG/DL (ref 74–99)
GLUCOSE UR STRIP.AUTO-MCNC: NEGATIVE MG/DL
HCT VFR BLD AUTO: 36.1 % (ref 35–45)
HDLC SERPL-MCNC: 88 MG/DL (ref 40–60)
HDLC SERPL: 2.3 {RATIO} (ref 0–5)
HGB BLD-MCNC: 11.1 G/DL (ref 12–16)
HGB UR QL STRIP: NEGATIVE
IMM GRANULOCYTES # BLD AUTO: 0 K/UL (ref 0–0.04)
IMM GRANULOCYTES NFR BLD AUTO: 0 % (ref 0–0.5)
KETONES UR QL STRIP.AUTO: NEGATIVE MG/DL
LDLC SERPL CALC-MCNC: 94.8 MG/DL (ref 0–100)
LEUKOCYTE ESTERASE UR QL STRIP.AUTO: ABNORMAL
LIPID PROFILE,FLP: ABNORMAL
LYMPHOCYTES # BLD: 1.6 K/UL (ref 0.9–3.6)
LYMPHOCYTES NFR BLD: 33 % (ref 21–52)
MAGNESIUM SERPL-MCNC: 2.2 MG/DL (ref 1.6–2.6)
MCH RBC QN AUTO: 29.8 PG (ref 24–34)
MCHC RBC AUTO-ENTMCNC: 30.7 G/DL (ref 31–37)
MCV RBC AUTO: 96.8 FL (ref 78–100)
MONOCYTES # BLD: 0.5 K/UL (ref 0.05–1.2)
MONOCYTES NFR BLD: 9 % (ref 3–10)
NEUTS SEG # BLD: 2.8 K/UL (ref 1.8–8)
NEUTS SEG NFR BLD: 56 % (ref 40–73)
NITRITE UR QL STRIP.AUTO: NEGATIVE
NRBC # BLD: 0 K/UL (ref 0–0.01)
NRBC BLD-RTO: 0 PER 100 WBC
PH UR STRIP: 6.5 [PH] (ref 5–8)
PLATELET # BLD AUTO: 228 K/UL (ref 135–420)
PMV BLD AUTO: 10.9 FL (ref 9.2–11.8)
POTASSIUM SERPL-SCNC: 4 MMOL/L (ref 3.5–5.5)
PROT SERPL-MCNC: 7.3 G/DL (ref 6.4–8.2)
PROT UR STRIP-MCNC: NEGATIVE MG/DL
RBC # BLD AUTO: 3.73 M/UL (ref 4.2–5.3)
RBC #/AREA URNS HPF: NEGATIVE /HPF (ref 0–5)
SODIUM SERPL-SCNC: 140 MMOL/L (ref 136–145)
SP GR UR REFRACTOMETRY: 1.02 (ref 1–1.03)
TRIGL SERPL-MCNC: 81 MG/DL (ref ?–150)
TSH SERPL DL<=0.05 MIU/L-ACNC: 2.61 UIU/ML (ref 0.36–3.74)
UROBILINOGEN UR QL STRIP.AUTO: 0.2 EU/DL (ref 0.2–1)
VLDLC SERPL CALC-MCNC: 16.2 MG/DL
WBC # BLD AUTO: 5 K/UL (ref 4.6–13.2)
WBC URNS QL MICRO: NORMAL /HPF (ref 0–5)

## 2021-12-06 PROCEDURE — 80053 COMPREHEN METABOLIC PANEL: CPT

## 2021-12-06 PROCEDURE — 83735 ASSAY OF MAGNESIUM: CPT

## 2021-12-06 PROCEDURE — 84443 ASSAY THYROID STIM HORMONE: CPT

## 2021-12-06 PROCEDURE — 81001 URINALYSIS AUTO W/SCOPE: CPT

## 2021-12-06 PROCEDURE — 85025 COMPLETE CBC W/AUTO DIFF WBC: CPT

## 2021-12-06 PROCEDURE — 82306 VITAMIN D 25 HYDROXY: CPT

## 2021-12-06 PROCEDURE — 80061 LIPID PANEL: CPT

## 2021-12-06 PROCEDURE — 36415 COLL VENOUS BLD VENIPUNCTURE: CPT

## 2021-12-15 ENCOUNTER — TELEPHONE (OUTPATIENT)
Dept: INTERNAL MEDICINE CLINIC | Age: 76
End: 2021-12-15

## 2021-12-15 ENCOUNTER — VIRTUAL VISIT (OUTPATIENT)
Dept: INTERNAL MEDICINE CLINIC | Age: 76
End: 2021-12-15
Payer: MEDICARE

## 2021-12-15 DIAGNOSIS — M85.89 OSTEOPENIA OF MULTIPLE SITES: ICD-10-CM

## 2021-12-15 DIAGNOSIS — Z86.16 HISTORY OF 2019 NOVEL CORONAVIRUS DISEASE (COVID-19): ICD-10-CM

## 2021-12-15 DIAGNOSIS — E55.9 VITAMIN D DEFICIENCY: ICD-10-CM
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
## 2021-12-15 DIAGNOSIS — R00.2 PALPITATIONS: ICD-10-CM

## 2021-12-15 DIAGNOSIS — I70.1 RENAL ARTERY STENOSIS DUE TO FIBROMUSCULAR DYSPLASIA (HCC): ICD-10-CM

## 2021-12-15 DIAGNOSIS — I10 PRIMARY HYPERTENSION: ICD-10-CM

## 2021-12-15 DIAGNOSIS — U09.9 POST-ACUTE SEQUELAE OF COVID-19 (PASC): ICD-10-CM

## 2021-12-15 DIAGNOSIS — I77.3 RENAL ARTERY STENOSIS DUE TO FIBROMUSCULAR DYSPLASIA (HCC): ICD-10-CM

## 2021-12-15 DIAGNOSIS — E78.5 HYPERLIPIDEMIA, UNSPECIFIED HYPERLIPIDEMIA TYPE: ICD-10-CM

## 2021-12-15 DIAGNOSIS — D64.9 ANEMIA, UNSPECIFIED TYPE: ICD-10-CM

## 2021-12-15 DIAGNOSIS — M05.79 RHEUMATOID ARTHRITIS INVOLVING MULTIPLE SITES WITH POSITIVE RHEUMATOID FACTOR (HCC): ICD-10-CM

## 2021-12-15 DIAGNOSIS — I77.3 FIBROMUSCULAR DYSPLASIA (HCC): ICD-10-CM

## 2021-12-15 DIAGNOSIS — I65.23 CAROTID STENOSIS, BILATERAL: ICD-10-CM

## 2021-12-15 DIAGNOSIS — D84.9 IMMUNOSUPPRESSED STATUS (HCC): ICD-10-CM

## 2021-12-15 PROCEDURE — 1101F PT FALLS ASSESS-DOCD LE1/YR: CPT | Performed by: INTERNAL MEDICINE

## 2021-12-15 PROCEDURE — G8427 DOCREV CUR MEDS BY ELIG CLIN: HCPCS | Performed by: INTERNAL MEDICINE

## 2021-12-15 PROCEDURE — G8399 PT W/DXA RESULTS DOCUMENT: HCPCS | Performed by: INTERNAL MEDICINE

## 2021-12-15 PROCEDURE — 99214 OFFICE O/P EST MOD 30 MIN: CPT | Performed by: INTERNAL MEDICINE

## 2021-12-15 PROCEDURE — G8756 NO BP MEASURE DOC: HCPCS | Performed by: INTERNAL MEDICINE

## 2021-12-15 PROCEDURE — 3017F COLORECTAL CA SCREEN DOC REV: CPT | Performed by: INTERNAL MEDICINE

## 2021-12-15 PROCEDURE — 1090F PRES/ABSN URINE INCON ASSESS: CPT | Performed by: INTERNAL MEDICINE

## 2021-12-15 PROCEDURE — G0463 HOSPITAL OUTPT CLINIC VISIT: HCPCS | Performed by: INTERNAL MEDICINE

## 2021-12-15 PROCEDURE — G8510 SCR DEP NEG, NO PLAN REQD: HCPCS | Performed by: INTERNAL MEDICINE

## 2021-12-15 NOTE — PATIENT INSTRUCTIONS
Heart-Healthy Diet: Care Instructions  Your Care Instructions     A heart-healthy diet has lots of vegetables, fruits, nuts, beans, and whole grains, and is low in salt. It limits foods that are high in saturated fat, such as meats, cheeses, and fried foods. It may be hard to change your diet, but even small changes can lower your risk of heart attack and heart disease. Follow-up care is a key part of your treatment and safety. Be sure to make and go to all appointments, and call your doctor if you are having problems. It's also a good idea to know your test results and keep a list of the medicines you take. How can you care for yourself at home? Watch your portions  · Learn what a serving is. A \"serving\" and a \"portion\" are not always the same thing. Make sure that you are not eating larger portions than are recommended. For example, a serving of pasta is ½ cup. A serving size of meat is 2 to 3 ounces. A 3-ounce serving is about the size of a deck of cards. Measure serving sizes until you are good at [place]" them. Keep in mind that restaurants often serve portions that are 2 or 3 times the size of one serving. · To keep your energy level up and keep you from feeling hungry, eat often but in smaller portions. · Eat only the number of calories you need to stay at a healthy weight. If you need to lose weight, eat fewer calories than your body burns (through exercise and other physical activity). Eat more fruits and vegetables  · Eat a variety of fruit and vegetables every day. Dark green, deep orange, red, or yellow fruits and vegetables are especially good for you. Examples include spinach, carrots, peaches, and berries. · Keep carrots, celery, and other veggies handy for snacks. Buy fruit that is in season and store it where you can see it so that you will be tempted to eat it. · Cook dishes that have a lot of veggies in them, such as stir-fries and soups.   Limit saturated and trans fat  · Read food labels, and try to avoid saturated and trans fats. They increase your risk of heart disease. · Use olive or canola oil when you cook. · Bake, broil, grill, or steam foods instead of frying them. · Choose lean meats instead of high-fat meats such as hot dogs and sausages. Cut off all visible fat when you prepare meat. · Eat fish, skinless poultry, and meat alternatives such as soy products instead of high-fat meats. Soy products, such as tofu, may be especially good for your heart. · Choose low-fat or fat-free milk and dairy products. Eat foods high in fiber  · Eat a variety of grain products every day. Include whole-grain foods that have lots of fiber and nutrients. Examples of whole-grain foods include oats, whole wheat bread, and brown rice. · Buy whole-grain breads and cereals, instead of white bread or pastries. Limit salt and sodium  · Limit how much salt and sodium you eat to help lower your blood pressure. · Taste food before you salt it. Add only a little salt when you think you need it. With time, your taste buds will adjust to less salt. · Eat fewer snack items, fast foods, and other high-salt, processed foods. Check food labels for the amount of sodium in packaged foods. · Choose low-sodium versions of canned goods (such as soups, vegetables, and beans). Limit sugar  · Limit drinks and foods with added sugar. These include candy, desserts, and soda pop. Limit alcohol  · Limit alcohol to no more than 2 drinks a day for men and 1 drink a day for women. Too much alcohol can cause health problems. When should you call for help? Watch closely for changes in your health, and be sure to contact your doctor if:    · You would like help planning heart-healthy meals. Where can you learn more? Go to http://www.[employer].com/  Enter V137 in the search box to learn more about \"Heart-Healthy Diet: Care Instructions. \"  Current as of: August 22, 2019               Content Version: 12.6  © 6191-1944 [employer]. Care instructions adapted under license by [employer] (which disclaims liability or warranty for this information). If you have questions about a medical condition or this instruction, always ask your healthcare professional. [address] any warranty or liability for your use of this information. High Cholesterol: Care Instructions  Your Care Instructions     Cholesterol is a type of fat in your blood. It is needed for many body functions, such as making new cells. Cholesterol is made by your body. It also comes from food you eat. High cholesterol means that you have too much of the fat in your blood. This raises your risk of a heart attack and stroke. LDL and HDL are part of your total cholesterol. LDL is the \"bad\" cholesterol. High LDL can raise your risk for heart disease, heart attack, and stroke. HDL is the \"good\" cholesterol. It helps clear bad cholesterol from the body. High HDL is linked with a lower risk of heart disease, heart attack, and stroke. Your cholesterol levels help your doctor find out your risk for having a heart attack or stroke. You and your doctor can talk about whether you need to lower your risk and what treatment is best for you. A heart-healthy lifestyle along with medicines can help lower your cholesterol and your risk. The way you choose to lower your risk will depend on how high your risk is for heart attack and stroke. It will also depend on how you feel about taking medicines. Follow-up care is a key part of your treatment and safety. Be sure to make and go to all appointments, and call your doctor if you are having problems. It's also a good idea to know your test results and keep a list of the medicines you take. How can you care for yourself at home? · Eat a variety of foods every day.  Good choices include fruits, vegetables, whole grains (like oatmeal), dried beans and peas, nuts and seeds, soy products (like tofu), and fat-free or low-fat dairy products. · Replace butter, margarine, and hydrogenated or partially hydrogenated oils with olive and canola oils. (Canola oil margarine without trans fat is fine.)  · Replace red meat with fish, poultry, and soy protein (like tofu). · Limit processed and packaged foods like chips, crackers, and cookies. · Bake, broil, or steam foods. Don't dennis them. · Be physically active. Get at least 30 minutes of exercise on most days of the week. Walking is a good choice. You also may want to do other activities, such as running, swimming, cycling, or playing tennis or team sports. · Stay at a healthy weight or lose weight by making the changes in eating and physical activity listed above. Losing just a small amount of weight, even 5 to 10 pounds, can reduce your risk for having a heart attack or stroke. · Do not smoke. When should you call for help? Watch closely for changes in your health, and be sure to contact your doctor if:    · You need help making lifestyle changes. · You have questions about your medicine. Where can you learn more? Go to http://www.gray.com/  Enter A824 in the search box to learn more about \"High Cholesterol: Care Instructions. \"  Current as of: December 16, 2019               Content Version: 12.6  © 3042-1744 [employer]. Care instructions adapted under license by [employer] (which disclaims liability or warranty for this information). If you have questions about a medical condition or this instruction, always ask your healthcare professional. [place] [address] any warranty or liability for your use of this information. Diarrhea: Care Instructions  Your Care Instructions     Diarrhea is loose, watery stools (bowel movements). The exact cause is often hard to find.  Sometimes diarrhea is your body's way of getting rid of what caused an upset stomach. Viruses, food poisoning, and many medicines can cause diarrhea. Some people get diarrhea in response to emotional stress, anxiety, or certain foods. Almost everyone has diarrhea now and then. It usually isn't serious, and your stools will return to normal soon. The important thing to do is replace the fluids you have lost, so you can prevent dehydration. The doctor has checked you carefully, but problems can develop later. If you notice any problems or new symptoms, get medical treatment right away. Follow-up care is a key part of your treatment and safety. Be sure to make and go to all appointments, and call your doctor if you are having problems. It's also a good idea to know your test results and keep a list of the medicines you take. How can you care for yourself at home? · Watch for signs of dehydration, which means your body has lost too much water. Dehydration is a serious condition and should be treated right away. Signs of dehydration are:  ? Increasing thirst and dry eyes and mouth. ? Feeling faint or lightheaded. ? A smaller amount of urine than normal.  · To prevent dehydration, drink plenty of fluids. Choose water and other clear liquids until you feel better. If you have kidney, heart, or liver disease and have to limit fluids, talk with your doctor before you increase the amount of fluids you drink. · When you feel like eating, start with small amounts of food. · The doctor may recommend that you take over-the-counter medicine, such as loperamide (Imodium), if you still have diarrhea after 6 hours. Read and follow all instructions on the label. Do not use this medicine if you have bloody diarrhea, a high fever, or other signs of serious illness. Call your doctor if you think you are having a problem with your medicine. When should you call for help? Call 911 anytime you think you may need emergency care. For example, call if:    · You passed out (lost consciousness).      · Your stools are maroon or very bloody. Call your doctor now or seek immediate medical care if:    · You are dizzy or lightheaded, or you feel like you may faint.     · Your stools are black and look like tar, or they have streaks of blood.     · You have new or worse belly pain.     · You have symptoms of dehydration, such as:  ? Dry eyes and a dry mouth. ? Passing only a little urine. ? Feeling thirstier than usual.     · You have a new or higher fever. Watch closely for changes in your health, and be sure to contact your doctor if:    · Your diarrhea is getting worse.     · You see pus in the diarrhea.     · You are not getting better after 2 days (48 hours). Where can you learn more? Go to http://www.gray.com/  Enter [id number] in the search box to learn more about \"Diarrhea: Care Instructions. \"  Current as of: July 1, 2021               Content Version: 13.0  © 2006-2021 [employer]. Care instructions adapted under license by [employer] (which disclaims liability or warranty for this information). If you have questions about a medical condition or this instruction, always ask your healthcare professional. [place] [address] any warranty or liability for your use of this information.

## 2021-12-19 PROBLEM — F41.9 ANXIETY: Status: ACTIVE | Noted: 2021-12-19

## 2021-12-19 PROBLEM — Z86.16 HISTORY OF 2019 NOVEL CORONAVIRUS DISEASE (COVID-19): Status: ACTIVE | Noted: 2021-10-10

## 2021-12-19 PROBLEM — U09.9 POST-ACUTE SEQUELAE OF COVID-19 (PASC): Status: ACTIVE | Noted: 2021-12-19

## 2021-12-19 NOTE — PROGRESS NOTES
Nhan Ayala is a 77 y.o. female who was seen by synchronous (real-time) audio-video technology on 12/15/2021. HPI:   Nhan Ayala is a 77y.o. year old female who presents today for a physical exam and post ED follow-up. 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 She also has a history of COVID-19 infection in 9/2021. She has completed the [address] 19 vaccine series, but did not obtain a third dose given her immunosuppressed status. She reports that she is doing reasonably well. She states that she sustained a fall 4 days ago after falling off a chair in her kitchen. She reports that she struck her right hip and her head during the fall but did not lose consciousness. She reported persistent right hip pain but no other improvement following the fall. However, on the way to a visit with Dr. Michael Bains yesterday, she states that she experienced acute onset vertigo while driving which was transient and resolved. She was advised by Dr. Michael Bains to present to [facility] ED for evaluation and underwent head CT scan, right hip x-ray, sacral/coccyx x-ray, and EKG which were unremarkable. She reports no further issues today and states that her right hip pain is gradually improving. She states that she has experienced resolution of all of her symptoms related to her recent COVID-19 infection except for altered taste and smell. She is otherwise without new complaints. Summary of prior hospitalizations and medical history:  On 9/29/2021, she contacted the office and reports that she had tested positive by rapid testing for COVID-19 on 9/28/2021 after developing symptoms 1 day prior of fever to 100.6°F, headache, sore throat, and nasal congestion with cough.  She stated that she also lost her sense of taste and smell. She was taking Tylenol  mg every 6 hours and ibuprofen with persistent fever. Given her immunosuppressed status and high risk of developing severe disease, she was advised to present to the [facility] ED and received bamlanivimab-etesevimab monoclonal antibody infusion without complications. She reports continued altered taste and smell, but resolution of all other symptoms. On 10/21/2019, she presented to the [facility] ED reporting difficulty with elevated blood pressure and a dull headache. She stated that her blood pressure has been elevated earlier in the week when visiting her gastroenterologist. On the day of presentation to the ED, she states that she was experiencing an achy dull headache that was mild in severity throughout the day. She became concerned when she went to bed and noticed throbbing pain in her hands and feet. She checked her blood pressure and noticed her systolic ranged from 600-546 mmHg. She had taken her metoprolol tartrate 50 mg bid as prescribed. She denied any shortness of breath, leg swelling, orthopnea, PND, visual changes, nausea/vomiting, lightheadedness, dizziness, confusion, speech difficulty, numbness, or weakness. She did admit to some chest tightness, but stated that it was not exertional and was her usual discomfort associated with reflux. Evaluation in the ED revealed that her blood pressure was 144/73. EKG showed sinus rhythm at 64 bpm and LBBB. Laboratory data included WBC 6.2, Hb 11.8/ Hct 36.9, creatinine 0.79/eGFR >60, troponin x 1 negative, and chest x-ray negative. While in the ED, her headache resolved and she was subsequently discharged. She was seen in follow-up on 10/30/2019 and described episodes with head fullness, flushing, and  \"prickly skin\" occurring in the evening after lying down.  She stated that she did notice one episode where her heart rate appeared to be 150 bpm. She also reported that she had been under an increased amount of stress with regard to several of her family members, and felt that the episodes may be secondary to anxiety. Her dose of Zoloft was increased to 50 mg daily, and she reported that the episodes of palpitations and head fullness resolved with the increase in dose of Zoloft. She has a history of hypertension and palpitations, treated currently with metoprolol. She has a history of dyslipidemia, not currently being treated with medication. She was evaluated by Dr. Lolly Ferreira in 2012 for palpitations. Event monitor was placed, and showed a known LBBB and PVC's, but reportedly no other arrhythmias (report unavailable). In 9/2012, she also underwent a pharmacologic nuclear stress test which was a normal low risk study with EF 55%. She also had an echocardiogram, which showed low normal LV function (EF 50%), mild grade 1 diastolic dysfunction, trace MR, TR, and AI. She also has a history of fibromuscular dysplasia, and in 5/2009, underwent aortogram and renal arteriogram with balloon angioplasty. She has a known right carotid bruit, and her last carotid duplex (5/2014) showed mild atherosclerotic intraluminal plaquing with elevated velocities in the bilateral mid internal carotid arteries with a \"string of pearls\" appearance consistent with known fibromuscular dysplasia, causing a 50-69% stenosis of the bilateral internal carotid arteries. There was no change from prior study in 2011. She had been followed by Dr. Javy Valladares, but states that she has been released from his care. She remains quite active, and denies any chest pain, shortness of breath at rest or with exertion, palpitations, lightheadedness, or edema. She underwent a repeat carotid duplex study (8/15/2018) showing mild (<50%) stenosis of the left internal carotid artery and moderate (50-69%) stenosis of the right internal carotid artery.  Repeat performed 12/17/2019 showed moderate homogenous plaque in right ICA (50 to 69% stenosis). Stenosis is at the mid ICA and may be upper limits of moderate; mild heterogeneous plaque left ICA (< 50% stenosis). She has a history of rheumatoid arthritis, diagnosed when she was 37years old, treated currently with subcutaneous methotrexate and hydroxychloroquine. She previously was also treated with prednisone, but has been able to successfully wean from taking it. She is being followed closely by Dr. Carli Cote. She is not currently being treated with a biologic agent due to concern regarding recent multiple squamous cell carcinomas of the skin, for which she was followed by Dr. Efrem Hargrove and now by Dr. Rosanne Torrez. She reports that she was well controlled on Humira for several years, but had to discontinue treatment when it was no longer on her insurance formulary. She also has a history of osteopenia, with bone density studies performed by Dr. Carli Cote. Her last bone density study was in 2/2019 showing T-scores:  femoral neck left -1.7  /right -1.9 and lumbar -2.1. She continues to take calcium and Vitamin D supplements. She has no history of pathologic fractures. She was recently diagnosed with familial hypocalciuric hypercalcemia with mildly elevated calcium 10.6, mildly elevated PTH 80, Vitamin D level 36.5, and 24 hour urine calcium of 105 (FeCa 1%) in 7/30/2018, which would be consistent with this diagnosis. She is being followed by Dr. Nydia Ramsay. She has a history of cervical degenerative joint and disc disease and chronic neck pain. She was being followed by Dr. Kayla York, who recommended surgery. She was seen by Dr. Nick Dixon at [facility] for a second opinion, and cervical MRI (9/2015) showed multilevel degenerative disc and facet disease, worst at C5-C6; left central/subarticular disc protrusion with superimposed annular fissure noted at C5-C6 causing mass effect upon the left hemicord but no signal changes with severe left neuroforaminal narrowing at this level.  Recommendation was for her to proceed with physical therapy, which did result in some improvement. She previously had been treated with gabapentin for pain control, but is currently only using Tylenol. She underwent acupuncture therapy with Dr. Jason Peterson with only minimal improvement. She also states that she was referred to Dr. Loc Damon for evaluation, but did not schedule since symptoms have gradually improved. She has a history of GERD. In 11/2017, she was having increasing difficulty with constipation alternating with diarrhea as well as increasing reflux symptoms. She states that she was evaluated by Dr. Bob Horton and it was her opinion that she suffered from irritable bowel syndrome. She has made changes in her diet and increased her consumption of fiber with some improvement. She began experiencing worsening reflux symptoms in 12/2018 after attempting to wean omeprazole from 40 mg to 20 mg daily. She also was taking an increased dose of prednisone at the time for a flare of her RA. She was evaluated by Dr. Bob Horton and attempted a trial of Protonix which she believed may be working better. She plans to continue taking it. She states that at the time of her worsening reflux, she also noted increasing palpitations and underwent evaluation by THOMAS Craft for Dr. Suleiman King. She states that she was offered an event recorder to evaluate, but she noticed that her symptoms improved once her GERD was under control. She therefore declined further evaluation. She states that she has resumed taking omeprazole with reasonable control of her reflux symptoms. She underwent upper endoscopy by Dr. Marcelina Sandoval in 11/2012 which showed a 2 cm hiatal hernia and pathology from a distal esophageal biopsy showing chronic inflammation. She had a screening colonoscopy in 7/14/2016 by Dr. Marcelina Sandoval, which showed an adenomatous polyp (report unavailable).  She had a repeat screening colonoscopy on 7/27/2021 by Dr. Pearl Btaes showing mild diverticulosis in the sigmoid colon, two sessile 5-6 mm polyps in the cecum (pathology: tubular adenoma), two sessile 5-7 mm polyps in the ascending colon (pathology: tubular adenoma), and one sessile 1 cm polyp in the transverse colon (polyp not retrieved). Follow-up recommended for 3 years. She denies any abdominal pain, nausea, vomiting, melena, hematochezia, or change in bowel movements. Past Medical History:   Diagnosis Date    Bowen's disease     Fibromuscular dysplasia (HCC)     GERD (gastroesophageal reflux disease)     Hiatal hernia     [facility]parathyroidism ([facility] [address]     Hypertension     LBBB (left bundle branch block)     Premature ventricular contractions     benign    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 (HCC)     s/p balloon angioplasty 5/2009 [facility]    Rheumatoid arthritis ([facility] [address]     Skin cancer      Past Surgical History:   Procedure Laterality Date    COLONOSCOPY N/A 7/27/2021    COLONOSCOPY with polypectomies with bx's performed by Vladimir Fong MD at [facility] ENDOSCOPY    HX ANGIOPLASTY      HX COLONOSCOPY      HX KNEE REPLACEMENT Bilateral 2009    partial     HX OTHER SURGICAL      removal of fibroid benign tumors from breast     HX PARTIAL HYSTERECTOMY      HX TONSILLECTOMY  1964     Current Outpatient Medications   Medication Sig    Psyllium Husk-Sucrose 3.4 gram/7 gram powd Take  by mouth.  sertraline (ZOLOFT) 50 mg tablet take 1 tablet by mouth once daily    aspirin 81 mg chewable tablet Take 81 mg by mouth. Every three days    hydrOXYchloroQUINE (PLAQUENIL) 200 mg tablet Take 200 mg by mouth daily.  metoprolol tartrate (LOPRESSOR) 50 mg tablet Take 1 Tab by mouth two (2) times a day.  METHOTREXATE SODIUM INJECTION 17.5 mg every seven (7) days.  omeprazole (PRILOSEC) 40 mg capsule Take 40 mg by mouth every other day.  multivitamin (ONE A DAY) tablet Take 1 Tab by Mouth Once a Day.  acetaminophen (TYLENOL) 500 mg tablet Take 500 mg by mouth every six (6) hours as needed.     cholecalciferol (VITAMIN D3) 400 unit tab tablet Take 4,000 Units by mouth.  folic acid (FOLVITE) 1 mg tablet Take 1 mg by mouth daily. No current facility-administered medications for this visit. Allergies and Intolerances: Allergies   Allergen Reactions    Hydrocodone Anaphylaxis    Nsaids (Non-Steroidal Anti-Inflammatory Drug) Other (comments)     None due to kidneys    Azithromycin Itching    Other Medication Other (comments)     GI intolerance to nonsteroidal antiinflammatory medications     Vicodin [Hydrocodone-Acetaminophen] Other (comments)     Family History: Her father passed away from a ruptured AAA. Her mother has osteoarthritis and COPD, and a maternal aunt had RA. Her brother recently passed away from multiple sclerosis. She has no family history of colon or breast cancer. Family History   Problem Relation Age of Onset    OSTEOARTHRITIS Mother     Elevated Lipids Mother     Heart Disease Mother         CHF    Lung Disease Mother     Hypertension Sister     Lung Disease Sister     Hypertension Brother     Mult Sclerosis Brother      Social History:   She  reports that she has never smoked. She has never used smokeless tobacco. She is  with two children and one stepchild. 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 She drinks wine only occasionally.      Social History     Substance and Sexual Activity   Alcohol Use Yes    Alcohol/week: 1.0 - 2.0 standard drink    Types: 1 - 2 Glasses of wine per week     Immunization History:  Immunization History   Administered Date(s) Administered    COVID-19, Moderna, Primary or Immunocompromised Series, MRNA, PF, 100mcg/0.5mL 02/24/2021, 03/24/2021    Influenza High Dose Vaccine PF 10/29/2018    Influenza Vaccine 10/29/2013    Influenza Vaccine (Tri) Adjuvanted (>65 Yrs FLUAD TRI 77380) 12/11/2019    Influenza, High-dose, Quadrivalent (>65 Yrs Fluzone High Dose Quad 31965) 11/18/2021    Pneumococcal Conjugate (PCV-13) 12/14/2015    Pneumococcal Polysaccharide (PPSV-23) 11/15/2018    Tdap 10/02/2019       Review of Systems:   As above included in HPI. Otherwise 11 point review of systems negative including constitutional, skin, HENT, eyes, respiratory, cardiovascular, gastrointestinal, genitourinary, musculoskeletal, endocrine, hematologic, allergy, and neurologic. Physical:   There were no vitals taken for this visit.        Constitutional: [x] Appears well-developed and well-nourished [x] No apparent distress      [] Abnormal -     Mental status: [x] Alert and awake  [x] Oriented to person/place/time [x] Able to follow commands    [] Abnormal -     Eyes:   EOM    [x]  Normal    [] Abnormal -   Sclera  [x]  Normal    [] Abnormal -          Discharge [x]  None visible   [] Abnormal -     HENT: [x] Normocephalic, atraumatic  [] Abnormal -   [x] Mouth/Throat: Mucous membranes are moist    External Ears [x] Normal  [] Abnormal -    Neck: [x] No visualized mass [] Abnormal -     Pulmonary/Chest: [x] Respiratory effort normal   [x] No visualized signs of difficulty breathing or respiratory distress        [] Abnormal -      Musculoskeletal:   [x] Normal gait with no signs of ataxia         [x] Normal range of motion of neck        [] Abnormal -     Neurological:        [x] No Facial Asymmetry (Cranial nerve 7 motor function) (limited exam due to video visit)          [x] No gaze palsy        [] Abnormal -          Skin:        [x] No significant exanthematous lesions or discoloration noted on facial skin         [] Abnormal -            Psychiatric:       [x] Normal Affect [] Abnormal -        [x] No Hallucinations    Other pertinent observable physical exam findings:-              Review of Data:  Labs:  Hospital Outpatient Visit on 12/06/2021   Component Date Value Ref Range Status    WBC 12/06/2021 5.0  4.6 - 13.2 K/uL Final    RBC 12/06/2021 3.73* 4.20 - 5.30 M/uL Final    HGB 12/06/2021 11.1* 12.0 - 16.0 g/dL Final    HCT 12/06/2021 36.1  35.0 - 45.0 % Final    MCV 12/06/2021 96.8  78.0 - 100.0 FL Final    MCH 12/06/2021 29.8  24.0 - 34.0 PG Final    MCHC 12/06/2021 30.7* 31.0 - 37.0 g/dL Final    RDW 12/06/2021 13.9  11.6 - 14.5 % Final    PLATELET 92/32/6888 781  135 - 420 K/uL Final    MPV 12/06/2021 10.9  9.2 - 11.8 FL Final    NRBC 12/06/2021 0.0  0  WBC Final    ABSOLUTE NRBC 12/06/2021 0.00  0.00 - 0.01 K/uL Final    NEUTROPHILS 12/06/2021 56  40 - 73 % Final    LYMPHOCYTES 12/06/2021 33  21 - 52 % Final    MONOCYTES 12/06/2021 9  3 - 10 % Final    EOSINOPHILS 12/06/2021 1  0 - 5 % Final    BASOPHILS 12/06/2021 1  0 - 2 % Final    IMMATURE GRANULOCYTES 12/06/2021 0  0.0 - 0.5 % Final    ABS. NEUTROPHILS 12/06/2021 2.8  1.8 - 8.0 K/UL Final    ABS. LYMPHOCYTES 12/06/2021 1.6  0.9 - 3.6 K/UL Final    ABS. MONOCYTES 12/06/2021 0.5  0.05 - 1.2 K/UL Final    ABS. EOSINOPHILS 12/06/2021 0.0  0.0 - 0.4 K/UL Final    ABS. BASOPHILS 12/06/2021 0.0  0.0 - 0.1 K/UL Final    ABS. IMM. GRANS.  12/06/2021 0.0  0.00 - 0.04 K/UL Final    DF 12/06/2021 AUTOMATED    Final    LIPID PROFILE 12/06/2021        Final    Cholesterol, total 12/06/2021 199  <200 MG/DL Final    Triglyceride 12/06/2021 81  <150 MG/DL Final    HDL Cholesterol 12/06/2021 88* 40 - 60 MG/DL Final    LDL, calculated 12/06/2021 94.8  0 - 100 MG/DL Final    VLDL, calculated 12/06/2021 16.2  MG/DL Final    CHOL/HDL Ratio 12/06/2021 2.3  0 - 5.0   Final    Magnesium 12/06/2021 2.2  1.6 - 2.6 mg/dL Final    Sodium 12/06/2021 140  136 - 145 mmol/L Final    Potassium 12/06/2021 4.0  3.5 - 5.5 mmol/L Final    Chloride 12/06/2021 106  100 - 111 mmol/L Final    CO2 12/06/2021 32  21 - 32 mmol/L Final    Anion gap 12/06/2021 2* 3.0 - 18 mmol/L Final    Glucose 12/06/2021 74  74 - 99 mg/dL Final    BUN 12/06/2021 13  7.0 - 18 MG/DL Final    Creatinine 12/06/2021 0.80  0.6 - 1.3 MG/DL Final    BUN/Creatinine ratio 12/06/2021 16  12 - 20   Final    GFR est AA 12/06/2021 >60  >60 ml/min/1.73m2 Final    GFR est non-AA 12/06/2021 >60  >60 ml/min/1.73m2 Final    Calcium 12/06/2021 10.2* 8.5 - 10.1 MG/DL Final    Bilirubin, total 12/06/2021 0.5  0.2 - 1.0 MG/DL Final    ALT (SGPT) 12/06/2021 24  13 - 56 U/L Final    AST (SGOT) 12/06/2021 24  10 - 38 U/L Final    Alk. phosphatase 12/06/2021 75  45 - 117 U/L Final    Protein, total 12/06/2021 7.3  6.4 - 8.2 g/dL Final    Albumin 12/06/2021 4.1  3.4 - 5.0 g/dL Final    Globulin 12/06/2021 3.2  2.0 - 4.0 g/dL Final    A-G Ratio 12/06/2021 1.3  0.8 - 1.7   Final    TSH 12/06/2021 2.61  0.36 - 3.74 uIU/mL Final    Vitamin D 25-Hydroxy 12/06/2021 68.5  30 - 100 ng/mL Final    Color 12/06/2021 YELLOW    Final    Appearance 12/06/2021 CLERICAL ERROR    Final    Specific gravity 12/06/2021 1.020  1.005 - 1.030   Final    pH (UA) 12/06/2021 6.5  5.0 - 8.0   Final    Protein 12/06/2021 Negative  NEG mg/dL Final    Glucose 12/06/2021 Negative  NEG mg/dL Final    Ketone 12/06/2021 Negative  NEG mg/dL Final    Bilirubin 12/06/2021 Negative  NEG   Final    Blood 12/06/2021 Negative  NEG   Final    Urobilinogen 12/06/2021 0.2  0.2 - 1.0 EU/dL Final    Nitrites 12/06/2021 Negative  NEG   Final    Leukocyte Esterase 12/06/2021 SMALL* NEG   Final    WBC 12/06/2021 0 to 3  0 - 5 /hpf Final    RBC 12/06/2021 Negative  0 - 5 /hpf Final    Epithelial cells 12/06/2021 1+  0 - 5 /lpf Final    Bacteria 12/06/2021 Negative  NEG /hpf Final         Health Maintenance:  Screening:    Mammogram: negative (10/2021) [facility]   PAP smear: well woman exams performed at [facility] by KIM Merchant (last 10/2021)   Colorectal: colonoscopy (7/2021) tubular adenomas. Dr. Madeline Neal. Due 7/2024. Depression: on Zoloft   DM (HbA1c/FPG): FPG 74 (12/2021)   Hepatitis C: negative (1/2/2018) Dr. Ofe Emerson. Falls: none   DEXA: osteopenia (last 3/2021). Performed by Dr. Ofe Emerson.    Glaucoma: regular eye exams with  [facility] (last 12/2021 ) every 6 months   Smoking: none   Vitamin D: 68.5 (12/2021)    Medicare Wellness: 6/10/2021      Impression:  Patient Active Problem List   Diagnosis Code    Displacement of cervical intervertebral disc without myelopathy M50.20    Cervical spondylosis without myelopathy M47.812    Brachial neuritis or radiculitis M54.12    Radiculopathy, cervical M54.12    History of nonmelanoma skin cancer Z85.828    Rheumatoid arthritis ([facility] [address] M06.9    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 (HCC) I70.1, I77.3    LBBB (left bundle branch block) I44.7    Hypertension I10    Hyperparathyroidism ([facility] [address] E21.3    Hiatal hernia K44.9    GERD (gastroesophageal reflux disease) K21.9    Fibromuscular dysplasia (HCC) I77.3    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    Irritable bowel syndrome with both constipation and diarrhea K58.2    Bilateral carotid bruits R09.89    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    Hyperlipidemia E78.5    Osteopenia of multiple sites M85.89    Vitamin D deficiency E55.9    Carotid stenosis, bilateral I65.23    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    Anemia D64.9    Palpitations R00.2    History of 2019 novel coronavirus disease (COVID-19) Z86.16    Immunosuppressed status (HCC) D84.9    Anxiety F41.9    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 Fall, subsequent encounter. Patient reports that she sustained a fall 4 days ago after falling off a chair in her kitchen. She reports that she struck her right hip and her head during the fall but did not lose consciousness. Yesterday, on the way to a visit with Dr. Sanna Jama, she experienced acute onset vertigo which was transient and resolved. Presented to [facility] ED for evaluation and underwent head CT scan, right hip x-ray, sacral/coccyx x-ray, and EKG which were unremarkable. Reports no further issues today and right hip pain gradually improving. Fall precautions stressed.   2. Hypertension. Blood pressure has been well controlled on current regimen of metoprolol tartrate 75 mg bid. Also noting improvement in palpitations. Renal function remains normal with creatinine 0.80/ eGFR >60 in 6/2021. Will continue to follow. 3. Fibromuscular dysplasia. Previous angioplasty of renal arteries. History of moderate stenosis of bilateral carotid arteries on carotid duplex in 5/2014. Noted prominent carotid bruits on physical exam, and repeat carotid duplex obtained in 8/2018 showing moderate (50-69%) stenosis of the right ICA and mild (<50%) of the left ICA. Review of report also showed mild atherosclerosis at that time. Previously followed by Dr. Eloisa Ohara of [facility] but given stability over many years, released from his care with plan to pursue further evaluation only if becomes symptomatic. Repeat carotid duplex scan obtained 12/17/2019 showed persistence of moderate stenosis of right ICA. On aspirin 81 mg every third day. Did not tolerate atorvastatin due to elevated LFT's so discontinued. Will continue to monitor. 4. Hyperlipidemia. Calculated 10 year ASCVD risk improved to 16.6 %, which places her in one of the four statin benefit groups as per new AHA/ACC guidelines (primary prevention: 10 year ASCVD risk >7.5%). In addition, mild atherosclerosis in the setting of fibromuscular dysplasia described on prior carotid duplex and moderate stenosis of right ICA reported. At increased risk due to chronic inflammatory state related to rheumatoid arthritis. Started on atorvastatin 10 mg daily in 12/2019, but developed increase in transaminases with AST 46 and ALT 64 (1/16/2020), and repeat ALT 85 and AST 66 (2/5/2020). Atorvastatin discontinued and LFT's normalized. Evaluated by Dr. Nicol Kerns and lab evaluation/ RUQ ultrasound normal in 3/2020. Presumed secondary to statin in the setting of long term methotrexate therapy for RA.   Continued improvement today with dietary changes with LDL 94 and HDL 88. Will continue to monitor. 5. Rheumatoid arthritis. On SC weekly methotrexate and hydroxychloroquine 200 mg daily. Reports that restarted prednisone due to increasing hand and joint pains and has now again weaned to 5 mg daily. Continuing to discuss with Dr. Kelley Baldwin treatment with a biologic for better control of joint inflammation. However, patient remains hesitant due to skin precancerous lesions. Being followed by Dr. Kelley Baldwin. 6. History of COVID-19 infection with PASC. Patient completed the Moderna COVID-19 vaccine series, but did not receive a third dose given her immunosuppressed status. On 9/27/2021, she noted the onset of fever, sore throat, nasal congestion, headache, and cough, and underwent rapid testing on 9/28/2021 which was positive for SARS-CoV-2. She had attended an indoor class reunion although she did wear a mask during the entire event. She contacted the office on 9/29/2021 and received bamlanivimab-etesevimab monoclonal antibody infusion at [facility]. She reports today resolution of symptoms except continuing to experience altered taste and smell. Advised that she may proceed with third dose of Moderna COVID-19 vaccine after 90 days following infusion of monoclonal antibody (after 12/29/2021). Will continue to follow. 7. Cervical degenerative disease. Experiences chronic neck pain, treated previously with gabapentin. Some improvement with exercising. Attempted accupuncture therapy by Dr. Sola Lujan, but after 3 months, no significant benefit seen. Previously followed by Dr. Deejay Doan, but referred to Dr. Dav Burr but never scheduled. Feels that overall improved and not felt to be a significant issue. 7. Osteopenia. Last bone density scan in 2/2019. Using femoral neck T-scores, calculated FRAX score estimates her 10 year risk of a major osteoporetic fracture at 13 % and hip fracture at 3.1 %, which are an indication for biphosphonate treatment with hip fracture risk >3%.  Being performed and followed by Dr. Shira Lim. She reports that she had a repeat bone density study in 3/2021 which showed worsening osteopenia, and states that Dr. Shira Lim is recommending treatment with Reclast. However, she is hesitant to begin. Continue calcium and Vitamin D. Encouraged exercise, particularly weight bearing activities. Fall precautions stressed. 8. Hyperparathyroidism. Mild increase in PTH, mildly elevated calcium, normal Vitamin D level,and 24 hour urine calcium only 105 (FeCa 1%) consistent with a diagnosis of familial hypocalciuric hypercalcemia. Being followed by Dr. Jose Mayer every 2 years given benign condition. Advised to drink plenty of fluids. Calcium mildly elevated today at 10.2. Will continue to monitor. 9. GERD. Resumed omeprazole and taking every other day with good response. Symptoms of diarrhea alternating with constipation chronically thought to be secondary to irritable bowel syndrome. On probiotic. Reports increasing difficulty with diarrhea and advised that she may take Imodium as needed to control. 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 Following with Dr. Ledy Solis. Recommending topical treatment with Efudex. Continues to be hesitant to begin treatment with a biologic for her RA given her extensive premalignant skin lesions. 11. Mild anemia. Present intermittently. B12 and folate levels normal in 12/2019. Iron studies and gammopathy panel normal in 5/2020. Stable and likely secondary to methotrexate. Will continue to monitor. 12.  Anxiety. Patient reports doing well on sertraline 50 mg daily. Discussed management of some difficulty with insomnia, and advised that may begin melatonin 3 mg nightly as needed. 13. Mouth pain. Reported intermittent difficulty and evaluated by Dr. Beto Davenport of ENT in the past with no etiology found.  Differential diagnosis would include herpes simplex, aphthous stomatitis, xerostomia, or nutritional deficiencies (B12, B6, folate, Zinc, iron), or candidiasis. 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 Tricyclics, pregabalin, gabapentin, and Mirapex have shown some benefit. Also has found some benefit with Mycelex troches in the past. Advised to take a multivitamin. Reports no recent recurrence. 14. Health maintenance. Completed Moderna COVID-19 vaccine series, but did not obtain third dose despite immunosuppressed status. Advised to delay until 12/29/2021 given administration of monoclonal antibody. Has not yet completed Shingrix vaccine but is planning to obtain. Other immunizations up-to-date. Mammogram and well women exams performed at [place] and up to date. Colonoscopy completed and repeat due in 3 years. Continue regular eye exams with Dr. Ran Palm. Vitamin D level has been normal on 4000 U daily. Following with Dr. Denilson Blankenship for precancerous skin lesions. Medicare wellness visit up to date. Patient understands recommendations and agrees with plan. Follow-up in 6 months. We discussed the expected course, resolution and complications of the diagnosis(es) in detail. Medication risks, benefits, costs, interactions, and alternatives were discussed as indicated. I advised her to contact the office if her condition worsens, changes or fails to improve as anticipated. She expressed understanding with the diagnosis(es) and plan. Ermias Marcomag, was evaluated through a synchronous (real-time) audio-video encounter. The patient (or guardian if applicable) is aware that this is a billable service. Verbal consent to proceed has been obtained within the past 12 months. The visit was conducted pursuant to the emergency declaration under the [address] [place] [address], [address] [place] [address] authority and the [employer] General Act. Patient identification was verified, and a caregiver was present when appropriate.  The patient was located in a state where the provider was credentialed to provide care.     Anai Smith MD

## 2021-12-29 ENCOUNTER — TELEPHONE (OUTPATIENT)
Dept: INTERNAL MEDICINE CLINIC | Age: 76
End: 2021-12-29

## 2021-12-29 NOTE — TELEPHONE ENCOUNTER
Pt calling says she is trying to schedule her booster and they are asking if she needs the booster or the additional dose for immune compromise. What does she need?

## 2021-12-29 NOTE — TELEPHONE ENCOUNTER
She should get full dose Moderna vaccine since she is immunocompromised and not the booster. The booster will be given in 6 months after her 3rd dose.

## 2022-01-24 ENCOUNTER — TELEPHONE (OUTPATIENT)
Dept: INTERNAL MEDICINE CLINIC | Age: 77
End: 2022-01-24

## 2022-01-24 NOTE — TELEPHONE ENCOUNTER
[place] with Dr Julieta Carreno office calling to have last labs faxed over to her at [phone].

## 2022-03-18 PROBLEM — M85.89 OSTEOPENIA OF MULTIPLE SITES: Status: ACTIVE | Noted: 2018-08-12

## 2022-03-18 PROBLEM — K14.6 BURNING MOUTH SYNDROME: Status: ACTIVE | Noted: 2019-06-11

## 2022-03-19 PROBLEM — Z86.16 HISTORY OF 2019 NOVEL CORONAVIRUS DISEASE (COVID-19): Status: ACTIVE | Noted: 2021-10-10

## 2022-03-19 PROBLEM — D84.9 IMMUNOSUPPRESSED STATUS (HCC): Status: ACTIVE | Noted: 2021-10-10

## 2022-03-19 PROBLEM — E55.9 VITAMIN D DEFICIENCY: Status: ACTIVE | Noted: 2018-08-12

## 2022-03-19 PROBLEM — R09.89 BILATERAL CAROTID BRUITS: Status: ACTIVE | Noted: 2018-08-09

## 2022-03-19 PROBLEM — U09.9 POST-ACUTE SEQUELAE OF COVID-19 (PASC): Status: ACTIVE | Noted: 2021-12-19

## 2022-03-19 PROBLEM — E83.52 FHH (FAMILIAL HYPOCALCIURIC HYPERCALCEMIA): Status: ACTIVE | Noted: 2018-08-12

## 2022-03-20 PROBLEM — E78.5 HYPERLIPIDEMIA: Status: ACTIVE | Noted: 2018-08-12

## 2022-03-20 PROBLEM — K58.2 IRRITABLE BOWEL SYNDROME WITH BOTH CONSTIPATION AND DIARRHEA: Status: ACTIVE | Noted: 2018-05-07

## 2022-03-20 PROBLEM — R00.2 PALPITATIONS: Status: ACTIVE | Noted: 2020-12-07

## 2022-03-20 PROBLEM — D64.9 ANEMIA: Status: ACTIVE | Noted: 2020-02-09

## 2022-03-20 PROBLEM — F41.9 ANXIETY: Status: ACTIVE | Noted: 2021-12-19

## 2022-03-20 PROBLEM — I65.23 CAROTID STENOSIS, BILATERAL: Status: ACTIVE | Noted: 2018-11-19

## 2022-03-28 RX ORDER — SERTRALINE HYDROCHLORIDE 50 MG/1
TABLET, FILM COATED ORAL
Qty: 90 TABLET | Refills: 2 | Status: SHIPPED | OUTPATIENT
Start: 2022-03-28

## 2022-04-05 ENCOUNTER — PATIENT MESSAGE (OUTPATIENT)
Dept: INTERNAL MEDICINE CLINIC | Age: 77
End: 2022-04-05

## 2022-04-06 NOTE — TELEPHONE ENCOUNTER
Called and spoke with patient. Reports that she did receive a call back from I with plan to obtain labs, stool cultures, and use liquid Imodium. Answered all questions.

## 2022-06-15 ENCOUNTER — HOSPITAL ENCOUNTER (OUTPATIENT)
Dept: LAB | Age: 77
Discharge: HOME | End: 2022-06-15
Payer: MEDICARE

## 2022-06-15 ENCOUNTER — APPOINTMENT (OUTPATIENT)
Dept: INTERNAL MEDICINE CLINIC | Age: 77
End: 2022-06-15

## 2022-06-15 DIAGNOSIS — I70.1 RENAL ARTERY STENOSIS DUE TO FIBROMUSCULAR DYSPLASIA (HCC): ICD-10-CM

## 2022-06-15 DIAGNOSIS — E55.9 VITAMIN D DEFICIENCY: ICD-10-CM

## 2022-06-15 DIAGNOSIS — I77.3 FIBROMUSCULAR DYSPLASIA (HCC): ICD-10-CM

## 2022-06-15 DIAGNOSIS — I10 PRIMARY HYPERTENSION: ICD-10-CM

## 2022-06-15 DIAGNOSIS — E78.5 HYPERLIPIDEMIA, UNSPECIFIED HYPERLIPIDEMIA TYPE: ICD-10-CM

## 2022-06-15 DIAGNOSIS — M05.79 RHEUMATOID ARTHRITIS INVOLVING MULTIPLE SITES WITH POSITIVE RHEUMATOID FACTOR (HCC): ICD-10-CM

## 2022-06-15 DIAGNOSIS — D64.9 ANEMIA, UNSPECIFIED TYPE: ICD-10-CM

## 2022-06-15 DIAGNOSIS — I77.3 RENAL ARTERY STENOSIS DUE TO FIBROMUSCULAR DYSPLASIA (HCC): ICD-10-CM

## 2022-06-15 DIAGNOSIS — R00.2 PALPITATIONS: ICD-10-CM

## 2022-06-15 DIAGNOSIS — M85.89 OSTEOPENIA OF MULTIPLE SITES: ICD-10-CM

## 2022-06-15 LAB
25(OH)D3 SERPL-MCNC: 69.2 NG/ML (ref 30–100)
ALBUMIN SERPL-MCNC: 3.9 G/DL (ref 3.4–5)
ALBUMIN/GLOB SERPL: 1.3 {RATIO} (ref 0.8–1.7)
ALP SERPL-CCNC: 67 U/L (ref 45–117)
ALT SERPL-CCNC: 24 U/L (ref 13–56)
ANION GAP SERPL CALC-SCNC: 3 MMOL/L (ref 3–18)
AST SERPL-CCNC: 29 U/L (ref 10–38)
BASOPHILS # BLD: 0 K/UL (ref 0–0.1)
BASOPHILS NFR BLD: 1 % (ref 0–2)
BILIRUB SERPL-MCNC: 0.5 MG/DL (ref 0.2–1)
BUN SERPL-MCNC: 14 MG/DL (ref 7–18)
BUN/CREAT SERPL: 18 (ref 12–20)
CALCIUM SERPL-MCNC: 10.3 MG/DL (ref 8.5–10.1)
CHLORIDE SERPL-SCNC: 106 MMOL/L (ref 100–111)
CHOLEST SERPL-MCNC: 176 MG/DL
CO2 SERPL-SCNC: 31 MMOL/L (ref 21–32)
CREAT SERPL-MCNC: 0.78 MG/DL (ref 0.6–1.3)
DIFFERENTIAL METHOD BLD: ABNORMAL
EOSINOPHIL # BLD: 0.1 K/UL (ref 0–0.4)
EOSINOPHIL NFR BLD: 3 % (ref 0–5)
ERYTHROCYTE [DISTWIDTH] IN BLOOD BY AUTOMATED COUNT: 13.1 % (ref 11.6–14.5)
GLOBULIN SER CALC-MCNC: 2.9 G/DL (ref 2–4)
GLUCOSE SERPL-MCNC: 74 MG/DL (ref 74–99)
HCT VFR BLD AUTO: 37.1 % (ref 35–45)
HDLC SERPL-MCNC: 72 MG/DL (ref 40–60)
HDLC SERPL: 2.4 {RATIO} (ref 0–5)
HGB BLD-MCNC: 11.4 G/DL (ref 12–16)
IMM GRANULOCYTES # BLD AUTO: 0 K/UL (ref 0–0.04)
IMM GRANULOCYTES NFR BLD AUTO: 0 % (ref 0–0.5)
LDLC SERPL CALC-MCNC: 84.6 MG/DL (ref 0–100)
LIPID PROFILE,FLP: ABNORMAL
LYMPHOCYTES # BLD: 1 K/UL (ref 0.9–3.6)
LYMPHOCYTES NFR BLD: 28 % (ref 21–52)
MAGNESIUM SERPL-MCNC: 2.1 MG/DL (ref 1.6–2.6)
MCH RBC QN AUTO: 29.3 PG (ref 24–34)
MCHC RBC AUTO-ENTMCNC: 30.7 G/DL (ref 31–37)
MCV RBC AUTO: 95.4 FL (ref 78–100)
MONOCYTES # BLD: 0.5 K/UL (ref 0.05–1.2)
MONOCYTES NFR BLD: 12 % (ref 3–10)
NEUTS SEG # BLD: 2.1 K/UL (ref 1.8–8)
NEUTS SEG NFR BLD: 56 % (ref 40–73)
NRBC # BLD: 0 K/UL (ref 0–0.01)
NRBC BLD-RTO: 0 PER 100 WBC
PLATELET # BLD AUTO: 253 K/UL (ref 135–420)
PMV BLD AUTO: 10.8 FL (ref 9.2–11.8)
POTASSIUM SERPL-SCNC: 4.3 MMOL/L (ref 3.5–5.5)
PROT SERPL-MCNC: 6.8 G/DL (ref 6.4–8.2)
RBC # BLD AUTO: 3.89 M/UL (ref 4.2–5.3)
SODIUM SERPL-SCNC: 140 MMOL/L (ref 136–145)
TRIGL SERPL-MCNC: 97 MG/DL (ref ?–150)
VLDLC SERPL CALC-MCNC: 19.4 MG/DL
WBC # BLD AUTO: 3.7 K/UL (ref 4.6–13.2)

## 2022-06-15 PROCEDURE — 80053 COMPREHEN METABOLIC PANEL: CPT

## 2022-06-15 PROCEDURE — 36415 COLL VENOUS BLD VENIPUNCTURE: CPT

## 2022-06-15 PROCEDURE — 82306 VITAMIN D 25 HYDROXY: CPT

## 2022-06-15 PROCEDURE — 80061 LIPID PANEL: CPT

## 2022-06-15 PROCEDURE — 83735 ASSAY OF MAGNESIUM: CPT

## 2022-06-15 PROCEDURE — 85025 COMPLETE CBC W/AUTO DIFF WBC: CPT

## 2022-06-22 ENCOUNTER — OFFICE VISIT (OUTPATIENT)
Dept: INTERNAL MEDICINE CLINIC | Age: 77
End: 2022-06-22
Payer: MEDICARE

## 2022-06-22 VITALS
SYSTOLIC BLOOD PRESSURE: 120 MMHG | HEART RATE: 66 BPM | RESPIRATION RATE: 16 BRPM | DIASTOLIC BLOOD PRESSURE: 64 MMHG | TEMPERATURE: 97.9 F | BODY MASS INDEX: 17.58 KG/M2 | WEIGHT: 112 LBS | OXYGEN SATURATION: 96 % | HEIGHT: 67 IN

## 2022-06-22 DIAGNOSIS — M85.89 OSTEOPENIA OF MULTIPLE SITES: ICD-10-CM

## 2022-06-22 DIAGNOSIS — D64.9 ANEMIA, UNSPECIFIED TYPE: ICD-10-CM

## 2022-06-22 DIAGNOSIS — I65.23 CAROTID STENOSIS, BILATERAL: ICD-10-CM

## 2022-06-22 DIAGNOSIS — I77.3 FIBROMUSCULAR DYSPLASIA (HCC): ICD-10-CM

## 2022-06-22 DIAGNOSIS — Z13.31 SCREENING FOR DEPRESSION: ICD-10-CM

## 2022-06-22 DIAGNOSIS — I77.3 RENAL ARTERY STENOSIS DUE TO FIBROMUSCULAR DYSPLASIA (HCC): ICD-10-CM

## 2022-06-22 DIAGNOSIS — M05.79 RHEUMATOID ARTHRITIS INVOLVING MULTIPLE SITES WITH POSITIVE RHEUMATOID FACTOR (HCC): ICD-10-CM

## 2022-06-22 DIAGNOSIS — F41.9 ANXIETY: ICD-10-CM

## 2022-06-22 DIAGNOSIS — K21.9 GASTROESOPHAGEAL REFLUX DISEASE, UNSPECIFIED WHETHER ESOPHAGITIS PRESENT: ICD-10-CM

## 2022-06-22 DIAGNOSIS — K58.0 IRRITABLE BOWEL SYNDROME WITH DIARRHEA: ICD-10-CM

## 2022-06-22 DIAGNOSIS — Z00.00 MEDICARE ANNUAL WELLNESS VISIT, SUBSEQUENT: ICD-10-CM

## 2022-06-22 DIAGNOSIS — E78.5 HYPERLIPIDEMIA, UNSPECIFIED HYPERLIPIDEMIA TYPE: ICD-10-CM

## 2022-06-22 DIAGNOSIS — Z71.89 ADVANCED DIRECTIVES, COUNSELING/DISCUSSION: ICD-10-CM

## 2022-06-22 DIAGNOSIS — I70.1 RENAL ARTERY STENOSIS DUE TO FIBROMUSCULAR DYSPLASIA (HCC): ICD-10-CM

## 2022-06-22 DIAGNOSIS — D84.9 IMMUNOSUPPRESSED STATUS (HCC): ICD-10-CM

## 2022-06-22 DIAGNOSIS — E21.3 HYPERPARATHYROIDISM (HCC): ICD-10-CM

## 2022-06-22 DIAGNOSIS — I10 PRIMARY HYPERTENSION: Primary | ICD-10-CM

## 2022-06-22 PROCEDURE — G8399 PT W/DXA RESULTS DOCUMENT: HCPCS | Performed by: INTERNAL MEDICINE

## 2022-06-22 PROCEDURE — 1101F PT FALLS ASSESS-DOCD LE1/YR: CPT | Performed by: INTERNAL MEDICINE

## 2022-06-22 PROCEDURE — 99214 OFFICE O/P EST MOD 30 MIN: CPT | Performed by: INTERNAL MEDICINE

## 2022-06-22 PROCEDURE — G8510 SCR DEP NEG, NO PLAN REQD: HCPCS | Performed by: INTERNAL MEDICINE

## 2022-06-22 PROCEDURE — 99497 ADVNCD CARE PLAN 30 MIN: CPT | Performed by: INTERNAL MEDICINE

## 2022-06-22 PROCEDURE — G8754 DIAS BP LESS 90: HCPCS | Performed by: INTERNAL MEDICINE

## 2022-06-22 PROCEDURE — G8752 SYS BP LESS 140: HCPCS | Performed by: INTERNAL MEDICINE

## 2022-06-22 PROCEDURE — G0439 PPPS, SUBSEQ VISIT: HCPCS | Performed by: INTERNAL MEDICINE

## 2022-06-22 PROCEDURE — G8427 DOCREV CUR MEDS BY ELIG CLIN: HCPCS | Performed by: INTERNAL MEDICINE

## 2022-06-22 PROCEDURE — G0463 HOSPITAL OUTPT CLINIC VISIT: HCPCS | Performed by: INTERNAL MEDICINE

## 2022-06-22 PROCEDURE — 1123F ACP DISCUSS/DSCN MKR DOCD: CPT | Performed by: INTERNAL MEDICINE

## 2022-06-22 PROCEDURE — G8419 CALC BMI OUT NRM PARAM NOF/U: HCPCS | Performed by: INTERNAL MEDICINE

## 2022-06-22 PROCEDURE — 3017F COLORECTAL CA SCREEN DOC REV: CPT | Performed by: INTERNAL MEDICINE

## 2022-06-22 PROCEDURE — G8536 NO DOC ELDER MAL SCRN: HCPCS | Performed by: INTERNAL MEDICINE

## 2022-06-22 PROCEDURE — 1090F PRES/ABSN URINE INCON ASSESS: CPT | Performed by: INTERNAL MEDICINE

## 2022-06-22 NOTE — PROGRESS NOTES
1. \"Have you been to the ER, urgent care clinic since your last visit? Hospitalized since your last visit? \" No    2. \"Have you seen or consulted any other health care providers outside of the [address] [place] [address] since your last visit? \" No     3. For patients aged 39-70: Has the patient had a colonoscopy / FIT/ Cologuard? Yes - no Care Gap present      If the patient is female:    4. For patients aged 41-77: Has the patient had a mammogram within the past 2 years? NA - based on age or sex      11. For patients aged 21-65: Has the patient had a pap smear?  NA - based on age or sex

## 2022-06-22 NOTE — ACP (ADVANCE CARE PLANNING)
Advance Care Planning     Advance Care Planning (ACP) Physician/NP/PA Conversation      Date of Conversation: 6/22/2022  Conducted with: Patient with Decision Making Capacity    Healthcare Decision Maker:     Primary Decision Maker: Taylor Keys Spouse - [phone]    Secondary Decision Maker: Tawnya Estevez - Daughter - [phone]    Secondary Decision Maker: Lenin Merrill - Daughter - [phone]  Click here to complete [address] including selection of the Healthcare Decision Maker Relationship (ie \"Primary\")        Today we documented Decision Maker(s) consistent with Legal Next of Kin hierarchy. Care Preferences:    Hospitalization: \"If your health worsens and it becomes clear that your chance of recovery is unlikely, what would be your preference regarding hospitalization? \"  The patient would prefer hospitalization. Ventilation: \"If you were unable to breathe on your own and your chance of recovery was unlikely, what would be your preference about the use of a ventilator (breathing machine) if it was available to you? \"   The patient would desire the use of a ventilator. Resuscitation: \"In the event your heart stopped as a result of an underlying serious health condition, would you want attempts to be made to restart your heart, or would you prefer a natural death? \"   Yes, attempt to resuscitate.     Additional topics discussed: treatment goals, benefit/burden of treatment options, ventilation preferences, hospitalization preferences, resuscitation preferences and end of life care preferences (vegetative state/imminent death)    Conversation Outcomes / Follow-Up Plan:   ACP in process - information provided, considering goals and options  Reviewed DNR/DNI and patient elects Full Code (Attempt Resuscitation)     Length of Voluntary ACP Conversation in minutes:  16 minutes    Vi Rodriguez MD

## 2022-06-22 NOTE — PATIENT INSTRUCTIONS
Please obtain Moderna booster dose and Shingrix vaccine series. Heart-Healthy Diet: Care Instructions  Your Care Instructions     A heart-healthy diet has lots of vegetables, fruits, nuts, beans, and whole grains, and is low in salt. It limits foods that are high in saturated fat, such as meats, cheeses, and fried foods. It may be hard to change your diet, but even small changes can lower your risk of heart attack and heart disease. Follow-up care is a key part of your treatment and safety. Be sure to make and go to all appointments, and call your doctor if you are having problems. It's also a good idea to know your test results and keep a list of the medicines you take. How can you care for yourself at home? Watch your portions  · Learn what a serving is. A \"serving\" and a \"portion\" are not always the same thing. Make sure that you are not eating larger portions than are recommended. For example, a serving of pasta is ½ cup. A serving size of meat is 2 to 3 ounces. A 3-ounce serving is about the size of a deck of cards. Measure serving sizes until you are good at [place]" them. Keep in mind that restaurants often serve portions that are 2 or 3 times the size of one serving. · To keep your energy level up and keep you from feeling hungry, eat often but in smaller portions. · Eat only the number of calories you need to stay at a healthy weight. If you need to lose weight, eat fewer calories than your body burns (through exercise and other physical activity). Eat more fruits and vegetables  · Eat a variety of fruit and vegetables every day. Dark green, deep orange, red, or yellow fruits and vegetables are especially good for you. Examples include spinach, carrots, peaches, and berries. · Keep carrots, celery, and other veggies handy for snacks. Buy fruit that is in season and store it where you can see it so that you will be tempted to eat it.   · Cook dishes that have a lot of veggies in them, such as stir-fries and soups. Limit saturated and trans fat  · Read food labels, and try to avoid saturated and trans fats. They increase your risk of heart disease. · Use olive or canola oil when you cook. · Bake, broil, grill, or steam foods instead of frying them. · Choose lean meats instead of high-fat meats such as hot dogs and sausages. Cut off all visible fat when you prepare meat. · Eat fish, skinless poultry, and meat alternatives such as soy products instead of high-fat meats. Soy products, such as tofu, may be especially good for your heart. · Choose low-fat or fat-free milk and dairy products. Eat foods high in fiber  · Eat a variety of grain products every day. Include whole-grain foods that have lots of fiber and nutrients. Examples of whole-grain foods include oats, whole wheat bread, and brown rice. · Buy whole-grain breads and cereals, instead of white bread or pastries. Limit salt and sodium  · Limit how much salt and sodium you eat to help lower your blood pressure. · Taste food before you salt it. Add only a little salt when you think you need it. With time, your taste buds will adjust to less salt. · Eat fewer snack items, fast foods, and other high-salt, processed foods. Check food labels for the amount of sodium in packaged foods. · Choose low-sodium versions of canned goods (such as soups, vegetables, and beans). Limit sugar  · Limit drinks and foods with added sugar. These include candy, desserts, and soda pop. Limit alcohol  · Limit alcohol to no more than 2 drinks a day for men and 1 drink a day for women. Too much alcohol can cause health problems. When should you call for help? Watch closely for changes in your health, and be sure to contact your doctor if:    · You would like help planning heart-healthy meals. Where can you learn more?   Go to http://www.gray.com/  Enter V137 in the search box to learn more about \"Heart-Healthy Diet: Care Instructions. \"  Current as of: August 22, 2019               Content Version: 12.6  © 9210-2351 [employer]. Care instructions adapted under license by [employer] (which disclaims liability or warranty for this information). If you have questions about a medical condition or this instruction, always ask your healthcare professional. [address] any warranty or liability for your use of this information. High Cholesterol: Care Instructions  Your Care Instructions     Cholesterol is a type of fat in your blood. It is needed for many body functions, such as making new cells. Cholesterol is made by your body. It also comes from food you eat. High cholesterol means that you have too much of the fat in your blood. This raises your risk of a heart attack and stroke. LDL and HDL are part of your total cholesterol. LDL is the \"bad\" cholesterol. High LDL can raise your risk for heart disease, heart attack, and stroke. HDL is the \"good\" cholesterol. It helps clear bad cholesterol from the body. High HDL is linked with a lower risk of heart disease, heart attack, and stroke. Your cholesterol levels help your doctor find out your risk for having a heart attack or stroke. You and your doctor can talk about whether you need to lower your risk and what treatment is best for you. A heart-healthy lifestyle along with medicines can help lower your cholesterol and your risk. The way you choose to lower your risk will depend on how high your risk is for heart attack and stroke. It will also depend on how you feel about taking medicines. Follow-up care is a key part of your treatment and safety. Be sure to make and go to all appointments, and call your doctor if you are having problems. It's also a good idea to know your test results and keep a list of the medicines you take. How can you care for yourself at home? · Eat a variety of foods every day.  Good choices include fruits, vegetables, whole grains (like oatmeal), dried beans and peas, nuts and seeds, soy products (like tofu), and fat-free or low-fat dairy products. · Replace butter, margarine, and hydrogenated or partially hydrogenated oils with olive and canola oils. (Canola oil margarine without trans fat is fine.)  · Replace red meat with fish, poultry, and soy protein (like tofu). · Limit processed and packaged foods like chips, crackers, and cookies. · Bake, broil, or steam foods. Don't dennis them. · Be physically active. Get at least 30 minutes of exercise on most days of the week. Walking is a good choice. You also may want to do other activities, such as running, swimming, cycling, or playing tennis or team sports. · Stay at a healthy weight or lose weight by making the changes in eating and physical activity listed above. Losing just a small amount of weight, even 5 to 10 pounds, can reduce your risk for having a heart attack or stroke. · Do not smoke. When should you call for help? Watch closely for changes in your health, and be sure to contact your doctor if:    · You need help making lifestyle changes. · You have questions about your medicine. Where can you learn more? Go to http://www.gray.com/  Enter J527 in the search box to learn more about \"High Cholesterol: Care Instructions. \"  Current as of: December 16, 2019               Content Version: 12.6  © 3867-0505 [employer]. Care instructions adapted under license by [employer] (which disclaims liability or warranty for this information). If you have questions about a medical condition or this instruction, always ask your healthcare professional. [place] [address] any warranty or liability for your use of this information.       Medicare Wellness Visit, Female    The best way to improve and maintain good health is to have a healthy lifestyle by eating a well-balanced diet, exercising regularly, limiting alcohol and stopping smoking. Regular visits with your physician or non-physician health care provider also support your good health. Preventive screening tests can find health problems before they become diseases or illnesses. Preventive services such as immunizations prevent serious infections. All people over age 72 should have a Pneumovax and a Prevnar-13 shot to prevent potentially life threatening infections with the pneumococcus bacteria, a common cause of pneumonia. These are once in a lifetime unless you and your provider decide differently. All people over 65 should have a yearly influenza vaccine or \"flu\" shot. This does not prevent infection with cold viruses but has been proven to prevent hospitalization and death from influenza. Although Medicare part B \"regular Medicare\" currently only covers tetanus vaccination in the context of an injury, a tetanus vaccine (Tdap or Td) is recommended every 10 years. A shingles vaccine is recommended once in a lifetime after age 61. The Shingles vaccine is also not covered by Medicare part B. Note, however, that both the Shingles vaccine and Tdap/Td are generally covered by secondary carriers. Please check your coverage and out of pocket expenses. Consider contacting your local health department because it may stock these vaccines for a reasonable charge.     We currently have documentation of the following immunization history for you:  Immunization History   Administered Date(s) Administered    KAYCEEID-[id number], Primary or Immunocompromised Series, MRNA, PF, 100mcg/0.5mL 02/24/2021, 03/24/2021, 01/02/2022    Influenza High Dose Vaccine PF 10/29/2018    Influenza Vaccine 10/29/2013    Influenza Vaccine (Tri) Adjuvanted (>65 Yrs FLUAD TRI 19075) 12/11/2019    Influenza, High-dose, Quadrivalent (>65 Yrs Fluzone High Dose Quad 19686) 11/18/2021    Pneumococcal Conjugate (PCV-13) 12/14/2015    Pneumococcal Polysaccharide (PPSV-23) 11/15/2018    Tdap 10/02/2019       Screening for infection with Hepatitis C is recommended for anyone born between [birth year] through [address]. The table at the bottom of this document indicates the status of this and other preventive services. A bone mass density test (DEXA) to screen for osteoporosis or thinning of the bones should be done at least once after age 72 and may be done up to every 2 years as determined by you and your health care provider. The most recent DEXA we have on file for you is:  DEXA Results (most recent):  No results found for this or any previous visit. Screening for diabetes mellitus with a blood sugar test (glucose) should be done at least every 3 years until age 79. You and your health care provider may decide whether to continue screening after age 79. The most recent blood glucose we have on file for you is:   Lab Results   Component Value Date/Time    Glucose 74 06/15/2022 08:27 AM         Glaucoma is a disease of the eye due to increased ocular pressure that can lead to blindness. People with risk factors for glaucoma ( race, diabetes, family history) should be screened at least every 2 years by an eye professional.     Cardiovascular screening tests that check for elevated lipids or cholesterol (fatty part of blood) which can lead to heart disease and strokes should be done every 4-6 years through age 79. You and your health care provider may decide whether to continue screening after age 79.  The most recent lipid panel we have on file for you is:   Lab Results   Component Value Date/Time    Cholesterol, total 176 06/15/2022 08:27 AM    HDL Cholesterol 72 (H) 06/15/2022 08:27 AM    LDL, calculated 84.6 06/15/2022 08:27 AM    VLDL, calculated 19.4 06/15/2022 08:27 AM    Triglyceride 97 06/15/2022 08:27 AM    CHOL/HDL Ratio 2.4 06/15/2022 08:27 AM       Colorectal cancer screening that evaluates for blood or polyps in your colon for people with average risk should be done yearly as a stool test, every five years as a flexible sigmoidoscope or every 10 years as a colonoscopy up to age 76. You and your health care provider may decide whether to continue screening after age 76. Breast cancer screening with a mammogram is recommended at least once every 2 years  for women age 54-69. You and your health care provider may decide whether to continue screening after age 76. The most recent mammogram we have on file for you is:   EFRAÍN Results (most recent):  No results found for this or any previous visit. Screening for cervical cancer with a pap smear is recommended for all women with a cervix until age 72. The frequency of this test is based on the details of her prior pap smear testing. You and your health care provider may decide whether to continue screening after age 72. People who have smoked the equivalent of 1 pack per day for 30 years or more may benefit from screening for lung cancer with a yearly low dose CT scan until they have been non smokers for 15 years or competing health conditions render this unlikely to be beneficial. Our records show: n/a    Your Medicare Wellness Exam is recommended annually.     Here is a list of your current Health Maintenance items with a due date:  Health Maintenance   Topic Date Due    Shingrix Vaccine Age 49> (1 of 2) Never done    COVID-19 Vaccine (4 - Booster for Moderna series) 04/02/2022    Depression Screen  12/15/2022    Medicare Yearly Exam  06/23/2023    Colorectal Cancer Screening Combo  07/27/2024    Lipid Screen  06/15/2027    DTaP/Tdap/Td series (2 - Td or Tdap) 10/02/2029    Hepatitis C Screening  Completed    Bone Densitometry (Dexa) Screening  Completed    Flu Vaccine  Completed    Pneumococcal 65+ years  Completed

## 2022-06-22 NOTE — PROGRESS NOTES
This is the Subsequent Medicare Annual Wellness Exam, performed 12 months or more after the Initial AWV or the last Subsequent AWV    I have reviewed the patient's medical history in detail and updated the computerized patient record. Assessment/Plan   Education and counseling provided:  Are appropriate based on today's review and evaluation  End-of-Life planning (with patient's consent)  Influenza Vaccine  Screening Mammography  Colorectal cancer screening tests  Cardiovascular screening blood test  Bone mass measurement (DEXA)  Screening for glaucoma  Diabetes screening test  Fourth dose of Moderna COVID-19 vaccine    1. Primary hypertension  -     MAGNESIUM; Future  -     METABOLIC PANEL, COMPREHENSIVE; Future  -     URINALYSIS W/MICROSCOPIC; Future  2. Fibromuscular dysplasia (HCC)  -     METABOLIC PANEL, COMPREHENSIVE; Future  3. Hyperlipidemia, unspecified hyperlipidemia type  -     LIPID PANEL; Future  -     METABOLIC PANEL, COMPREHENSIVE; Future  4. 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 (HCC)  -     LIPID PANEL; Future  -     METABOLIC PANEL, COMPREHENSIVE; Future  5. Carotid stenosis, bilateral  -     LIPID PANEL; Future  6. Rheumatoid arthritis involving multiple sites with positive rheumatoid factor (HCC)  -     CBC WITH AUTOMATED DIFF; Future  -     METABOLIC PANEL, COMPREHENSIVE; Future  7. Immunosuppressed status ([facility] [address]  8. Irritable bowel syndrome with diarrhea  9. Gastroesophageal reflux disease, unspecified whether esophagitis present  10. Hyperparathyroidism ([facility] [address]  -     METABOLIC PANEL, COMPREHENSIVE; Future  -     VITAMIN D, 25 HYDROXY; Future  11. Osteopenia of multiple sites  12. Anemia, unspecified type  -     CBC WITH AUTOMATED DIFF; Future  13. Anxiety  14. Medicare annual wellness visit, subsequent  -     ADVANCE CARE PLANNING FIRST 30 MINS  15.  Advanced directives, counseling/discussion  -     ADVANCE CARE PLANNING FIRST 27 MINS  12. Screening for depression Depression Risk Factor Screening     3 most recent PHQ Screens 6/22/2022   PHQ Not Done -   Little interest or pleasure in doing things Not at all   Feeling down, depressed, irritable, or hopeless Not at all   Total Score PHQ 2 0       Alcohol & Drug Abuse Risk Screen    Do you average more than 1 drink per night or more than 7 drinks a week:  No    On any one occasion in the past three months have you have had more than 3 drinks containing alcohol:  No          Functional Ability and Level of Safety    Hearing: Hearing is mildly decreased      Activities of Daily Living: The home contains: no safety equipment. Patient does total self care      Ambulation: with mild difficulty     Fall Risk:  Fall Risk Assessment, last 12 mths 6/22/2022   Able to walk? Yes   Fall in past 12 months? 0   Do you feel unsteady? 0   Are you worried about falling 0   Number of falls in past 12 months -   Fall with injury?  -      Abuse Screen:  Patient is not abused       Cognitive Screening    Has your family/caregiver stated any concerns about your memory: no     Cognitive Screening: None performed today    Health Maintenance Due     Health Maintenance Due   Topic Date Due    Shingrix Vaccine Age 49> (1 of 2) Never done    COVID-19 Vaccine (4 - Booster for Fredick Diamond series) 04/02/2022       Patient Care Team   Patient Care Team:  Clint Yip MD as PCP - General (Internal Medicine Physician)  Clint Yip MD as PCP - REHABILITATION [facility] Empaneled Provider  Cary Stapleton MD as Physician (Physical Medicine and Rehabilitation Physician)  Laurent Solo DO (Rheumatology Internal Medicine)  Maura Sherman MD (Gastroenterology)  Cody Diop MD (Ophthalmology)  Jacklyn Hernández MD (Endocrinology Physician)  Sarai Smith NP as Nurse Practitioner (Gynecology)  Jason Garcia MD (Dermatology Physician)  Bert Warren MD (Cardiovascular Disease Physician)    History     Patient Active Problem List Diagnosis Code    Cervical spondylosis without myelopathy M47.812    Brachial neuritis or radiculitis M54.12    Radiculopathy, cervical M54.12    History of nonmelanoma skin cancer Z85.828    Rheumatoid arthritis ([address] M06.9    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 (HCC) I70.1, I77.3    LBBB (left bundle branch block) I44.7    Hypertension I10    Hyperparathyroidism ([address] E21.3    Hiatal hernia K44.9    GERD (gastroesophageal reflux disease) K21.9    Fibromuscular dysplasia (HCC) I77.3    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    Irritable bowel syndrome with diarrhea K58.0    Bilateral carotid bruits R09.89    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    Hyperlipidemia E78.5    Osteopenia of multiple sites M85.89    Vitamin D deficiency E55.9    Carotid stenosis, bilateral I65.23    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    Anemia D64.9    Palpitations R00.2    History of 2019 novel coronavirus disease (COVID-19) Z86.16    Immunosuppressed status (HCC) D84.9    Anxiety F41.9    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.9     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)     s/p balloon angioplasty 5/2009 [facility]    Rheumatoid arthritis ([address]     Skin cancer       Past Surgical History:   Procedure Laterality Date    COLONOSCOPY N/A 7/27/2021    COLONOSCOPY with polypectomies with bx's performed by [facility]uss [facility] ENDOSCOPY    HX ANGIOPLASTY      HX COLONOSCOPY      HX KNEE REPLACEMENT Bilateral 2009    partial     HX OTHER SURGICAL      removal of fibroid benign tumors from breast     HX PARTIAL HYSTERECTOMY      HX TONSILLECTOMY  1964     Current Outpatient Medications   Medication Sig Dispense Refill    sertraline (ZOLOFT) 50 mg tablet take 1 tablet by mouth once daily 90 Tablet 2    Psyllium Husk-Sucrose 3.4 gram/7 gram powd Take  by mouth.  aspirin 81 mg chewable tablet Take 81 mg by mouth. Every three days      hydrOXYchloroQUINE (PLAQUENIL) 200 mg tablet Take 200 mg by mouth daily.  metoprolol tartrate (LOPRESSOR) 50 mg tablet Take 1 Tab by mouth two (2) times a day. 180 Tab 2    METHOTREXATE SODIUM INJECTION 17.5 mg every seven (7) days.  omeprazole (PRILOSEC) 40 mg capsule Take 40 mg by mouth every other day.  multivitamin (ONE A DAY) tablet Take 1 Tab by Mouth Once a Day.  acetaminophen (TYLENOL) 500 mg tablet Take 500 mg by mouth every six (6) hours as needed.  cholecalciferol (VITAMIN D3) 400 unit tab tablet Take 4,000 Units by mouth.  folic acid (FOLVITE) 1 mg tablet Take 1 mg by mouth daily. 0     Allergies   Allergen Reactions    Hydrocodone Anaphylaxis    Nsaids (Non-Steroidal Anti-Inflammatory Drug) Other (comments)     None due to kidneys    Azithromycin Itching    Other Medication Other (comments)     GI intolerance to nonsteroidal antiinflammatory medications     Vicodin [Hydrocodone-Acetaminophen] Other (comments)       Family History   Problem Relation Age of Onset    OSTEOARTHRITIS Mother     Elevated Lipids Mother     Heart Disease Mother         CHF    Lung Disease Mother     Hypertension Sister     Lung Disease Sister     Hypertension Brother     Mult Sclerosis Brother      Social History     Tobacco Use    Smoking status: Never Smoker    Smokeless tobacco: Never Used   Substance Use Topics    Alcohol use:  Yes     Alcohol/week: 1.0 - 2.0 standard drink     Types: 1 - 2 Glasses of wine per week         Lacey Jackson MD

## 2022-06-25 PROBLEM — K58.0 IRRITABLE BOWEL SYNDROME WITH DIARRHEA: Status: ACTIVE | Noted: 2018-05-07

## 2022-06-25 NOTE — PROGRESS NOTES
HPI:   [place] is a 77y.o. year old female who presents today for a routine visit. 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 She also has a history of COVID-19 infection in 9/2021. She has completed the Moderna COVID 19 vaccine series and a third dose given her immunosuppressed status. She reports that she is doing reasonably well. She states that she has noted improvement in her diarrhea since changing her diet and decreasing the amount of seeds and grains that she has been consuming. She states that she was told that her diarrhea is secondary to irritable bowel syndrome. She was prescribed Creon to see if this would help with her symptoms, but states that she did not initiate since it was derived from pig pancreas. She also reports that she underwent a Mohs procedure with Dr. Beulah Stuart for removal of a basal cell carcinoma from her face. She is continuing to follow-up with dermatology to determine what other therapy is necessary prior to considering initiation of a biologic for treatment of her rheumatoid arthritis. She is otherwise without new complaints. Summary of prior hospitalizations and medical history:  In 12/2021, she sustained a fall after falling off a chair in her kitchen. She reported that she struck her right hip and her head during the fall but did not lose consciousness. She reported persistent right hip pain but no other symptoms following the fall. However, three days later, she experienced acute onset vertigo while driving which was transient and resolved.   She was advised by Dr. David Hollis to present to [facility] ED for evaluation (12/14/2021) and underwent head CT scan, right hip x-ray, sacral/coccyx x-ray, and EKG which were unremarkable. On 9/29/2021, she contacted the office and reports that she had tested positive by rapid testing for COVID-19 on 9/28/2021 after developing symptoms 1 day prior of fever to 100.6°F, headache, sore throat, and nasal congestion with cough. She stated that she also lost her sense of taste and smell. She was taking Tylenol  mg every 6 hours and ibuprofen with persistent fever. Given her immunosuppressed status and high risk of developing severe disease, she was advised to present to the [facility] ED and received bamlanivimab-etesevimab monoclonal antibody infusion without complications. She reports continued altered taste and smell, but resolution of all other symptoms. On 10/21/2019, she presented to the [facility] ED reporting difficulty with elevated blood pressure and a dull headache. She stated that her blood pressure has been elevated earlier in the week when visiting her gastroenterologist. On the day of presentation to the ED, she states that she was experiencing an achy dull headache that was mild in severity throughout the day. She became concerned when she went to bed and noticed throbbing pain in her hands and feet. She checked her blood pressure and noticed her systolic ranged from 724-272 mmHg. She had taken her metoprolol tartrate 50 mg bid as prescribed. She denied any shortness of breath, leg swelling, orthopnea, PND, visual changes, nausea/vomiting, lightheadedness, dizziness, confusion, speech difficulty, numbness, or weakness. She did admit to some chest tightness, but stated that it was not exertional and was her usual discomfort associated with reflux. Evaluation in the ED revealed that her blood pressure was 144/73. EKG showed sinus rhythm at 64 bpm and LBBB. Laboratory data included WBC 6.2, Hb 11.8/ Hct 36.9, creatinine 0.79/eGFR >60, troponin x 1 negative, and chest x-ray negative. While in the ED, her headache resolved and she was subsequently discharged.  She was seen in follow-up on 10/30/2019 and described episodes with head fullness, flushing, and  \"prickly skin\" occurring in the evening after lying down. She stated that she did notice one episode where her heart rate appeared to be 150 bpm. She also reported that she had been under an increased amount of stress with regard to several of her family members, and felt that the episodes may be secondary to anxiety. Her dose of Zoloft was increased to 50 mg daily, and she reported that the episodes of palpitations and head fullness resolved with the increase in dose of Zoloft. She has a history of hypertension and palpitations, treated currently with metoprolol. She has a history of dyslipidemia, not currently being treated with medication. She was evaluated by Dr. Cristian Badillo in 2012 for palpitations. Event monitor was placed, and showed a known LBBB and PVC's, but reportedly no other arrhythmias (report unavailable). In 9/2012, she also underwent a pharmacologic nuclear stress test which was a normal low risk study with EF 55%. She also had an echocardiogram, which showed low normal LV function (EF 50%), mild grade 1 diastolic dysfunction, trace MR, TR, and AI. She also has a history of fibromuscular dysplasia, and in 5/2009, underwent aortogram and renal arteriogram with balloon angioplasty. She has a known right carotid bruit, and her last carotid duplex (5/2014) showed mild atherosclerotic intraluminal plaquing with elevated velocities in the bilateral mid internal carotid arteries with a \"string of pearls\" appearance consistent with known fibromuscular dysplasia, causing a 50-69% stenosis of the bilateral internal carotid arteries. There was no change from prior study in 2011. She had been followed by Dr. Cb Xavier, but states that she has been released from his care. She remains quite active, and denies any chest pain, shortness of breath at rest or with exertion, palpitations, lightheadedness, or edema.  She underwent a repeat carotid duplex study (8/15/2018) showing mild (<50%) stenosis of the left internal carotid artery and moderate (50-69%) stenosis of the right internal carotid artery. Repeat performed 12/17/2019 showed moderate homogenous plaque in right ICA (50 to 69% stenosis). Stenosis is at the mid ICA and may be upper limits of moderate; mild heterogeneous plaque left ICA (< 50% stenosis). She has a history of rheumatoid arthritis, diagnosed when she was 37years old, treated currently with subcutaneous methotrexate and hydroxychloroquine. She previously was also treated with prednisone, but has been able to successfully wean from taking it. She is being followed closely by Dr. Neri Armendariz. She is not currently being treated with a biologic agent due to concern regarding recent multiple squamous cell carcinomas of the skin, for which she was followed by Dr. Nelly Del Cid and now by Dr. Juan Manuel Anthony. She reports that she was well controlled on Humira for several years, but had to discontinue treatment when it was no longer on her insurance formulary. She also has a history of osteopenia, with bone density studies performed by Dr. Neri Armendariz. Her last bone density study was in 2/2019 showing T-scores:  femoral neck left -1.7  /right -1.9 and lumbar -2.1. She continues to take calcium and Vitamin D supplements. She has no history of pathologic fractures. She was recently diagnosed with familial hypocalciuric hypercalcemia with mildly elevated calcium 10.6, mildly elevated PTH 80, Vitamin D level 36.5, and 24 hour urine calcium of 105 (FeCa 1%) in 7/30/2018, which would be consistent with this diagnosis. She is being followed by Dr. Lisseth Barrios. She has a history of cervical degenerative joint and disc disease and chronic neck pain. She was being followed by Dr. Donna Nunn, who recommended surgery.  She was seen by Dr. Suhkdev San at [facility] for a second opinion, and cervical MRI (9/2015) showed multilevel degenerative disc and facet disease, worst at C5-C6; left central/subarticular disc protrusion with superimposed annular fissure noted at C5-C6 causing mass effect upon the left hemicord but no signal changes with severe left neuroforaminal narrowing at this level. Recommendation was for her to proceed with physical therapy, which did result in some improvement. She previously had been treated with gabapentin for pain control, but is currently only using Tylenol. She underwent acupuncture therapy with Dr. Guy Julian with only minimal improvement. She also states that she was referred to Dr. Sara Chapin for evaluation, but did not schedule since symptoms have gradually improved. She has a history of GERD. In 11/2017, she was having increasing difficulty with constipation alternating with diarrhea as well as increasing reflux symptoms. She states that she was evaluated by Dr. Mason Riley and it was her opinion that she suffered from irritable bowel syndrome. She has made changes in her diet and increased her consumption of fiber with some improvement. She began experiencing worsening reflux symptoms in 12/2018 after attempting to wean omeprazole from 40 mg to 20 mg daily. She also was taking an increased dose of prednisone at the time for a flare of her RA. She was evaluated by Dr. Mason Riley and attempted a trial of Protonix which she believed may be working better. She plans to continue taking it. She states that at the time of her worsening reflux, she also noted increasing palpitations and underwent evaluation by [facility] for Dr. Christoper Duane. She states that she was offered an event recorder to evaluate, but she noticed that her symptoms improved once her GERD was under control. She therefore declined further evaluation. She states that she has resumed taking omeprazole with reasonable control of her reflux symptoms.  She underwent upper endoscopy by Dr. Michael Dominguez in 11/2012 which showed a 2 cm hiatal hernia and pathology from a distal esophageal biopsy showing chronic inflammation. She had a screening colonoscopy in 2016 by Dr. Clemencia Toledo, which showed an adenomatous polyp (report unavailable). She had a repeat screening colonoscopy on 2021 by Dr. Thurmond Apgar showing mild diverticulosis in the sigmoid colon, two sessile 5-6 mm polyps in the cecum (pathology: tubular adenoma), two sessile 5-7 mm polyps in the ascending colon (pathology: tubular adenoma), and one sessile 1 cm polyp in the transverse colon (polyp not retrieved). Follow-up recommended for 3 years. She denies any abdominal pain, nausea, vomiting, melena, hematochezia, or change in bowel movements. 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)     s/p [facility] 2009 [facility]    Rheumatoid arthritis [address] [address]     Skin cancer      Past Surgical History:   Procedure Laterality Date    COLONOSCOPY N/A 2021    COLONOSCOPY with polypectomies with bx's performed by Antony Mendosa MD at [facility] HX ANGIOPLASTY      HX COLONOSCOPY      HX KNEE REPLACEMENT Bilateral 2009    partial     HX OTHER SURGICAL      removal of fibroid benign tumors from breast     HX PARTIAL HYSTERECTOMY      HX TONSILLECTOMY  1964     Current Outpatient Medications   Medication Sig    sertraline (ZOLOFT) 50 mg tablet take 1 tablet by mouth once daily    Psyllium Husk-Sucrose 3.4 gram/7 gram powd Take  by mouth.  aspirin 81 mg chewable tablet Take 81 mg by mouth. Every three days    hydrOXYchloroQUINE (PLAQUENIL) 200 mg tablet Take 200 mg by mouth daily.  metoprolol tartrate (LOPRESSOR) 50 mg tablet Take 1 Tab by mouth two (2) times a day.     METHOTREXATE SODIUM INJECTION 17.5 mg every seven (7) days.    omeprazole (PRILOSEC) 40 mg capsule Take 40 mg by mouth every other day.  multivitamin (ONE A DAY) tablet Take 1 Tab by Mouth Once a Day.  acetaminophen (TYLENOL) 500 mg tablet Take 500 mg by mouth every six (6) hours as needed.  cholecalciferol (VITAMIN D3) 400 unit tab tablet Take 4,000 Units by mouth.  folic acid (FOLVITE) 1 mg tablet Take 1 mg by mouth daily. No current facility-administered medications for this visit. Allergies and Intolerances: Allergies   Allergen Reactions    Hydrocodone Anaphylaxis    Nsaids (Non-Steroidal Anti-Inflammatory Drug) Other (comments)     None due to kidneys    Azithromycin Itching    Other Medication Other (comments)     GI intolerance to nonsteroidal antiinflammatory medications     Vicodin [Hydrocodone-Acetaminophen] Other (comments)     Family History: Her father passed away from a ruptured AAA. Her mother has osteoarthritis and COPD, and a maternal aunt had RA. Her brother recently passed away from multiple sclerosis. She has no family history of colon or breast cancer. Family History   Problem Relation Age of Onset    OSTEOARTHRITIS Mother     Elevated Lipids Mother     Heart Disease Mother         CHF    Lung Disease Mother     Hypertension Sister     Lung Disease Sister     Hypertension Brother     Mult Sclerosis Brother      Social History:   She  reports that she has never smoked. She has never used smokeless tobacco. She is  with two children and one stepchild. 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 She drinks wine only occasionally.      Social History     Substance and Sexual Activity   Alcohol Use Yes    Alcohol/week: 1.0 - 2.0 standard drink    Types: 1 - 2 Glasses of wine per week     Immunization History:  Immunization History   Administered Date(s) Administered    COVID-19, Francine Mijares, Primary or Immunocompromised Series, MRNA, PF, 100mcg/0.5mL 02/24/2021, 03/24/2021, 01/02/2022    Influenza High Dose Vaccine PF 10/29/2018    Influenza Vaccine 10/29/2013    Influenza Vaccine (Tri) Adjuvanted (>65 Yrs FLUAD TRI [id number]) 12/11/2019    Influenza, High-dose, Quadrivalent (>65 Yrs Fluzone High Dose Quad [id number]) 11/18/2021    Pneumococcal Conjugate (PCV-13) 12/14/2015    Pneumococcal Polysaccharide (PPSV-23) 11/15/2018    Tdap 10/02/2019       Review of Systems:   As above included in HPI. Otherwise 11 point review of systems negative including constitutional, skin, HENT, eyes, respiratory, cardiovascular, gastrointestinal, genitourinary, musculoskeletal, endocrine, hematologic, allergy, and neurologic. Physical:   Visit Vitals  /64 (BP 1 Location: Left arm, BP Patient Position: Sitting)   Pulse 66   Temp 97.9 °F (36.6 °C) (Temporal)   Resp 16   Ht 5' 6.5\" (1.689 m)   Wt 112 lb (50.8 kg)   SpO2 96%   BMI 17.81 kg/m²      Patient appears in no apparent distress. Affect is appropriate. HEENT --Anicteric sclerae, tympanic membranes normal,  ear canals normal.  PERRL, EOMI, conjunctiva and lids normal.  No cervical lymphadenopathy. No thyromegaly, JVD, or bruits. Carotid pulses 2+ with normal upstroke. Lungs --Clear to auscultation. No wheezing or rales. Heart --Regular rate and rhythm, no murmurs, rubs, gallops, or clicks. Abdomen -- Soft and nontender, no hepatosplenomegaly or masses. Extremities -- Without cyanosis, clubbing, edema.  Normal looking digits, ROM intact  Neuro -- CN 2-12 intact, strength 5/5 with intact soft touch in all extremities  Derm - no obvious abnormalities noted, no rash        Review of Data:  Labs:  Hospital Outpatient Visit on 06/15/2022   Component Date Value Ref Range Status    WBC 06/15/2022 3.7* 4.6 - 13.2 K/uL Final    RBC 06/15/2022 3.89* 4.20 - 5.30 M/uL Final    HGB 06/15/2022 11.4* 12.0 - 16.0 g/dL Final    HCT 06/15/2022 37.1  35.0 - 45.0 % Final    MCV 06/15/2022 95.4  78.0 - 100.0 FL Final    MCH 06/15/2022 29.3  24.0 - 34.0 PG Final    MCHC 06/15/2022 30.7* 31.0 - 37.0 g/dL Final    RDW 06/15/2022 13.1  11.6 - 14.5 % Final    PLATELET 90/36/1822 969  135 - 420 K/uL Final    MPV 06/15/2022 10.8  9.2 - 11.8 FL Final    NRBC 06/15/2022 0.0  0  WBC Final    ABSOLUTE NRBC 06/15/2022 0.00  0.00 - 0.01 K/uL Final    NEUTROPHILS 06/15/2022 56  40 - 73 % Final    LYMPHOCYTES 06/15/2022 28  21 - 52 % Final    MONOCYTES 06/15/2022 12* 3 - 10 % Final    EOSINOPHILS 06/15/2022 3  0 - 5 % Final    BASOPHILS 06/15/2022 1  0 - 2 % Final    IMMATURE GRANULOCYTES 06/15/2022 0  0.0 - 0.5 % Final    ABS. NEUTROPHILS 06/15/2022 2.1  1.8 - 8.0 K/UL Final    ABS. LYMPHOCYTES 06/15/2022 1.0  0.9 - 3.6 K/UL Final    ABS. MONOCYTES 06/15/2022 0.5  0.05 - 1.2 K/UL Final    ABS. EOSINOPHILS 06/15/2022 0.1  0.0 - 0.4 K/UL Final    ABS. BASOPHILS 06/15/2022 0.0  0.0 - 0.1 K/UL Final    ABS. IMM.  GRANS. 06/15/2022 0.0  0.00 - 0.04 K/UL Final    DF 06/15/2022 AUTOMATED    Final    LIPID PROFILE 06/15/2022        Final    Cholesterol, total 06/15/2022 176  <200 MG/DL Final    Triglyceride 06/15/2022 97  <150 MG/DL Final    HDL Cholesterol 06/15/2022 72* 40 - 60 MG/DL Final    LDL, calculated 06/15/2022 84.6  0 - 100 MG/DL Final    VLDL, calculated 06/15/2022 19.4  MG/DL Final    CHOL/HDL Ratio 06/15/2022 2.4  0 - 5.0   Final    Magnesium 06/15/2022 2.1  1.6 - 2.6 mg/dL Final    Sodium 06/15/2022 140  136 - 145 mmol/L Final    Potassium 06/15/2022 4.3  3.5 - 5.5 mmol/L Final    Chloride 06/15/2022 106  100 - 111 mmol/L Final    CO2 06/15/2022 31  21 - 32 mmol/L Final    Anion gap 06/15/2022 3  3.0 - 18 mmol/L Final    Glucose 06/15/2022 74  74 - 99 mg/dL Final    BUN 06/15/2022 14  7.0 - 18 MG/DL Final    Creatinine 06/15/2022 0.78  0.6 - 1.3 MG/DL Final    BUN/Creatinine ratio 06/15/2022 18  12 - 20   Final    GFR est AA 06/15/2022 >60  >60 ml/min/1.73m2 Final    GFR est non-AA 06/15/2022 >60  >60 ml/min/1.73m2 Final    Calcium 06/15/2022 10.3* 8.5 - 10.1 MG/DL Final    Bilirubin, total 06/15/2022 0.5  0.2 - 1.0 MG/DL Final    ALT (SGPT) 06/15/2022 24  13 - 56 U/L Final    AST (SGOT) 06/15/2022 29  10 - 38 U/L Final    Alk. phosphatase 06/15/2022 67  45 - 117 U/L Final    Protein, total 06/15/2022 6.8  6.4 - 8.2 g/dL Final    Albumin 06/15/2022 3.9  3.4 - 5.0 g/dL Final    Globulin 06/15/2022 2.9  2.0 - 4.0 g/dL Final    A-G Ratio 06/15/2022 1.3  0.8 - 1.7   Final    Vitamin D 25-Hydroxy 06/15/2022 69.2  30 - 100 ng/mL Final         Health Maintenance:  Screening:    Mammogram: negative (10/2021) [facility]   PAP smear: well woman exams performed at [facility] by NP Gael Simmons (last 10/2021)   Colorectal: colonoscopy (7/2021) tubular adenomas. Dr. Timothy Moncada. Due 7/2024. Depression: on Zoloft   DM (HbA1c/FPG): FPG 74 (6/2022)   Hepatitis C: negative (1/2/2018) Dr. Remberto Prather. Falls: none   DEXA: osteopenia (last 3/2021). Performed by Dr. Remberto Prather.    Glaucoma: regular eye exams with Dr. Teofilo Hammer (last 12/2021 ) every 6 months   Smoking: none   Vitamin D: 69.2 (6/2022)    Medicare Wellness: today      Impression:  Patient Active Problem List   Diagnosis Code    Displacement of cervical intervertebral disc without myelopathy M50.20    Cervical spondylosis without myelopathy M47.812    Brachial neuritis or radiculitis M54.12    Radiculopathy, cervical M54.12    History of nonmelanoma skin cancer Z85.828    Rheumatoid arthritis ([facility] [address] M06.9    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 (HCC) I70.1, I77.3    LBBB (left bundle branch block) I44.7    Hypertension I10    Hyperparathyroidism ([facility] [address] E21.3    Hiatal hernia K44.9    GERD (gastroesophageal reflux disease) K21.9    Fibromuscular dysplasia (HCC) I77.3    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    Irritable bowel syndrome with both constipation and diarrhea K58.2    Bilateral carotid bruits R09.89    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    Hyperlipidemia E78.5    Osteopenia of multiple sites M85.89    Vitamin D deficiency E55.9    Carotid stenosis, bilateral I65.23    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    Anemia D64.9    Palpitations R00.2    History of 2019 novel coronavirus disease (COVID-19) Z86.16    Immunosuppressed status (HCC) D84.9    Anxiety F41.9    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 Hypertension. Blood pressure has been well controlled on current regimen of metoprolol tartrate 75 mg bid. Also noting improvement in palpitations. Reports she has an echocardiogram scheduled in 7/2022 and scheduled to establish care with Dr. Mau Montano on 7/22/2022. Renal function remains normal with creatinine 0.78/ eGFR >60. Will continue to follow. 2. Fibromuscular dysplasia. Previous angioplasty of renal arteries. History of moderate stenosis of bilateral carotid arteries on carotid duplex in 5/2014. Noted prominent carotid bruits on physical exam, and repeat carotid duplex obtained in 8/2018 showing moderate (50-69%) stenosis of the right ICA and mild (<50%) of the left ICA. Review of report also showed mild atherosclerosis at that time. Previously followed by Dr. Cristian Mayo of [facility] but given stability over many years, released from his care with plan to pursue further evaluation only if becomes symptomatic. Repeat carotid duplex scan obtained 12/17/2019 showed persistence of moderate stenosis of right ICA. On aspirin 81 mg every third day. Did not tolerate atorvastatin due to elevated LFT's so discontinued. Lipid panel has improved with dietary changes. Will continue to monitor. 3. Hyperlipidemia. Calculated 10 year ASCVD risk improved to 18.6 %, which places her in one of the four statin benefit groups as per new AHA/ACC guidelines (primary prevention: 10 year ASCVD risk >7.5%).  In addition, mild atherosclerosis in the setting of fibromuscular dysplasia described on prior carotid duplex and moderate stenosis of right ICA reported. At increased risk due to chronic inflammatory state related to rheumatoid arthritis. Started on atorvastatin 10 mg daily in 12/2019, but developed increase in transaminases with AST 46 and ALT 64 (1/16/2020), and repeat ALT 85 and AST 66 (2/5/2020). Atorvastatin discontinued and LFT's normalized. Evaluated by Dr. Live Morales and lab evaluation/ RUQ ultrasound normal in 3/2020. Presumed secondary to statin in the setting of long term methotrexate therapy for RA. Noted continued improvement today with LDL 84 and HDL 72, and advised to continue with dietary changes. Will continue to monitor. 4. Rheumatoid arthritis. On SC weekly methotrexate and hydroxychloroquine 200 mg daily. Reported in 12/2021 that she had restarted prednisone due to increasing hand and joint pains but reports today that she has weaned with reasonable pain control. . Continuing to discuss with Dr. David Hollis treatment with a biologic for better control of joint inflammation. However, patient remains hesitant due to skin precancerous lesions. Being followed by Dr. David Hollis. 5. History of COVID-19 infection with PASC. Patient completed the Moderna COVID-19 vaccine series, but did not receive a third dose given her immunosuppressed status. On 9/27/2021, she noted the onset of fever, sore throat, nasal congestion, headache, and cough, and underwent rapid testing on 9/28/2021 which was positive for SARS-CoV-2. She had attended an indoor class reunion although she did wear a mask during the entire event. She contacted the office on 9/29/2021 and received bamlanivimab-etesevimab monoclonal antibody infusion at [facility] ED. She reports today resolution of symptoms except continuing to experience altered taste and smell. Completed third dose of Moderna COVID-19 vaccine, and advised to proceed with booster dose given immunosuppressed status. Will continue to follow. 6. Diarrhea.   Patient having significant difficulty with diarrhea impacting her quality of life. Underwent evaluation by Dr. Sylvia Neri and felt to be secondary to IBS. Advised dietary changes to eliminate seeds and certain grains with noticeable improvement. Did not wish to begin treatment with Creon given derived from pig pancreas. Will use Imodium preoperatively when leaving her home for activities. Continuing to follow with Dr. Sylvia Neri. 7. Cervical degenerative disease. Previously reported difficulty with chronic neck pain, and was treated in the past with gabapentin. Some improvement with exercising. Attempted accupuncture therapy by Dr. Trudi Weems, but after 3 months, no significant benefit seen. Previously followed by Dr. Arvind Lewis, and referred to Dr. Claudio Kaufman but never scheduled. Overall has improved and no longer felt to be a significant issue. 7. Osteopenia. Last bone density scan in 2/2019. Using femoral neck T-scores, calculated FRAX score estimates her 10 year risk of a major osteoporetic fracture at 13 % and hip fracture at 3.1 %, which are an indication for biphosphonate treatment with hip fracture risk >3%. Being performed and followed by Dr. Sofia West. She reports that she had a repeat bone density study in 3/2021 which showed worsening osteopenia, and states that Dr. Sofia West is recommending treatment with Reclast. However, she is hesitant to begin. Continue calcium and Vitamin D. Encouraged exercise, particularly weight bearing activities. Fall precautions stressed. 8. Hyperparathyroidism. Mild increase in PTH, mildly elevated calcium, normal Vitamin D level,and 24 hour urine calcium only 105 (FeCa 1%) consistent with a diagnosis of familial hypocalciuric hypercalcemia. Being followed by Dr. Malinda Mclean every 2 years given benign condition. Advised to drink plenty of fluids. Calcium remains mildly elevated today at 10.3. Will continue to monitor. 9. GERD. Resumed omeprazole and taking 40 mg every other day with good response. Advised to continue. 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 Following regularly with Dr. Vale Oreilly. Underwent Mohs procedure by Dr. Kendall Vargas for removal of a basal cell carcinoma from her face. She continues to be hesitant to begin treatment with a biologic for her RA given her extensive premalignant skin lesions. Has follow-up with dermatology in 9/2022. 11. Mild anemia. Present intermittently. B12 and folate levels normal in 12/2019. Iron studies and gammopathy panel normal in 5/2020. Stable and likely secondary to methotrexate. Will continue to monitor. 12. Anxiety. Patient reports doing well on sertraline 50 mg daily. Some difficulty with insomnia and advised that may begin melatonin 3 mg nightly as needed. 13. History of mouth pain. Reported intermittent difficulty and evaluated by Dr. Joana Parikh of ENT in the past with no etiology found. Differential diagnosis would include herpes simplex, aphthous stomatitis, xerostomia, or nutritional deficiencies (B12, B6, folate, Zinc, iron), or candidiasis. 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 Tricyclics, pregabalin, gabapentin, and Mirapex have shown some benefit. Also has found some benefit with Mycelex troches in the past. Advised to take a multivitamin. Reports no recent recurrence. 14. Health maintenance. Completed Moderna COVID-19 vaccine series and a third Moderna dose given immunosuppressed status. Advised to proceed with a Moderna booster dose to complete her vaccination series. Has not yet completed Shingrix vaccine but is planning to obtain. Other immunizations up-to-date. Mammogram and well women exams performed at [facility] and up to date. Colonoscopy up-to-date. Continue regular eye exams with Dr. Laith Mckeon. Vitamin D level remains normal on 4000 U daily. Following with Dr. Vale Oreilly for nonmelanoma skin cancers as discussed. In addition, an annual Medicare wellness visit was done today.      Patient understands recommendations and agrees with plan.  Follow-up in 6 months.

## 2022-07-07 ENCOUNTER — TELEPHONE (OUTPATIENT)
Dept: INTERNAL MEDICINE CLINIC | Age: 77
End: 2022-07-07

## 2022-07-07 NOTE — TELEPHONE ENCOUNTER
----- Message from Thompson Whitley sent at 7/7/2022 10:26 AM EDT -----  Subject: Message to Provider    QUESTIONS  Information for Provider? Patient is needing blood work that was done on   15th sent over to Rheumologist office. ---------------------------------------------------------------------------  --------------  Tejal Good [facility]  [phone]; OK to leave message on voicemail  ---------------------------------------------------------------------------  --------------  SCRIPT ANSWERS  Relationship to Patient?  Self

## 2022-08-22 ENCOUNTER — TELEPHONE (OUTPATIENT)
Dept: INTERNAL MEDICINE CLINIC | Age: 77
End: 2022-08-22

## 2022-08-22 RX ORDER — CLOTRIMAZOLE 10 MG/1
10 LOZENGE ORAL; TOPICAL
Qty: 50 TABLET | Refills: 0 | Status: SHIPPED | OUTPATIENT
Start: 2022-08-22 | End: 2022-09-01

## 2022-08-22 NOTE — TELEPHONE ENCOUNTER
Received call from Dr. Sanna Jama during her visit with patient. Reports that patient is experiencing increase in mouth sensitivity and pain as well as eroded lips consistent with mucositis. Reports pain so severe it is interfering with eating and sleeping. Patient has history of burning mouth syndrome, but reports lip lesions new. No history of herpes labialis. Evaluated by Dr. Blanca Jhaveri and prescribed topical steroid for lips, but patient reports that it caused burning pain when applied. She states that she does have a follow-up appointment later this week with dermatology. Will prescribe Mycelex troches as has been helpful in the past with her burning mouth syndrome. Will also prescribe Magic mouthwash to help manage pain.

## 2022-08-23 LAB
CREATININE, EXTERNAL: 0.74
HBA1C MFR BLD HPLC: 5.2 %

## 2022-09-07 ENCOUNTER — PATIENT MESSAGE (OUTPATIENT)
Dept: INTERNAL MEDICINE CLINIC | Age: 77
End: 2022-09-07

## 2022-09-08 NOTE — TELEPHONE ENCOUNTER
From: [place]  To: Julianna Reagan MD  Sent: 2022 5:48 PM EDT  Subject: Follow Up for [address]    [address] Dav Hernandez : 46 has tried unsuccessfully to send the following msg to you via her [employer] acct and has asked that I send to you for her. Dr. Link Donahue, Im hoping you will remind me where we stand with reference to the following: For my , Simi Said,  [birth year]. Is it best to leave his December visit to his Neurologist , Dr Karin Ivan, as is or see another Neurologist sooner? Regarding me, Dav Hernandez,  46  I greatly need to see a therapist on a regular basis as soon as feasible. I remember someone, I believe from your office, calling me to tell me they would send a book with   therapist/psychologists names and info for me to peruse. I was hoping that you had an individual or two that you could recommend as I value your opinion and would not know where to start to choose from your bookwhich I have not, to date, received. I do remember the day you called and I tried to focus on our conversation but obviously was unable to. Latanya Pedraza looks forward to PT and we appreciate you making this possible. It is actually critical for him to continue PT at home as long as possible. He continues to fall on a regular basis which caused the recent hosp visit. Having home PT allows the therapist to work with him in his home environment. Again, thank you for making this possible. Looking forward to hearing from you. Pls call me Home: [phone] Cell [phone] or [place] : [birth year].  Cell: [phone] Awake/Alert/Cooperative

## 2022-09-30 NOTE — DISCHARGE INSTRUCTIONS
Colonoscopy: What to Expect at [address] [place] [address]  After you have a colonoscopy, you will stay at the clinic for 1 to 2 hours until the medicines wear off. Then you can go home. But you will need to arrange for a ride. Your doctor will tell you when you can eat and do your other usual activities. Your doctor will talk to you about when you will need your next colonoscopy. Your doctor can help you decide how often you need to be checked. This will depend on the results of your test and your risk for colorectal cancer. After the test, you may be bloated or have gas pains. You may need to pass gas. If a biopsy was done or a polyp was removed, you may have streaks of blood in your stool (feces) for a few days. This care sheet gives you a general idea about how long it will take for you to recover. But each person recovers at a different pace. Follow the steps below to get better as quickly as possible. How can you care for yourself at home? Activity  · Rest when you feel tired. · You can do your normal activities when it feels okay to do so. Diet  · Follow your doctor's directions for eating. · Unless your doctor has told you not to, drink plenty of fluids. This helps to replace the fluids that were lost during the colon prep. · Do not drink alcohol. Medicines  · If polyps were removed or a biopsy was done during the test, your doctor may tell you not to take aspirin or other anti-inflammatory medicines for a few days. These include ibuprofen (Advil, Motrin) and naproxen (Aleve). Other instructions  · For your safety, do not drive or operate machinery until the medicine wears off and you can think clearly. Your doctor may tell you not to drive or operate machinery until the day after your test.  · Do not sign legal documents or make major decisions until the medicine wears off and you can think clearly. The anesthesia can make it hard for you to fully understand what you are agreeing to.   Follow-up care is a Infectious Diseases Progress Note      LOS: 3 days   Patient Care Team:  Yusuf Jones MD as PCP - General  PhiJoshua MD as Consulting Physician (Cardiology)  Moise Watson MD as Consulting Physician (Cardiac Electrophysiology)  Izzy Alvarez MD as Consulting Physician (Nephrology)  Celine Swanson MD as Consulting Physician (Cardiology)    Chief Complaint: Left elbow pain and swelling    Subjective       The patient has been afebrile for the last 24 hours.  The patient is on room air, hemodynamically stable, and is tolerating antimicrobial therapy.      Review of Systems:   Review of Systems   Constitutional: Negative.    HENT: Negative.    Eyes: Negative.    Respiratory: Negative.    Cardiovascular: Negative.    Gastrointestinal: Negative.    Endocrine: Negative.    Genitourinary: Negative.    Musculoskeletal: Positive for joint swelling.        Left elbow pain and swelling-improved   Skin: Negative.    Neurological: Negative.    Psychiatric/Behavioral: Negative.    All other systems reviewed and are negative.       Objective     Vital Signs  Temp:  [97.6 °F (36.4 °C)-98.2 °F (36.8 °C)] 98.2 °F (36.8 °C)  Heart Rate:  [72-80] 80  Resp:  [16-20] 18  BP: (113-136)/(63-79) 122/66    Physical Exam:  Physical Exam  Vitals and nursing note reviewed.   Constitutional:       General: He is not in acute distress.     Appearance: He is well-developed. He is obese. He is ill-appearing. He is not diaphoretic.   HENT:      Head: Normocephalic and atraumatic.   Eyes:      Conjunctiva/sclera: Conjunctivae normal.      Pupils: Pupils are equal, round, and reactive to light.   Cardiovascular:      Rate and Rhythm: Normal rate and regular rhythm.      Heart sounds: Normal heart sounds, S1 normal and S2 normal.   Pulmonary:      Effort: Pulmonary effort is normal. No respiratory distress.      Breath sounds: No stridor. No wheezing or rales.      Comments: Diminished throughout  Abdominal:       General: Bowel sounds are normal. There is no distension.      Palpations: Abdomen is soft. There is no mass.      Tenderness: There is no abdominal tenderness. There is no guarding.      Comments: Healed abdominal wound   Musculoskeletal:         General: No deformity. Normal range of motion.      Cervical back: Neck supple.      Comments:  Patient has some erythema with a large area of fluctuance at the right elbow-fluctuance has improved today and patient appears to have more range of motion   Skin:     General: Skin is warm and dry.      Coloration: Skin is not pale.      Findings: No erythema or rash.      Comments:  Multiple very superficial wounds to the left lower leg that are weeping    Several very small scabs to bilateral feet-nothing that looks actively infected    Neurological:      Mental Status: He is alert and oriented to person, place, and time.      Cranial Nerves: No cranial nerve deficit.   Psychiatric:         Mood and Affect: Mood normal.          Results Review:    I have reviewed all clinical data, test, lab, and imaging results.     Radiology  CT Upper Extremity Left Without Contrast    Result Date: 9/29/2022  CT UPPER EXTREMITY LEFT WO CONTRAST Date of Exam: 9/29/2022 14:36 EDT Indication: Concern for septic olecranon bursitis-history of hardware placement. Comparison: Radiographs September 27, 2022. Technique: CT of the left elbow was performed without contrast. Coronal and sagittal reformats were obtained.  Automated exposure control and iterative reconstruction methods were used.   FINDINGS: The exam is limited by lack of intravenous contrast, and inability to position elbow for standard imaging. There is a fixation screw and cerclage wires in the proximal ulna/olecranon. Hardware components appear in similar positions to the prior radiographs. There are ossifications in the posterior soft tissues, superior to the olecranon which may related to chronic displaced fracture fragments or  key part of your treatment and safety. Be sure to make and go to all appointments, and call your doctor if you are having problems. It's also a good idea to know your test results and keep a list of the medicines you take. When should you call for help? Call 911 anytime you think you may need emergency care. For example, call if:  · You passed out (lost consciousness). · You pass maroon or bloody stools. · You have severe belly pain. Call your doctor now or seek immediate medical care if:  · Your stools are black and tarlike. · Your stools have streaks of blood, but you did not have a biopsy or any polyps removed. · You have belly pain, or your belly is swollen and firm. · You vomit. · You have a fever. · You are very dizzy. Watch closely for changes in your health, and be sure to contact your doctor if you have any problems. Where can you learn more? Go to [employer]  Enter E264 in the search box to learn more about \"Colonoscopy: What to Expect at Home. \"   © 3571-5394 Healthwise, Incorporated. Care instructions adapted under license by [facility] (which disclaims liability or warranty for this information). This care instruction is for use with your licensed healthcare professional. If you have questions about a medical condition or this instruction, always ask your healthcare professional. [address] any warranty or liability for your use of this information. Content Version: 57.5.322933; Current as of: November 14, 2014         High-Fiber Diet: Care Instructions  Your Care Instructions     A high-fiber diet may help you relieve constipation and feel less bloated. Your doctor and dietitian will help you make a high-fiber eating plan based on your personal needs. The plan will include the things you like to eat. It will also make sure that you get 30 grams of fiber a day.   Before you make changes to the way you eat, be sure to talk with your doctor heterotopic ossification. No clear donor site is visible at this time. Mineralization appears within normal limits. No acute displaced fracture is seen. Elbow alignment appears within normal limits. There does not appear to be evidence of significant elbow arthropathy. There does appear to be an elbow effusion, likely at least moderate in size. There appears to be edema in the posterior soft tissues. There is focal soft tissue prominence overlying the olecranon as seen on the prior radiographs. There is suspicion for a collection in the superficial soft tissues in this location at the medial-posterior olecranon measuring approximately 3.6 x 2.1 x 3.8 cm. This is in the area of the olecranon bursa.      1.Limited evaluation due to lack of intravenous contrast and patient's inability to position elbow for standard imaging. 2.Nonspecific edema in the posterior soft tissues with superficial collection at the posterior medial elbow measuring approximately 3.8 x 3.6 x 2.1 cm. This is in the area of the olecranon bursa. Infectious bursitis cannot be confirmed or excluded. If there is suspicion for infectious bursitis, aspiration may be necessary for diagnosis. 3.Nonspecific elbow effusion. If there is suspicion for joint infection, aspiration would be advised. 4.Status post internal fixation of the olecranon without definite hardware complication seen on this exam. 5.Ossifications at the posterior elbow could be related to heterotopic ossification or chronic displaced fracture fragments. Electronically Signed: Joshua Boucher MD 9/29/2022 16:41 EDT      Cardiology    Laboratory    Results from last 7 days   Lab Units 09/30/22 0425 09/29/22 0141 09/28/22 0313 09/27/22 1932   WBC 10*3/mm3 7.80 8.30 8.50 8.70   HEMOGLOBIN g/dL 8.2* 7.9* 7.9* 8.2*   HEMATOCRIT % 26.7* 25.7* 26.3* 26.8*   PLATELETS 10*3/mm3 400 335 286 290     Results from last 7 days   Lab Units 09/30/22 0425 09/29/22 0141 09/28/22 0313 09/27/22 1932  or dietitian. Follow-up care is a key part of your treatment and safety. Be sure to make and go to all appointments, and call your doctor if you are having problems. It's also a good idea to know your test results and keep a list of the medicines you take. How can you care for yourself at home? · You can increase how much fiber you get if you eat more of certain foods. These foods include:  ? Whole-grain breads and cereals. ? Fruits, such as pears, apples, and peaches. Eat the skins, peels, and seeds, if you can.  ? Vegetables, such as broccoli, cabbage, spinach, carrots, asparagus, and squash. ? Starchy vegetables. These include potatoes with skins, kidney beans, and lima beans. · Take a fiber supplement every day if your doctor recommends it. Examples are Benefiber, Citrucel, FiberCon, and Metamucil. Ask your doctor how much to take. · Drink plenty of fluids. If you have kidney, heart, or liver disease and have to limit fluids, talk with your doctor before you increase the amount of fluids you drink. · Get some exercise every day. Exercise helps stool move through the colon. It also helps prevent constipation. · Keep a food diary. Try to notice and write down what foods cause gas, pain, or other symptoms. Then you can avoid these foods. Where can you learn more? Go to http://www.gray.com/  Enter I587 in the search box to learn more about \"High-Fiber Diet: Care Instructions. \"  Current as of: December 17, 2020               Content Version: 12.8  © 3851-5600 [employer]. Care instructions adapted under license by [employer] (which disclaims liability or warranty for this information). If you have questions about a medical condition or this instruction, always ask your healthcare professional. [address] any warranty or liability for your use of this information.             Diverticulosis: Care Instructions  Your Care   SODIUM mmol/L 136 135* 136 136   POTASSIUM mmol/L 3.9 3.5 3.2* 3.0*   CHLORIDE mmol/L 97* 94* 96* 95*   CO2 mmol/L 27.0 28.0 27.0 29.0   BUN mg/dL 17 21 24* 22   CREATININE mg/dL 1.06 1.22 1.42* 1.44*   GLUCOSE mg/dL 215* 173* 170* 146*   ALBUMIN g/dL  --   --   --  3.10*   BILIRUBIN mg/dL  --   --   --  1.0   ALK PHOS U/L  --   --   --  109   AST (SGOT) U/L  --   --   --  20   ALT (SGPT) U/L  --   --   --  7   CALCIUM mg/dL 8.1* 8.2* 7.8* 7.7*         Results from last 7 days   Lab Units 09/27/22 1932   SED RATE mm/hr 27*         Microbiology   Microbiology Results (last 10 days)     Procedure Component Value - Date/Time    Blood Culture - Blood, Arm, Right [[id number]]  (Normal) Collected: 09/28/22 1326    Lab Status: Preliminary result Specimen: Blood from Arm, Right Updated: 09/29/22 1417     Blood Culture No growth at 24 hours    Blood Culture - Blood, Wrist, Right [[id number]]  (Normal) Collected: 09/28/22 1052    Lab Status: Preliminary result Specimen: Blood from Wrist, Right Updated: 09/30/22 1115     Blood Culture No growth at 2 days    Blood Culture - Blood, Wrist, Right [[id number]]  (Normal) Collected: 09/27/22 2045    Lab Status: Preliminary result Specimen: Blood from Wrist, Right Updated: 09/29/22 2100     Blood Culture No growth at 2 days    Blood Culture - Blood, Arm, Right [[id number]]  (Normal) Collected: 09/27/22 2045    Lab Status: Preliminary result Specimen: Blood from Arm, Right Updated: 09/29/22 2100     Blood Culture No growth at 2 days    Wound Culture - Drainage, Leg, Left [[id number]]  (Abnormal)  (Susceptibility) Collected: 09/27/22 1935    Lab Status: Final result Specimen: Drainage from Leg, Left Updated: 09/30/22 1147     Wound Culture Heavy growth (4+) Staphylococcus aureus, MRSA     Comment:   Methicillin resistant Staphylococcus aureus, Patient may be an isolation risk.         Heavy growth (4+) Corynebacterium species     Comment:   No definitive guidelines. All are susceptible to  Instructions  In diverticulosis, pouches called diverticula form in the wall of the large intestine (colon). The pouches do not cause any pain or other symptoms. Most people who have diverticulosis do not know they have it. But the pouches sometimes bleed, and if they become infected, they can cause pain and other symptoms. When this happens, it is called diverticulitis. Diverticula form when pressure pushes the wall of the colon outward at certain weak points. A diet that is too low in fiber can cause diverticula. Follow-up care is a key part of your treatment and safety. Be sure to make and go to all appointments, and call your doctor if you are having problems. It's also a good idea to know your test results and keep a list of the medicines you take. How can you care for yourself at home? · Include fruits, leafy green vegetables, beans, and whole grains in your diet each day. These foods are high in fiber. · Take a fiber supplement, such as Citrucel or Metamucil, every day if needed. Read and follow all instructions on the label. · Drink plenty of fluids. If you have kidney, heart, or liver disease and have to limit fluids, talk with your doctor before you increase the amount of fluids you drink. · Get at least 30 minutes of exercise on most days of the week. Walking is a good choice. You also may want to do other activities, such as running, swimming, cycling, or playing tennis or team sports. · Cut out foods that cause gas, pain, or other symptoms. When should you call for help?    Call your doctor now or seek immediate medical care if:    · You have belly pain.     · You pass maroon or very bloody stools.     · You have a fever.     · You have nausea and vomiting.     · You have unusual changes in your bowel movements or abdominal swelling.     · You have burning pain when you urinate.     · You have abnormal vaginal discharge.     · You have shoulder pain.     · You have cramping pain that does not get better when you have a bowel movement or pass gas.     · You pass gas or stool from your urethra while urinating. Watch closely for changes in your health, and be sure to contact your doctor if you have any problems. Where can you learn more? Go to http://www.gray.com/  Enter [id number] in the search box to learn more about \"Diverticulosis: Care Instructions. \"  Current as of: April 15, 2020               Content Version: 12.8  © 2006-2021 [employer]. Care instructions adapted under license by [employer] (which disclaims liability or warranty for this information). If you have questions about a medical condition or this instruction, always ask your healthcare professional. [address] any warranty or liability for your use of this information. Colon Polyps: Care Instructions  Your Care Instructions     Colon polyps are growths in the colon or the rectum. The cause of most colon polyps is not known, and most people who get them do not have any problems. But a certain kind can turn into cancer. For this reason, regular testing for colon polyps is important for people as they get older. It is also important for anyone who has an increased risk for colon cancer. Polyps are usually found through routine colon cancer screening tests. Although most colon polyps are not cancerous, they are usually removed and then tested for cancer. Screening for colon cancer saves lives because the cancer can usually be cured if it is caught early. If you have a polyp that is the type that can turn into cancer, you may need more tests to examine your entire colon. The doctor will remove any other polyps that he or she finds, and you will be tested more often. Follow-up care is a key part of your treatment and safety. Be sure to make and go to all appointments, and call your doctor if you are having problems.  It's also a good idea to know your test vancomycin. Resistance to penicillins & cephalosporins does occur.        Gram Stain Rare (1+) WBCs seen      Rare (1+) Gram positive cocci    Narrative:            Susceptibility      Staphylococcus aureus, MRSA      YASMIN      Clindamycin Resistant     Erythromycin Resistant     Inducible Clindamycin Resistance Negative      Oxacillin Resistant     Rifampin Susceptible      Tetracycline Susceptible      Trimethoprim + Sulfamethoxazole Susceptible      Vancomycin Susceptible                                 Medication Review:       Schedule Meds  apixaban, 5 mg, Oral, Q12H  aspirin, 81 mg, Oral, Once per day on Mon Wed Fri  atorvastatin, 40 mg, Oral, Daily  budesonide, 0.5 mg, Nebulization, BID - RT  cefTRIAXone, 2 g, Intravenous, Q24H  docusate sodium, 200 mg, Oral, BID  famotidine, 20 mg, Oral, BID AC  furosemide, 40 mg, Oral, Daily  insulin lispro, 0-7 Units, Subcutaneous, TID AC  isosorbide mononitrate, 120 mg, Oral, Q24H  metOLazone, 5 mg, Oral, Once per day on Mon Wed Fri  metoprolol tartrate, 50 mg, Oral, BID With Meals  mirtazapine, 30 mg, Oral, Nightly  nystatin, , Topical, Q12H  pregabalin, 150 mg, Oral, BID  ranolazine, 1,000 mg, Oral, Q12H  rOPINIRole, 1 mg, Oral, Nightly  sodium chloride, 3 mL, Intravenous, Q12H  traZODone, 100 mg, Oral, Nightly  vancomycin, 1,750 mg, Intravenous, Q24H        Infusion Meds  Pharmacy to dose vancomycin,         PRN Meds  benzonatate  •  dextrose  •  dextrose  •  glucagon (human recombinant)  •  HYDROmorphone  •  hydrOXYzine  •  ipratropium-albuterol  •  magnesium sulfate **OR** magnesium sulfate **OR** magnesium sulfate  •  nitroglycerin  •  ondansetron **OR** ondansetron  •  oxyCODONE  •  Pharmacy to dose vancomycin  •  potassium chloride **OR** potassium chloride **OR** potassium chloride  •  [COMPLETED] Insert peripheral IV **AND** sodium chloride  •  sodium chloride        Assessment & Plan       Antimicrobial Therapy   1.  IV vancomycin        2.  IV  Rocephin        3.  P.o. Keflex        4.  P.o. doxycycline        5.            Assessment     Presentation concerning for left septic olecranon bursitis.  Patient denies any trauma or injections to that area and states that the pain started spontaneously several days before admission.  There is some erythema, acute tendernes, s and a large area of fluctuance just above the elbow joint.  Patient has limited range of motion due to the pain  -Admits to previous surgery on that elbow with hardware 5 or 6 years ago after a fall  -Current x-ray shows a olecranon bursitis  -Initial blood cultures are negative  -he reports fever at home but has been afebrile since admission  -CT shows a superficial collection at the posterior medial elbow in the area of the olecranon bursa  -Orthopedic surgery has seen the patient and does not feel that surgical intervention is needed  -Patient has shown some improvement to swelling and range of motion today     Superficial wounds to the left lower leg with weeping-likely due to chronic edema/venous stasis.  Cultures currently growing Corynebacterium.  It is likely skin bacteria colonization  -History of peripheral vascular disease     Recent admission for bowel obstruction without surgery     Our service last saw the patient in August 2021 for MRSA bacteremia with a abdominal wound infected with MRSA.  Received 4 weeks of IV vancomycin     History of pacemaker placement, congestive heart failure, CAD     History of alcohol cirrhosis     Chronic kidney disease stage III     Type 2 diabetes     COPD     Plan     Discontinue IV Rocephin and IV vancomycin  Start p.o. Keflex 500 mg 3 times daily for 3 weeks  Start p.o. doxycycline 100 mg twice daily for 3 weeks  If patient fails the above treatment then he will likely need surgical intervention  Continue supportive care  Okay to discharge from Infectious Disease standpoint            Jaye Green, KOURTNEY  09/30/22  13:19 EDT    Note is  results and keep a list of the medicines you take. How can you care for yourself at home? Regular exams to look for colon polyps are the best way to prevent polyps from turning into colon cancer. These can include stool tests, sigmoidoscopy, colonoscopy, and CT colonography. Talk with your doctor about a testing schedule that is right for you. To prevent polyps  There is no home treatment that can prevent colon polyps. But these steps may help lower your risk for cancer. · Stay active. Being active can help you get to and stay at a healthy weight. Try to exercise on most days of the week. Walking is a good choice. · Eat well. Choose a variety of vegetables, fruits, legumes (such as peas and beans), fish, poultry, and whole grains. · Do not smoke. If you need help quitting, talk to your doctor about stop-smoking programs and medicines. These can increase your chances of quitting for good. · If you drink alcohol, limit how much you drink. Limit alcohol to 2 drinks a day for men and 1 drink a day for women. When should you call for help? Call your doctor now or seek immediate medical care if:    · You have severe belly pain.     · Your stools are maroon or very bloody. Watch closely for changes in your health, and be sure to contact your doctor if:    · You have a fever.     · You have nausea or vomiting.     · You have a change in bowel habits (new constipation or diarrhea).     · Your symptoms get worse or are not improving as expected. Where can you learn more? Go to http://www.johnson.com/  Enter C571 in the search box to learn more about \"Colon Polyps: Care Instructions. \"  Current as of: December 17, 2020               Content Version: 12.8  © 1896-9831 [employer] Care instructions adapted under license by [employer] (which disclaims liability or warranty for this information).  If you have questions about a medical condition or this instruction, always ask your healthcare professional. [place] [address] any warranty or liability for your use of this information. DISCHARGE SUMMARY from Nurse     POST-PROCEDURE INSTRUCTIONS:    Call your Physician if you:  ? Observe any excess bleeding. ? Develop a temperature over 100.5o F.  ? Experience abdominal, shoulder or chest pain. ? Notice any signs of decreased circulation or nerve impairment to an extremity such as a change in color, persistent numbness, tingling, coldness or increase in pain. ? Vomit blood or you have nausea and vomiting lasting longer than 4 hours. ? Are unable to take medications. ? Are unable to urinate within 8 hours after discharge following general anesthesia or intravenous sedation. For the next 24 hours after receiving general anesthesia or intravenous sedation, or while taking prescription Narcotics, limit your activities:  ? Do NOT drive a motor vehicle, operate hazard machinery or power tools, or perform tasks that require coordination. The medication you received during your procedure may have some effect on your mental awareness. ? Do NOT make important personal or business decisions. The medication you received during your procedure may have some effect on your mental awareness. ? Do NOT drink alcoholic beverages. These drinks do not mix well with the medications that have been given to you. ? Upon discharge from the hospital, you must be accompanied by a responsible adult. ? Resume your diet as directed by your physician. ? Resume medications as your physician has prescribed. ? Please give a list of your current medications to your Primary Care Provider. ? Please update this list whenever your medications are discontinued, doses are changed, or new medications (including over-the-counter products) are added. ? Please carry medication information at all times in case of emergency situations.       These are general instructions for a dictated utilizing voice recognition software/Dragon   healthy lifestyle:    No smoking/ No tobacco products/ Avoid exposure to second hand smoke.  Surgeon General's Warning:  Quitting smoking now greatly reduces serious risk to your health. Obesity, smoking, and a sedentary lifestyle greatly increase your risk for illness.  A healthy diet, regular physical exercise & weight monitoring are important for maintaining a healthy lifestyle   You may be retaining fluid if you have a history of heart failure or if you experience any of the following symptoms:  Weight gain of 3 pounds or more overnight or 5 pounds in a week, increased swelling in our hands or feet or shortness of breath while lying flat in bed. Please call your doctor as soon as you notice any of these symptoms; do not wait until your next office visit. Recognize signs and symptoms of STROKE:  F  -  Face looks uneven  A  -  Arms unable to move or move unevenly  S  -  Speech slurred or non-existent  T  -  Time to call 911 - as soon as signs and symptoms begin - DO NOT go back to bed or wait to see If you get better - TIME IS BRAIN. Colorectal Screening   Colorectal cancer almost always develops from precancerous polyps (abnormal growths) in the colon or rectum. Screening tests can find precancerous polyps, so that they can be removed before they turn into cancer. Screening tests can also find colorectal cancer early, when treatment works best.  [employer] Speak with your physician about when you should begin screening and how often you should be tested. Additional Information    If you have questions, please call [phone]. Remember, [employer] is NOT to be used for urgent needs. For medical emergencies, dial 911. Educational references and/or instructions provided during this visit included:    See attached. APPOINTMENTS:    Please make a follow-up appointment with your physician. Discharge information has been reviewed with the patient. The patient verbalized understanding.

## 2022-10-27 ENCOUNTER — PATIENT MESSAGE (OUTPATIENT)
Dept: INTERNAL MEDICINE CLINIC | Age: 77
End: 2022-10-27

## 2022-10-27 DIAGNOSIS — R09.89 CHOKING EPISODE: ICD-10-CM

## 2022-10-27 DIAGNOSIS — R05.1 ACUTE COUGH: ICD-10-CM

## 2022-10-27 DIAGNOSIS — R09.89 CHOKING EPISODE: Primary | ICD-10-CM

## 2022-10-28 ENCOUNTER — HOSPITAL ENCOUNTER (OUTPATIENT)
Dept: GENERAL RADIOLOGY | Age: 77
Discharge: HOME | End: 2022-10-28
Payer: MEDICARE

## 2022-10-28 PROCEDURE — 71046 X-RAY EXAM CHEST 2 VIEWS: CPT

## 2022-12-06 ENCOUNTER — HOSPITAL ENCOUNTER (OUTPATIENT)
Dept: LAB | Age: 77
Discharge: HOME | End: 2022-12-06
Payer: MEDICARE

## 2022-12-06 ENCOUNTER — PATIENT MESSAGE (OUTPATIENT)
Dept: INTERNAL MEDICINE CLINIC | Age: 77
End: 2022-12-06

## 2022-12-06 ENCOUNTER — APPOINTMENT (OUTPATIENT)
Dept: INTERNAL MEDICINE CLINIC | Age: 77
End: 2022-12-06

## 2022-12-06 DIAGNOSIS — I70.1 RENAL ARTERY STENOSIS DUE TO FIBROMUSCULAR DYSPLASIA (HCC): ICD-10-CM

## 2022-12-06 DIAGNOSIS — I10 PRIMARY HYPERTENSION: ICD-10-CM

## 2022-12-06 DIAGNOSIS — E21.3 HYPERPARATHYROIDISM (HCC): ICD-10-CM

## 2022-12-06 DIAGNOSIS — E78.5 HYPERLIPIDEMIA, UNSPECIFIED HYPERLIPIDEMIA TYPE: ICD-10-CM

## 2022-12-06 DIAGNOSIS — M05.79 RHEUMATOID ARTHRITIS INVOLVING MULTIPLE SITES WITH POSITIVE RHEUMATOID FACTOR (HCC): ICD-10-CM

## 2022-12-06 DIAGNOSIS — D64.9 ANEMIA, UNSPECIFIED TYPE: ICD-10-CM

## 2022-12-06 DIAGNOSIS — I77.3 FIBROMUSCULAR DYSPLASIA (HCC): ICD-10-CM

## 2022-12-06 DIAGNOSIS — I65.23 CAROTID STENOSIS, BILATERAL: ICD-10-CM

## 2022-12-06 DIAGNOSIS — I77.3 RENAL ARTERY STENOSIS DUE TO FIBROMUSCULAR DYSPLASIA (HCC): ICD-10-CM

## 2022-12-06 LAB
25(OH)D3 SERPL-MCNC: 45.2 NG/ML (ref 30–100)
ALBUMIN SERPL-MCNC: 4 G/DL (ref 3.4–5)
ALBUMIN/GLOB SERPL: 1.3 {RATIO} (ref 0.8–1.7)
ALP SERPL-CCNC: 73 U/L (ref 45–117)
ALT SERPL-CCNC: 27 U/L (ref 13–56)
ANION GAP SERPL CALC-SCNC: 4 MMOL/L (ref 3–18)
APPEARANCE UR: CLEAR
AST SERPL-CCNC: 28 U/L (ref 10–38)
BASOPHILS # BLD: 0.1 K/UL (ref 0–0.1)
BASOPHILS NFR BLD: 1 % (ref 0–2)
BILIRUB SERPL-MCNC: 0.5 MG/DL (ref 0.2–1)
BILIRUB UR QL: NEGATIVE
BUN SERPL-MCNC: 17 MG/DL (ref 7–18)
BUN/CREAT SERPL: 20 (ref 12–20)
CALCIUM SERPL-MCNC: 10.4 MG/DL (ref 8.5–10.1)
CHLORIDE SERPL-SCNC: 104 MMOL/L (ref 100–111)
CHOLEST SERPL-MCNC: 186 MG/DL
CO2 SERPL-SCNC: 29 MMOL/L (ref 21–32)
COLOR UR: YELLOW
CREAT SERPL-MCNC: 0.84 MG/DL (ref 0.6–1.3)
DIFFERENTIAL METHOD BLD: ABNORMAL
EOSINOPHIL # BLD: 0.1 K/UL (ref 0–0.4)
EOSINOPHIL NFR BLD: 1 % (ref 0–5)
ERYTHROCYTE [DISTWIDTH] IN BLOOD BY AUTOMATED COUNT: 13.9 % (ref 11.6–14.5)
GLOBULIN SER CALC-MCNC: 3 G/DL (ref 2–4)
GLUCOSE SERPL-MCNC: 82 MG/DL (ref 74–99)
GLUCOSE UR STRIP.AUTO-MCNC: NEGATIVE MG/DL
HCT VFR BLD AUTO: 36 % (ref 35–45)
HDLC SERPL-MCNC: 94 MG/DL (ref 40–60)
HDLC SERPL: 2 {RATIO} (ref 0–5)
HGB BLD-MCNC: 11.6 G/DL (ref 12–16)
HGB UR QL STRIP: NEGATIVE
IMM GRANULOCYTES # BLD AUTO: 0 K/UL (ref 0–0.04)
IMM GRANULOCYTES NFR BLD AUTO: 0 % (ref 0–0.5)
KETONES UR QL STRIP.AUTO: NEGATIVE MG/DL
LDLC SERPL CALC-MCNC: 71.4 MG/DL (ref 0–100)
LEUKOCYTE ESTERASE UR QL STRIP.AUTO: NEGATIVE
LIPID PROFILE,FLP: ABNORMAL
LYMPHOCYTES # BLD: 1 K/UL (ref 0.9–3.6)
LYMPHOCYTES NFR BLD: 14 % (ref 21–52)
MAGNESIUM SERPL-MCNC: 2.2 MG/DL (ref 1.6–2.6)
MCH RBC QN AUTO: 29.7 PG (ref 24–34)
MCHC RBC AUTO-ENTMCNC: 32.2 G/DL (ref 31–37)
MCV RBC AUTO: 92.3 FL (ref 78–100)
MONOCYTES # BLD: 0.5 K/UL (ref 0.05–1.2)
MONOCYTES NFR BLD: 7 % (ref 3–10)
NEUTS SEG # BLD: 5.6 K/UL (ref 1.8–8)
NEUTS SEG NFR BLD: 78 % (ref 40–73)
NITRITE UR QL STRIP.AUTO: NEGATIVE
NRBC # BLD: 0 K/UL (ref 0–0.01)
NRBC BLD-RTO: 0 PER 100 WBC
PH UR STRIP: 6.5 [PH] (ref 5–8)
PLATELET # BLD AUTO: 276 K/UL (ref 135–420)
PMV BLD AUTO: 10.8 FL (ref 9.2–11.8)
POTASSIUM SERPL-SCNC: 4.1 MMOL/L (ref 3.5–5.5)
PROT SERPL-MCNC: 7 G/DL (ref 6.4–8.2)
PROT UR STRIP-MCNC: NEGATIVE MG/DL
RBC # BLD AUTO: 3.9 M/UL (ref 4.2–5.3)
SODIUM SERPL-SCNC: 137 MMOL/L (ref 136–145)
SP GR UR REFRACTOMETRY: 1 (ref 1–1.03)
TRIGL SERPL-MCNC: 103 MG/DL (ref ?–150)
UROBILINOGEN UR QL STRIP.AUTO: 0.2 EU/DL (ref 0.2–1)
VLDLC SERPL CALC-MCNC: 20.6 MG/DL
WBC # BLD AUTO: 7.2 K/UL (ref 4.6–13.2)

## 2022-12-06 PROCEDURE — 80061 LIPID PANEL: CPT

## 2022-12-06 PROCEDURE — 85025 COMPLETE CBC W/AUTO DIFF WBC: CPT

## 2022-12-06 PROCEDURE — 82306 VITAMIN D 25 HYDROXY: CPT

## 2022-12-06 PROCEDURE — 36415 COLL VENOUS BLD VENIPUNCTURE: CPT

## 2022-12-06 PROCEDURE — 80053 COMPREHEN METABOLIC PANEL: CPT

## 2022-12-06 PROCEDURE — 83735 ASSAY OF MAGNESIUM: CPT

## 2022-12-06 PROCEDURE — 81003 URINALYSIS AUTO W/O SCOPE: CPT

## 2022-12-13 ENCOUNTER — OFFICE VISIT (OUTPATIENT)
Dept: INTERNAL MEDICINE CLINIC | Age: 77
End: 2022-12-13
Payer: MEDICARE

## 2022-12-13 VITALS
TEMPERATURE: 97 F | RESPIRATION RATE: 16 BRPM | OXYGEN SATURATION: 96 % | SYSTOLIC BLOOD PRESSURE: 132 MMHG | HEART RATE: 74 BPM | WEIGHT: 112 LBS | HEIGHT: 67 IN | BODY MASS INDEX: 17.58 KG/M2 | DIASTOLIC BLOOD PRESSURE: 64 MMHG

## 2022-12-13 DIAGNOSIS — D04.9 BOWEN'S DISEASE: ICD-10-CM

## 2022-12-13 DIAGNOSIS — D84.9 IMMUNOSUPPRESSED STATUS (HCC): ICD-10-CM

## 2022-12-13 DIAGNOSIS — K21.9 GASTROESOPHAGEAL REFLUX DISEASE, UNSPECIFIED WHETHER ESOPHAGITIS PRESENT: ICD-10-CM

## 2022-12-13 DIAGNOSIS — I65.23 CAROTID STENOSIS, BILATERAL: ICD-10-CM

## 2022-12-13 DIAGNOSIS — F41.9 ANXIETY: ICD-10-CM

## 2022-12-13 DIAGNOSIS — M85.89 OSTEOPENIA OF MULTIPLE SITES: ICD-10-CM

## 2022-12-13 DIAGNOSIS — M85.9 DISORDER OF BONE DENSITY AND STRUCTURE, UNSPECIFIED: ICD-10-CM

## 2022-12-13 DIAGNOSIS — K58.0 IRRITABLE BOWEL SYNDROME WITH DIARRHEA: ICD-10-CM

## 2022-12-13 DIAGNOSIS — I77.3 FIBROMUSCULAR DYSPLASIA (HCC): ICD-10-CM

## 2022-12-13 DIAGNOSIS — I10 PRIMARY HYPERTENSION: Primary | ICD-10-CM

## 2022-12-13 DIAGNOSIS — E78.5 HYPERLIPIDEMIA, UNSPECIFIED HYPERLIPIDEMIA TYPE: ICD-10-CM

## 2022-12-13 DIAGNOSIS — D64.9 ANEMIA, UNSPECIFIED TYPE: ICD-10-CM

## 2022-12-13 DIAGNOSIS — M05.79 RHEUMATOID ARTHRITIS INVOLVING MULTIPLE SITES WITH POSITIVE RHEUMATOID FACTOR (HCC): ICD-10-CM

## 2022-12-13 PROCEDURE — 99214 OFFICE O/P EST MOD 30 MIN: CPT | Performed by: INTERNAL MEDICINE

## 2022-12-13 PROCEDURE — G8427 DOCREV CUR MEDS BY ELIG CLIN: HCPCS | Performed by: INTERNAL MEDICINE

## 2022-12-13 PROCEDURE — 1101F PT FALLS ASSESS-DOCD LE1/YR: CPT | Performed by: INTERNAL MEDICINE

## 2022-12-13 PROCEDURE — G0463 HOSPITAL OUTPT CLINIC VISIT: HCPCS | Performed by: INTERNAL MEDICINE

## 2022-12-13 PROCEDURE — 3078F DIAST BP <80 MM HG: CPT | Performed by: INTERNAL MEDICINE

## 2022-12-13 PROCEDURE — G8752 SYS BP LESS 140: HCPCS | Performed by: INTERNAL MEDICINE

## 2022-12-13 PROCEDURE — 3074F SYST BP LT 130 MM HG: CPT | Performed by: INTERNAL MEDICINE

## 2022-12-13 PROCEDURE — 1123F ACP DISCUSS/DSCN MKR DOCD: CPT | Performed by: INTERNAL MEDICINE

## 2022-12-13 PROCEDURE — G8510 SCR DEP NEG, NO PLAN REQD: HCPCS | Performed by: INTERNAL MEDICINE

## 2022-12-13 PROCEDURE — G8399 PT W/DXA RESULTS DOCUMENT: HCPCS | Performed by: INTERNAL MEDICINE

## 2022-12-13 PROCEDURE — G8754 DIAS BP LESS 90: HCPCS | Performed by: INTERNAL MEDICINE

## 2022-12-13 PROCEDURE — 1090F PRES/ABSN URINE INCON ASSESS: CPT | Performed by: INTERNAL MEDICINE

## 2022-12-13 PROCEDURE — G8419 CALC BMI OUT NRM PARAM NOF/U: HCPCS | Performed by: INTERNAL MEDICINE

## 2022-12-13 PROCEDURE — G8536 NO DOC ELDER MAL SCRN: HCPCS | Performed by: INTERNAL MEDICINE

## 2022-12-13 RX ORDER — PREDNISONE 5 MG/1
5 TABLET ORAL DAILY
COMMUNITY
Start: 2022-11-29

## 2022-12-13 NOTE — PROGRESS NOTES
1. \"Have you been to the ER, urgent care clinic since your last visit? Hospitalized since your last visit? \" No    2. \"Have you seen or consulted any other health care providers outside of the [address] [place] [address] since your last visit? \" No     3. For patients aged 39-70: Has the patient had a colonoscopy / FIT/ Cologuard? NA - based on age      If the patient is female:    4. For patients aged 41-77: Has the patient had a mammogram within the past 2 years? NA - based on age or sex      11. For patients aged 21-65: Has the patient had a pap smear?  NA - based on age or sex

## 2022-12-13 NOTE — PATIENT INSTRUCTIONS
Heart-Healthy Diet: Care Instructions  Your Care Instructions     A heart-healthy diet has lots of vegetables, fruits, nuts, beans, and whole grains, and is low in salt. It limits foods that are high in saturated fat, such as meats, cheeses, and fried foods. It may be hard to change your diet, but even small changes can lower your risk of heart attack and heart disease. Follow-up care is a key part of your treatment and safety. Be sure to make and go to all appointments, and call your doctor if you are having problems. It's also a good idea to know your test results and keep a list of the medicines you take. How can you care for yourself at home? Watch your portions  Learn what a serving is. A \"serving\" and a \"portion\" are not always the same thing. Make sure that you are not eating larger portions than are recommended. For example, a serving of pasta is ½ cup. A serving size of meat is 2 to 3 ounces. A 3-ounce serving is about the size of a deck of cards. Measure serving sizes until you are good at [place]" them. Keep in mind that restaurants often serve portions that are 2 or 3 times the size of one serving. To keep your energy level up and keep you from feeling hungry, eat often but in smaller portions. Eat only the number of calories you need to stay at a healthy weight. If you need to lose weight, eat fewer calories than your body burns (through exercise and other physical activity). Eat more fruits and vegetables  Eat a variety of fruit and vegetables every day. Dark green, deep orange, red, or yellow fruits and vegetables are especially good for you. Examples include spinach, carrots, peaches, and berries. Keep carrots, celery, and other veggies handy for snacks. Buy fruit that is in season and store it where you can see it so that you will be tempted to eat it. Cook dishes that have a lot of veggies in them, such as stir-fries and soups.   Limit saturated and trans fat  Read food labels, and try to avoid saturated and trans fats. They increase your risk of heart disease. Use olive or canola oil when you cook. Bake, broil, grill, or steam foods instead of frying them. Choose lean meats instead of high-fat meats such as hot dogs and sausages. Cut off all visible fat when you prepare meat. Eat fish, skinless poultry, and meat alternatives such as soy products instead of high-fat meats. Soy products, such as tofu, may be especially good for your heart. Choose low-fat or fat-free milk and dairy products. Eat foods high in fiber  Eat a variety of grain products every day. Include whole-grain foods that have lots of fiber and nutrients. Examples of whole-grain foods include oats, whole wheat bread, and brown rice. Buy whole-grain breads and cereals, instead of white bread or pastries. Limit salt and sodium  Limit how much salt and sodium you eat to help lower your blood pressure. Taste food before you salt it. Add only a little salt when you think you need it. With time, your taste buds will adjust to less salt. Eat fewer snack items, fast foods, and other high-salt, processed foods. Check food labels for the amount of sodium in packaged foods. Choose low-sodium versions of canned goods (such as soups, vegetables, and beans). Limit sugar  Limit drinks and foods with added sugar. These include candy, desserts, and soda pop. Limit alcohol  Limit alcohol to no more than 2 drinks a day for men and 1 drink a day for women. Too much alcohol can cause health problems. When should you call for help? Watch closely for changes in your health, and be sure to contact your doctor if:    You would like help planning heart-healthy meals. Where can you learn more? Go to http://www.johnson.com/  Enter V137 in the search box to learn more about \"Heart-Healthy Diet: Care Instructions. \"  Current as of: August 22, 2019               Content Version: 12.6  © 2955-1553 [employer]. Care instructions adapted under license by [employer] (which disclaims liability or warranty for this information). If you have questions about a medical condition or this instruction, always ask your healthcare professional. [address] any warranty or liability for your use of this information. High Cholesterol: Care Instructions  Your Care Instructions     Cholesterol is a type of fat in your blood. It is needed for many body functions, such as making new cells. Cholesterol is made by your body. It also comes from food you eat. High cholesterol means that you have too much of the fat in your blood. This raises your risk of a heart attack and stroke. LDL and HDL are part of your total cholesterol. LDL is the \"bad\" cholesterol. High LDL can raise your risk for heart disease, heart attack, and stroke. HDL is the \"good\" cholesterol. It helps clear bad cholesterol from the body. High HDL is linked with a lower risk of heart disease, heart attack, and stroke. Your cholesterol levels help your doctor find out your risk for having a heart attack or stroke. You and your doctor can talk about whether you need to lower your risk and what treatment is best for you. A heart-healthy lifestyle along with medicines can help lower your cholesterol and your risk. The way you choose to lower your risk will depend on how high your risk is for heart attack and stroke. It will also depend on how you feel about taking medicines. Follow-up care is a key part of your treatment and safety. Be sure to make and go to all appointments, and call your doctor if you are having problems. It's also a good idea to know your test results and keep a list of the medicines you take. How can you care for yourself at home? Eat a variety of foods every day.  Good choices include fruits, vegetables, whole grains (like oatmeal), dried beans and peas, nuts and seeds, soy products (like tofu), and fat-free or low-fat dairy products. Replace butter, margarine, and hydrogenated or partially hydrogenated oils with olive and canola oils. (Canola oil margarine without trans fat is fine.)  Replace red meat with fish, poultry, and soy protein (like tofu). Limit processed and packaged foods like chips, crackers, and cookies. Bake, broil, or steam foods. Don't dennis them. Be physically active. Get at least 30 minutes of exercise on most days of the week. Walking is a good choice. You also may want to do other activities, such as running, swimming, cycling, or playing tennis or team sports. Stay at a healthy weight or lose weight by making the changes in eating and physical activity listed above. Losing just a small amount of weight, even 5 to 10 pounds, can reduce your risk for having a heart attack or stroke. Do not smoke. When should you call for help? Watch closely for changes in your health, and be sure to contact your doctor if:    You need help making lifestyle changes. You have questions about your medicine. Where can you learn more? Go to http://www.GÃ¼dpod/  Enter I526 in the search box to learn more about \"High Cholesterol: Care Instructions. \"  Current as of: December 16, 2019               Content Version: 12.6  © 4821-5820 [employer]. Care instructions adapted under license by [employer] (which disclaims liability or warranty for this information). If you have questions about a medical condition or this instruction, always ask your healthcare professional. [place] [address] any warranty or liability for your use of this information.

## 2022-12-20 PROBLEM — U09.9 POST-ACUTE SEQUELAE OF COVID-19 (PASC): Status: RESOLVED | Noted: 2021-12-19 | Resolved: 2022-12-20

## 2022-12-20 NOTE — PROGRESS NOTES
HPI:   [place] is a 69y.o. year old female who presents today for a routine visit. 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 She also has a history of COVID-19 infection in 9/2021. She has completed four doses of the [address] 19 vaccine series given her immunosuppressed status and received the updated bivalent booster dose. She reports that she is doing reasonably well. She reports that she is being followed closely by dermatology and underwent removal of a squamous cell carcinoma from her face by Mohs procedure. She states that she is a new lesion was found on her neck and she is in the process of scheduling removal.  She reports that her methotrexate was held for 2 months during the time surrounding her dermatologic surgery, and she noted a significant increase in joint pains at that time. However, she states that she restarted methotrexate approximately 4 weeks ago and has noted improvement in her arthritis pain, particularly involving her bilateral hands. She also reports that she was having difficulty with worsening nerve pain and lesions on her lip. She states that dermatology determined that they were autoimmune in etiology and she noted significant improvement after being treated with a topical steroid cream and Vaseline. She had contacted the office on 10/27/2022 after choking on a tra chip, and follow-up chest x-ray (10/28/2022) was negative. She states that she is scheduled to follow-up with Dr. Caprice Simmonds to address. She is otherwise without new complaints. Summary of prior hospitalizations and medical history:  In 12/2021, she sustained a fall after falling off a chair in her kitchen.   She reported that she struck her right hip and her head during the fall but did not lose consciousness. She reported persistent right hip pain but no other symptoms following the fall. However, three days later, she experienced acute onset vertigo while driving which was transient and resolved. She was advised by Dr. Silvia Ng to present to [facility] ED for evaluation (12/14/2021) and underwent head CT scan, right hip x-ray, sacral/coccyx x-ray, and EKG which were unremarkable. On 9/29/2021, she contacted the office and reports that she had tested positive by rapid testing for COVID-19 on 9/28/2021 after developing symptoms 1 day prior of fever to 100.6°F, headache, sore throat, and nasal congestion with cough. She stated that she also lost her sense of taste and smell. She was taking Tylenol  mg every 6 hours and ibuprofen with persistent fever. Given her immunosuppressed status and high risk of developing severe disease, she was advised to present to the [facility] ED and received bamlanivimab-etesevimab monoclonal antibody infusion without complications. She reports continued altered taste and smell, but resolution of all other symptoms. On 10/21/2019, [facility] reporting difficulty with elevated blood pressure and a dull headache. She stated that her blood pressure has been elevated earlier in the week when visiting her gastroenterologist. On the day of presentation to the ED, she states that she was experiencing an achy dull headache that was mild in severity throughout the day. She became concerned when she went to bed and noticed throbbing pain in her hands and feet. She checked her blood pressure and noticed her systolic ranged from 459-275 mmHg. She had taken her metoprolol tartrate 50 mg bid as prescribed. She denied any shortness of breath, leg swelling, orthopnea, PND, visual changes, nausea/vomiting, lightheadedness, dizziness, confusion, speech difficulty, numbness, or weakness.   She did admit to some chest tightness, but stated that it was not exertional and was her usual discomfort associated with reflux. Evaluation in the ED revealed that her blood pressure was 144/73. EKG showed sinus rhythm at 64 bpm and LBBB. Laboratory data included WBC 6.2, Hb 11.8/ Hct 36.9, creatinine 0.79/eGFR >60, troponin x 1 negative, and chest x-ray negative. While in the ED, her headache resolved and she was subsequently discharged. She was seen in follow-up on 10/30/2019 and described episodes with head fullness, flushing, and  \"prickly skin\" occurring in the evening after lying down. She stated that she did notice one episode where her heart rate appeared to be 150 bpm. She also reported that she had been under an increased amount of stress with regard to several of her family members, and felt that the episodes may be secondary to anxiety. Her dose of Zoloft was increased to 50 mg daily, and she reported that the episodes of palpitations and head fullness resolved with the increase in dose of Zoloft. She has a history of hypertension and palpitations, treated currently with metoprolol. She has a history of dyslipidemia, not currently being treated with medication. She was evaluated by Dr. Gen Mcintosh in 2012 for palpitations. Event monitor was placed, and showed a known LBBB and PVC's, but reportedly no other arrhythmias (report unavailable). In 9/2012, she also underwent a pharmacologic nuclear stress test which was a normal low risk study with EF 55%. She also had an echocardiogram, which showed low normal LV function (EF 50%), mild grade 1 diastolic dysfunction, trace MR, TR, and AI. She also has a history of fibromuscular dysplasia, and in 5/2009, underwent aortogram and renal arteriogram with balloon angioplasty.  She has a known right carotid bruit, and her last carotid duplex (5/2014) showed mild atherosclerotic intraluminal plaquing with elevated velocities in the bilateral mid internal carotid arteries with a \"string of pearls\" appearance consistent with known fibromuscular dysplasia, causing a 50-69% stenosis of the bilateral internal carotid arteries. There was no change from prior study in 2011. She had been followed by Dr. Emma Feliz, but states that she has been released from his care. She remains quite active, and denies any chest pain, shortness of breath at rest or with exertion, palpitations, lightheadedness, or edema. She underwent a repeat carotid duplex study (8/15/2018) showing mild (<50%) stenosis of the left internal carotid artery and moderate (50-69%) stenosis of the right internal carotid artery. Repeat performed 12/17/2019 showed moderate homogenous plaque in right ICA (50 to 69% stenosis). Stenosis is at the mid ICA and may be upper limits of moderate; mild heterogeneous plaque left ICA (< 50% stenosis). She has a history of rheumatoid arthritis, diagnosed when she was 37years old, treated currently with subcutaneous methotrexate and hydroxychloroquine. She previously was also treated with prednisone, but has been able to successfully wean from taking it. She is being followed closely by Dr. Priscilla Hager. She is not currently being treated with a biologic agent due to concern regarding recent multiple squamous cell carcinomas of the skin, for which she was followed by Dr. Meche Kwan and now by Dr. Jocelin Macdonald. She reports that she was well controlled on Humira for several years, but had to discontinue treatment when it was no longer on her insurance formulary. She also has a history of osteopenia, with bone density studies performed by Dr. Priscilla Hager. Her last bone density study was in 2/2019 showing T-scores:  femoral neck left -1.7  /right -1.9 and lumbar -2.1. She continues to take calcium and Vitamin D supplements. She has no history of pathologic fractures.  She was recently diagnosed with familial hypocalciuric hypercalcemia with mildly elevated calcium 10.6, mildly elevated PTH 80, Vitamin D level 36.5, and 24 hour urine calcium of 105 (FeCa 1%) in 7/30/2018, which would be consistent with this diagnosis. She is being followed by Dr. Roberto Hernandez. She has a history of cervical degenerative joint and disc disease and chronic neck pain. She was being followed by Dr. Ami Torre, who recommended surgery. She was seen by Dr. Suzette Her at [address] for a second opinion, and cervical MRI (9/2015) showed multilevel degenerative disc and facet disease, worst at C5-C6; left central/subarticular disc protrusion with superimposed annular fissure noted at C5-C6 causing mass effect upon the left hemicord but no signal changes with severe left neuroforaminal narrowing at this level. Recommendation was for her to proceed with physical therapy, which did result in some improvement. She previously had been treated with gabapentin for pain control, but is currently only using Tylenol. She underwent acupuncture therapy with Dr. Bernadette Hyde with only minimal improvement. She also states that she was referred to Dr. Louie Ramos for evaluation, but did not schedule since symptoms have gradually improved. She has a history of GERD. In 11/2017, she was having increasing difficulty with constipation alternating with diarrhea as well as increasing reflux symptoms. She states that she was evaluated by Dr. Suresh Jackson and it was her opinion that she suffered from irritable bowel syndrome. She has made changes in her diet and increased her consumption of fiber with some improvement. She began experiencing worsening reflux symptoms in 12/2018 after attempting to wean omeprazole from 40 mg to 20 mg daily. She also was taking an increased dose of prednisone at the time for a flare of her RA. She was evaluated by Dr. Suresh Jackson and attempted a trial of Protonix which she believed may be working better. She plans to continue taking it. She states that at the time of her worsening reflux, she also noted increasing palpitations and underwent evaluation by THOMAS Randall for Dr. Bonilla Griffiths.  She states that she was offered an event recorder to evaluate, but she noticed that her symptoms improved once her GERD was under control. She therefore declined further evaluation. She states that she has resumed taking omeprazole with reasonable control of her reflux symptoms. She underwent upper endoscopy by Dr. Fede Yoo in 11/2012 which showed a 2 cm hiatal hernia and pathology from a distal esophageal biopsy showing chronic inflammation. She had a screening colonoscopy in 7/14/2016 by Dr. Fede Yoo, which showed an adenomatous polyp (report unavailable). She had a repeat screening colonoscopy on 7/27/2021 by Dr. Clary Billy showing mild diverticulosis in the sigmoid colon, two sessile 5-6 mm polyps in the cecum (pathology: tubular adenoma), two sessile 5-7 mm polyps in the ascending colon (pathology: tubular adenoma), and one sessile 1 cm polyp in the transverse colon (polyp not retrieved). Follow-up recommended for 3 years. She denies any abdominal pain, nausea, vomiting, melena, hematochezia, or change in bowel movements.        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 [facility])     s/p balloon angioplasty 5/2009 [facility]    Rheumatoid arthritis ([facility] [address]     Skin cancer      Past Surgical History:   Procedure Laterality Date    COLONOSCOPY N/A 7/27/2021    COLONOSCOPY with polypectomies with bx's performed by Price Jo MD at [facility] ENDOSCOPY    HX ANGIOPLASTY      HX COLONOSCOPY      HX KNEE REPLACEMENT Bilateral 2009    partial     HX OTHER SURGICAL      removal of fibroid benign tumors from breast     HX PARTIAL HYSTERECTOMY      HX TONSILLECTOMY  1964     Current Outpatient Medications   Medication Sig    predniSONE (DELTASONE) 5 mg tablet Take 5 mg by mouth daily. sertraline (ZOLOFT) 50 mg tablet take 1 tablet by mouth once daily    Psyllium Husk-Sucrose 3.4 gram/7 gram powd Take  by mouth. aspirin 81 mg chewable tablet Take 81 mg by mouth. Every three days    hydrOXYchloroQUINE (PLAQUENIL) 200 mg tablet Take 200 mg by mouth daily. metoprolol tartrate (LOPRESSOR) 50 mg tablet Take 1 Tab by mouth two (2) times a day. METHOTREXATE SODIUM INJECTION 17.5 mg every seven (7) days. omeprazole (PRILOSEC) 40 mg capsule Take 40 mg by mouth every other day. multivitamin (ONE A DAY) tablet Take 1 Tab by Mouth Once a Day. acetaminophen (TYLENOL) 500 mg tablet Take 500 mg by mouth every six (6) hours as needed. cholecalciferol (VITAMIN D3) (2,000 UNITS /50 MCG) cap capsule Take 2,000 Units by mouth. folic acid (FOLVITE) 1 mg tablet Take 1 mg by mouth daily. No current facility-administered medications for this visit. Allergies and Intolerances: Allergies   Allergen Reactions    Hydrocodone Anaphylaxis    Nsaids (Non-Steroidal Anti-Inflammatory Drug) Other (comments)     None due to kidneys    Azithromycin Itching    Other Medication Other (comments)     GI intolerance to nonsteroidal antiinflammatory medications     Vicodin [Hydrocodone-Acetaminophen] Other (comments)     Family History: Her father passed away from a ruptured AAA. Her mother has osteoarthritis and COPD, and a maternal aunt had RA. Her brother recently passed away from multiple sclerosis. She has no family history of colon or breast cancer. Family History   Problem Relation Age of Onset    OSTEOARTHRITIS Mother     Elevated Lipids Mother     Heart Disease Mother         CHF    Lung Disease Mother     Hypertension Sister     Lung Disease Sister     Hypertension Brother     Mult Sclerosis Brother      Social History:   She  reports that she has never smoked. She has never used smokeless tobacco. She is  with two children and one stepchild.  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 She drinks wine only occasionally. Social History     Substance and Sexual Activity   Alcohol Use Yes    Alcohol/week: 1.0 - 2.0 standard drink    Types: 1 - 2 Glasses of wine per week     Immunization History:  Immunization History   Administered Date(s) Administered     Influenza, FLUZONE (age 72 y+), High Dose 11/18/2021, 09/30/2022    COVID-19, MODERNA BLUE border, Primary or Immunocompromised, (age 18y+), IM, 100 mcg/0.5mL 02/24/2021, 03/24/2021, 01/02/2022, 06/23/2022    COVID-19, MODERNA Bivalent BOOSTER, (age 18y+), IM, 50 mcg/0.5 mL 11/01/2022    Influenza High Dose Vaccine PF 10/29/2018    Influenza Vaccine 10/29/2013    Influenza Vaccine (Tri) Adjuvanted (>65 Yrs FLUAD TRI 11092) 12/11/2019    Pneumococcal Conjugate (PCV-13) 12/14/2015    Pneumococcal Polysaccharide (PPSV-23) 11/15/2018    Tdap 10/02/2019       Review of Systems:   As above included in HPI. Otherwise 11 point review of systems negative including constitutional, skin, HENT, eyes, respiratory, cardiovascular, gastrointestinal, genitourinary, musculoskeletal, endocrine, hematologic, allergy, and neurologic. Physical:   Visit Vitals  /64 (BP 1 Location: Left upper arm, BP Patient Position: Sitting)   Pulse 74   Temp 97 °F (36.1 °C) (Temporal)   Resp 16   Ht 5' 6.5\" (1.689 m)   Wt 112 lb (50.8 kg)   SpO2 96%   BMI 17.81 kg/m²      Patient appears in no apparent distress. Affect is appropriate. HEENT: PERRLA, anicteric, oropharynx clear, no JVD, adenopathy or thyromegaly. No carotid bruits or radiated murmur. Lungs: clear to auscultation, no wheezes, rhonchi, or rales. Heart: regular rate and rhythm. No murmur, rubs, gallops  Abdomen: soft, nontender, nondistended, normal bowel sounds, no hepatosplenomegaly or masses. Extremities: without edema.           Review of Data:  Labs:  Hospital Outpatient Visit on 12/06/2022   Component Date Value Ref Range Status    WBC 12/06/2022 7.2 4.6 - 13.2 K/uL Final    RBC 12/06/2022 3.90 (A)  4.20 - 5.30 M/uL Final    HGB 12/06/2022 11.6 (A)  12.0 - 16.0 g/dL Final    HCT 12/06/2022 36.0  35.0 - 45.0 % Final    MCV 12/06/2022 92.3  78.0 - 100.0 FL Final    MCH 12/06/2022 29.7  24.0 - 34.0 PG Final    MCHC 12/06/2022 32.2  31.0 - 37.0 g/dL Final    RDW 12/06/2022 13.9  11.6 - 14.5 % Final    PLATELET 66/15/4597 842  135 - 420 K/uL Final    MPV 12/06/2022 10.8  9.2 - 11.8 FL Final    NRBC 12/06/2022 0.0  0  WBC Final    ABSOLUTE NRBC 12/06/2022 0.00  0.00 - 0.01 K/uL Final    NEUTROPHILS 12/06/2022 78 (A)  40 - 73 % Final    LYMPHOCYTES 12/06/2022 14 (A)  21 - 52 % Final    MONOCYTES 12/06/2022 7  3 - 10 % Final    EOSINOPHILS 12/06/2022 1  0 - 5 % Final    BASOPHILS 12/06/2022 1  0 - 2 % Final    IMMATURE GRANULOCYTES 12/06/2022 0  0.0 - 0.5 % Final    ABS. NEUTROPHILS 12/06/2022 5.6  1.8 - 8.0 K/UL Final    ABS. LYMPHOCYTES 12/06/2022 1.0  0.9 - 3.6 K/UL Final    ABS. MONOCYTES 12/06/2022 0.5  0.05 - 1.2 K/UL Final    ABS. EOSINOPHILS 12/06/2022 0.1  0.0 - 0.4 K/UL Final    ABS. BASOPHILS 12/06/2022 0.1  0.0 - 0.1 K/UL Final    ABS. IMM. GRANS.  12/06/2022 0.0  0.00 - 0.04 K/UL Final    DF 12/06/2022 AUTOMATED    Final    LIPID PROFILE 12/06/2022        Final    Cholesterol, total 12/06/2022 186  <200 MG/DL Final    Triglyceride 12/06/2022 103  <150 MG/DL Final    HDL Cholesterol 12/06/2022 94 (A)  40 - 60 MG/DL Final    LDL, calculated 12/06/2022 71.4  0 - 100 MG/DL Final    VLDL, calculated 12/06/2022 20.6  MG/DL Final    CHOL/HDL Ratio 12/06/2022 2.0  0 - 5.0   Final    Magnesium 12/06/2022 2.2  1.6 - 2.6 mg/dL Final    Sodium 12/06/2022 137  136 - 145 mmol/L Final    Potassium 12/06/2022 4.1  3.5 - 5.5 mmol/L Final    Chloride 12/06/2022 104  100 - 111 mmol/L Final    CO2 12/06/2022 29  21 - 32 mmol/L Final    Anion gap 12/06/2022 4  3.0 - 18 mmol/L Final    Glucose 12/06/2022 82  74 - 99 mg/dL Final    BUN 12/06/2022 17  7.0 - 18 MG/DL Final Creatinine 12/06/2022 0.84  0.6 - 1.3 MG/DL Final    BUN/Creatinine ratio 12/06/2022 20  12 - 20   Final    eGFR 12/06/2022 >60  >60 ml/min/1.73m2 Final    Calcium 12/06/2022 10.4 (A)  8.5 - 10.1 MG/DL Final    Bilirubin, total 12/06/2022 0.5  0.2 - 1.0 MG/DL Final    ALT (SGPT) 12/06/2022 27  13 - 56 U/L Final    AST (SGOT) 12/06/2022 28  10 - 38 U/L Final    Alk. phosphatase 12/06/2022 73  45 - 117 U/L Final    Protein, total 12/06/2022 7.0  6.4 - 8.2 g/dL Final    Albumin 12/06/2022 4.0  3.4 - 5.0 g/dL Final    Globulin 12/06/2022 3.0  2.0 - 4.0 g/dL Final    A-G Ratio 12/06/2022 1.3  0.8 - 1.7   Final    Vitamin D 25-Hydroxy 12/06/2022 45.2  30 - 100 ng/mL Final    Color 12/06/2022 YELLOW    Final    Appearance 12/06/2022 CLEAR    Final    Specific gravity 12/06/2022 1.005  1.005 - 1.030   Final    pH (UA) 12/06/2022 6.5  5.0 - 8.0   Final    Protein 12/06/2022 Negative  NEG mg/dL Final    Glucose 12/06/2022 Negative  NEG mg/dL Final    Ketone 12/06/2022 Negative  NEG mg/dL Final    Bilirubin 12/06/2022 Negative  NEG   Final    Blood 12/06/2022 Negative  NEG   Final    Urobilinogen 12/06/2022 0.2  0.2 - 1.0 EU/dL Final    Nitrites 12/06/2022 Negative  NEG   Final    Leukocyte Esterase 12/06/2022 Negative  NEG   Final         Health Maintenance:  Screening:    Mammogram: negative (12/2022) [facility]   PAP smear: well woman exams performed at [facility] by KIM Li (last 12/2022)   Colorectal: colonoscopy (7/2021) tubular adenomas. Dr. Kelly Layton. Due 7/2024. Depression: on Zoloft   DM (HbA1c/FPG): FPG 82 (12/2022)   Hepatitis C: negative (1/2/2018) Dr. Gerardo Donahue. Falls: none   DEXA: osteopenia (last 3/2021). Performed by Dr. Gerardo Donahue.    Glaucoma: regular eye exams with Dr. Adelita Mckeon (last 6/2022) every 6 months   Smoking: none   Vitamin D: 45.2 (12/2022)    Medicare Wellness: 6/22/2022      Impression:  Patient Active Problem List   Diagnosis Code    Cervical spondylosis without myelopathy M47.812    Brachial neuritis or radiculitis M54.12    Radiculopathy, cervical M54.12    History of nonmelanoma skin cancer Z85.828    Rheumatoid arthritis ([facility] [address] M06.9    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 (HCC) I70.1, I77.3    LBBB (left bundle branch block) I44.7    Hypertension I10    Hyperparathyroidism ([facility] [address] E21.3    Hiatal hernia K44.9    GERD (gastroesophageal reflux disease) K21.9    Fibromuscular dysplasia (HCC) I77.3    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    Irritable bowel syndrome with diarrhea K58.0    Bilateral carotid bruits R09.89    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    Hyperlipidemia E78.5    Osteopenia of multiple sites M85.89    Vitamin D deficiency E55.9    Carotid stenosis, bilateral I65.23    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    Anemia D64.9    Palpitations R00.2    History of 2019 novel coronavirus disease (COVID-19) Z86.16    Immunosuppressed status (HCC) D84.9    Anxiety F41.9    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 Hypertension. Blood pressure remains well controlled on current regimen of metoprolol tartrate 75 mg bid. Also noted improvement in palpitations. Underwent echocardiogram (7/2022) which showed mildly decreased LV function (EF 51%), LBBB, mild concentric LVH, normal diastolic function, mild AI, mild MR, mild TR, normal PAP. Established care with Dr. Orville Farr on 7/22/2022 and will follow annually. Renal function remains normal with creatinine 0.84/ eGFR >60. Will continue to follow. 2. Fibromuscular dysplasia. Previous angioplasty of renal arteries. History of moderate stenosis of bilateral carotid arteries on carotid duplex in 5/2014. Noted prominent carotid bruits on physical exam, and repeat carotid duplex obtained in 8/2018 showing moderate (50-69%) stenosis of the right ICA and mild (<50%) of the left ICA. Review of report also showed mild atherosclerosis at that time.  Previously followed by Dr. Tegan Nicholson of [facility] but given stability over many years, released from care. Repeat carotid duplex scan obtained 12/17/2019 showed persistence of moderate stenosis of right ICA. On aspirin 81 mg every third day. Did not tolerate atorvastatin due to elevated LFT's so discontinued. Lipid panel has improved with dietary changes and currently at goal with LDL 71. Will continue to monitor. 3. Hyperlipidemia. Calculated 10 year ASCVD risk 25.2 %, which places her in one of the four statin benefit groups as per new AHA/ACC guidelines (primary prevention: 10 year ASCVD risk >7.5%). In addition, mild atherosclerosis in the setting of fibromuscular dysplasia described on prior carotid duplex and moderate stenosis of right ICA reported. Also at increased risk due to chronic inflammatory state related to rheumatoid arthritis. Started on atorvastatin 10 mg daily in 12/2019, but developed increase in transaminases with AST 46 and ALT 64 (1/16/2020), and repeat ALT 85 and AST 66 (2/5/2020). Atorvastatin discontinued and LFT's normalized. Evaluated by Dr. Israel Diaz and lab evaluation/ RUQ ultrasound normal in 3/2020. Presumed secondary to statin in the setting of long term methotrexate therapy for RA. Now with continued improvement today with LDL 71 and HDL 94 due to lifestyle and dietary changes, and no current need for statin therapy. Will continue to monitor. 4. Rheumatoid arthritis. On SC weekly methotrexate, hydroxychloroquine 200 mg daily, and prednisone 5 mg daily. Reports methotrexate was held for approximately two months given Mohs procedure for removal of squamous cell carcinoma by Dr. Yoana Sanders. Reports has restarted 4 weeks ago noting improvement in joint pain particular involving her hands. Still not wishing to proceed with a biologic given difficulty with nonmelanoma skin cancers. Continuing to follow with Dr. Ondina Swain. 5. History of COVID-19 infection.  Patient completed the Moderna COVID-19 vaccine series, but did not receive a third dose given her immunosuppressed status. On 9/27/2021, she noted the onset of fever, sore throat, nasal congestion, headache, and cough, and underwent rapid testing on 9/28/2021 which was positive for SARS-CoV-2. She had attended an indoor class reunion although she did wear a mask during the entire event. She contacted the office on 9/29/2021 and received bamlanivimab-etesevimab monoclonal antibody infusion at [facility] ED. She reports today complete resolution of symptoms except continuing to experience altered taste and smell. Completed four doses of Moderna COVID-19 vaccine given immunosuppressed status and has received the updated bivalent booster dose. 6.  IBS with diarrhea. Had been having significant difficulty with diarrhea impacting her quality of life. Underwent evaluation by Dr. Laura Mahoney and felt to be secondary to IBS. Advised dietary changes to eliminate seeds and certain grains with noticeable improvement. Did not wish to begin treatment with Creon given derived from pig pancreas. Will use Imodium preventatively when leaving her home for activities with good control. Continuing to follow with Dr. Laura Mahoney. 7. Cervical degenerative disease. Previously reported difficulty with chronic neck pain, and was treated in the past with gabapentin. Some improvement with exercising. Attempted accupuncture therapy by Dr. Reji Holder, but after 3 months, no significant benefit seen. Previously followed by Dr. Nolan Cleveland, and referred to Dr. Kalpesh Mckeon but never scheduled. Overall has improved and no longer felt to be a significant issue. 7. Osteopenia. Last bone density scan in 2/2019. Using femoral neck T-scores, calculated FRAX score estimates her 10 year risk of a major osteoporetic fracture at 13 % and hip fracture at 3.1 %, which are an indication for biphosphonate treatment with hip fracture risk >3%. Being performed and followed by Dr. Maribel Viramontes.  She underwent repeat bone density study in 3/2021 which showed persistent osteopenia, and Dr. Monisha Enamorado recommending treatment with Reclast, but patient not wishing to proceed. Continue calcium and Vitamin D. Encouraged exercise, particularly weight bearing activities. Fall precautions stressed. 8. Hyperparathyroidism. Mild increase in PTH, mildly elevated calcium, normal Vitamin D level,and 24 hour urine calcium only 105 (FeCa 1%) consistent with a diagnosis of familial hypocalciuric hypercalcemia. Being followed by Dr. Rhina Tineo every 2 years given benign condition. Calcium level remained stable today at 10.4. Advised to drink plenty of fluids. Calcium remains mildly elevated today at 10.3. Will continue to monitor. 9. GERD. Continuing on omeprazole 40 mg every other day with good response. Scheduled to follow-up with Dr. Be Rich following choking episode in 10/2022. 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 Following regularly with Dr. Jeremy Randall. Reports underwent recent Mohs procedure by Dr. Toan Drew for removal of a squamous cell carcinoma from her face. Also with new lesion on her neck and in the process of scheduling. She continues to be hesitant to begin treatment with a biologic for her RA given her difficulty with nonmelanoma skin cancers. 11. Mild anemia. Present intermittently. B12 and folate levels normal in 12/2019. Iron studies and gammopathy panel normal in 5/2020. Stable and likely secondary to methotrexate use. On folate. Will continue to monitor. 12. Anxiety. Patient reports doing well on sertraline 50 mg daily. Reports insomnia responding to melatonin 3-5 mg nightly as needed. 13. History of mouth pain. Reported intermittent difficulty and evaluated by Dr. Margaret Yañez of ENT in the past with no etiology found. Differential diagnosis would include herpes simplex, aphthous stomatitis, xerostomia, or nutritional deficiencies (B12, B6, folate, Zinc, iron), or candidiasis.  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 Tricyclics, pregabalin, gabapentin, and Mirapex have shown some benefit. Also has found some benefit with Mycelex troches in the past. Advised to take a multivitamin. Recently evaluated by dermatology for painful lip lesions and was told they were autoimmune in etiology. Responded to treatment with steroid cream and Vaseline. Continuing to follow closely with Dr. Charlie Monet. 14. Health maintenance. Completed four doses of the Moderna COVID-19 vaccine series and given immunosuppressed status, and received the updated bivalent booster dose. Already received the influenza vaccine. Has not yet completed Shingrix vaccine but is planning to obtain. Other immunizations up-to-date. Mammogram and well women exams performed at [facility] and up to date. Colonoscopy up-to-date. Continue regular eye exams with Dr. Nikolai Bass. Vitamin D level remains normal on 4000 U daily. Following with Dr. Mattie Valle for nonmelanoma skin cancers as discussed. Medicare wellness visit up-to-date. Patient understands recommendations and agrees with plan. Follow-up in 6 months. Future orders:    ICD-10-CM ICD-9-CM    1. Primary hypertension  P03 176.8 METABOLIC PANEL, COMPREHENSIVE      URINALYSIS W/MICROSCOPIC      MAGNESIUM      2. Fibromuscular dysplasia (HCC)  I77.3 447.8 LIPID PANEL      3. Hyperlipidemia, unspecified hyperlipidemia type  E78.5 272.4 LIPID PANEL      METABOLIC PANEL, COMPREHENSIVE      4. Carotid stenosis, bilateral  I65.23 433.10      433.30       5. Rheumatoid arthritis involving multiple sites with positive rheumatoid factor (HCC)  M05.79 714.0 CBC WITH AUTOMATED DIFF      METABOLIC PANEL, COMPREHENSIVE      URINALYSIS W/MICROSCOPIC      VITAMIN D, 25 HYDROXY      6. Immunosuppressed status ([address]  D84.9 279.9       7. Irritable bowel syndrome with diarrhea  K58.0 564.1       8. Gastroesophageal reflux disease, unspecified whether esophagitis present  K21.9 530.81       9. Bowen's disease  D04.9 232.9       10. Anemia, unspecified type  D64.9 285.9 CBC WITH AUTOMATED DIFF      11. Anxiety  F41.9 300.00       12. Osteopenia of multiple sites  M85.89 733.90 VITAMIN D, 25 HYDROXY      13.  Disorder of bone density and structure, unspecified   M85.9 733.90 VITAMIN D, 25 HYDROXY

## 2023-02-04 DIAGNOSIS — I10 PRIMARY HYPERTENSION: Primary | ICD-10-CM

## 2023-02-04 DIAGNOSIS — D64.9 ANEMIA, UNSPECIFIED TYPE: ICD-10-CM

## 2023-02-04 DIAGNOSIS — M05.79 RHEUMATOID ARTHRITIS INVOLVING MULTIPLE SITES WITH POSITIVE RHEUMATOID FACTOR (HCC): Primary | ICD-10-CM

## 2023-02-05 DIAGNOSIS — E78.5 HYPERLIPIDEMIA, UNSPECIFIED HYPERLIPIDEMIA TYPE: ICD-10-CM

## 2023-02-05 DIAGNOSIS — M05.79 RHEUMATOID ARTHRITIS INVOLVING MULTIPLE SITES WITH POSITIVE RHEUMATOID FACTOR (HCC): ICD-10-CM

## 2023-02-05 DIAGNOSIS — I10 PRIMARY HYPERTENSION: Primary | ICD-10-CM

## 2023-02-07 DIAGNOSIS — M85.9 DISORDER OF BONE DENSITY AND STRUCTURE, UNSPECIFIED: ICD-10-CM

## 2023-02-07 DIAGNOSIS — I77.3 FIBROMUSCULAR DYSPLASIA (HCC): Primary | ICD-10-CM

## 2023-02-07 DIAGNOSIS — M85.89 OSTEOPENIA OF MULTIPLE SITES: ICD-10-CM

## 2023-02-07 DIAGNOSIS — E78.5 HYPERLIPIDEMIA, UNSPECIFIED HYPERLIPIDEMIA TYPE: ICD-10-CM

## 2023-02-07 DIAGNOSIS — M05.79 RHEUMATOID ARTHRITIS INVOLVING MULTIPLE SITES WITH POSITIVE RHEUMATOID FACTOR (HCC): Primary | ICD-10-CM

## 2023-02-08 ENCOUNTER — OFFICE VISIT (OUTPATIENT)
Dept: INTERNAL MEDICINE CLINIC | Age: 78
End: 2023-02-08
Payer: MEDICARE

## 2023-02-08 VITALS
TEMPERATURE: 97.5 F | OXYGEN SATURATION: 99 % | SYSTOLIC BLOOD PRESSURE: 150 MMHG | WEIGHT: 112 LBS | DIASTOLIC BLOOD PRESSURE: 80 MMHG | HEART RATE: 76 BPM | RESPIRATION RATE: 18 BRPM | BODY MASS INDEX: 17.58 KG/M2 | HEIGHT: 67 IN

## 2023-02-08 DIAGNOSIS — L98.9 SKIN LESION: Primary | ICD-10-CM

## 2023-02-08 PROCEDURE — G8399 PT W/DXA RESULTS DOCUMENT: HCPCS | Performed by: INTERNAL MEDICINE

## 2023-02-08 PROCEDURE — G0463 HOSPITAL OUTPT CLINIC VISIT: HCPCS | Performed by: INTERNAL MEDICINE

## 2023-02-08 PROCEDURE — G8536 NO DOC ELDER MAL SCRN: HCPCS | Performed by: INTERNAL MEDICINE

## 2023-02-08 PROCEDURE — 99213 OFFICE O/P EST LOW 20 MIN: CPT | Performed by: INTERNAL MEDICINE

## 2023-02-08 PROCEDURE — 3077F SYST BP >= 140 MM HG: CPT | Performed by: INTERNAL MEDICINE

## 2023-02-08 PROCEDURE — G8419 CALC BMI OUT NRM PARAM NOF/U: HCPCS | Performed by: INTERNAL MEDICINE

## 2023-02-08 PROCEDURE — 3079F DIAST BP 80-89 MM HG: CPT | Performed by: INTERNAL MEDICINE

## 2023-02-08 PROCEDURE — G8510 SCR DEP NEG, NO PLAN REQD: HCPCS | Performed by: INTERNAL MEDICINE

## 2023-02-08 PROCEDURE — 1101F PT FALLS ASSESS-DOCD LE1/YR: CPT | Performed by: INTERNAL MEDICINE

## 2023-02-08 PROCEDURE — 1123F ACP DISCUSS/DSCN MKR DOCD: CPT | Performed by: INTERNAL MEDICINE

## 2023-02-08 PROCEDURE — G8427 DOCREV CUR MEDS BY ELIG CLIN: HCPCS | Performed by: INTERNAL MEDICINE

## 2023-02-08 PROCEDURE — 1090F PRES/ABSN URINE INCON ASSESS: CPT | Performed by: INTERNAL MEDICINE

## 2023-02-08 RX ORDER — HYDROCORTISONE 25 MG/G
CREAM TOPICAL 2 TIMES DAILY
Qty: 30 G | Refills: 0 | Status: SHIPPED | OUTPATIENT
Start: 2023-02-08 | End: 2023-02-15

## 2023-02-08 NOTE — PROGRESS NOTES
Pb Infante presents today for       1. \"Have you been to the ER, urgent care clinic since your last visit? Hospitalized since your last visit? \" no    2. \"Have you seen or consulted any other health care providers outside of the [address] [place] [address] since your last visit? \" no     3. For patients aged 39-70: Has the patient had a colonoscopy / FIT/ Cologuard? NA - based on age      If the patient is female:    4. For patients aged 41-77: Has the patient had a mammogram within the past 2 years? NA - based on age or sex  See top three    5. For patients aged 21-65: Has the patient had a pap smear?  NA - based on age or sex

## 2023-02-08 NOTE — PROGRESS NOTES
Negro Sorto is a 69y.o. year old female who is a new patient to me today. By        Subjective:   Negro Sorto was seen today for Shoulder Pain (Left shoulder pain )    Patient is complaining of pain above the left clavicle. She has noticed a skin lesion for about 2 weeks. Patient has applied latex bandage on it to prevent it from rubbing against the seatbelt. She has seen some redness around the skin lesion. Patient has history of squamous cell carcinoma removed recently from the skin on face and left side of the neck.     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 [facility])     s/p balloon angioplasty 5/2009 [facility]    Rheumatoid arthritis ([facility] [address]     Skin cancer        Past Surgical History:   Procedure Laterality Date    COLONOSCOPY N/A 7/27/2021    COLONOSCOPY with polypectomies with bx's performed by Merle Guerrero MD at [facility] ENDOSCOPY    HX ANGIOPLASTY      HX COLONOSCOPY      HX KNEE REPLACEMENT Bilateral 2009    partial     HX OTHER SURGICAL      removal of fibroid benign tumors from breast     HX PARTIAL HYSTERECTOMY      HX TONSILLECTOMY  1964       Family History   Problem Relation Age of Onset    OSTEOARTHRITIS Mother     Elevated Lipids Mother     Heart Disease Mother         CHF    Lung Disease Mother     Hypertension Sister     Lung Disease Sister     Hypertension Brother     Mult Sclerosis Brother        Social History     Socioeconomic History    Marital status:      Spouse name: Not on file    Number of children: Not on file    Years of education: Not on file    Highest education level: Not on file   Occupational History    Occupation: Retired   Tobacco Use    Smoking status: Never Smokeless tobacco: Never   Vaping Use    Vaping Use: Never used   Substance and Sexual Activity    Alcohol use: Yes     Alcohol/week: 1.0 - 2.0 standard drink     Types: 1 - 2 Glasses of wine per week    Drug use: No    Sexual activity: Not Currently   Other Topics Concern    Not on file   Social History Narrative    Not on file     Social Determinants of Health     Financial Resource Strain: Not on file   Food Insecurity: Not on file   Transportation Needs: Not on file   Physical Activity: Not on file   Stress: Not on file   Social Connections: Not on file   Intimate Partner Violence: Not on file   Housing Stability: Not on file       Allergies   Allergen Reactions    Hydrocodone Anaphylaxis    Nsaids (Non-Steroidal Anti-Inflammatory Drug) Other (comments)     None due to kidneys    Azithromycin Itching    Other Medication Other (comments)     GI intolerance to nonsteroidal antiinflammatory medications     Vicodin [Hydrocodone-Acetaminophen] Other (comments)       Current Outpatient Medications on File Prior to Visit   Medication Sig Dispense Refill    predniSONE (DELTASONE) 5 mg tablet Take 5 mg by mouth daily. sertraline (ZOLOFT) 50 mg tablet take 1 tablet by mouth once daily 90 Tablet 2    Psyllium Husk-Sucrose 3.4 gram/7 gram powd Take  by mouth. aspirin 81 mg chewable tablet Take 81 mg by mouth. Every three days      hydrOXYchloroQUINE (PLAQUENIL) 200 mg tablet Take 200 mg by mouth daily. metoprolol tartrate (LOPRESSOR) 50 mg tablet Take 1 Tab by mouth two (2) times a day. 180 Tab 2    METHOTREXATE SODIUM INJECTION 17.5 mg every seven (7) days. omeprazole (PRILOSEC) 40 mg capsule Take 40 mg by mouth every other day. multivitamin (ONE A DAY) tablet Take 1 Tab by Mouth Once a Day. acetaminophen (TYLENOL) 500 mg tablet Take 500 mg by mouth every six (6) hours as needed. cholecalciferol (VITAMIN D3) (2,000 UNITS /50 MCG) cap capsule Take 2,000 Units by mouth.       folic acid (FOLVITE) 1 mg tablet Take 1 mg by mouth daily. 0     No current facility-administered medications on file prior to visit. Objective:     Vitals:    02/08/23 1550   BP: (!) 150/80   Pulse: 76   Resp: 18   Temp: 97.5 °F (36.4 °C)   TempSrc: Temporal   SpO2: 99%   Weight: 112 lb (50.8 kg)   Height: 5' 6.5\" (1.689 m)      Physical Exam  Skin:     Comments: Skin lesion with some contact dermatitis around it noted overlapping left clavicle. Labs:     Results for orders placed or performed during the hospital encounter of 12/06/22   CBC WITH AUTOMATED DIFF   Result Value Ref Range    WBC 7.2 4.6 - 13.2 K/uL    RBC 3.90 (L) 4.20 - 5.30 M/uL    HGB 11.6 (L) 12.0 - 16.0 g/dL    HCT 36.0 35.0 - 45.0 %    MCV 92.3 78.0 - 100.0 FL    MCH 29.7 24.0 - 34.0 PG    MCHC 32.2 31.0 - 37.0 g/dL    RDW 13.9 11.6 - 14.5 %    PLATELET 900 335 - 465 K/uL    MPV 10.8 9.2 - 11.8 FL    NRBC 0.0 0  WBC    ABSOLUTE NRBC 0.00 0.00 - 0.01 K/uL    NEUTROPHILS 78 (H) 40 - 73 %    LYMPHOCYTES 14 (L) 21 - 52 %    MONOCYTES 7 3 - 10 %    EOSINOPHILS 1 0 - 5 %    BASOPHILS 1 0 - 2 %    IMMATURE GRANULOCYTES 0 0.0 - 0.5 %    ABS. NEUTROPHILS 5.6 1.8 - 8.0 K/UL    ABS. LYMPHOCYTES 1.0 0.9 - 3.6 K/UL    ABS. MONOCYTES 0.5 0.05 - 1.2 K/UL    ABS. EOSINOPHILS 0.1 0.0 - 0.4 K/UL    ABS. BASOPHILS 0.1 0.0 - 0.1 K/UL    ABS. IMM.  GRANS. 0.0 0.00 - 0.04 K/UL    DF AUTOMATED     LIPID PANEL   Result Value Ref Range    LIPID PROFILE          Cholesterol, total 186 <200 MG/DL    Triglyceride 103 <150 MG/DL    HDL Cholesterol 94 (H) 40 - 60 MG/DL    LDL, calculated 71.4 0 - 100 MG/DL    VLDL, calculated 20.6 MG/DL    CHOL/HDL Ratio 2.0 0 - 5.0     MAGNESIUM   Result Value Ref Range    Magnesium 2.2 1.6 - 2.6 mg/dL   METABOLIC PANEL, COMPREHENSIVE   Result Value Ref Range    Sodium 137 136 - 145 mmol/L    Potassium 4.1 3.5 - 5.5 mmol/L    Chloride 104 100 - 111 mmol/L    CO2 29 21 - 32 mmol/L    Anion gap 4 3.0 - 18 mmol/L Glucose 82 74 - 99 mg/dL    BUN 17 7.0 - 18 MG/DL    Creatinine 0.84 0.6 - 1.3 MG/DL    BUN/Creatinine ratio 20 12 - 20      eGFR >60 >60 ml/min/1.73m2    Calcium 10.4 (H) 8.5 - 10.1 MG/DL    Bilirubin, total 0.5 0.2 - 1.0 MG/DL    ALT (SGPT) 27 13 - 56 U/L    AST (SGOT) 28 10 - 38 U/L    Alk.  phosphatase 73 45 - 117 U/L    Protein, total 7.0 6.4 - 8.2 g/dL    Albumin 4.0 3.4 - 5.0 g/dL    Globulin 3.0 2.0 - 4.0 g/dL    A-G Ratio 1.3 0.8 - 1.7     VITAMIN D, 25 HYDROXY   Result Value Ref Range    Vitamin D 25-Hydroxy 45.2 30 - 100 ng/mL   URINALYSIS W/ RFLX MICROSCOPIC   Result Value Ref Range    Color YELLOW      Appearance CLEAR      Specific gravity 1.005 1.005 - 1.030      pH (UA) 6.5 5.0 - 8.0      Protein Negative NEG mg/dL    Glucose Negative NEG mg/dL    Ketone Negative NEG mg/dL    Bilirubin Negative NEG      Blood Negative NEG      Urobilinogen 0.2 0.2 - 1.0 EU/dL    Nitrites Negative NEG      Leukocyte Esterase Negative NEG            Active Problems:     Patient Active Problem List    Diagnosis    Skin lesion    Anxiety    History of 2019 novel coronavirus disease (COVID-19)    Immunosuppressed status (HCC)    Palpitations    Anemia    Burning mouth syndrome    Carotid stenosis, bilateral     8/2018 moderate (13-85%) LICA, mild (<73%) YONATAN      FHH (familial hypocalciuric hypercalcemia)    Hyperlipidemia    Osteopenia of multiple sites    Vitamin D deficiency    Bilateral carotid bruits    Irritable bowel syndrome with diarrhea    History of nonmelanoma skin cancer    Rheumatoid arthritis (HCC)    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 Skin lesion  Assessment & Plan:   Looks like squamous cell carcinoma, with some area of contact dermatitis around it. Orders:  -     REFERRAL TO DERMATOLOGY  -     hydrocortisone (HYTONE) 2.5 % topical cream; Apply  to affected area two (2) times a day for 7 days. use thin layer      Follow-up and Dispositions    Return if symptoms worsen or fail to improve. Disclaimer: The patient understands our medical plan. Alternatives have been explained and offered. The risks, benefits and significant side effects of all medications have been reviewed. Anticipated time course and progression of condition reviewed. All questions have been addressed. She is encouraged to employ the information provided in the after visit summary, which was reviewed. Where applicable, she is instructed to call the clinic if she has not been notified either by phone or through [address] with the results of her tests or with an appointment plan for any referrals within 1 week(s). No news is not good news; it's no news. The patient  is to call if her condition worsens or fails to improve or if significant side effects are experienced.            Juan Byrne MD

## 2023-04-16 PROBLEM — L98.9 SKIN LESION: Status: RESOLVED | Noted: 2023-02-08 | Resolved: 2023-04-16

## 2023-05-31 ENCOUNTER — TELEPHONE (OUTPATIENT)
Age: 78
End: 2023-05-31

## 2023-05-31 NOTE — TELEPHONE ENCOUNTER
----- Message from Breanne Forrest sent at 5/31/2023 11:27 AM EDT -----  Subject: Message to Provider    QUESTIONS  Information for Provider? Mrs. Jannice Saxon called today returning the call from   the office. She is calling to switch the lab appt. to 6-, earliest   that it can be scheduled. Leave a message that the appt for the lab has   been changed. Thank you   ---------------------------------------------------------------------------  --------------  John POST  [phone]; OK to leave message on voicemail  ---------------------------------------------------------------------------  --------------  SCRIPT ANSWERS  Relationship to Patient?  Self

## 2023-05-31 NOTE — TELEPHONE ENCOUNTER
----- Message from Lashon Lagunas sent at 5/31/2023  8:57 AM EDT -----  Subject: Message to Provider    QUESTIONS  Information for Provider? pt need a call back to r/s her lab apt. looking   for early morning on 13 of june if possible. Call cell number. ---------------------------------------------------------------------------  --------------  Saúl Milligan TGOF  [phone]; OK to leave message on voicemail  ---------------------------------------------------------------------------  --------------  SCRIPT ANSWERS  Relationship to Patient?  Self

## 2023-06-21 ENCOUNTER — OFFICE VISIT (OUTPATIENT)
Age: 78
End: 2023-06-21
Payer: MEDICARE

## 2023-06-21 VITALS
HEIGHT: 67 IN | RESPIRATION RATE: 16 BRPM | SYSTOLIC BLOOD PRESSURE: 124 MMHG | BODY MASS INDEX: 17.96 KG/M2 | TEMPERATURE: 97 F | DIASTOLIC BLOOD PRESSURE: 60 MMHG | WEIGHT: 114.4 LBS | HEART RATE: 56 BPM | OXYGEN SATURATION: 97 %

## 2023-06-21 DIAGNOSIS — I77.3 FIBROMUSCULAR DYSPLASIA (HCC): ICD-10-CM

## 2023-06-21 DIAGNOSIS — I70.1 RENAL ARTERY STENOSIS DUE TO FIBROMUSCULAR DYSPLASIA (HCC): ICD-10-CM

## 2023-06-21 DIAGNOSIS — M47.812 CERVICAL SPONDYLOSIS WITHOUT MYELOPATHY: ICD-10-CM

## 2023-06-21 DIAGNOSIS — K58.0 IRRITABLE BOWEL SYNDROME WITH DIARRHEA: ICD-10-CM

## 2023-06-21 DIAGNOSIS — Z71.89 ACP (ADVANCE CARE PLANNING): ICD-10-CM

## 2023-06-21 DIAGNOSIS — E83.52 FHH (FAMILIAL HYPOCALCIURIC HYPERCALCEMIA): ICD-10-CM

## 2023-06-21 DIAGNOSIS — Z00.00 MEDICARE ANNUAL WELLNESS VISIT, SUBSEQUENT: ICD-10-CM

## 2023-06-21 DIAGNOSIS — E78.00 PURE HYPERCHOLESTEROLEMIA: ICD-10-CM

## 2023-06-21 DIAGNOSIS — M54.12 RADICULOPATHY, CERVICAL: ICD-10-CM

## 2023-06-21 DIAGNOSIS — D84.9 IMMUNOSUPPRESSED STATUS (HCC): ICD-10-CM

## 2023-06-21 DIAGNOSIS — E55.9 VITAMIN D DEFICIENCY: ICD-10-CM

## 2023-06-21 DIAGNOSIS — M54.2 NECK PAIN: Primary | ICD-10-CM

## 2023-06-21 DIAGNOSIS — M05.79 RHEUMATOID ARTHRITIS INVOLVING MULTIPLE SITES WITH POSITIVE RHEUMATOID FACTOR (HCC): ICD-10-CM

## 2023-06-21 DIAGNOSIS — I77.3 RENAL ARTERY STENOSIS DUE TO FIBROMUSCULAR DYSPLASIA (HCC): ICD-10-CM

## 2023-06-21 DIAGNOSIS — I65.23 CAROTID STENOSIS, BILATERAL: ICD-10-CM

## 2023-06-21 DIAGNOSIS — M85.89 OSTEOPENIA OF MULTIPLE SITES: ICD-10-CM

## 2023-06-21 DIAGNOSIS — E21.3 HYPERPARATHYROIDISM (HCC): ICD-10-CM

## 2023-06-21 DIAGNOSIS — I10 PRIMARY HYPERTENSION: ICD-10-CM

## 2023-06-21 PROCEDURE — 99211 OFF/OP EST MAY X REQ PHY/QHP: CPT | Performed by: INTERNAL MEDICINE

## 2023-06-21 RX ORDER — CHOLECALCIFEROL (VITAMIN D3) 125 MCG
5 CAPSULE ORAL DAILY
COMMUNITY

## 2023-06-21 SDOH — ECONOMIC STABILITY: FOOD INSECURITY: WITHIN THE PAST 12 MONTHS, YOU WORRIED THAT YOUR FOOD WOULD RUN OUT BEFORE YOU GOT MONEY TO BUY MORE.: NEVER TRUE

## 2023-06-21 SDOH — ECONOMIC STABILITY: HOUSING INSECURITY
IN THE LAST 12 MONTHS, WAS THERE A TIME WHEN YOU DID NOT HAVE A STEADY PLACE TO SLEEP OR SLEPT IN A SHELTER (INCLUDING NOW)?: NO

## 2023-06-21 SDOH — ECONOMIC STABILITY: INCOME INSECURITY: HOW HARD IS IT FOR YOU TO PAY FOR THE VERY BASICS LIKE FOOD, HOUSING, MEDICAL CARE, AND HEATING?: NOT VERY HARD

## 2023-06-21 SDOH — ECONOMIC STABILITY: FOOD INSECURITY: WITHIN THE PAST 12 MONTHS, THE FOOD YOU BOUGHT JUST DIDN'T LAST AND YOU DIDN'T HAVE MONEY TO GET MORE.: NEVER TRUE

## 2023-06-21 ASSESSMENT — PATIENT HEALTH QUESTIONNAIRE - PHQ9
1. LITTLE INTEREST OR PLEASURE IN DOING THINGS: 0
SUM OF ALL RESPONSES TO PHQ QUESTIONS 1-9: 0
2. FEELING DOWN, DEPRESSED OR HOPELESS: 0
SUM OF ALL RESPONSES TO PHQ9 QUESTIONS 1 & 2: 0

## 2023-06-21 ASSESSMENT — LIFESTYLE VARIABLES
HOW MANY STANDARD DRINKS CONTAINING ALCOHOL DO YOU HAVE ON A TYPICAL DAY: 1 OR 2
HOW OFTEN DO YOU HAVE A DRINK CONTAINING ALCOHOL: 2-3 TIMES A WEEK

## 2023-06-21 NOTE — PATIENT INSTRUCTIONS
quitting, talk to your doctor about stop-smoking programs and medicines. These can increase your chances of quitting for good. Quitting smoking may be the most important step you can take to protect your heart. It is never too late to quit.     Limit alcohol to 2 drinks a day for men and 1 drink a day for women. Too much alcohol can cause health problems.     Manage other health problems such as diabetes, high blood pressure, and high cholesterol. If you think you may have a problem with alcohol or drug use, talk to your doctor. Medicines    Take your medicines exactly as prescribed. Call your doctor if you think you are having a problem with your medicine.     If your doctor recommends aspirin, take the amount directed each day. Make sure you take aspirin and not another kind of pain reliever, such as acetaminophen (Tylenol). When should you call for help? Call 911 if you have symptoms of a heart attack. These may include:    Chest pain or pressure, or a strange feeling in the chest.     Sweating.     Shortness of breath.     Pain, pressure, or a strange feeling in the back, neck, jaw, or upper belly or in one or both shoulders or arms.     Lightheadedness or sudden weakness.     A fast or irregular heartbeat. After you call 911, the  may tell you to chew 1 adult-strength or 2 to 4 low-dose aspirin. Wait for an ambulance. Do not try to drive yourself. Watch closely for changes in your health, and be sure to contact your doctor if you have any problems. Where can you learn more? Go to http://www.blunt.com/ and enter F075 to learn more about \"A Healthy Heart: Care Instructions. \"  Current as of: September 7, 2022               Content Version: 13.7  © 8107-9742 [employer]wise, Inc[employer]. Care instructions adapted under license by [facility] ([facility]).  If you have questions about a medical condition or this instruction, always ask your healthcare professional. Diane Hendricks

## 2023-06-21 NOTE — PROGRESS NOTES
Ata Huitron presents today for   Chief Complaint   Patient presents with    Medicare AWV                 1. \"Have you been to the ER, urgent care clinic since your last visit? Hospitalized since your last visit? \" no    2. \"Have you seen or consulted any other health care providers outside of the [address] [place] [address] since your last visit? \" no     3. For patients aged 39-70: Has the patient had a colonoscopy / FIT/ Cologuard? NA - based on age      If the patient is female:    4. For patients aged 41-77: Has the patient had a mammogram within the past 2 years? NA - based on age or sex      11. For patients aged 21-65: Has the patient had a pap smear?  NA - based on age or sex
Medicare Annual Wellness Visit    [facility] is here for Medicare AWV    Assessment & Plan   Medicare annual wellness visit, subsequent  ACP (advance care planning)  Recommendations for Preventive Services Due: see orders and patient instructions/AVS.  Recommended screening schedule for the next 5-10 years is provided to the patient in written form: see Patient Instructions/AVS.     Return in about 6 months (around 12/21/2023), or if symptoms worsen or fail to improve. Subjective   See office progress note for details. Patient's complete Health Risk Assessment and screening values have been reviewed and are found in Flowsheets. The following problems were reviewed today and where indicated follow up appointments were made and/or referrals ordered. Positive Risk Factor Screenings with Interventions:               General HRA Questions:  Select all that apply: (!) New or Increased Pain, New or Increased Fatigue, Stress    Pain Interventions:  Discussed restarting yoga and exercises to help with neck pain. Also addressed discussing consideration of treatment with a biologic for her bilateral hand pain due to rheumatoid arthritis. Fatigue Interventions:  Discussed restarting exercises so that sleep may be more refreshing as neck pain improves. Stress Interventions:  Patient declined any further interventions or treatment       Weight and Activity:  Physical Activity: Insufficiently Active    Days of Exercise per Week: 7 days    Minutes of Exercise per Session: 20 min     On average, how many days per week do you engage in moderate to strenuous exercise (like a brisk walk)?: 7 days  Have you lost any weight without trying in the past 3 months?: No  Body mass index is 18.19 kg/m². (!) Abnormal  Underweight Interventions:  Encouraged to increase caloric intake and follow a healthy diet. Weight has increased 2 pounds since her last visit.            Advanced Directives:  Do you have a Living Will?:
evaluation by Dr. Boom Page and felt to be secondary to IBS. Advised dietary changes to eliminate seeds and certain grains with noticeable improvement. Did not wish to begin treatment with Creon given derived from pig pancreas. Will use Imodium preventatively when leaving her home for activities with good control. Continuing to follow with Dr. Anastasia Paul, last visit in 3/2023.  7. Osteopenia. Prior bone density scan in 2/2019. Using femoral neck T-scores, calculated FRAX score estimates her 10 year risk of a major osteoporetic fracture at 13 % and hip fracture at 3.1 %, which are an indication for biphosphonate treatment with hip fracture risk >3%. She underwent repeat bone density study in 3/2021 which showed persistent osteopenia, and Dr. Mara Rayo recommended treatment with Reclast, but patient did not wish to proceed. Reports repeat bone density study in 3/2023 showed worsening osteopenia in bilateral hips and again recommended to initiate treatment with Reclast. Discussed today and urged to proceed. Continue calcium and Vitamin D supplementation. Advised to restart vitamin D supplement given low normal level. Encouraged exercise, particularly weight bearing activities. Fall precautions stressed. 8. Hyperparathyroidism. Mild increase in PTH, mildly elevated calcium, normal Vitamin D level,and 24 hour urine calcium only 105 (FeCa 1%) consistent with a diagnosis of familial hypocalciuric hypercalcemia. Previously followed by Dr. Nestor Nicole every 2 years given benign condition. Calcium level has remained stable and remains stable today at 10.2. Advised to drink plenty of fluids. Will continue to monitor. 9. GERD. Continuing on omeprazole 40 mg every other day with good response. Given choking episode in 10/2022, underwent modified barium swallow (5/15/2023) which was essentially normal with intermittent delayed relaxation of the cricopharyngeus muscle. No dietary changes recommended.   10. History

## 2023-06-25 RX ORDER — PREDNISONE 2.5 MG/1
TABLET ORAL
COMMUNITY
Start: 2023-05-19

## 2023-06-28 ENCOUNTER — PATIENT MESSAGE (OUTPATIENT)
Age: 78
End: 2023-06-28

## 2023-06-28 DIAGNOSIS — Z12.31 SCREENING MAMMOGRAM FOR BREAST CANCER: Primary | ICD-10-CM

## 2023-11-18 ENCOUNTER — PATIENT MESSAGE (OUTPATIENT)
Age: 78
End: 2023-11-18

## 2023-11-20 ENCOUNTER — TELEPHONE (OUTPATIENT)
Age: 78
End: 2023-11-20

## 2023-11-20 NOTE — TELEPHONE ENCOUNTER
Pt is calling in regards to this message. Pt did not take a covid test b/c she does not feel it is that. Pt just wants to know if she can get prescribed something.  She does not want the symptoms to get worst

## 2023-11-20 NOTE — TELEPHONE ENCOUNTER
Called and spoke with patient. Reports developing sore throat, rhinorrhea, headache and cough on the night of 11/19/2023. No documented fevers, dyspnea, or sputum production, and SpO2 97-98% on pulse oximeter. Patient on immunosuppressive treatment for rheumatoid arthritis.  sick with similar symptoms last week. Discussed likely viral etiology and requested that she perform a COVID 19 test since would treat with Paxlovid if positive. Will call back with results. Advised symptomatic therapy and close monitoring with call back if worsens.

## 2023-12-11 ENCOUNTER — PATIENT MESSAGE (OUTPATIENT)
Age: 78
End: 2023-12-11

## 2023-12-11 RX ORDER — BENZONATATE 100 MG/1
100 CAPSULE ORAL 3 TIMES DAILY PRN
Qty: 30 CAPSULE | Refills: 0 | Status: SHIPPED | OUTPATIENT
Start: 2023-12-11 | End: 2023-12-21

## 2023-12-11 NOTE — TELEPHONE ENCOUNTER
Patient pharmacy is [id number] - [place], [address] [phone] - F [phone] [[id number]]

## 2023-12-13 NOTE — TELEPHONE ENCOUNTER
Please advise that there is a potential interaction of Imodium with Paxlovid so would recommend avoiding. However it would be safe for her to take Pepto-Bismol or Kaopectate (bismuth containing products) since they do not interact.

## 2023-12-13 NOTE — TELEPHONE ENCOUNTER
Called patient, gave information. She wants her home phone number taken off her chart, she states having poor reception. Please only have cell listed.

## 2023-12-21 ENCOUNTER — HOSPITAL ENCOUNTER (OUTPATIENT)
Facility: HOSPITAL | Age: 78
Setting detail: SPECIMEN
Discharge: HOME | End: 2023-12-24
Payer: MEDICARE

## 2023-12-21 DIAGNOSIS — I10 PRIMARY HYPERTENSION: ICD-10-CM

## 2023-12-21 DIAGNOSIS — M85.89 OSTEOPENIA OF MULTIPLE SITES: ICD-10-CM

## 2023-12-21 DIAGNOSIS — E78.00 PURE HYPERCHOLESTEROLEMIA: ICD-10-CM

## 2023-12-21 DIAGNOSIS — E55.9 VITAMIN D DEFICIENCY: ICD-10-CM

## 2023-12-21 LAB
25(OH)D3 SERPL-MCNC: 63.3 NG/ML (ref 30–100)
ALBUMIN SERPL-MCNC: 3.9 G/DL (ref 3.4–5)
ALBUMIN/GLOB SERPL: 1.3 (ref 0.8–1.7)
ALP SERPL-CCNC: 60 U/L (ref 45–117)
ALT SERPL-CCNC: 31 U/L (ref 13–56)
ANION GAP SERPL CALC-SCNC: 2 MMOL/L (ref 3–18)
APPEARANCE UR: CLEAR
AST SERPL-CCNC: 34 U/L (ref 10–38)
BACTERIA URNS QL MICRO: NEGATIVE /HPF
BASOPHILS # BLD: 0 K/UL (ref 0–0.1)
BASOPHILS NFR BLD: 1 % (ref 0–2)
BILIRUB SERPL-MCNC: 0.4 MG/DL (ref 0.2–1)
BILIRUB UR QL: NEGATIVE
BUN SERPL-MCNC: 12 MG/DL (ref 7–18)
BUN/CREAT SERPL: 16 (ref 12–20)
CALCIUM SERPL-MCNC: 10 MG/DL (ref 8.5–10.1)
CHLORIDE SERPL-SCNC: 106 MMOL/L (ref 100–111)
CHOLEST SERPL-MCNC: 165 MG/DL
CO2 SERPL-SCNC: 32 MMOL/L (ref 21–32)
COLOR UR: YELLOW
CREAT SERPL-MCNC: 0.73 MG/DL (ref 0.6–1.3)
DIFFERENTIAL METHOD BLD: ABNORMAL
EOSINOPHIL # BLD: 0.2 K/UL (ref 0–0.4)
EOSINOPHIL NFR BLD: 3 % (ref 0–5)
EPITH CASTS URNS QL MICRO: ABNORMAL /LPF (ref 0–5)
ERYTHROCYTE [DISTWIDTH] IN BLOOD BY AUTOMATED COUNT: 12.8 % (ref 11.6–14.5)
GLOBULIN SER CALC-MCNC: 3 G/DL (ref 2–4)
GLUCOSE SERPL-MCNC: 78 MG/DL (ref 74–99)
GLUCOSE UR STRIP.AUTO-MCNC: NEGATIVE MG/DL
HCT VFR BLD AUTO: 39.1 % (ref 35–45)
HDLC SERPL-MCNC: 68 MG/DL (ref 40–60)
HDLC SERPL: 2.4 (ref 0–5)
HGB BLD-MCNC: 12.1 G/DL (ref 12–16)
HGB UR QL STRIP: NEGATIVE
IMM GRANULOCYTES # BLD AUTO: 0 K/UL (ref 0–0.04)
IMM GRANULOCYTES NFR BLD AUTO: 0 % (ref 0–0.5)
KETONES UR QL STRIP.AUTO: NEGATIVE MG/DL
LDLC SERPL CALC-MCNC: 75.8 MG/DL (ref 0–100)
LEUKOCYTE ESTERASE UR QL STRIP.AUTO: ABNORMAL
LIPID PANEL: ABNORMAL
LYMPHOCYTES # BLD: 1.2 K/UL (ref 0.9–3.6)
LYMPHOCYTES NFR BLD: 24 % (ref 21–52)
MCH RBC QN AUTO: 29.7 PG (ref 24–34)
MCHC RBC AUTO-ENTMCNC: 30.9 G/DL (ref 31–37)
MCV RBC AUTO: 96.1 FL (ref 78–100)
MONOCYTES # BLD: 0.5 K/UL (ref 0.05–1.2)
MONOCYTES NFR BLD: 9 % (ref 3–10)
NEUTS SEG # BLD: 3.2 K/UL (ref 1.8–8)
NEUTS SEG NFR BLD: 63 % (ref 40–73)
NITRITE UR QL STRIP.AUTO: NEGATIVE
NRBC # BLD: 0 K/UL (ref 0–0.01)
NRBC BLD-RTO: 0 PER 100 WBC
PH UR STRIP: 7 (ref 5–8)
PLATELET # BLD AUTO: 244 K/UL (ref 135–420)
PMV BLD AUTO: 11.5 FL (ref 9.2–11.8)
POTASSIUM SERPL-SCNC: 4 MMOL/L (ref 3.5–5.5)
PROT SERPL-MCNC: 6.9 G/DL (ref 6.4–8.2)
PROT UR STRIP-MCNC: NEGATIVE MG/DL
RBC # BLD AUTO: 4.07 M/UL (ref 4.2–5.3)
RBC #/AREA URNS HPF: NEGATIVE /HPF (ref 0–5)
SODIUM SERPL-SCNC: 140 MMOL/L (ref 136–145)
SP GR UR REFRACTOMETRY: 1.02 (ref 1–1.03)
TRIGL SERPL-MCNC: 106 MG/DL
UROBILINOGEN UR QL STRIP.AUTO: 0.2 EU/DL (ref 0.2–1)
VLDLC SERPL CALC-MCNC: 21.2 MG/DL
WBC # BLD AUTO: 5 K/UL (ref 4.6–13.2)
WBC URNS QL MICRO: ABNORMAL /HPF (ref 0–4)

## 2023-12-21 PROCEDURE — 81001 URINALYSIS AUTO W/SCOPE: CPT

## 2023-12-21 PROCEDURE — 80061 LIPID PANEL: CPT

## 2023-12-21 PROCEDURE — 36415 COLL VENOUS BLD VENIPUNCTURE: CPT

## 2023-12-21 PROCEDURE — 82330 ASSAY OF CALCIUM: CPT

## 2023-12-21 PROCEDURE — 80053 COMPREHEN METABOLIC PANEL: CPT

## 2023-12-21 PROCEDURE — 85025 COMPLETE CBC W/AUTO DIFF WBC: CPT

## 2023-12-21 PROCEDURE — 82306 VITAMIN D 25 HYDROXY: CPT

## 2023-12-22 LAB — CA-I SERPL ISE-MCNC: 5.5 MG/DL (ref 4.5–5.6)

## 2024-01-05 ENCOUNTER — HOSPITAL ENCOUNTER (OUTPATIENT)
Facility: HOSPITAL | Age: 79
End: 2024-01-05
Attending: INTERNAL MEDICINE
Payer: MEDICARE

## 2024-01-05 VITALS — WEIGHT: 114.42 LBS | HEIGHT: 66 IN | BODY MASS INDEX: 18.39 KG/M2

## 2024-01-05 DIAGNOSIS — Z12.31 SCREENING MAMMOGRAM FOR BREAST CANCER: ICD-10-CM

## 2024-01-05 PROCEDURE — 77067 SCR MAMMO BI INCL CAD: CPT

## 2024-01-17 ENCOUNTER — OFFICE VISIT (OUTPATIENT)
Age: 79
End: 2024-01-17
Payer: MEDICARE

## 2024-01-17 VITALS
WEIGHT: 109 LBS | HEART RATE: 59 BPM | OXYGEN SATURATION: 96 % | BODY MASS INDEX: 17.11 KG/M2 | SYSTOLIC BLOOD PRESSURE: 128 MMHG | RESPIRATION RATE: 16 BRPM | TEMPERATURE: 98.4 F | HEIGHT: 67 IN | DIASTOLIC BLOOD PRESSURE: 60 MMHG

## 2024-01-17 DIAGNOSIS — I10 PRIMARY HYPERTENSION: ICD-10-CM

## 2024-01-17 DIAGNOSIS — M85.89 OSTEOPENIA OF MULTIPLE SITES: ICD-10-CM

## 2024-01-17 DIAGNOSIS — D84.9 IMMUNOSUPPRESSED STATUS (HCC): ICD-10-CM

## 2024-01-17 DIAGNOSIS — I77.3 FIBROMUSCULAR DYSPLASIA (HCC): ICD-10-CM

## 2024-01-17 DIAGNOSIS — F41.9 ANXIETY: Primary | ICD-10-CM

## 2024-01-17 DIAGNOSIS — Z86.16 HISTORY OF 2019 NOVEL CORONAVIRUS DISEASE (COVID-19): ICD-10-CM

## 2024-01-17 DIAGNOSIS — M05.79 RHEUMATOID ARTHRITIS INVOLVING MULTIPLE SITES WITH POSITIVE RHEUMATOID FACTOR (HCC): ICD-10-CM

## 2024-01-17 DIAGNOSIS — R00.2 PALPITATIONS: ICD-10-CM

## 2024-01-17 DIAGNOSIS — E21.3 HYPERPARATHYROIDISM (HCC): ICD-10-CM

## 2024-01-17 DIAGNOSIS — Z85.828 HISTORY OF NONMELANOMA SKIN CANCER: ICD-10-CM

## 2024-01-17 DIAGNOSIS — G47.00 INSOMNIA, UNSPECIFIED TYPE: ICD-10-CM

## 2024-01-17 DIAGNOSIS — E83.52 FHH (FAMILIAL HYPOCALCIURIC HYPERCALCEMIA): ICD-10-CM

## 2024-01-17 DIAGNOSIS — I47.10 PAROXYSMAL SVT (SUPRAVENTRICULAR TACHYCARDIA): ICD-10-CM

## 2024-01-17 PROCEDURE — 3078F DIAST BP <80 MM HG: CPT | Performed by: INTERNAL MEDICINE

## 2024-01-17 PROCEDURE — 1090F PRES/ABSN URINE INCON ASSESS: CPT | Performed by: INTERNAL MEDICINE

## 2024-01-17 PROCEDURE — G8484 FLU IMMUNIZE NO ADMIN: HCPCS | Performed by: INTERNAL MEDICINE

## 2024-01-17 PROCEDURE — G8419 CALC BMI OUT NRM PARAM NOF/U: HCPCS | Performed by: INTERNAL MEDICINE

## 2024-01-17 PROCEDURE — 1123F ACP DISCUSS/DSCN MKR DOCD: CPT | Performed by: INTERNAL MEDICINE

## 2024-01-17 PROCEDURE — 99215 OFFICE O/P EST HI 40 MIN: CPT | Performed by: INTERNAL MEDICINE

## 2024-01-17 PROCEDURE — 1036F TOBACCO NON-USER: CPT | Performed by: INTERNAL MEDICINE

## 2024-01-17 PROCEDURE — G8427 DOCREV CUR MEDS BY ELIG CLIN: HCPCS | Performed by: INTERNAL MEDICINE

## 2024-01-17 PROCEDURE — G8400 PT W/DXA NO RESULTS DOC: HCPCS | Performed by: INTERNAL MEDICINE

## 2024-01-17 PROCEDURE — 3074F SYST BP LT 130 MM HG: CPT | Performed by: INTERNAL MEDICINE

## 2024-01-17 RX ORDER — SERTRALINE HYDROCHLORIDE 100 MG/1
100 TABLET, FILM COATED ORAL DAILY
Qty: 90 TABLET | Refills: 3 | Status: SHIPPED | OUTPATIENT
Start: 2024-01-17

## 2024-01-17 RX ORDER — HYDROXYZINE HYDROCHLORIDE 25 MG/1
25 TABLET, FILM COATED ORAL NIGHTLY PRN
Qty: 90 TABLET | Refills: 1 | Status: SHIPPED | OUTPATIENT
Start: 2024-01-17

## 2024-01-17 SDOH — ECONOMIC STABILITY: FOOD INSECURITY: WITHIN THE PAST 12 MONTHS, YOU WORRIED THAT YOUR FOOD WOULD RUN OUT BEFORE YOU GOT MONEY TO BUY MORE.: NEVER TRUE

## 2024-01-17 SDOH — ECONOMIC STABILITY: INCOME INSECURITY: HOW HARD IS IT FOR YOU TO PAY FOR THE VERY BASICS LIKE FOOD, HOUSING, MEDICAL CARE, AND HEATING?: NOT HARD AT ALL

## 2024-01-17 SDOH — ECONOMIC STABILITY: FOOD INSECURITY: WITHIN THE PAST 12 MONTHS, THE FOOD YOU BOUGHT JUST DIDN'T LAST AND YOU DIDN'T HAVE MONEY TO GET MORE.: NEVER TRUE

## 2024-01-17 ASSESSMENT — PATIENT HEALTH QUESTIONNAIRE - PHQ9
SUM OF ALL RESPONSES TO PHQ QUESTIONS 1-9: 0
SUM OF ALL RESPONSES TO PHQ9 QUESTIONS 1 & 2: 0
SUM OF ALL RESPONSES TO PHQ QUESTIONS 1-9: 0
SUM OF ALL RESPONSES TO PHQ QUESTIONS 1-9: 0
1. LITTLE INTEREST OR PLEASURE IN DOING THINGS: 0
SUM OF ALL RESPONSES TO PHQ QUESTIONS 1-9: 0
2. FEELING DOWN, DEPRESSED OR HOPELESS: 0

## 2024-01-17 NOTE — PROGRESS NOTES
HPI:   Anya Mason is a 78 y.o. year old female who presents today for a routine visit.  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 She reports today that she is doing only fairly well.      On 12/11/2023, she contacted the office and reported that she had tested positive for COVID-19 by home rapid antigen testing and was experiencing a fever 100.5 °F, chills, generalized myalgias, nasal congestion, postnasal drainage, and cough.  She was treated with Paxlovid and reports gradual overall improvement.  She reports that she is continuing to have intermittent cough particularly when taking a deep breath, and also noticed persistent decrease in her taste and smell.  She states that she has not received the updated 2023-24 COVID-vaccine.    She reports that she has noted increased anxiety and insomnia despite taking melatonin 5 mg nightly and sertraline 50 mg daily.  She states that she has difficulty falling asleep and staying asleep, and is also noting increased difficulty with palpitations particularly at night.  She does report ongoing concerns regarding family matters but feels that she is not able to handle them as well as she had done previously.  She also reports the passing of a close family member recently which she feels is contributing to her current difficulty.  She states that she discussed her issues with Dr. Velasquez, and she states that she was concerned that she was experiencing worsening depression.    On 7/31/2023, she contacted cardiology and reported that she was noting increased palpitations that were keeping her up at night and lasting for a longer duration than previously.  A 48-hour Holter monitor was placed on 8/2/2023, and she had a follow-up visit with Dr. AWLKER

## 2024-01-17 NOTE — PROGRESS NOTES
Anya Mason presents today for   Chief Complaint   Patient presents with    6 Month Follow-Up       1. \"Have you been to the ER, urgent care clinic since your last visit?  Hospitalized since your last visit?\" no    2. \"Have you seen or consulted any other health care providers outside of the [facility] since your last visit?\" no     3. For patients aged 45-75: Has the patient had a colonoscopy / FIT/ Cologuard? NA - based on age      If the patient is female:    4. For patients aged 40-74: Has the patient had a mammogram within the past 2 years? NA - based on age or sex      5. For patients aged 21-65: Has the patient had a pap smear? NA - based on age or sex

## 2024-01-21 PROBLEM — I47.19 ATRIAL TACHYCARDIA, PAROXYSMAL: Status: ACTIVE | Noted: 2024-01-21

## 2024-01-21 PROBLEM — I47.10 PAROXYSMAL SVT (SUPRAVENTRICULAR TACHYCARDIA): Status: ACTIVE | Noted: 2024-01-21

## 2024-02-29 ENCOUNTER — OFFICE VISIT (OUTPATIENT)
Age: 79
End: 2024-02-29

## 2024-02-29 VITALS
WEIGHT: 110 LBS | DIASTOLIC BLOOD PRESSURE: 60 MMHG | BODY MASS INDEX: 17.27 KG/M2 | SYSTOLIC BLOOD PRESSURE: 112 MMHG | TEMPERATURE: 98.2 F | RESPIRATION RATE: 14 BRPM | HEIGHT: 67 IN | HEART RATE: 59 BPM | OXYGEN SATURATION: 99 %

## 2024-02-29 DIAGNOSIS — I77.3 FIBROMUSCULAR DYSPLASIA (HCC): ICD-10-CM

## 2024-02-29 DIAGNOSIS — R00.2 PALPITATIONS: ICD-10-CM

## 2024-02-29 DIAGNOSIS — D84.9 IMMUNOSUPPRESSED STATUS (HCC): ICD-10-CM

## 2024-02-29 DIAGNOSIS — I10 PRIMARY HYPERTENSION: ICD-10-CM

## 2024-02-29 DIAGNOSIS — E78.00 PURE HYPERCHOLESTEROLEMIA: ICD-10-CM

## 2024-02-29 DIAGNOSIS — F41.9 ANXIETY: Primary | ICD-10-CM

## 2024-02-29 DIAGNOSIS — I47.19 PAROXYSMAL ATRIAL TACHYCARDIA: ICD-10-CM

## 2024-02-29 DIAGNOSIS — G47.00 INSOMNIA, UNSPECIFIED TYPE: ICD-10-CM

## 2024-02-29 DIAGNOSIS — M05.79 RHEUMATOID ARTHRITIS INVOLVING MULTIPLE SITES WITH POSITIVE RHEUMATOID FACTOR (HCC): ICD-10-CM

## 2024-02-29 DIAGNOSIS — E55.9 VITAMIN D DEFICIENCY: ICD-10-CM

## 2024-02-29 DIAGNOSIS — Z86.16 HISTORY OF 2019 NOVEL CORONAVIRUS DISEASE (COVID-19): ICD-10-CM

## 2024-02-29 NOTE — PATIENT INSTRUCTIONS
Incorporated.   Care instructions adapted under license by [employer] (which disclaims liability or warranty for this information). If you have questions about a medical condition or this instruction, always ask your healthcare professional. Healthwise, [employer] disclaims any warranty or liability for your use of this information.      Learning About Low-Sodium Foods  What foods are low in sodium?    The foods you eat contain nutrients, such as vitamins and minerals. Sodium is a nutrient. Your body needs the right amount to stay healthy and work as it should. You can use the list below to help you make choices about which foods to eat.  Fruits  Fresh, frozen, canned, or dried fruit  Vegetables  Fresh or frozen vegetables, with no added salt  Canned vegetables, low-sodium or with no added salt  Grains  Bagels without salted tops  Cereal with no added salt  Corn tortillas  Crackers with no added salt  Oatmeal, cooked without salt  Popcorn with no salt  Pasta and noodles, cooked without salt  Rice, cooked without salt  Unsalted pretzels  Dairy and dairy alternatives  Butter, unsalted  Cream cheese  Ice cream  Milk  Soy milk  Meats and other protein foods  Beans and peas, canned with no salt  Eggs  Fresh fish (not smoked)  Fresh meats (not smoked or cured)  Nuts and nut butter, prepared without salt  Poultry, not packaged with sodium solution  Tofu, unseasoned  Tuna, canned without salt  Seasonings  Garlic  Herbs and spices  Lemon juice  Mustard  Olive oil  Salt-free seasoning mixes  Vinegar  Work with your doctor to find out how much of this nutrient you need. Depending on your health, you may need more or less of it in your diet.  Where can you learn more?  Go to https://www.healthTreatspace.net/GoodHelpConnections  Enter S460 in the search box to learn more about \"Learning About Low-Sodium Foods.\"  Current as of: August 22, 2019               Content Version: 12.6  © 4163-1583 [employer].

## 2024-03-06 ENCOUNTER — HOSPITAL ENCOUNTER (OUTPATIENT)
Facility: HOSPITAL | Age: 79
Discharge: HOME | End: 2024-03-09
Payer: MEDICARE

## 2024-03-06 ENCOUNTER — TELEMEDICINE (OUTPATIENT)
Age: 79
End: 2024-03-06
Payer: MEDICARE

## 2024-03-06 DIAGNOSIS — J06.9 UPPER RESPIRATORY TRACT INFECTION, UNSPECIFIED TYPE: ICD-10-CM

## 2024-03-06 DIAGNOSIS — J06.9 UPPER RESPIRATORY TRACT INFECTION, UNSPECIFIED TYPE: Primary | ICD-10-CM

## 2024-03-06 PROCEDURE — G8484 FLU IMMUNIZE NO ADMIN: HCPCS | Performed by: NURSE PRACTITIONER

## 2024-03-06 PROCEDURE — G8428 CUR MEDS NOT DOCUMENT: HCPCS | Performed by: NURSE PRACTITIONER

## 2024-03-06 PROCEDURE — 99213 OFFICE O/P EST LOW 20 MIN: CPT | Performed by: NURSE PRACTITIONER

## 2024-03-06 PROCEDURE — 1123F ACP DISCUSS/DSCN MKR DOCD: CPT | Performed by: NURSE PRACTITIONER

## 2024-03-06 PROCEDURE — 71046 X-RAY EXAM CHEST 2 VIEWS: CPT

## 2024-03-06 PROCEDURE — 1090F PRES/ABSN URINE INCON ASSESS: CPT | Performed by: NURSE PRACTITIONER

## 2024-03-06 PROCEDURE — 1036F TOBACCO NON-USER: CPT | Performed by: NURSE PRACTITIONER

## 2024-03-06 PROCEDURE — G8400 PT W/DXA NO RESULTS DOC: HCPCS | Performed by: NURSE PRACTITIONER

## 2024-03-06 PROCEDURE — G8419 CALC BMI OUT NRM PARAM NOF/U: HCPCS | Performed by: NURSE PRACTITIONER

## 2024-03-06 RX ORDER — GUAIFENESIN 600 MG/1
600 TABLET, EXTENDED RELEASE ORAL 2 TIMES DAILY
Qty: 60 TABLET | Refills: 0 | Status: SHIPPED | COMMUNITY
Start: 2024-03-06

## 2024-03-06 RX ORDER — DEXTROMETHORPHAN POLISTIREX 30 MG/5ML
60 SUSPENSION ORAL 2 TIMES DAILY PRN
Qty: 1 EACH | Refills: 0 | COMMUNITY
Start: 2024-03-06 | End: 2024-03-16

## 2024-03-06 RX ORDER — AMOXICILLIN 500 MG/1
500 CAPSULE ORAL 2 TIMES DAILY
Qty: 14 CAPSULE | Refills: 0 | Status: SHIPPED | OUTPATIENT
Start: 2024-03-06 | End: 2024-03-13

## 2024-03-06 NOTE — PROGRESS NOTES
[facility]  [address]  [address]  [place], [place] [address]  [phone] office/[phone] fax    3/6/2024    HPI:   Anya Maosn 1945 is a pleasant White (non-) female who presents virtually.    Todays concern/HPI:  Temp since Saturday (99.1). productive cough with colored phlegm (greenish gray, thick), headache, achy (has RA), sore throat. Taking tylenol q6h. Tested negative for Covid yesterday.       Review of Systems - Negative except above    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 (HCC)     s/p balloon angioplasty 5/2009 [facility]    Rheumatoid arthritis (HCC)     Skin cancer      Past Surgical History:   Procedure Laterality Date    ANGIOPLASTY      COLONOSCOPY      COLONOSCOPY N/A 7/27/2021    COLONOSCOPY with polypectomies with bx's performed by Basim Murry MD at [facility] ENDOSCOPY    OTHER SURGICAL HISTORY      removal of fibroid benign tumors from breast     PARTIAL HYSTERECTOMY (CERVIX NOT REMOVED)      TONSILLECTOMY  1964    TOTAL KNEE ARTHROPLASTY Bilateral 2009    partial      Current Outpatient Medications   Medication Sig    amoxicillin (AMOXIL) 500 MG capsule Take 1 capsule by mouth 2 times daily for 7 days    guaiFENesin (MUCINEX) 600 MG extended release tablet Take 1 tablet by mouth 2 times daily    dextromethorphan (DELSYM) 30 MG/5ML extended release liquid Take 10 mLs by mouth 2 times daily as needed for Cough    sertraline (ZOLOFT) 100 MG tablet Take 1 tablet by mouth daily    hydrOXYzine HCl (ATARAX) 25 MG tablet Take 1 tablet by mouth nightly as needed for Anxiety (insomnia)    metoprolol tartrate (LOPRESSOR) 25 MG tablet Take 1 tablet by mouth

## 2024-03-06 NOTE — ASSESSMENT & PLAN NOTE
Take Mucinex BID with 8oz of water  Delsym twice daily for cough  Stay well hydrated  Avoid dairy til cough has subsided (thickens mucous)  Humidifier at night  Sleep with shoulders elevated    Initiate ABX  Discussed possible side effects  Complete ABX therapy to obtain full benefit    Per patient: Dr. Velasquez, RA, will send in a note to this office stating patient needs to have her lungs auscultated or imaging.  Patient has a history of COVID back in December 2023 with intermittent colds.  Patient is on methotrexate.  Patient does not feel like leaving her home today.  Inform patient order for chest x-ray is placed and if she feels up to it to go have her x-ray done tomorrow.

## 2024-03-07 NOTE — RESULT ENCOUNTER NOTE
[facility] nurses, pls contact patient with the following: (Please do not send message via [employer], contact them via phone)    CXR normal. Cont ABX therapy. If not improved, call office    Thank you

## 2024-03-11 ENCOUNTER — TELEPHONE (OUTPATIENT)
Age: 79
End: 2024-03-11

## 2024-03-11 RX ORDER — AMOXICILLIN AND CLAVULANATE POTASSIUM 875; 125 MG/1; MG/1
1 TABLET, FILM COATED ORAL 2 TIMES DAILY
Qty: 20 TABLET | Refills: 0 | Status: SHIPPED | OUTPATIENT
Start: 2024-03-11 | End: 2024-03-21

## 2024-03-11 RX ORDER — BENZONATATE 100 MG/1
100 CAPSULE ORAL 3 TIMES DAILY PRN
Qty: 30 CAPSULE | Refills: 0 | Status: SHIPPED | OUTPATIENT
Start: 2024-03-11 | End: 2024-03-21

## 2024-03-11 NOTE — TELEPHONE ENCOUNTER
Called and spoke with patient.  Reports symptoms developed since 3/2/2024 and evaluated by [facility] on 3/6/2024.  Prescribed amoxicillin 500 mg twice daily for 7 days and instructed to use guaifenesin and Delsym for cough.  Patient reports that she has 1 more day of amoxicillin but continuing to have persistent fever to 100.7 °F, cough productive of green-yellow sputum, headache, and sore throat.  Denies rhinorrhea or nasal congestion.  Chest x-ray obtained (3/6/2024) and was unremarkable.  Repeat COVID test today negative.  Patient immunosuppressed on methotrexate, prednisone, and hydroxychloroquine.  Discussed that given persistent fever with discolored sputum, would recommend broadening antibiotic coverage to Augmentin.  Will also prescribe benzonatate for cough.  Advised to call back if not improving.    Prescriptions sent to [facility].

## 2024-03-11 NOTE — TELEPHONE ENCOUNTER
Pt called in was seen on 3/6/24 VV   Pt still having symptoms of:     Fever   Sore throat   Cough w/phlem   Headache      PT wants to know if there is anymore treatment that can be provided, please advise.

## 2024-05-10 ENCOUNTER — TELEPHONE (OUTPATIENT)
Age: 79
End: 2024-05-10

## 2024-05-10 NOTE — TELEPHONE ENCOUNTER
----- Message from Marbella Elmore sent at 5/9/2024  4:01 PM EDT -----  Subject: Message to Provider    QUESTIONS  Information for Provider? Pt needs to reschedule labs  ---------------------------------------------------------------------------  --------------  CALL BACK INFO  [phone]; OK to leave message on voicemail  ---------------------------------------------------------------------------  --------------  SCRIPT ANSWERS  Relationship to Patient? Self  
Patient have been scheduled  
Statement Selected

## 2024-06-17 ENCOUNTER — TELEPHONE (OUTPATIENT)
Facility: CLINIC | Age: 79
End: 2024-06-17

## 2024-06-17 NOTE — TELEPHONE ENCOUNTER
----- Message from Belle Monsalveon sent at 6/17/2024  1:20 PM EDT -----  Regarding: ECC Message to Provider  ECC Message to Provider    Relationship to Patient: Self     Additional Information: Patient want to have an email address of the physician Dr. Coty Spencer because she will send an important message together with MR and Mrs. Munoz.   --------------------------------------------------------------------------------------------------------------------------    Call Back Information: OK to leave message on voicemail  Preferred Call Back Number:   [phone]

## 2024-06-17 NOTE — TELEPHONE ENCOUNTER
Please reach out to patient and let her know that we do not give out emails.  She can relay the message through staff or through [employer] messages if they are concerning her.

## 2024-06-18 NOTE — TELEPHONE ENCOUNTER
Pt contacted. Helped pt set up [employer] accounts so pt can send messages through the portal.   Pt also notified that messages must match the account they are sent with

## 2024-08-07 ENCOUNTER — HOSPITAL ENCOUNTER (OUTPATIENT)
Facility: HOSPITAL | Age: 79
Setting detail: SPECIMEN
Discharge: HOME | End: 2024-08-10
Payer: MEDICARE

## 2024-08-07 DIAGNOSIS — R00.2 PALPITATIONS: ICD-10-CM

## 2024-08-07 DIAGNOSIS — I47.19 PAROXYSMAL ATRIAL TACHYCARDIA (HCC): ICD-10-CM

## 2024-08-07 DIAGNOSIS — E78.00 PURE HYPERCHOLESTEROLEMIA: ICD-10-CM

## 2024-08-07 DIAGNOSIS — I10 PRIMARY HYPERTENSION: ICD-10-CM

## 2024-08-07 DIAGNOSIS — E55.9 VITAMIN D DEFICIENCY: ICD-10-CM

## 2024-08-07 LAB
25(OH)D3 SERPL-MCNC: 71.1 NG/ML (ref 30–100)
ALBUMIN SERPL-MCNC: 4 G/DL (ref 3.4–5)
ALBUMIN/GLOB SERPL: 1.3 (ref 0.8–1.7)
ALP SERPL-CCNC: 65 U/L (ref 45–117)
ALT SERPL-CCNC: 34 U/L (ref 13–56)
ANION GAP SERPL CALC-SCNC: 3 MMOL/L (ref 3–18)
APPEARANCE UR: CLEAR
AST SERPL-CCNC: 36 U/L (ref 10–38)
BACTERIA URNS QL MICRO: NEGATIVE /HPF
BASOPHILS # BLD: 0 K/UL (ref 0–0.1)
BASOPHILS NFR BLD: 1 % (ref 0–2)
BILIRUB SERPL-MCNC: 0.5 MG/DL (ref 0.2–1)
BILIRUB UR QL: NEGATIVE
BUN SERPL-MCNC: 15 MG/DL (ref 7–18)
BUN/CREAT SERPL: 18 (ref 12–20)
CALCIUM SERPL-MCNC: 10.6 MG/DL (ref 8.5–10.1)
CHLORIDE SERPL-SCNC: 103 MMOL/L (ref 100–111)
CHOLEST SERPL-MCNC: 183 MG/DL
CO2 SERPL-SCNC: 30 MMOL/L (ref 21–32)
COLOR UR: YELLOW
CREAT SERPL-MCNC: 0.85 MG/DL (ref 0.6–1.3)
DIFFERENTIAL METHOD BLD: ABNORMAL
EOSINOPHIL # BLD: 0.2 K/UL (ref 0–0.4)
EOSINOPHIL NFR BLD: 3 % (ref 0–5)
EPITH CASTS URNS QL MICRO: NORMAL /LPF (ref 0–5)
ERYTHROCYTE [DISTWIDTH] IN BLOOD BY AUTOMATED COUNT: 13.4 % (ref 11.6–14.5)
GLOBULIN SER CALC-MCNC: 3.2 G/DL (ref 2–4)
GLUCOSE SERPL-MCNC: 81 MG/DL (ref 74–99)
GLUCOSE UR STRIP.AUTO-MCNC: NEGATIVE MG/DL
HCT VFR BLD AUTO: 37.7 % (ref 35–45)
HDLC SERPL-MCNC: 77 MG/DL (ref 40–60)
HDLC SERPL: 2.4 (ref 0–5)
HGB BLD-MCNC: 11.8 G/DL (ref 12–16)
HGB UR QL STRIP: NEGATIVE
IMM GRANULOCYTES # BLD AUTO: 0 K/UL (ref 0–0.04)
IMM GRANULOCYTES NFR BLD AUTO: 0 % (ref 0–0.5)
KETONES UR QL STRIP.AUTO: NEGATIVE MG/DL
LDLC SERPL CALC-MCNC: 83.4 MG/DL (ref 0–100)
LEUKOCYTE ESTERASE UR QL STRIP.AUTO: NEGATIVE
LIPID PANEL: ABNORMAL
LYMPHOCYTES # BLD: 1.1 K/UL (ref 0.9–3.6)
LYMPHOCYTES NFR BLD: 22 % (ref 21–52)
MAGNESIUM SERPL-MCNC: 2.1 MG/DL (ref 1.6–2.6)
MCH RBC QN AUTO: 30.4 PG (ref 24–34)
MCHC RBC AUTO-ENTMCNC: 31.3 G/DL (ref 31–37)
MCV RBC AUTO: 97.2 FL (ref 78–100)
MONOCYTES # BLD: 0.5 K/UL (ref 0.05–1.2)
MONOCYTES NFR BLD: 11 % (ref 3–10)
NEUTS SEG # BLD: 3.1 K/UL (ref 1.8–8)
NEUTS SEG NFR BLD: 63 % (ref 40–73)
NITRITE UR QL STRIP.AUTO: NEGATIVE
NRBC # BLD: 0 K/UL (ref 0–0.01)
NRBC BLD-RTO: 0 PER 100 WBC
PH UR STRIP: 7 (ref 5–8)
PLATELET # BLD AUTO: 231 K/UL (ref 135–420)
PMV BLD AUTO: 10.6 FL (ref 9.2–11.8)
POTASSIUM SERPL-SCNC: 4.8 MMOL/L (ref 3.5–5.5)
PROT SERPL-MCNC: 7.2 G/DL (ref 6.4–8.2)
PROT UR STRIP-MCNC: NEGATIVE MG/DL
RBC # BLD AUTO: 3.88 M/UL (ref 4.2–5.3)
RBC #/AREA URNS HPF: NORMAL /HPF (ref 0–5)
SODIUM SERPL-SCNC: 136 MMOL/L (ref 136–145)
SP GR UR REFRACTOMETRY: 1 (ref 1–1.03)
TRIGL SERPL-MCNC: 113 MG/DL
TSH SERPL DL<=0.05 MIU/L-ACNC: 2.44 UIU/ML (ref 0.36–3.74)
UROBILINOGEN UR QL STRIP.AUTO: 0.2 EU/DL (ref 0.2–1)
VLDLC SERPL CALC-MCNC: 22.6 MG/DL
WBC # BLD AUTO: 4.9 K/UL (ref 4.6–13.2)
WBC URNS QL MICRO: NORMAL /HPF (ref 0–4)

## 2024-08-07 PROCEDURE — 85025 COMPLETE CBC W/AUTO DIFF WBC: CPT

## 2024-08-07 PROCEDURE — 80061 LIPID PANEL: CPT

## 2024-08-07 PROCEDURE — 82306 VITAMIN D 25 HYDROXY: CPT

## 2024-08-07 PROCEDURE — 84443 ASSAY THYROID STIM HORMONE: CPT

## 2024-08-07 PROCEDURE — 36415 COLL VENOUS BLD VENIPUNCTURE: CPT

## 2024-08-07 PROCEDURE — 83735 ASSAY OF MAGNESIUM: CPT

## 2024-08-07 PROCEDURE — 81001 URINALYSIS AUTO W/SCOPE: CPT

## 2024-08-07 PROCEDURE — 80053 COMPREHEN METABOLIC PANEL: CPT

## 2024-08-14 ENCOUNTER — OFFICE VISIT (OUTPATIENT)
Facility: CLINIC | Age: 79
End: 2024-08-14

## 2024-08-14 VITALS
HEART RATE: 66 BPM | WEIGHT: 108 LBS | TEMPERATURE: 98.7 F | OXYGEN SATURATION: 98 % | DIASTOLIC BLOOD PRESSURE: 62 MMHG | BODY MASS INDEX: 16.95 KG/M2 | SYSTOLIC BLOOD PRESSURE: 128 MMHG | RESPIRATION RATE: 14 BRPM | HEIGHT: 67 IN

## 2024-08-14 DIAGNOSIS — I65.23 CAROTID STENOSIS, BILATERAL: ICD-10-CM

## 2024-08-14 DIAGNOSIS — D84.9 IMMUNOSUPPRESSED STATUS (HCC): ICD-10-CM

## 2024-08-14 DIAGNOSIS — Z71.89 ACP (ADVANCE CARE PLANNING): ICD-10-CM

## 2024-08-14 DIAGNOSIS — D64.9 ANEMIA, UNSPECIFIED TYPE: ICD-10-CM

## 2024-08-14 DIAGNOSIS — G47.00 INSOMNIA, UNSPECIFIED TYPE: ICD-10-CM

## 2024-08-14 DIAGNOSIS — F41.9 ANXIETY: ICD-10-CM

## 2024-08-14 DIAGNOSIS — I77.3 FIBROMUSCULAR DYSPLASIA (HCC): ICD-10-CM

## 2024-08-14 DIAGNOSIS — I10 PRIMARY HYPERTENSION: Primary | ICD-10-CM

## 2024-08-14 DIAGNOSIS — Z00.00 MEDICARE ANNUAL WELLNESS VISIT, SUBSEQUENT: ICD-10-CM

## 2024-08-14 DIAGNOSIS — I47.19 PAROXYSMAL ATRIAL TACHYCARDIA (HCC): ICD-10-CM

## 2024-08-14 DIAGNOSIS — E55.9 VITAMIN D DEFICIENCY: ICD-10-CM

## 2024-08-14 DIAGNOSIS — M05.79 RHEUMATOID ARTHRITIS INVOLVING MULTIPLE SITES WITH POSITIVE RHEUMATOID FACTOR (HCC): ICD-10-CM

## 2024-08-14 DIAGNOSIS — E78.00 PURE HYPERCHOLESTEROLEMIA: ICD-10-CM

## 2024-08-14 SDOH — ECONOMIC STABILITY: FOOD INSECURITY: WITHIN THE PAST 12 MONTHS, THE FOOD YOU BOUGHT JUST DIDN'T LAST AND YOU DIDN'T HAVE MONEY TO GET MORE.: NEVER TRUE

## 2024-08-14 SDOH — ECONOMIC STABILITY: INCOME INSECURITY: HOW HARD IS IT FOR YOU TO PAY FOR THE VERY BASICS LIKE FOOD, HOUSING, MEDICAL CARE, AND HEATING?: NOT HARD AT ALL

## 2024-08-14 SDOH — ECONOMIC STABILITY: FOOD INSECURITY: WITHIN THE PAST 12 MONTHS, YOU WORRIED THAT YOUR FOOD WOULD RUN OUT BEFORE YOU GOT MONEY TO BUY MORE.: NEVER TRUE

## 2024-08-14 ASSESSMENT — PATIENT HEALTH QUESTIONNAIRE - PHQ9
SUM OF ALL RESPONSES TO PHQ QUESTIONS 1-9: 0
2. FEELING DOWN, DEPRESSED OR HOPELESS: NOT AT ALL
SUM OF ALL RESPONSES TO PHQ9 QUESTIONS 1 & 2: 0
1. LITTLE INTEREST OR PLEASURE IN DOING THINGS: NOT AT ALL

## 2024-08-14 ASSESSMENT — LIFESTYLE VARIABLES
HOW OFTEN DO YOU HAVE A DRINK CONTAINING ALCOHOL: MONTHLY OR LESS
HOW MANY STANDARD DRINKS CONTAINING ALCOHOL DO YOU HAVE ON A TYPICAL DAY: 1 OR 2

## 2024-08-14 NOTE — PROGRESS NOTES
Anya Mason presents today for   Chief Complaint   Patient presents with    Medicare AWV       \"Have you been to the ER, urgent care clinic since your last visit?  Hospitalized since your last visit?\"    NO    “Have you seen or consulted any other health care providers outside of [facility] since your last visit?”    NO            
Medicare Annual Wellness Visit    Anya Mason is here for Medicare AWV    Assessment & Plan   Primary hypertension  -     CBC with Auto Differential; Future  -     Comprehensive Metabolic Panel; Future  -     Magnesium; Future  -     Microalbumin / Creatinine Urine Ratio; Future  Pure hypercholesterolemia  -     Comprehensive Metabolic Panel; Future  -     Lipid Panel; Future  Paroxysmal atrial tachycardia (HCC)  -     Comprehensive Metabolic Panel; Future  -     Magnesium; Future  Fibromuscular dysplasia (HCC)  -     Vascular duplex carotid bilateral; Future  Carotid stenosis, bilateral  -     Vascular duplex carotid bilateral; Future  Rheumatoid arthritis involving multiple sites with positive rheumatoid factor (HCC)  Immunosuppressed status (HCC)  Anemia, unspecified type  -     CBC with Auto Differential; Future  -     Ferritin; Future  Anxiety  Insomnia, unspecified type  Medicare annual wellness visit, subsequent  ACP (advance care planning)  Vitamin D deficiency  -     Vitamin D 25 Hydroxy; Future    Recommendations for Preventive Services Due: see orders and patient instructions/AVS.  Recommended screening schedule for the next 5-10 years is provided to the patient in written form: see Patient Instructions/AVS.     Return in about 6 months (around 2/14/2025), or if symptoms worsen or fail to improve.     Subjective   See office progress note for details.      Patient's complete Health Risk Assessment and screening values have been reviewed and are found in Flowsheets. The following problems were reviewed today and where indicated follow up appointments were made and/or referrals ordered.    Positive Risk Factor Screenings with Interventions:                  Abnormal BMI (underweight):  Body mass index is 17.17 kg/m². (!) Abnormal  Interventions:  Encouraged to increase caloric intake         Advanced Directives:  Do you have a Living Will?: (!) No    Intervention:  see ACP note          Objective 
without sequela.      She underwent reevaluation by KEYLA Mascorro on 4/17/2023 and multiple lesions were treated with Efudex. 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  She was treated with a topical steroid cream and Vaseline with significant improvement.       She was seen for an acute visit on 4/12/2023, and reported intermittent dizziness when getting up from a chair or turning her head while lying in bed.  She stated that she was not experiencing \"spinning\", but was experiencing \"swaying\" as well as head fullness.  She reported a persistent headache and symptoms consistent with her seasonal allergies.  She was advised to begin Claritin and Flonase, and reported that her symptoms resolved with treatment.      She had contacted the office on 10/27/2022 after choking on a ivy chip, and follow-up chest x-ray (10/28/2022) was negative.  She underwent evaluation by KEYLA Hinds and had a modified barium swallow (3/7/2023) which showed intermittent delayed relaxation of the cricopharyngeus muscle but otherwise had no difficulty drinking from a cup or straw.  No further evaluation felt to be needed.    In 12/2021, she sustained a fall after falling off a chair in her kitchen.  She reported that she struck her right hip and her head during the fall but did not lose consciousness.  She reported persistent right hip pain but no other symptoms following the fall.  However, three days later, she experienced acute onset vertigo while driving which was transient and resolved.  She was advised by Dr. Velasquez to present to [facility] ED for evaluation (12/14/2021) and underwent head CT scan, right hip x-ray, sacral/coccyx x-ray, and EKG which were unremarkable.      On 9/29/2021, she contacted the office and reports that she had tested positive by rapid testing for COVID-19 on 9/28/2021 after developing symptoms 1 day

## 2024-08-14 NOTE — ACP (ADVANCE CARE PLANNING)
Advance Care Planning     Advance Care Planning (ACP) Physician/NP/PA Conversation    Date of Conversation: 8/14/2024  Conducted with: Patient with Decision Making Capacity    Healthcare Decision Maker:      Primary Decision Maker: Mauro Mason - Spouse - [phone]    Secondary Decision Maker: Alexa Barclay - Child - [phone]    Secondary Decision Maker: Baldev Serrano - Child - [phone]    Click here to complete Healthcare Decision Makers including selection of the Healthcare Decision Maker Relationship (ie \"Primary\")  Today we confirmed healthcare decision makers as her  and 2 daughters    Care Preferences:    Hospitalization:  \"If your health worsens and it becomes clear that your chance of recovery is unlikely, what would be your preference regarding hospitalization?\"  The patient would prefer hospitalization.    Ventilation:  \"If you were unable to breath on your own and your chance of recovery was unlikely, what would be your preference about the use of a ventilator (breathing machine) if it was available to you?\"  The patient would desire the use of a ventilator.    Resuscitation:  \"In the event your heart stopped as a result of an underlying serious health condition, would you want attempts made to restart your heart, or would you prefer a natural death?\"  Yes, attempt to resuscitate.    treatment goals, benefit/burden of treatment options, ventilation preferences, hospitalization preferences, resuscitation preferences, and end of life care preferences (vegetative state/imminent death)    Conversation Outcomes / Follow-Up Plan:  ACP in process - information provided, considering goals and options  Reviewed DNR/DNI and patient elects Full Code (Attempt Resuscitation)    Length of Voluntary ACP Conversation in minutes:  16 minutes    Coty Spencer MD

## 2024-08-18 PROBLEM — J06.9 UPPER RESPIRATORY TRACT INFECTION: Status: RESOLVED | Noted: 2024-03-06 | Resolved: 2024-08-18

## 2024-09-03 ENCOUNTER — HOSPITAL ENCOUNTER (OUTPATIENT)
Facility: HOSPITAL | Age: 79
Discharge: HOME | End: 2024-09-05
Attending: INTERNAL MEDICINE
Payer: MEDICARE

## 2024-09-03 DIAGNOSIS — I65.23 CAROTID STENOSIS, BILATERAL: ICD-10-CM

## 2024-09-03 DIAGNOSIS — I77.3 FIBROMUSCULAR DYSPLASIA (HCC): ICD-10-CM

## 2024-09-03 PROCEDURE — 93880 EXTRACRANIAL BILAT STUDY: CPT

## 2024-09-05 LAB
VAS LEFT CCA DIST EDV: 15.6 CM/S
VAS LEFT CCA DIST PSV: 88.5 CM/S
VAS LEFT CCA MID EDV: 27.39 CM/S
VAS LEFT CCA MID PSV: 127.87 CM/S
VAS LEFT CCA PROX EDV: 23.5 CM/S
VAS LEFT CCA PROX PSV: 131.8 CM/S
VAS LEFT ECA EDV: 8.4 CM/S
VAS LEFT ECA PSV: 76.2 CM/S
VAS LEFT ICA DIST EDV: 27.8 CM/S
VAS LEFT ICA DIST PSV: 117.7 CM/S
VAS LEFT ICA MID EDV: 28.2 CM/S
VAS LEFT ICA MID PSV: 111.4 CM/S
VAS LEFT ICA PROX EDV: 24.6 CM/S
VAS LEFT ICA PROX PSV: 87.6 CM/S
VAS LEFT ICA/CCA PSV: 1.33 NO UNITS
VAS LEFT SUBCLAVIAN PROX EDV: 0 CM/S
VAS LEFT SUBCLAVIAN PROX PSV: 141.7 CM/S
VAS LEFT VERTEBRAL EDV: 22.45 CM/S
VAS LEFT VERTEBRAL PSV: 91.9 CM/S
VAS RIGHT CCA DIST EDV: 11.5 CM/S
VAS RIGHT CCA DIST PSV: 74.9 CM/S
VAS RIGHT CCA MID EDV: 15.01 CM/S
VAS RIGHT CCA MID PSV: 106.9 CM/S
VAS RIGHT CCA PROX EDV: 11.1 CM/S
VAS RIGHT CCA PROX PSV: 91.4 CM/S
VAS RIGHT ECA EDV: 7.64 CM/S
VAS RIGHT ECA PSV: 80.1 CM/S
VAS RIGHT ICA DIST EDV: 33.9 CM/S
VAS RIGHT ICA DIST PSV: 150.1 CM/S
VAS RIGHT ICA MID EDV: 36 CM/S
VAS RIGHT ICA MID PSV: 186.5 CM/S
VAS RIGHT ICA PROX EDV: 12.8 CM/S
VAS RIGHT ICA PROX PSV: 64.6 CM/S
VAS RIGHT ICA/CCA PSV: 2.5 NO UNITS
VAS RIGHT SUBCLAVIAN PROX EDV: 0 CM/S
VAS RIGHT SUBCLAVIAN PROX PSV: 106.4 CM/S
VAS RIGHT VERTEBRAL EDV: 17.61 CM/S
VAS RIGHT VERTEBRAL PSV: 80.6 CM/S

## 2024-09-05 PROCEDURE — 93880 EXTRACRANIAL BILAT STUDY: CPT | Performed by: SURGERY

## 2024-09-20 NOTE — PROGRESS NOTES
Instructions for your procedure at [facility]      Today's Date: 9/20/2024      Patient's Name: Anya Mason      Procedure Date: September 27        Please enter the main entrance of the hospital and check-in at the  located in the lobby.      Do NOT eat or drink anything, including candy, gum, or ice chips after midnight prior to your procedure, unless it is part of your prep.  Brush your teeth before coming to the hospital.You may swish with water, but do not swallow.  No smoking/Vaping/E-Cigarettes 24 hours prior to the day of procedure.  No alcohol 24 hours prior to the day of procedure.  No recreational drugs for one week prior to the day of procedure.  Bring Photo ID, Insurance information, and Co-pay if required on day of procedure.  Bring in pertinent legal documents, such as, Medical Power of , DNR, Advance Directive, etc.  Leave all other valuables, including money/purse, at home.  Remove jewelry, including ALL body piercings, nail polish, acrylic nails, and makeup (including mascara); no lotions, powders, deodorant, and/or perfume/cologne/after shave on the skin.  Glasses and dentures may be worn to the hospital.  They must be removed prior to procedure. Please bring case/container for glasses or dentures.  11. Contacts should not be worn on day of procedure.   12. Call the office ([phone]) if you have symptoms of a cold or illness within 24-48 hours prior to your procedure.   13. AN ADULT (relative or friend 18 years or older) MUST DRIVE YOU HOME AFTER YOUR PROCEDURE.   14. Please make arrangements for a responsible adult (18 years or older) to be with you for 24 hours after your procedure.   15. TWO VISITORS will be allowed in the waiting area during your procedure.       Special Instructions:      Bring list of CURRENT medications.  Follow instructions from the office regarding Bowel Prep, Vitamins, Iron, Blood Thinners, Insulin, Seizure, and Blood

## 2024-09-26 ENCOUNTER — ANESTHESIA EVENT (OUTPATIENT)
Facility: HOSPITAL | Age: 79
End: 2024-09-26
Payer: MEDICARE

## 2024-09-27 ENCOUNTER — HOSPITAL ENCOUNTER (OUTPATIENT)
Facility: HOSPITAL | Age: 79
Setting detail: OUTPATIENT SURGERY
Discharge: HOME | End: 2024-09-27
Attending: INTERNAL MEDICINE | Admitting: INTERNAL MEDICINE
Payer: MEDICARE

## 2024-09-27 ENCOUNTER — ANESTHESIA (OUTPATIENT)
Facility: HOSPITAL | Age: 79
End: 2024-09-27
Payer: MEDICARE

## 2024-09-27 VITALS
SYSTOLIC BLOOD PRESSURE: 119 MMHG | OXYGEN SATURATION: 100 % | WEIGHT: 105.5 LBS | HEART RATE: 69 BPM | TEMPERATURE: 97.8 F | HEIGHT: 62 IN | DIASTOLIC BLOOD PRESSURE: 54 MMHG | BODY MASS INDEX: 19.41 KG/M2 | RESPIRATION RATE: 18 BRPM

## 2024-09-27 PROCEDURE — 3700000000 HC ANESTHESIA ATTENDED CARE: Performed by: INTERNAL MEDICINE

## 2024-09-27 PROCEDURE — 3600007502: Performed by: INTERNAL MEDICINE

## 2024-09-27 PROCEDURE — 2709999900 HC NON-CHARGEABLE SUPPLY: Performed by: INTERNAL MEDICINE

## 2024-09-27 PROCEDURE — 6360000002 HC RX W HCPCS: Performed by: NURSE ANESTHETIST, CERTIFIED REGISTERED

## 2024-09-27 PROCEDURE — 88305 TISSUE EXAM BY PATHOLOGIST: CPT

## 2024-09-27 PROCEDURE — 2580000003 HC RX 258: Performed by: NURSE ANESTHETIST, CERTIFIED REGISTERED

## 2024-09-27 PROCEDURE — 2500000003 HC RX 250 WO HCPCS: Performed by: NURSE ANESTHETIST, CERTIFIED REGISTERED

## 2024-09-27 PROCEDURE — 7100000000 HC PACU RECOVERY - FIRST 15 MIN: Performed by: INTERNAL MEDICINE

## 2024-09-27 PROCEDURE — 7100000010 HC PHASE II RECOVERY - FIRST 15 MIN: Performed by: INTERNAL MEDICINE

## 2024-09-27 PROCEDURE — 3600007512: Performed by: INTERNAL MEDICINE

## 2024-09-27 PROCEDURE — 3700000001 HC ADD 15 MINUTES (ANESTHESIA): Performed by: INTERNAL MEDICINE

## 2024-09-27 RX ORDER — FAMOTIDINE 20 MG/1
20 TABLET, FILM COATED ORAL ONCE
Status: DISCONTINUED | OUTPATIENT
Start: 2024-09-27 | End: 2024-09-27 | Stop reason: HOSPADM

## 2024-09-27 RX ORDER — SODIUM CHLORIDE 9 MG/ML
INJECTION, SOLUTION INTRAVENOUS PRN
Status: DISCONTINUED | OUTPATIENT
Start: 2024-09-27 | End: 2024-09-27 | Stop reason: HOSPADM

## 2024-09-27 RX ORDER — SODIUM CHLORIDE 0.9 % (FLUSH) 0.9 %
5-40 SYRINGE (ML) INJECTION PRN
Status: DISCONTINUED | OUTPATIENT
Start: 2024-09-27 | End: 2024-09-27 | Stop reason: HOSPADM

## 2024-09-27 RX ORDER — PROPOFOL 10 MG/ML
INJECTION, EMULSION INTRAVENOUS
Status: DISCONTINUED | OUTPATIENT
Start: 2024-09-27 | End: 2024-09-27 | Stop reason: SDUPTHER

## 2024-09-27 RX ORDER — EPHEDRINE SULFATE/0.9% NACL/PF 25 MG/5 ML
SYRINGE (ML) INTRAVENOUS
Status: DISCONTINUED | OUTPATIENT
Start: 2024-09-27 | End: 2024-09-27 | Stop reason: SDUPTHER

## 2024-09-27 RX ORDER — SODIUM CHLORIDE 0.9 % (FLUSH) 0.9 %
5-40 SYRINGE (ML) INJECTION EVERY 12 HOURS SCHEDULED
Status: DISCONTINUED | OUTPATIENT
Start: 2024-09-27 | End: 2024-09-27 | Stop reason: HOSPADM

## 2024-09-27 RX ADMIN — Medication 10 MG: at 13:15

## 2024-09-27 RX ADMIN — PROPOFOL 50 MG: 10 INJECTION, EMULSION INTRAVENOUS at 13:11

## 2024-09-27 RX ADMIN — DEXMEDETOMIDINE HYDROCHLORIDE 10 MCG: 100 INJECTION, SOLUTION INTRAVENOUS at 12:57

## 2024-09-27 RX ADMIN — PROPOFOL 50 MG: 10 INJECTION, EMULSION INTRAVENOUS at 12:57

## 2024-09-27 RX ADMIN — PROPOFOL 50 MG: 10 INJECTION, EMULSION INTRAVENOUS at 12:58

## 2024-09-27 RX ADMIN — SODIUM CHLORIDE 25 ML/HR: 9 INJECTION, SOLUTION INTRAVENOUS at 12:03

## 2024-09-27 RX ADMIN — PROPOFOL 50 MG: 10 INJECTION, EMULSION INTRAVENOUS at 13:22

## 2024-09-27 RX ADMIN — PROPOFOL 50 MG: 10 INJECTION, EMULSION INTRAVENOUS at 13:09

## 2024-09-27 ASSESSMENT — PAIN - FUNCTIONAL ASSESSMENT
PAIN_FUNCTIONAL_ASSESSMENT: NONE - DENIES PAIN
PAIN_FUNCTIONAL_ASSESSMENT: 0-10

## 2024-09-27 NOTE — H&P
Psyllium 0.36 g CAPS Take by mouth Takes 2 capsules every other day and 1 every other day   Yes Eliceo Weiner MD   Multiple Vitamin (MULTIVITAMIN ADULT PO) Take by mouth daily   Yes Eliceo Weiner MD   sertraline (ZOLOFT) 100 MG tablet Take 1 tablet by mouth daily 1/17/24   Coty Spencer MD   metoprolol tartrate (LOPRESSOR) 25 MG tablet Take 1 tablet by mouth 2 times daily Take with 50 mg tablet 3/25/23   Eliceo Weiner MD   predniSONE (DELTASONE) 2.5 MG tablet Take 1 tablet by mouth every other day 5/19/23   Eliceo Weiner MD   melatonin 5 MG TABS tablet Take 1 tablet by mouth nightly    Eliceo Weiner MD   acetaminophen (TYLENOL) 500 MG tablet Take 1 tablet by mouth every 6 hours as needed    Automatic Reconciliation, Ar   aspirin 81 MG chewable tablet Take 1 tablet by mouth Takes every 3rd day    Automatic Reconciliation, Ar   Cholecalciferol 50 MCG (2000 UT) CAPS Take 1 capsule by mouth    Automatic Reconciliation, Ar   folic acid (FOLVITE) 1 MG tablet Take 2 tablets by mouth nightly 9/8/15   Automatic Reconciliation, Ar   hydroxychloroquine (PLAQUENIL) 200 MG tablet Take 1 tablet by mouth daily 5/26/21   Automatic Reconciliation, Ar   metoprolol tartrate (LOPRESSOR) 50 MG tablet Take 1 tablet by mouth 2 times daily 2/28/19   Automatic Reconciliation, Ar        Allergies   Allergen Reactions    Hydrocodone Anaphylaxis    Nsaids Other (See Comments)     None due to kidneys    Azithromycin Itching    Hydrocodone-Acetaminophen Other (See Comments)       Review of Systems:  A comprehensive review of systems was negative except for that written in the History of Present Illness.    Objective:     Vitals:    09/20/24 1045   Weight: 49.4 kg (109 lb)   Height: 1.689 m (5' 6.5\")        Physical Exam:  GENERAL: alert, cooperative, no distress, appears stated age  LUNG: clear to auscultation bilaterally  HEART: regular rate and rhythm, S1, S2 normal, no murmur, click, rub or

## 2024-09-27 NOTE — DISCHARGE INSTRUCTIONS
Colonoscopy: What to Expect at Home  Your Recovery  After a colonoscopy, you'll stay at the clinic until you wake up. Then you can go home. But you'll need to arrange for a ride. Your doctor will tell you when you can eat and do your other usual activities.  Your doctor will talk to you about when you'll need your next colonoscopy. Your doctor can help you decide how often you need to be checked. This will depend on the results of your test and your risk for colorectal cancer.  After the test, you may be bloated or have gas pains. You may need to pass gas. If a biopsy was done or a polyp was removed, you may have streaks of blood in your stool (feces) for a few days. Problems such as heavy rectal bleeding may not occur until several weeks after the test. This isn't common. But it can happen after polyps are removed.  This care sheet gives you a general idea about how long it will take for you to recover. But each person recovers at a different pace. Follow the steps below to get better as quickly as possible.  How can you care for yourself at home?  Activity    Rest when you feel tired.     You can do your normal activities when it feels okay to do so.   Diet    Follow your doctor's directions for eating.     Unless your doctor has told you not to, drink plenty of fluids. This helps to replace the fluids that were lost during the colon prep.     Do not drink alcohol.   Medicines    Your doctor will tell you if and when you can restart your medicines. You will also be given instructions about taking any new medicines.     If you stopped taking aspirin or some other blood thinner, your doctor will tell you when to start taking it again.     If polyps were removed or a biopsy was done during the test, your doctor may tell you not to take aspirin or other anti-inflammatory medicines for a few days. These include ibuprofen (Advil, Motrin) and naproxen (Aleve).   Other instructions    For your safety, do not drive or  call for help?   Call your doctor now or seek immediate medical care if:    You have severe belly pain.     Your stools are maroon or very bloody.   Watch closely for changes in your health, and be sure to contact your doctor if:    You have a fever.     You have nausea or vomiting.     You have a change in bowel habits (new constipation or diarrhea).     Your symptoms get worse or are not improving as expected.   Where can you learn more?  Go to https://www.[employer].net/patientEd and enter C571 to learn more about \"Colon Polyps: Care Instructions.\"  Current as of: October 19, 2023  Content Version: 14.1  © 1138-9594 [employer].   Care instructions adapted under license by [employer]. If you have questions about a medical condition or this instruction, always ask your healthcare professional. [employer] disclaims any warranty or liability for your use of this information.

## 2025-02-05 ENCOUNTER — HOSPITAL ENCOUNTER (OUTPATIENT)
Facility: HOSPITAL | Age: 80
Setting detail: SPECIMEN
Discharge: HOME | End: 2025-02-08
Payer: MEDICARE

## 2025-02-05 DIAGNOSIS — I47.19 PAROXYSMAL ATRIAL TACHYCARDIA (HCC): ICD-10-CM

## 2025-02-05 DIAGNOSIS — D64.9 ANEMIA, UNSPECIFIED TYPE: ICD-10-CM

## 2025-02-05 DIAGNOSIS — E78.00 PURE HYPERCHOLESTEROLEMIA: ICD-10-CM

## 2025-02-05 DIAGNOSIS — I10 PRIMARY HYPERTENSION: ICD-10-CM

## 2025-02-05 DIAGNOSIS — E55.9 VITAMIN D DEFICIENCY: ICD-10-CM

## 2025-02-05 LAB
25(OH)D3 SERPL-MCNC: 52.6 NG/ML (ref 30–100)
ALBUMIN SERPL-MCNC: 4.4 G/DL (ref 3.4–5)
ALBUMIN/GLOB SERPL: 1.4 (ref 0.8–1.7)
ALP SERPL-CCNC: 71 U/L (ref 45–117)
ALT SERPL-CCNC: 36 U/L (ref 13–56)
ANION GAP SERPL CALC-SCNC: 3 MMOL/L (ref 3–18)
AST SERPL-CCNC: 42 U/L (ref 10–38)
BASOPHILS # BLD: 0.05 K/UL (ref 0–0.1)
BASOPHILS NFR BLD: 1 % (ref 0–2)
BILIRUB SERPL-MCNC: 0.6 MG/DL (ref 0.2–1)
BUN SERPL-MCNC: 13 MG/DL (ref 7–18)
BUN/CREAT SERPL: 15 (ref 12–20)
CALCIUM SERPL-MCNC: 10.6 MG/DL (ref 8.5–10.1)
CHLORIDE SERPL-SCNC: 103 MMOL/L (ref 100–111)
CHOLEST SERPL-MCNC: 204 MG/DL
CO2 SERPL-SCNC: 31 MMOL/L (ref 21–32)
CREAT SERPL-MCNC: 0.87 MG/DL (ref 0.6–1.3)
DIFFERENTIAL METHOD BLD: ABNORMAL
EOSINOPHIL # BLD: 0.18 K/UL (ref 0–0.4)
EOSINOPHIL NFR BLD: 3.6 % (ref 0–5)
ERYTHROCYTE [DISTWIDTH] IN BLOOD BY AUTOMATED COUNT: 12.8 % (ref 11.6–14.5)
FERRITIN SERPL-MCNC: 37 NG/ML (ref 8–388)
GLOBULIN SER CALC-MCNC: 3.2 G/DL (ref 2–4)
GLUCOSE SERPL-MCNC: 80 MG/DL (ref 74–99)
HCT VFR BLD AUTO: 41.8 % (ref 35–45)
HDLC SERPL-MCNC: 87 MG/DL (ref 40–60)
HDLC SERPL: 2.3 (ref 0–5)
HGB BLD-MCNC: 12.7 G/DL (ref 12–16)
IMM GRANULOCYTES # BLD AUTO: 0.01 K/UL (ref 0–0.04)
IMM GRANULOCYTES NFR BLD AUTO: 0.2 % (ref 0–0.5)
LDLC SERPL CALC-MCNC: 93 MG/DL (ref 0–100)
LIPID PANEL: ABNORMAL
LYMPHOCYTES # BLD: 1.55 K/UL (ref 0.9–3.6)
LYMPHOCYTES NFR BLD: 30.7 % (ref 21–52)
MAGNESIUM SERPL-MCNC: 2.3 MG/DL (ref 1.6–2.6)
MCH RBC QN AUTO: 29.8 PG (ref 24–34)
MCHC RBC AUTO-ENTMCNC: 30.4 G/DL (ref 31–37)
MCV RBC AUTO: 98.1 FL (ref 78–100)
MONOCYTES # BLD: 0.62 K/UL (ref 0.05–1.2)
MONOCYTES NFR BLD: 12.3 % (ref 3–10)
NEUTS SEG # BLD: 2.64 K/UL (ref 1.8–8)
NEUTS SEG NFR BLD: 52.2 % (ref 40–73)
NRBC # BLD: 0 K/UL (ref 0–0.01)
NRBC BLD-RTO: 0 PER 100 WBC
PLATELET # BLD AUTO: 268 K/UL (ref 135–420)
PMV BLD AUTO: 11.3 FL (ref 9.2–11.8)
POTASSIUM SERPL-SCNC: 4.1 MMOL/L (ref 3.5–5.5)
PROT SERPL-MCNC: 7.6 G/DL (ref 6.4–8.2)
RBC # BLD AUTO: 4.26 M/UL (ref 4.2–5.3)
SODIUM SERPL-SCNC: 137 MMOL/L (ref 136–145)
TRIGL SERPL-MCNC: 120 MG/DL
VLDLC SERPL CALC-MCNC: 24 MG/DL
WBC # BLD AUTO: 5.1 K/UL (ref 4.6–13.2)

## 2025-02-05 PROCEDURE — 83735 ASSAY OF MAGNESIUM: CPT

## 2025-02-05 PROCEDURE — 80061 LIPID PANEL: CPT

## 2025-02-05 PROCEDURE — 80053 COMPREHEN METABOLIC PANEL: CPT

## 2025-02-05 PROCEDURE — 36415 COLL VENOUS BLD VENIPUNCTURE: CPT

## 2025-02-05 PROCEDURE — 85025 COMPLETE CBC W/AUTO DIFF WBC: CPT

## 2025-02-05 PROCEDURE — 82306 VITAMIN D 25 HYDROXY: CPT

## 2025-02-05 PROCEDURE — 82728 ASSAY OF FERRITIN: CPT

## 2025-02-18 ENCOUNTER — OFFICE VISIT (OUTPATIENT)
Facility: CLINIC | Age: 80
End: 2025-02-18

## 2025-02-18 ENCOUNTER — HOSPITAL ENCOUNTER (OUTPATIENT)
Facility: HOSPITAL | Age: 80
Setting detail: SPECIMEN
Discharge: HOME | End: 2025-02-21
Payer: MEDICARE

## 2025-02-18 VITALS
SYSTOLIC BLOOD PRESSURE: 112 MMHG | DIASTOLIC BLOOD PRESSURE: 60 MMHG | WEIGHT: 108 LBS | TEMPERATURE: 98.5 F | RESPIRATION RATE: 14 BRPM | OXYGEN SATURATION: 97 % | HEIGHT: 62 IN | HEART RATE: 61 BPM | BODY MASS INDEX: 19.88 KG/M2

## 2025-02-18 DIAGNOSIS — R74.01 ELEVATED AST (SGOT): ICD-10-CM

## 2025-02-18 DIAGNOSIS — E61.1 IRON DEFICIENCY: ICD-10-CM

## 2025-02-18 DIAGNOSIS — N39.41 URGE INCONTINENCE OF URINE: ICD-10-CM

## 2025-02-18 DIAGNOSIS — I65.23 CAROTID STENOSIS, BILATERAL: ICD-10-CM

## 2025-02-18 DIAGNOSIS — I77.3 FIBROMUSCULAR DYSPLASIA: ICD-10-CM

## 2025-02-18 DIAGNOSIS — I10 PRIMARY HYPERTENSION: Primary | ICD-10-CM

## 2025-02-18 DIAGNOSIS — E78.00 PURE HYPERCHOLESTEROLEMIA: ICD-10-CM

## 2025-02-18 DIAGNOSIS — M05.79 RHEUMATOID ARTHRITIS INVOLVING MULTIPLE SITES WITH POSITIVE RHEUMATOID FACTOR (HCC): ICD-10-CM

## 2025-02-18 DIAGNOSIS — E83.52 FHH (FAMILIAL HYPOCALCIURIC HYPERCALCEMIA): ICD-10-CM

## 2025-02-18 DIAGNOSIS — E21.3 HYPERPARATHYROIDISM: ICD-10-CM

## 2025-02-18 DIAGNOSIS — D84.9 IMMUNOSUPPRESSED STATUS: ICD-10-CM

## 2025-02-18 DIAGNOSIS — I47.19 PAROXYSMAL ATRIAL TACHYCARDIA: ICD-10-CM

## 2025-02-18 DIAGNOSIS — F41.9 ANXIETY: ICD-10-CM

## 2025-02-18 DIAGNOSIS — Z12.31 SCREENING MAMMOGRAM FOR BREAST CANCER: ICD-10-CM

## 2025-02-18 DIAGNOSIS — G47.00 INSOMNIA, UNSPECIFIED TYPE: ICD-10-CM

## 2025-02-18 LAB
APPEARANCE UR: CLEAR
BACTERIA URNS QL MICRO: NEGATIVE /HPF
BILIRUB UR QL: NEGATIVE
COLOR UR: YELLOW
EPITH CASTS URNS QL MICRO: NEGATIVE /LPF (ref 0–5)
GLUCOSE UR STRIP.AUTO-MCNC: NEGATIVE MG/DL
HGB UR QL STRIP: NEGATIVE
KETONES UR QL STRIP.AUTO: NEGATIVE MG/DL
LEUKOCYTE ESTERASE UR QL STRIP.AUTO: NEGATIVE
NITRITE UR QL STRIP.AUTO: NEGATIVE
PH UR STRIP: 7 (ref 5–8)
PROT UR STRIP-MCNC: NEGATIVE MG/DL
RBC #/AREA URNS HPF: NEGATIVE /HPF (ref 0–5)
SP GR UR REFRACTOMETRY: 1.01 (ref 1–1.03)
UROBILINOGEN UR QL STRIP.AUTO: 0.2 EU/DL (ref 0.2–1)
WBC URNS QL MICRO: NEGATIVE /HPF (ref 0–5)

## 2025-02-18 PROCEDURE — 87086 URINE CULTURE/COLONY COUNT: CPT

## 2025-02-18 PROCEDURE — 81001 URINALYSIS AUTO W/SCOPE: CPT

## 2025-02-18 SDOH — ECONOMIC STABILITY: FOOD INSECURITY: WITHIN THE PAST 12 MONTHS, THE FOOD YOU BOUGHT JUST DIDN'T LAST AND YOU DIDN'T HAVE MONEY TO GET MORE.: NEVER TRUE

## 2025-02-18 SDOH — ECONOMIC STABILITY: FOOD INSECURITY: WITHIN THE PAST 12 MONTHS, YOU WORRIED THAT YOUR FOOD WOULD RUN OUT BEFORE YOU GOT MONEY TO BUY MORE.: NEVER TRUE

## 2025-02-18 ASSESSMENT — PATIENT HEALTH QUESTIONNAIRE - PHQ9
SUM OF ALL RESPONSES TO PHQ QUESTIONS 1-9: 0
SUM OF ALL RESPONSES TO PHQ9 QUESTIONS 1 & 2: 0
2. FEELING DOWN, DEPRESSED OR HOPELESS: NOT AT ALL
1. LITTLE INTEREST OR PLEASURE IN DOING THINGS: NOT AT ALL

## 2025-02-18 NOTE — PROGRESS NOTES
Anya Mason presents today for   Chief Complaint   Patient presents with    6 Month Follow-Up       \"Have you been to the ER, urgent care clinic since your last visit?  Hospitalized since your last visit?\"    NO    “Have you seen or consulted any other health care providers outside of [facility] since your last visit?”    NO            
osteopenia in bilateral hips and again recommended to initiate treatment with Reclast.  Remains unwilling to proceed.  Continue calcium and Vitamin D supplementation.  Vitamin D level much improved since restarting supplement.  Encouraged exercise, particularly weight bearing activities. Fall precautions stressed.  7. Hyperparathyroidism. Mild increase in PTH, mildly elevated calcium, normal Vitamin D level,and 24 hour urine calcium only 105 (FeCa 1%) consistent with a diagnosis of familial hypocalciuric hypercalcemia.  Previously followed by Dr. Madison every 2 years given benign condition. Calcium level remains elevated but stable today at 10.6.  Advised to drink plenty of fluids. Will reassess intact PTH level at next visit. Will continue to monitor.  8. Depression and anxiety.  Noted significantly increased symptoms of depression and anxiety following COVID infection in 12/2023 and dose of Zoloft increased to 100 mg daily with improvement.  Reports today that attempts at decreasing dose unsuccessful and continuing at the 100 mg daily dose with good response. Still having some issues with insomnia and taking melatonin 5 mg nightly with reasonable response.  Did not use hydroxyzine as prescribed. Will continue to monitor.  9.  Elevated AST.  Noted mild elevation of AST today at 42 and other LFTs normal.  Patient denies NSAID use but does drink occasional wine.  On chronic methotrexate therapy.  Given only mild elevation, will continue to monitor.  Reports has upcoming labs with Dr. Velasquez in 3/2025 to reassess.  Advised to avoid alcohol.  10. IBS with diarrhea.  Had been having significant difficulty with diarrhea impacting her quality of life. Underwent evaluation by Dr. More and felt to be secondary to IBS.  Advised dietary changes to eliminate seeds and certain grains with noticeable improvement.  Did not wish to begin treatment with Creon. Now with noticeable improvement when eating whole grains and not

## 2025-02-20 LAB
BACTERIA SPEC CULT: NORMAL
SERVICE CMNT-IMP: NORMAL

## 2025-05-07 ENCOUNTER — HOSPITAL ENCOUNTER (OUTPATIENT)
Facility: HOSPITAL | Age: 80
Discharge: HOME | End: 2025-05-10
Attending: INTERNAL MEDICINE
Payer: MEDICARE

## 2025-05-07 VITALS — HEIGHT: 66 IN | BODY MASS INDEX: 17.04 KG/M2 | WEIGHT: 106 LBS

## 2025-05-07 DIAGNOSIS — Z12.31 SCREENING MAMMOGRAM FOR BREAST CANCER: ICD-10-CM

## 2025-05-07 PROCEDURE — 77063 BREAST TOMOSYNTHESIS BI: CPT

## 2025-05-08 RX ORDER — SERTRALINE HYDROCHLORIDE 100 MG/1
100 TABLET, FILM COATED ORAL DAILY
Qty: 90 TABLET | Refills: 3 | Status: SHIPPED | OUTPATIENT
Start: 2025-05-08

## 2025-05-13 ENCOUNTER — OFFICE VISIT (OUTPATIENT)
Facility: CLINIC | Age: 80
End: 2025-05-13

## 2025-05-13 ENCOUNTER — HOSPITAL ENCOUNTER (OUTPATIENT)
Age: 80
Discharge: HOME | End: 2025-05-16
Payer: MEDICARE

## 2025-05-13 VITALS
TEMPERATURE: 98.7 F | SYSTOLIC BLOOD PRESSURE: 102 MMHG | HEART RATE: 67 BPM | BODY MASS INDEX: 17.19 KG/M2 | WEIGHT: 107 LBS | DIASTOLIC BLOOD PRESSURE: 60 MMHG | OXYGEN SATURATION: 97 % | HEIGHT: 66 IN | RESPIRATION RATE: 16 BRPM

## 2025-05-13 DIAGNOSIS — R27.0 ATAXIA: ICD-10-CM

## 2025-05-13 DIAGNOSIS — I10 PRIMARY HYPERTENSION: ICD-10-CM

## 2025-05-13 DIAGNOSIS — K58.0 IRRITABLE BOWEL SYNDROME WITH DIARRHEA: ICD-10-CM

## 2025-05-13 DIAGNOSIS — R51.9 ACUTE INTRACTABLE HEADACHE, UNSPECIFIED HEADACHE TYPE: ICD-10-CM

## 2025-05-13 DIAGNOSIS — I47.19 PAROXYSMAL ATRIAL TACHYCARDIA: ICD-10-CM

## 2025-05-13 DIAGNOSIS — E78.00 PURE HYPERCHOLESTEROLEMIA: ICD-10-CM

## 2025-05-13 DIAGNOSIS — I65.23 CAROTID STENOSIS, BILATERAL: ICD-10-CM

## 2025-05-13 DIAGNOSIS — Z85.828 HISTORY OF NONMELANOMA SKIN CANCER: ICD-10-CM

## 2025-05-13 DIAGNOSIS — M47.812 CERVICAL SPONDYLOSIS: ICD-10-CM

## 2025-05-13 DIAGNOSIS — R63.6 UNDERWEIGHT (BMI < 18.5): ICD-10-CM

## 2025-05-13 DIAGNOSIS — D84.9 IMMUNOSUPPRESSED STATUS: ICD-10-CM

## 2025-05-13 DIAGNOSIS — M05.79 RHEUMATOID ARTHRITIS INVOLVING MULTIPLE SITES WITH POSITIVE RHEUMATOID FACTOR (HCC): ICD-10-CM

## 2025-05-13 DIAGNOSIS — I77.3 FIBROMUSCULAR DYSPLASIA: ICD-10-CM

## 2025-05-13 DIAGNOSIS — R51.9 ACUTE INTRACTABLE HEADACHE, UNSPECIFIED HEADACHE TYPE: Primary | ICD-10-CM

## 2025-05-13 DIAGNOSIS — F41.9 ANXIETY: ICD-10-CM

## 2025-05-13 LAB — CREAT UR-MCNC: 0.8 MG/DL (ref 0.6–1.3)

## 2025-05-13 PROCEDURE — 82565 ASSAY OF CREATININE: CPT

## 2025-05-13 PROCEDURE — 70496 CT ANGIOGRAPHY HEAD: CPT

## 2025-05-13 PROCEDURE — 6360000004 HC RX CONTRAST MEDICATION: Performed by: INTERNAL MEDICINE

## 2025-05-13 RX ORDER — IOPAMIDOL 755 MG/ML
80 INJECTION, SOLUTION INTRAVASCULAR
Status: COMPLETED | OUTPATIENT
Start: 2025-05-13 | End: 2025-05-13

## 2025-05-13 RX ADMIN — IOPAMIDOL 80 ML: 755 INJECTION, SOLUTION INTRAVENOUS at 16:46

## 2025-05-13 NOTE — PROGRESS NOTES
Anya Mason presents today for   Chief Complaint   Patient presents with    Dizziness       \"Have you been to the ER, urgent care clinic since your last visit?  Hospitalized since your last visit?\"    NO    “Have you seen or consulted any other health care providers outside of [facility] since your last visit?”    NO            
any chest pain, shortness of breath at rest or with exertion, palpitations, lightheadedness, or edema. She underwent a repeat carotid duplex study (8/15/2018) showing mild (<50%) stenosis of the left internal carotid artery and moderate (50-69%) stenosis of the right internal carotid artery. Repeat performed 12/17/2019 showed moderate homogenous plaque in right ICA (50 to 69% stenosis). Stenosis is at the mid ICA and may be upper limits of moderate; mild heterogeneous plaque left ICA (< 50% stenosis).     She has a history of rheumatoid arthritis, diagnosed when she was 41 years old, treated currently with subcutaneous methotrexate and hydroxychloroquine. She previously was also treated with prednisone, but has been able to successfully wean from taking it. She is being followed closely by Dr. Velasquez. She is not currently being treated with a biologic agent due to concern regarding multiple squamous cell carcinomas of the skin, for which she was followed by Dr. Scott and now by Dr. Shin. She reports that she was well controlled on Humira for several years, but had to discontinue treatment when it was no longer on her insurance formulary. She also has a history of osteopenia, with bone density studies performed by Dr. Velasquez. Her last bone density study was in 2/2019 showing T-scores:  femoral neck left -1.7  /right -1.9 and lumbar -2.1. She continues to take calcium and Vitamin D supplements. She has no history of pathologic fractures. She was recently diagnosed with familial hypocalciuric hypercalcemia with mildly elevated calcium 10.6, mildly elevated PTH 80, Vitamin D level 36.5, and 24 hour urine calcium of 105 (FeCa 1%) in 7/30/2018, which would be consistent with this diagnosis. She was being followed by Dr. Madison.      She has a history of cervical degenerative joint and disc disease and chronic neck pain. She was being followed by Dr. Mccarthy, who recommended surgery. She was seen by Dr. Rodriguez at

## 2025-05-14 ENCOUNTER — RESULTS FOLLOW-UP (OUTPATIENT)
Facility: CLINIC | Age: 80
End: 2025-05-14

## 2025-05-17 RX ORDER — OMEPRAZOLE 20 MG/1
20 CAPSULE, DELAYED RELEASE ORAL DAILY
COMMUNITY

## (undated) DEVICE — UNDERPAD INCONT W23XL36IN STD BLU POLYPR BK FLUF SFT

## (undated) DEVICE — SYRINGE MED 50ML LUERSLIP TIP

## (undated) DEVICE — ENDOSCOPY PUMP TUBING/ CAP SET: Brand: ERBE

## (undated) DEVICE — FORCEPS BX L240CM JAW DIA2.8MM L CAP W/ NDL MIC MESH TOOTH

## (undated) DEVICE — SYRINGE MED 10ML LUERLOCK TIP W/O SFTY DISP

## (undated) DEVICE — TRAP SPEC COLL POLYP POLYSTYR --

## (undated) DEVICE — CATH IV SAFE STR 22GX1IN BLU -- PROTECTIV PLUS

## (undated) DEVICE — MEDI-VAC NON-CONDUCTIVE SUCTION TUBING: Brand: CARDINAL HEALTH

## (undated) DEVICE — SYRINGE MED 25GA 3ML L5/8IN SUBQ PLAS W/ DETACH NDL SFTY

## (undated) DEVICE — SNARE ENDOSCP POLYP 2.4 MM 240 CM 10 MM 2.8 MM CAPTIVATOR

## (undated) DEVICE — SNARE POLYP M W27MMXL240CM OVL STIFF DISP CAPTIVATOR

## (undated) DEVICE — SYRINGE 20ML LL S/C 50

## (undated) DEVICE — CANNULA NSL AD TBNG L14FT STD PVC O2 CRV CONN NONFLARED NSL

## (undated) DEVICE — SOLUTION IRRIG 1000ML H2O STRL BLT

## (undated) DEVICE — LINER SUCT CANSTR 3000CC PLAS SFT PRE ASSEMB W/OUT TBNG W/

## (undated) DEVICE — GOWN PLASTIC FILM THMBHKS UNIV BLUE: Brand: CARDINAL HEALTH

## (undated) DEVICE — YANKAUER,SMOOTH HANDLE,HIGH CAPACITY: Brand: MEDLINE INDUSTRIES, INC.

## (undated) DEVICE — GAUZE,SPONGE,4"X4",16PLY,STRL,LF,10/TRAY: Brand: MEDLINE

## (undated) DEVICE — CATHETER SUCT TR FL TIP 14FR W/ O CTRL

## (undated) DEVICE — Device

## (undated) DEVICE — CANNULA ORIG TL CLR W FOAM CUSHIONS AND 14FT SUPL TB 3 CHN